# Patient Record
Sex: FEMALE | Race: BLACK OR AFRICAN AMERICAN | NOT HISPANIC OR LATINO | Employment: OTHER | URBAN - METROPOLITAN AREA
[De-identification: names, ages, dates, MRNs, and addresses within clinical notes are randomized per-mention and may not be internally consistent; named-entity substitution may affect disease eponyms.]

---

## 2017-01-09 ENCOUNTER — LAB VISIT (OUTPATIENT)
Dept: LAB | Facility: HOSPITAL | Age: 57
End: 2017-01-09
Attending: INTERNAL MEDICINE
Payer: MEDICARE

## 2017-01-09 DIAGNOSIS — E11.8 TYPE II DIABETES MELLITUS WITH COMPLICATION: ICD-10-CM

## 2017-01-09 LAB
ALBUMIN SERPL BCP-MCNC: 3.5 G/DL
ALP SERPL-CCNC: 82 U/L
ALT SERPL W/O P-5'-P-CCNC: 24 U/L
ANION GAP SERPL CALC-SCNC: 10 MMOL/L
AST SERPL-CCNC: 20 U/L
BILIRUB SERPL-MCNC: 0.3 MG/DL
BUN SERPL-MCNC: 14 MG/DL
CALCIUM SERPL-MCNC: 8.5 MG/DL
CHLORIDE SERPL-SCNC: 106 MMOL/L
CHOLEST/HDLC SERPL: 6.5 {RATIO}
CO2 SERPL-SCNC: 24 MMOL/L
CREAT SERPL-MCNC: 0.9 MG/DL
EST. GFR  (AFRICAN AMERICAN): >60 ML/MIN/1.73 M^2
EST. GFR  (NON AFRICAN AMERICAN): >60 ML/MIN/1.73 M^2
GLUCOSE SERPL-MCNC: 132 MG/DL
HDL/CHOLESTEROL RATIO: 15.4 %
HDLC SERPL-MCNC: 246 MG/DL
HDLC SERPL-MCNC: 38 MG/DL
LDLC SERPL CALC-MCNC: 163.4 MG/DL
NONHDLC SERPL-MCNC: 208 MG/DL
POTASSIUM SERPL-SCNC: 3.8 MMOL/L
PROT SERPL-MCNC: 7.3 G/DL
SODIUM SERPL-SCNC: 140 MMOL/L
TRIGL SERPL-MCNC: 223 MG/DL
TSH SERPL DL<=0.005 MIU/L-ACNC: 3.09 UIU/ML

## 2017-01-09 PROCEDURE — 36415 COLL VENOUS BLD VENIPUNCTURE: CPT | Mod: PO

## 2017-01-09 PROCEDURE — 80053 COMPREHEN METABOLIC PANEL: CPT

## 2017-01-09 PROCEDURE — 84443 ASSAY THYROID STIM HORMONE: CPT

## 2017-01-09 PROCEDURE — 80061 LIPID PANEL: CPT

## 2017-01-09 PROCEDURE — 83036 HEMOGLOBIN GLYCOSYLATED A1C: CPT

## 2017-01-10 LAB
ESTIMATED AVG GLUCOSE: 157 MG/DL
HBA1C MFR BLD HPLC: 7.1 %

## 2017-01-17 ENCOUNTER — OFFICE VISIT (OUTPATIENT)
Dept: INTERNAL MEDICINE | Facility: CLINIC | Age: 57
End: 2017-01-17
Payer: MEDICARE

## 2017-01-17 VITALS
BODY MASS INDEX: 33.27 KG/M2 | TEMPERATURE: 99 F | WEIGHT: 207 LBS | HEIGHT: 66 IN | DIASTOLIC BLOOD PRESSURE: 72 MMHG | OXYGEN SATURATION: 99 % | HEART RATE: 94 BPM | SYSTOLIC BLOOD PRESSURE: 136 MMHG

## 2017-01-17 DIAGNOSIS — I15.2 HYPERTENSION ASSOCIATED WITH DIABETES: ICD-10-CM

## 2017-01-17 DIAGNOSIS — Z86.010 HISTORY OF COLON POLYPS: ICD-10-CM

## 2017-01-17 DIAGNOSIS — E11.59 HYPERTENSION ASSOCIATED WITH DIABETES: ICD-10-CM

## 2017-01-17 DIAGNOSIS — E78.5 HYPERLIPIDEMIA ASSOCIATED WITH TYPE 2 DIABETES MELLITUS: ICD-10-CM

## 2017-01-17 DIAGNOSIS — F32.A DEPRESSION, UNSPECIFIED DEPRESSION TYPE: ICD-10-CM

## 2017-01-17 DIAGNOSIS — E11.69 HYPERLIPIDEMIA ASSOCIATED WITH TYPE 2 DIABETES MELLITUS: ICD-10-CM

## 2017-01-17 DIAGNOSIS — Z12.31 SCREENING MAMMOGRAM, ENCOUNTER FOR: ICD-10-CM

## 2017-01-17 DIAGNOSIS — E11.8 TYPE 2 DIABETES MELLITUS WITH COMPLICATION, WITHOUT LONG-TERM CURRENT USE OF INSULIN: Primary | ICD-10-CM

## 2017-01-17 DIAGNOSIS — I25.10 CORONARY ARTERY DISEASE INVOLVING NATIVE CORONARY ARTERY OF NATIVE HEART WITHOUT ANGINA PECTORIS: ICD-10-CM

## 2017-01-17 PROCEDURE — G0008 ADMIN INFLUENZA VIRUS VAC: HCPCS | Mod: PBBFAC,PO | Performed by: INTERNAL MEDICINE

## 2017-01-17 PROCEDURE — 99214 OFFICE O/P EST MOD 30 MIN: CPT | Mod: 25,S$PBB,, | Performed by: INTERNAL MEDICINE

## 2017-01-17 PROCEDURE — 99999 PR PBB SHADOW E&M-EST. PATIENT-LVL III: CPT | Mod: PBBFAC,,, | Performed by: INTERNAL MEDICINE

## 2017-01-17 PROCEDURE — 99213 OFFICE O/P EST LOW 20 MIN: CPT | Mod: PBBFAC,PO | Performed by: INTERNAL MEDICINE

## 2017-01-17 PROCEDURE — 90732 PPSV23 VACC 2 YRS+ SUBQ/IM: CPT | Mod: PBBFAC,PO | Performed by: INTERNAL MEDICINE

## 2017-01-17 RX ORDER — ROSUVASTATIN CALCIUM 40 MG/1
40 TABLET, COATED ORAL NIGHTLY
Qty: 90 TABLET | Refills: 3 | Status: SHIPPED | OUTPATIENT
Start: 2017-01-17 | End: 2018-01-22

## 2017-01-17 NOTE — MR AVS SNAPSHOT
[facility] Internal Medicine  [address] [facility]  [place] [address]  Phone: [phone]                  Dulce Boogie   2017 10:00 AM   Office Visit    Description:  Female : 1960   Provider:  Zeus Hanson MD   Department:  [facility] - Internal Medicine           Reason for Visit     6 MONTH FOLLOW UP           Diagnoses this Visit        Comments    Type 2 diabetes mellitus with complication, without long-term current use of insulin    -  Primary     Hypertension associated with diabetes         Hyperlipidemia associated with type 2 diabetes mellitus         Coronary artery disease involving native coronary artery of native heart without angina pectoris         History of colon polyps         Depression, unspecified depression type         Screening mammogram, encounter for                To Do List           Future Appointments        Provider Department Dept Phone    7/10/2017 9:10 AM LABORATORY, [facility] Laboratory [phone]    2017 9:40 AM Zeus Hanson MD [facility] Internal Medicine [phone]      Goals (5 Years of Data)     None      Follow-Up and Disposition     Return in about 6 months (around 2017).       These Medications        Disp Refills Start End    rosuvastatin (CRESTOR) 40 MG Tab 90 tablet 3 2017    Take 1 tablet (40 mg total) by mouth every evening. - Oral    Pharmacy: [facility]/pharmacy #[id number] - [place], [place] - [address] AT [facility] Ph #: [phone]         [facility] On Call     [facility] Care Line -  Assistance  Registered nurses in the [facility] On Call Center provide clinical advisement, health education, appointment booking, and other advisory services.  Call for this free service at [phone].             Medications           Message regarding Medications     Verify the changes and/or additions to your medication regime listed below are the same as discussed with your clinician  today.  If any of these changes or additions are incorrect, please notify your healthcare provider.        START taking these NEW medications        Refills    rosuvastatin (CRESTOR) 40 MG Tab 3    Sig: Take 1 tablet (40 mg total) by mouth every evening.    Class: Normal    Route: Oral           Verify that the below list of medications is an accurate representation of the medications you are currently taking.  If none reported, the list may be blank. If incorrect, please contact your healthcare provider. Carry this list with you in case of emergency.           Current Medications     alprazolam (XANAX) 0.5 MG tablet Take 1 tablet (0.5 mg total) by mouth nightly.    amlodipine-olmesartan (MARCELLE) 10-40 mg per tablet Take 1 tablet by mouth once daily.    cyclobenzaprine (FLEXERIL) 10 MG tablet Take 1 tablet (10 mg total) by mouth 3 (three) times daily.    doxazosin (CARDURA) 4 MG tablet Take 1 tablet (4 mg total) by mouth once daily.    fenofibrate micronized (LOFIBRA) 134 MG Cap TAKE ONE CAPSULE BY MOUTH EVERY DAY WITH BREAKFAST.    hydrocodone-acetaminophen 5-325mg (NORCO) 5-325 mg per tablet Take 1 tablet by mouth 3 (three) times daily as needed for Pain.    meloxicam (MOBIC) 7.5 MG tablet Take 1 tablet (7.5 mg total) by mouth 2 (two) times daily.    metformin (GLUCOPHAGE) 500 MG tablet Take 1 tablet (500 mg total) by mouth 2 (two) times daily with meals.    mupirocin (BACTROBAN) 2 % ointment Three times a day. Apply a small amount three times a day     niacin (NIASPAN EXTENDED-RELEASE) 750 MG CR tablet Take 1 tablet (750 mg total) by mouth nightly.    NITROSTAT 0.4 mg SL tablet Place 1 tablet under the tongue.    omega 3-dha-epa-fish oil (FISH OIL) 900-1,400 mg CpDR Take 1 capsule by mouth 2 (two) times daily.    omeprazole (PRILOSEC) 40 MG capsule Take 1 capsule (40 mg total) by mouth once daily.    potassium chloride SA (K-DUR,KLOR-CON) 20 MEQ tablet Take 1 tablet (20 mEq total) by mouth once daily.     "promethazine (PHENERGAN) 25 MG tablet Take 1 tablet (25 mg total) by mouth every 4 (four) hours.    metoprolol tartrate (LOPRESSOR) 25 MG tablet Take 1 tablet (25 mg total) by mouth 2 (two) times daily.    rosuvastatin (CRESTOR) 40 MG Tab Take 1 tablet (40 mg total) by mouth every evening.           Clinical Reference Information           Vital Signs - Last Recorded  Most recent update: 1/17/2017 10:07 AM by Gregory Redman MA    BP Pulse Temp Ht Wt LMP    136/72 94 98.5 °F (36.9 °C) (Tympanic) 5' 6" (1.676 m) 93.9 kg (207 lb 0.2 oz) 06/21/2003    SpO2 BMI             99% 33.41 kg/m2         Blood Pressure          Most Recent Value    BP  136/72      Allergies as of 1/17/2017     No Known Drug Allergies      Immunizations Administered on Date of Encounter - 1/17/2017     Name Date Dose VIS Date Route    Influenza - High Dose 1/17/2017 0.5 mL 8/7/2015 Intramuscular    Pneumococcal Polysaccharide - 23 Valent 1/17/2017 0.5 mL 4/24/2015 Intramuscular      Orders Placed During Today's Visit      Normal Orders This Visit    Case request GI: COLONOSCOPY     Influenza - High Dose (65+) (PF) (IM)     Pneumococcal Polysaccharide Vaccine (23 Valent) (SQ/IM)     Future Labs/Procedures Expected by Expires    CBC auto differential  7/16/2017 (Approximate) 3/18/2018    Comprehensive metabolic panel  7/16/2017 (Approximate) 3/18/2018    Hemoglobin A1c  7/16/2017 (Approximate) 3/18/2018    Lipid panel  7/16/2017 (Approximate) 3/18/2018    Mammo Digital Screening Bilat with CAD  7/16/2017 (Approximate) 3/19/2018    Protein / creatinine ratio, urine  7/16/2017 (Approximate) 7/23/2017    TSH  7/16/2017 (Approximate) 3/18/2018      "

## 2017-01-17 NOTE — PROGRESS NOTES
"HPI:  Patient is a 56-year-old female who comes today for follow-up for diabetes, hypertension, lipids.  At this time, she has no reported problems or complaints.  She denies any hypoglycemic events.  Her blood pressure has been doing extremely well.  She denies any chest pain, shortness of breath.    Current meds have been verified and updated per the EMR  Exam:  Visit Vitals    /72    Pulse 94    Temp 98.5 °F (36.9 °C) (Tympanic)    Ht 5' 6" (1.676 m)    Wt 93.9 kg (207 lb 0.2 oz)    LMP 06/21/2003    SpO2 99%    BMI 33.41 kg/m2     Exam deferred    Lab Results   Component Value Date    WBC 6.95 07/07/2016    HGB 12.1 07/07/2016    HCT 38.0 07/07/2016     07/07/2016    CHOL 246 (H) 01/09/2017    TRIG 223 (H) 01/09/2017    HDL 38 (L) 01/09/2017    ALT 24 01/09/2017    AST 20 01/09/2017     01/09/2017    K 3.8 01/09/2017     01/09/2017    CREATININE 0.9 01/09/2017    BUN 14 01/09/2017    CO2 24 01/09/2017    TSH 3.091 01/09/2017    INR 1.0 12/21/2013    GLUF 106 01/07/2005    HGBA1C 7.1 (H) 01/09/2017       Impression:  Hyperlipidemia, not to goal  Other Multiple medical problems below, stable  Patient Active Problem List   Diagnosis    Depression    Obesity    CAD (coronary artery disease)    Type II diabetes mellitus with complication    Hypertension associated with diabetes    Hyperlipidemia associated with type 2 diabetes mellitus    History of colon polyps       Plan:  Orders Placed This Encounter    Mammo Digital Screening Bilat with CAD    Pneumococcal Polysaccharide Vaccine (23 Valent) (SQ/IM)    Influenza - High Dose (65+) (PF) (IM)    Hemoglobin A1c    Comprehensive metabolic panel    Lipid panel    Protein / creatinine ratio, urine    TSH    CBC auto differential    rosuvastatin (CRESTOR) 40 MG Tab    Case request GI: COLONOSCOPY     She will increase the Crestor to 40 mg a day.  She was given high-dose influenza and Pneumovax today.  She'll be seen again " in 6 months with the above lab work and mammogram.  She is due for her colonoscopy.

## 2017-01-17 NOTE — PROGRESS NOTES
Administered Pneumovax 23 / Highdose Flu per Dr Hanson. See immunization record. Tolerated well/TGD

## 2017-03-02 ENCOUNTER — OFFICE VISIT (OUTPATIENT)
Dept: INTERNAL MEDICINE | Facility: CLINIC | Age: 57
End: 2017-03-02
Payer: MEDICARE

## 2017-03-02 VITALS
WEIGHT: 209.44 LBS | DIASTOLIC BLOOD PRESSURE: 72 MMHG | HEIGHT: 66 IN | HEART RATE: 100 BPM | SYSTOLIC BLOOD PRESSURE: 128 MMHG | BODY MASS INDEX: 33.66 KG/M2 | TEMPERATURE: 100 F | OXYGEN SATURATION: 96 %

## 2017-03-02 DIAGNOSIS — R50.9 FEVER, UNSPECIFIED FEVER CAUSE: ICD-10-CM

## 2017-03-02 DIAGNOSIS — R52 BODY ACHES: ICD-10-CM

## 2017-03-02 DIAGNOSIS — J10.1 INFLUENZA B: ICD-10-CM

## 2017-03-02 LAB
CTP QC/QA: YES
CTP QC/QA: YES
FLUAV AG NPH QL: NEGATIVE
FLUBV AG NPH QL: POSITIVE
S PYO RRNA THROAT QL PROBE: NEGATIVE

## 2017-03-02 PROCEDURE — 87804 INFLUENZA ASSAY W/OPTIC: CPT | Mod: PBBFAC,PO | Performed by: NURSE PRACTITIONER

## 2017-03-02 PROCEDURE — 99999 PR PBB SHADOW E&M-EST. PATIENT-LVL IV: CPT | Mod: PBBFAC,,, | Performed by: NURSE PRACTITIONER

## 2017-03-02 PROCEDURE — 87880 STREP A ASSAY W/OPTIC: CPT | Mod: PBBFAC,PO | Performed by: NURSE PRACTITIONER

## 2017-03-02 PROCEDURE — 87081 CULTURE SCREEN ONLY: CPT

## 2017-03-02 PROCEDURE — 99214 OFFICE O/P EST MOD 30 MIN: CPT | Mod: PBBFAC,PO | Performed by: NURSE PRACTITIONER

## 2017-03-02 PROCEDURE — 99213 OFFICE O/P EST LOW 20 MIN: CPT | Mod: S$PBB,,, | Performed by: NURSE PRACTITIONER

## 2017-03-02 RX ORDER — PROMETHAZINE HYDROCHLORIDE AND DEXTROMETHORPHAN HYDROBROMIDE 6.25; 15 MG/5ML; MG/5ML
5 SYRUP ORAL EVERY 6 HOURS PRN
Qty: 240 ML | Refills: 0 | Status: SHIPPED | OUTPATIENT
Start: 2017-03-02 | End: 2017-03-12

## 2017-03-02 RX ORDER — MUPIROCIN 20 MG/G
OINTMENT TOPICAL 3 TIMES DAILY
Qty: 22 G | Refills: 0 | Status: SHIPPED | OUTPATIENT
Start: 2017-03-02 | End: 2017-09-27 | Stop reason: SDUPTHER

## 2017-03-02 RX ORDER — OSELTAMIVIR PHOSPHATE 75 MG/1
75 CAPSULE ORAL 2 TIMES DAILY
Qty: 10 CAPSULE | Refills: 0 | Status: SHIPPED | OUTPATIENT
Start: 2017-03-02 | End: 2017-03-07

## 2017-03-02 NOTE — PROGRESS NOTES
"Subjective:      Patient ID: Dulce Boogie is a 56 y.o. female.    Chief Complaint: Cough (fever,congestion,body aches)    HPI:  Patient states since yesterday she has had chills, fever, body aches. Her throat has been sore, dry cough.  She has taken tylenol and aspirin at home    Past Medical History:   Diagnosis Date    CAD (coronary artery disease)     Depression     History of colon polyps     Hyperlipidemia associated with type 2 diabetes mellitus     Hypertension associated with diabetes     Obesity     Type II diabetes mellitus with complication        Past Surgical History:   Procedure Laterality Date    CAROTID STENT       SECTION, LOW TRANSVERSE      x 2       Lab Results   Component Value Date    WBC 6.95 2016    HGB 12.1 2016    HCT 38.0 2016     2016    CHOL 246 (H) 2017    TRIG 223 (H) 2017    HDL 38 (L) 2017    ALT 24 2017    AST 20 2017     2017    K 3.8 2017     2017    CREATININE 0.9 2017    BUN 14 2017    CO2 24 2017    TSH 3.091 2017    INR 1.0 2013    GLUF 106 2005    HGBA1C 7.1 (H) 2017       /72  Pulse 100  Temp 99.9 °F (37.7 °C) (Tympanic)   Ht 5' 5.5" (1.664 m)  Wt 95 kg (209 lb 7 oz)  LMP 2003  SpO2 96%  BMI 34.32 kg/m2      Review of Systems   Constitutional: Positive for fever. Negative for appetite change, fatigue and unexpected weight change.        Body aches   HENT: Positive for congestion and sore throat. Negative for ear pain, postnasal drip, rhinorrhea, sinus pressure, sneezing, tinnitus, trouble swallowing and voice change.    Eyes: Negative for pain, discharge, redness, itching and visual disturbance.   Respiratory: Negative for cough, chest tightness, shortness of breath and wheezing.    Cardiovascular: Negative for chest pain, palpitations and leg swelling.   Gastrointestinal: Negative for abdominal distention, " abdominal pain, blood in stool, constipation, diarrhea, nausea and vomiting.        No reflux.   Genitourinary: Negative for difficulty urinating, dyspareunia, flank pain, menstrual problem and pelvic pain.   Musculoskeletal: Negative for arthralgias, back pain, myalgias and neck stiffness.   Skin: Negative for color change and rash.   Neurological: Negative for dizziness and headaches.   Psychiatric/Behavioral: Negative for confusion and sleep disturbance. The patient is not nervous/anxious.       Objective:     Physical Exam   Constitutional: She is oriented to person, place, and time. She appears well-developed and well-nourished. No distress.   HENT:   Head: Normocephalic and atraumatic.   Mouth/Throat: Oropharynx is clear and moist. No oropharyngeal exudate.   Mild red throat   Neck: Neck supple.   Cardiovascular: Normal rate, regular rhythm and normal heart sounds.  Exam reveals no gallop and no friction rub.    No murmur heard.  Pulmonary/Chest: Effort normal and breath sounds normal. No respiratory distress. She has no wheezes. She has no rales.   Musculoskeletal: She exhibits no edema or tenderness.   Lymphadenopathy:     She has no cervical adenopathy.   Neurological: She is alert and oriented to person, place, and time. No cranial nerve deficit.   Skin: Skin is warm and dry. She is not diaphoretic.   Psychiatric: She has a normal mood and affect. Her behavior is normal.   Nursing note and vitals reviewed.    Assessment:      1. Influenza B    2. Fever, unspecified fever cause    3. Body aches      Plan:   Influenza B    Fever, unspecified fever cause  -     POCT Rapid Strep A  -     POCT Influenza A/B  -     Strep A culture, throat    Body aches  -     POCT Rapid Strep A  -     POCT Influenza A/B  -     Strep A culture, throat    Other orders  -     mupirocin (BACTROBAN) 2 % ointment; Apply topically 3 (three) times daily.  Dispense: 22 g; Refill: 0  -     oseltamivir (TAMIFLU) 75 MG capsule; Take 1  capsule (75 mg total) by mouth 2 (two) times daily.  Dispense: 10 capsule; Refill: 0  -     promethazine-dextromethorphan (PROMETHAZINE-DM) 6.25-15 mg/5 mL Syrp; Take 5 mLs by mouth every 6 (six) hours as needed.  Dispense: 240 mL; Refill: 0    Will notify if strep is +.   Symptomatic care discussed, rest and fluids    Current Outpatient Prescriptions:     alprazolam (XANAX) 0.5 MG tablet, Take 1 tablet (0.5 mg total) by mouth nightly., Disp: 30 tablet, Rfl: 5    amlodipine-olmesartan (MARCELLE) 10-40 mg per tablet, Take 1 tablet by mouth once daily., Disp: 30 tablet, Rfl: 11    cyclobenzaprine (FLEXERIL) 10 MG tablet, Take 1 tablet (10 mg total) by mouth 3 (three) times daily., Disp: 50 tablet, Rfl: 3    doxazosin (CARDURA) 4 MG tablet, Take 1 tablet (4 mg total) by mouth once daily., Disp: 30 tablet, Rfl: 11    fenofibrate micronized (LOFIBRA) 134 MG Cap, TAKE ONE CAPSULE BY MOUTH EVERY DAY WITH BREAKFAST., Disp: 30 capsule, Rfl: 11    hydrocodone-acetaminophen 5-325mg (NORCO) 5-325 mg per tablet, Take 1 tablet by mouth 3 (three) times daily as needed for Pain., Disp: 60 tablet, Rfl: 0    meloxicam (MOBIC) 7.5 MG tablet, Take 1 tablet (7.5 mg total) by mouth 2 (two) times daily., Disp: 60 tablet, Rfl: 11    metformin (GLUCOPHAGE) 500 MG tablet, Take 1 tablet (500 mg total) by mouth 2 (two) times daily with meals., Disp: 180 tablet, Rfl: 3    niacin (NIASPAN EXTENDED-RELEASE) 750 MG CR tablet, Take 1 tablet (750 mg total) by mouth nightly., Disp: 30 tablet, Rfl: 11    NITROSTAT 0.4 mg SL tablet, Place 1 tablet under the tongue., Disp: , Rfl: 3    omega 3-dha-epa-fish oil (FISH OIL) 900-1,400 mg CpDR, Take 1 capsule by mouth 2 (two) times daily., Disp: , Rfl:     potassium chloride SA (K-DUR,KLOR-CON) 20 MEQ tablet, Take 1 tablet (20 mEq total) by mouth once daily., Disp: 30 tablet, Rfl: 11    promethazine (PHENERGAN) 25 MG tablet, Take 1 tablet (25 mg total) by mouth every 4 (four) hours., Disp: 30 tablet,  Rfl: 1    rosuvastatin (CRESTOR) 40 MG Tab, Take 1 tablet (40 mg total) by mouth every evening., Disp: 90 tablet, Rfl: 3    mupirocin (BACTROBAN) 2 % ointment, Apply topically 3 (three) times daily., Disp: 22 g, Rfl: 0    omeprazole (PRILOSEC) 40 MG capsule, Take 1 capsule (40 mg total) by mouth once daily., Disp: 30 capsule, Rfl: 11    oseltamivir (TAMIFLU) 75 MG capsule, Take 1 capsule (75 mg total) by mouth 2 (two) times daily., Disp: 10 capsule, Rfl: 0    promethazine-dextromethorphan (PROMETHAZINE-DM) 6.25-15 mg/5 mL Syrp, Take 5 mLs by mouth every 6 (six) hours as needed., Disp: 240 mL, Rfl: 0

## 2017-03-02 NOTE — MR AVS SNAPSHOT
[facility] Internal Medicine  [address] [place] [address]  [place] [address]  Phone: [phone]                  Dulce Boogie   3/2/2017 10:00 AM   Office Visit    Description:  Female : 1960   Provider:  Daniel Wilson NP   Department:  [facility]           Reason for Visit     Cough           Diagnoses this Visit        Comments    Fever, unspecified fever cause    -  Primary     Body aches                To Do List           Future Appointments        Provider Department Dept Phone    7/10/2017 9:10 AM LABORATORY, [facility] - Laboratory [phone]    2017 9:40 AM Zeus Hanson MD [facility] Internal Medicine [phone]      Goals (5 Years of Data)     None       These Medications        Disp Refills Start End    mupirocin (BACTROBAN) 2 % ointment 22 g 0 3/2/2017 3/12/2017    Apply topically 3 (three) times daily. - Topical (Top)    Pharmacy: [facility]/pharmacy #[id number] - [place] - [address] [facility] Ph #: [phone]       oseltamivir (TAMIFLU) 75 MG capsule 10 capsule 0 3/2/2017 3/7/2017    Take 1 capsule (75 mg total) by mouth 2 (two) times daily. - Oral    Pharmacy: [facility]/pharmacy #[id number]  [place] - [address] [facility] Ph #: [phone]       promethazine-dextromethorphan (PROMETHAZINE-DM) 6.25-15 mg/5 mL Syrp 240 mL 0 3/2/2017 3/12/2017    Take 5 mLs by mouth every 6 (six) hours as needed. - Oral    Pharmacy: [facility]/pharmacy #[id number]  [place] - [address] [facility] Ph #: [phone]         [facility] On Call     [facility] On Call Nurse Care Line -  Assistance  Registered nurses in the [facility] On Call Center provide clinical advisement, health education, appointment booking, and other advisory services.  Call for this free service at [phone].             Medications           Message regarding Medications     Verify the changes and/or additions to your medication regime listed  below are the same as discussed with your clinician today.  If any of these changes or additions are incorrect, please notify your healthcare provider.        START taking these NEW medications        Refills    mupirocin (BACTROBAN) 2 % ointment 0    Sig: Apply topically 3 (three) times daily.    Class: Normal    Route: Topical (Top)    oseltamivir (TAMIFLU) 75 MG capsule 0    Sig: Take 1 capsule (75 mg total) by mouth 2 (two) times daily.    Class: Normal    Route: Oral    promethazine-dextromethorphan (PROMETHAZINE-DM) 6.25-15 mg/5 mL Syrp 0    Sig: Take 5 mLs by mouth every 6 (six) hours as needed.    Class: Normal    Route: Oral      STOP taking these medications     metoprolol tartrate (LOPRESSOR) 25 MG tablet Take 1 tablet (25 mg total) by mouth 2 (two) times daily.           Verify that the below list of medications is an accurate representation of the medications you are currently taking.  If none reported, the list may be blank. If incorrect, please contact your healthcare provider. Carry this list with you in case of emergency.           Current Medications     alprazolam (XANAX) 0.5 MG tablet Take 1 tablet (0.5 mg total) by mouth nightly.    amlodipine-olmesartan (MARCELLE) 10-40 mg per tablet Take 1 tablet by mouth once daily.    cyclobenzaprine (FLEXERIL) 10 MG tablet Take 1 tablet (10 mg total) by mouth 3 (three) times daily.    doxazosin (CARDURA) 4 MG tablet Take 1 tablet (4 mg total) by mouth once daily.    fenofibrate micronized (LOFIBRA) 134 MG Cap TAKE ONE CAPSULE BY MOUTH EVERY DAY WITH BREAKFAST.    hydrocodone-acetaminophen 5-325mg (NORCO) 5-325 mg per tablet Take 1 tablet by mouth 3 (three) times daily as needed for Pain.    meloxicam (MOBIC) 7.5 MG tablet Take 1 tablet (7.5 mg total) by mouth 2 (two) times daily.    metformin (GLUCOPHAGE) 500 MG tablet Take 1 tablet (500 mg total) by mouth 2 (two) times daily with meals.    niacin (NIASPAN EXTENDED-RELEASE) 750 MG CR tablet Take 1 tablet (750  mg total) by mouth nightly.    NITROSTAT 0.4 mg SL tablet Place 1 tablet under the tongue.    omega 3-dha-epa-fish oil (FISH OIL) 900-1,400 mg CpDR Take 1 capsule by mouth 2 (two) times daily.    potassium chloride SA (K-DUR,KLOR-CON) 20 MEQ tablet Take 1 tablet (20 mEq total) by mouth once daily.    promethazine (PHENERGAN) 25 MG tablet Take 1 tablet (25 mg total) by mouth every 4 (four) hours.    rosuvastatin (CRESTOR) 40 MG Tab Take 1 tablet (40 mg total) by mouth every evening.    mupirocin (BACTROBAN) 2 % ointment Apply topically 3 (three) times daily.    omeprazole (PRILOSEC) 40 MG capsule Take 1 capsule (40 mg total) by mouth once daily.    oseltamivir (TAMIFLU) 75 MG capsule Take 1 capsule (75 mg total) by mouth 2 (two) times daily.    promethazine-dextromethorphan (PROMETHAZINE-DM) 6.25-15 mg/5 mL Syrp Take 5 mLs by mouth every 6 (six) hours as needed.           Clinical Reference Information           Your Vitals Were     BP                   128/72           Blood Pressure          Most Recent Value    BP  128/72      Allergies as of 3/2/2017     No Known Drug Allergies      Immunizations Administered on Date of Encounter - 3/2/2017     None      Orders Placed During Today's Visit      Normal Orders This Visit    POCT Influenza A/B     POCT Rapid Strep A     Strep A culture, throat          3/2/2017 11:00 AM - Lilibeth Amaral LPN      Component Results     Component Value Flag Ref Range Units Status    Rapid Strep A Screen Negative  Negative  Final     Acceptable Yes    Final         3/2/2017 11:02 AM - Lilibeth Amaral LPN      Component Results     Component Value Flag Ref Range Units Status    Rapid Influenza A Ag Negative  Negative  Final    Rapid Influenza B Ag Positive (A) Negative  Final     Acceptable Yes    Final            Language Assistance Services     ATTENTION: Language assistance services are available, free of charge. Please call [phone].      ATENCIÓN: Trevon fletcher  español, tiene a hernandez disposición servicios gratuitos de asistencia lingüística. Morena al [phone].     JAY JAY Ý: N?u b?n nói Ti?ng Vi?t, có các d?ch v? h? tr? ngôn ng? mi?n phí dành cho b?n. G?i s? [phone].         [facility] complies with applicable Federal civil rights laws and does not discriminate on the basis of race, color, national origin, age, disability, or sex.

## 2017-03-04 LAB — BACTERIA THROAT CULT: NORMAL

## 2017-03-07 RX ORDER — AMLODIPINE AND OLMESARTAN MEDOXOMIL 10; 40 MG/1; MG/1
1 TABLET ORAL DAILY
Qty: 30 TABLET | Refills: 11 | Status: SHIPPED | OUTPATIENT
Start: 2017-03-07 | End: 2017-07-12

## 2017-03-07 RX ORDER — ALPRAZOLAM 0.5 MG/1
0.5 TABLET ORAL NIGHTLY
Qty: 30 TABLET | Refills: 5 | Status: SHIPPED | OUTPATIENT
Start: 2017-03-07 | End: 2017-05-04 | Stop reason: SDUPTHER

## 2017-03-07 RX ORDER — METFORMIN HYDROCHLORIDE 500 MG/1
500 TABLET ORAL 2 TIMES DAILY WITH MEALS
Qty: 180 TABLET | Refills: 3 | Status: SHIPPED | OUTPATIENT
Start: 2017-03-07 | End: 2018-06-06 | Stop reason: SDUPTHER

## 2017-03-07 RX ORDER — OMEPRAZOLE 40 MG/1
40 CAPSULE, DELAYED RELEASE ORAL DAILY
Qty: 30 CAPSULE | Refills: 11 | Status: SHIPPED | OUTPATIENT
Start: 2017-03-07 | End: 2017-07-10 | Stop reason: SDUPTHER

## 2017-03-07 RX ORDER — DOXAZOSIN 4 MG/1
4 TABLET ORAL DAILY
Qty: 30 TABLET | Refills: 11 | Status: SHIPPED | OUTPATIENT
Start: 2017-03-07 | End: 2017-07-10 | Stop reason: SDUPTHER

## 2017-03-07 RX ORDER — FENOFIBRATE 134 MG/1
CAPSULE ORAL
Qty: 30 CAPSULE | Refills: 11 | Status: SHIPPED | OUTPATIENT
Start: 2017-03-07 | End: 2017-07-10 | Stop reason: SDUPTHER

## 2017-03-07 RX ORDER — CYCLOBENZAPRINE HCL 10 MG
10 TABLET ORAL 3 TIMES DAILY
Qty: 50 TABLET | Refills: 3 | Status: SHIPPED | OUTPATIENT
Start: 2017-03-07 | End: 2017-11-01 | Stop reason: SDUPTHER

## 2017-03-07 RX ORDER — POTASSIUM CHLORIDE 20 MEQ/1
20 TABLET, EXTENDED RELEASE ORAL DAILY
Qty: 30 TABLET | Refills: 11 | Status: SHIPPED | OUTPATIENT
Start: 2017-03-07 | End: 2017-07-10 | Stop reason: SDUPTHER

## 2017-03-07 RX ORDER — NIACIN 750 MG/1
750 TABLET, EXTENDED RELEASE ORAL NIGHTLY
Qty: 30 TABLET | Refills: 11 | Status: SHIPPED | OUTPATIENT
Start: 2017-03-07 | End: 2017-05-15

## 2017-03-07 RX ORDER — MELOXICAM 7.5 MG/1
7.5 TABLET ORAL 2 TIMES DAILY
Qty: 60 TABLET | Refills: 11 | Status: SHIPPED | OUTPATIENT
Start: 2017-03-07 | End: 2017-07-10 | Stop reason: SDUPTHER

## 2017-05-04 RX ORDER — HYDROCODONE BITARTRATE AND ACETAMINOPHEN 5; 325 MG/1; MG/1
1 TABLET ORAL 3 TIMES DAILY PRN
Qty: 60 TABLET | Refills: 0 | Status: SHIPPED | OUTPATIENT
Start: 2017-05-04 | End: 2017-09-27 | Stop reason: SDUPTHER

## 2017-05-04 RX ORDER — ALPRAZOLAM 0.5 MG/1
0.5 TABLET ORAL NIGHTLY
Qty: 30 TABLET | Refills: 5 | Status: SHIPPED | OUTPATIENT
Start: 2017-05-04 | End: 2018-01-22

## 2017-05-15 ENCOUNTER — TELEPHONE (OUTPATIENT)
Dept: INTERNAL MEDICINE | Facility: CLINIC | Age: 57
End: 2017-05-15

## 2017-05-15 RX ORDER — NIACIN 750 MG
750 TABLET, EXTENDED RELEASE ORAL NIGHTLY
Qty: 30 TABLET | Refills: 11 | COMMUNITY
Start: 2017-05-15 | End: 2018-04-05 | Stop reason: SDUPTHER

## 2017-05-15 NOTE — TELEPHONE ENCOUNTER
----- Message from Niru Anders sent at 5/15/2017  9:45 AM CDT -----  Contact: Mita/[facility]  Mita has a question about Rx Niacin, Please call her at [phone]      [facility]/pharmacy #[id number] - [place] [facility] [address] [facility]  [address]  [place] [address]  Phone: [phone] Fax: [phone]    Thanks  td

## 2017-05-15 NOTE — TELEPHONE ENCOUNTER
Returned call to THANIA Fan. She advised that the ER Niacin is not covered by pt's insurance. SLOW RELEASED Niacin is preferred. LV 03/02/17. [place] 07/14/17/TWIN

## 2017-07-10 ENCOUNTER — PATIENT MESSAGE (OUTPATIENT)
Dept: INTERNAL MEDICINE | Facility: CLINIC | Age: 57
End: 2017-07-10

## 2017-07-10 ENCOUNTER — LAB VISIT (OUTPATIENT)
Dept: LAB | Facility: HOSPITAL | Age: 57
End: 2017-07-10
Attending: INTERNAL MEDICINE
Payer: MEDICARE

## 2017-07-10 DIAGNOSIS — E11.8 TYPE 2 DIABETES MELLITUS WITH COMPLICATION, WITHOUT LONG-TERM CURRENT USE OF INSULIN: ICD-10-CM

## 2017-07-10 DIAGNOSIS — Z11.59 NEED FOR HEPATITIS C SCREENING TEST: ICD-10-CM

## 2017-07-10 LAB
ALBUMIN SERPL BCP-MCNC: 3.6 G/DL
ALP SERPL-CCNC: 72 U/L
ALT SERPL W/O P-5'-P-CCNC: 27 U/L
ANION GAP SERPL CALC-SCNC: 12 MMOL/L
AST SERPL-CCNC: 22 U/L
BASOPHILS # BLD AUTO: 0.04 K/UL
BASOPHILS NFR BLD: 0.5 %
BILIRUB SERPL-MCNC: 0.3 MG/DL
BUN SERPL-MCNC: 19 MG/DL
CALCIUM SERPL-MCNC: 8.9 MG/DL
CHLORIDE SERPL-SCNC: 109 MMOL/L
CHOLEST/HDLC SERPL: 7 {RATIO}
CO2 SERPL-SCNC: 20 MMOL/L
CREAT SERPL-MCNC: 0.9 MG/DL
DIFFERENTIAL METHOD: ABNORMAL
EOSINOPHIL # BLD AUTO: 0.2 K/UL
EOSINOPHIL NFR BLD: 2.1 %
ERYTHROCYTE [DISTWIDTH] IN BLOOD BY AUTOMATED COUNT: 15.2 %
EST. GFR  (AFRICAN AMERICAN): >60 ML/MIN/1.73 M^2
EST. GFR  (NON AFRICAN AMERICAN): >60 ML/MIN/1.73 M^2
GLUCOSE SERPL-MCNC: 141 MG/DL
HCT VFR BLD AUTO: 37.6 %
HDL/CHOLESTEROL RATIO: 14.2 %
HDLC SERPL-MCNC: 246 MG/DL
HDLC SERPL-MCNC: 35 MG/DL
HGB BLD-MCNC: 12.2 G/DL
LDLC SERPL CALC-MCNC: 172.4 MG/DL
LYMPHOCYTES # BLD AUTO: 3.7 K/UL
LYMPHOCYTES NFR BLD: 48.4 %
MCH RBC QN AUTO: 26.3 PG
MCHC RBC AUTO-ENTMCNC: 32.4 %
MCV RBC AUTO: 81 FL
MONOCYTES # BLD AUTO: 0.6 K/UL
MONOCYTES NFR BLD: 7.4 %
NEUTROPHILS # BLD AUTO: 3.2 K/UL
NEUTROPHILS NFR BLD: 41.6 %
NONHDLC SERPL-MCNC: 211 MG/DL
PLATELET # BLD AUTO: 181 K/UL
PMV BLD AUTO: 13.2 FL
POTASSIUM SERPL-SCNC: 3.3 MMOL/L
PROT SERPL-MCNC: 7.4 G/DL
RBC # BLD AUTO: 4.63 M/UL
SODIUM SERPL-SCNC: 141 MMOL/L
TRIGL SERPL-MCNC: 193 MG/DL
TSH SERPL DL<=0.005 MIU/L-ACNC: 2.08 UIU/ML
WBC # BLD AUTO: 7.69 K/UL

## 2017-07-10 PROCEDURE — 80061 LIPID PANEL: CPT

## 2017-07-10 PROCEDURE — 86803 HEPATITIS C AB TEST: CPT

## 2017-07-10 PROCEDURE — 85025 COMPLETE CBC W/AUTO DIFF WBC: CPT

## 2017-07-10 PROCEDURE — 84443 ASSAY THYROID STIM HORMONE: CPT

## 2017-07-10 PROCEDURE — 80053 COMPREHEN METABOLIC PANEL: CPT

## 2017-07-10 PROCEDURE — 36415 COLL VENOUS BLD VENIPUNCTURE: CPT | Mod: PO

## 2017-07-10 PROCEDURE — 83036 HEMOGLOBIN GLYCOSYLATED A1C: CPT

## 2017-07-10 RX ORDER — POTASSIUM CHLORIDE 20 MEQ/1
20 TABLET, EXTENDED RELEASE ORAL DAILY
Qty: 90 TABLET | Refills: 3 | Status: SHIPPED | OUTPATIENT
Start: 2017-07-10 | End: 2018-05-30

## 2017-07-10 RX ORDER — OMEPRAZOLE 40 MG/1
40 CAPSULE, DELAYED RELEASE ORAL DAILY
Qty: 90 CAPSULE | Refills: 3 | Status: SHIPPED | OUTPATIENT
Start: 2017-07-10 | End: 2018-09-03 | Stop reason: SDUPTHER

## 2017-07-10 RX ORDER — MELOXICAM 7.5 MG/1
7.5 TABLET ORAL 2 TIMES DAILY
Qty: 180 TABLET | Refills: 3 | Status: SHIPPED | OUTPATIENT
Start: 2017-07-10 | End: 2018-01-22

## 2017-07-10 RX ORDER — FENOFIBRATE 134 MG/1
CAPSULE ORAL
Qty: 90 CAPSULE | Refills: 3 | Status: SHIPPED | OUTPATIENT
Start: 2017-07-10 | End: 2018-01-22

## 2017-07-10 RX ORDER — DOXAZOSIN 4 MG/1
4 TABLET ORAL DAILY
Qty: 90 TABLET | Refills: 3 | Status: SHIPPED | OUTPATIENT
Start: 2017-07-10 | End: 2018-09-03 | Stop reason: SDUPTHER

## 2017-07-11 LAB
ESTIMATED AVG GLUCOSE: 160 MG/DL
HBA1C MFR BLD HPLC: 7.2 %
HCV AB SERPL QL IA: NEGATIVE

## 2017-07-12 ENCOUNTER — TELEPHONE (OUTPATIENT)
Dept: INTERNAL MEDICINE | Facility: CLINIC | Age: 57
End: 2017-07-12

## 2017-07-12 RX ORDER — VALSARTAN 320 MG/1
320 TABLET ORAL DAILY
Qty: 90 TABLET | Refills: 3 | Status: SHIPPED | OUTPATIENT
Start: 2017-07-12 | End: 2018-04-05

## 2017-07-12 RX ORDER — AMLODIPINE BESYLATE 10 MG/1
10 TABLET ORAL DAILY
Qty: 90 TABLET | Refills: 3 | Status: SHIPPED | OUTPATIENT
Start: 2017-07-12 | End: 2018-01-22

## 2017-07-12 NOTE — TELEPHONE ENCOUNTER
----- Message from Ivette Madsen sent at 7/12/2017 12:36 PM CDT -----  Contact: [facility]  Caller states states that they have questions regarding a script that was sent over        [facility]/pharmacy #[id number] - [place] - [address] [facility]  [address]  [place] [address]  Phone: [phone] Fax: [phone]

## 2017-07-12 NOTE — TELEPHONE ENCOUNTER
Unable to contact Medicare by phone the call is disconnected Medicare can't hear my response.  Pt is needing a prior authorization on Mauricio.  The pharmacy is recommending pt try Amlodipine,Valsartan,Lorsatan Irbesartan,Candesartan. Last visit 1/17/17 next visit 7/14/17  Please advise

## 2017-07-14 ENCOUNTER — TELEPHONE (OUTPATIENT)
Dept: INTERNAL MEDICINE | Facility: CLINIC | Age: 57
End: 2017-07-14

## 2017-07-14 ENCOUNTER — OFFICE VISIT (OUTPATIENT)
Dept: INTERNAL MEDICINE | Facility: CLINIC | Age: 57
End: 2017-07-14
Payer: MEDICARE

## 2017-07-14 VITALS
SYSTOLIC BLOOD PRESSURE: 150 MMHG | OXYGEN SATURATION: 98 % | HEIGHT: 66 IN | DIASTOLIC BLOOD PRESSURE: 82 MMHG | TEMPERATURE: 97 F | HEART RATE: 79 BPM | WEIGHT: 213.38 LBS | BODY MASS INDEX: 34.29 KG/M2

## 2017-07-14 DIAGNOSIS — I25.10 CORONARY ARTERY DISEASE INVOLVING NATIVE CORONARY ARTERY OF NATIVE HEART WITHOUT ANGINA PECTORIS: ICD-10-CM

## 2017-07-14 DIAGNOSIS — Z12.4 CERVICAL CANCER SCREENING: ICD-10-CM

## 2017-07-14 DIAGNOSIS — Z86.010 HISTORY OF COLON POLYPS: ICD-10-CM

## 2017-07-14 DIAGNOSIS — E11.8 TYPE 2 DIABETES MELLITUS WITH COMPLICATION, WITHOUT LONG-TERM CURRENT USE OF INSULIN: ICD-10-CM

## 2017-07-14 DIAGNOSIS — I15.2 HYPERTENSION ASSOCIATED WITH DIABETES: ICD-10-CM

## 2017-07-14 DIAGNOSIS — E11.59 HYPERTENSION ASSOCIATED WITH DIABETES: ICD-10-CM

## 2017-07-14 DIAGNOSIS — E78.5 HYPERLIPIDEMIA ASSOCIATED WITH TYPE 2 DIABETES MELLITUS: Primary | ICD-10-CM

## 2017-07-14 DIAGNOSIS — E11.69 HYPERLIPIDEMIA ASSOCIATED WITH TYPE 2 DIABETES MELLITUS: Primary | ICD-10-CM

## 2017-07-14 PROCEDURE — 99999 PR PBB SHADOW E&M-EST. PATIENT-LVL IV: CPT | Mod: PBBFAC,,, | Performed by: INTERNAL MEDICINE

## 2017-07-14 PROCEDURE — 88175 CYTOPATH C/V AUTO FLUID REDO: CPT

## 2017-07-14 PROCEDURE — 99214 OFFICE O/P EST MOD 30 MIN: CPT | Mod: S$PBB,,, | Performed by: INTERNAL MEDICINE

## 2017-07-14 PROCEDURE — 4010F ACE/ARB THERAPY RXD/TAKEN: CPT | Mod: ,,, | Performed by: INTERNAL MEDICINE

## 2017-07-14 PROCEDURE — 3045F PR MOST RECENT HEMOGLOBIN A1C LEVEL 7.0-9.0%: CPT | Mod: ,,, | Performed by: INTERNAL MEDICINE

## 2017-07-14 PROCEDURE — 99214 OFFICE O/P EST MOD 30 MIN: CPT | Mod: PBBFAC,PO | Performed by: INTERNAL MEDICINE

## 2017-07-14 RX ORDER — CLOPIDOGREL BISULFATE 75 MG/1
75 TABLET ORAL DAILY
Refills: 6 | Status: ON HOLD | COMMUNITY
Start: 2017-07-05 | End: 2018-05-23 | Stop reason: HOSPADM

## 2017-07-14 NOTE — TELEPHONE ENCOUNTER
Spoke with pt.  She reports that [facility] is her Part D carrier.  Will send prior authorization to [facility].

## 2017-07-14 NOTE — PROGRESS NOTES
"HPI:  Patient is a 56-year-old female who comes in today for follow-up of her hypertension, diabetes, lipids and her annual physical exam.  She states her blood pressure typically is 120 130/70-80 when she's been taking her medications.  Her insurance company denied her Mauricio and so.  She's been out of medications for about 2 weeks.  Patient states her blood sugars at been doing well.  She denies any hypoglycemic events.  She has been having some chest pain.  She saw her cardiologist several months ago and had a heart catheterization done that showed no significant blockages.  There've been no other new problems or complaints  Current MEDS: medcard review, verified and update  Allergies: Per the electronic medical record    Past Medical History:   Diagnosis Date    CAD (coronary artery disease)     Depression     History of colon polyps     Hyperlipidemia associated with type 2 diabetes mellitus     Hypertension associated with diabetes     Obesity     Type II diabetes mellitus with complication        Past Surgical History:   Procedure Laterality Date    CAROTID STENT       SECTION, LOW TRANSVERSE      x 2       SHx: per the electronic medical record    FHx: recorded in the electronic medical record    ROS:    denies any chest pains or shortness of breath. Denies any nausea, vomiting or diarrhea. Denies any fever, chills or sweats. Denies any change in weight, voice, stool, skin or hair. Denies any dysuria, dyspepsia or dysphagia. Denies any change in vision, hearing or headaches. Denies any swollen lymph nodes or loss of memory.    PE:  BP (!) 150/82   Pulse 79   Temp 96.6 °F (35.9 °C) (Tympanic)   Ht 5' 5.5" (1.664 m)   Wt 96.8 kg (213 lb 6.5 oz)   LMP 2003   SpO2 98%   BMI 34.97 kg/m²   Gen: Well-developed, well-nourished, female, in no acute distress, oriented x3  HEENT: neck is supple, no adenopathy, carotids 2+ equal without bruits, thyroid exam normal size without " nodules.  CHEST: clear to auscultation and percussion  CVS: regular rate and rhythm without significant murmur, gallop, or rubs  ABD: soft, benign, no rebound no guarding, no distention. Bowel sounds are normal.     Nontender,  no palpable masses, no organomegaly and no audible bruits.  BREAST: no masses.  No nipple inversion, retraction, or deviation.  EXT: no clubbing, cyanosis, or edema  LYMPH: no cervical, inguinal, or axillary adenopathy  FEET: no loss of sensation.  No ulcers or pressure sores.  Monofilament testing normal  NEURO: gait normal.  Cranial nerves II- XII intact. No nystagmus.  Speech normal.   Gross motor and sensory unremarkable.  PELVIC: External genitalia unremarkable.  Uterus, cervix, adnexa all normal.  Pap smear done    Lab Results   Component Value Date    WBC 7.69 07/10/2017    HGB 12.2 07/10/2017    HCT 37.6 07/10/2017     07/10/2017    CHOL 246 (H) 07/10/2017    TRIG 193 (H) 07/10/2017    HDL 35 (L) 07/10/2017    ALT 27 07/10/2017    AST 22 07/10/2017     07/10/2017    K 3.3 (L) 07/10/2017     07/10/2017    CREATININE 0.9 07/10/2017    BUN 19 07/10/2017    CO2 20 (L) 07/10/2017    TSH 2.075 07/10/2017    INR 1.0 12/21/2013    GLUF 106 01/07/2005    HGBA1C 7.2 (H) 07/10/2017       Impression:  Hypertension, not to goal because she's been out of her medications  Hyperlipidemia, she again is not been faithful with her Crestor  Diabetes, adequately controlled  Other medical problems below, stable  Patient Active Problem List   Diagnosis    Depression    Obesity    CAD (coronary artery disease)    Type II diabetes mellitus with complication    Hypertension associated with diabetes    Hyperlipidemia associated with type 2 diabetes mellitus    History of colon polyps       Plan:   Orders Placed This Encounter    Hemoglobin A1c    Comprehensive metabolic panel    Lipid panel    Protein / creatinine ratio, urine    Liquid-based pap smear, screening    Case request  GI: COLONOSCOPY     Her medications will remain the same.  She'll see me again in 6 months with above lab work.  She's been encouraged to be faithful with her pills, diet, and exercise

## 2017-07-14 NOTE — TELEPHONE ENCOUNTER
Called pt left message to call office with more information for her prior authorization for Mauricio.  Medicare is needing her Part D carrier.

## 2017-08-16 ENCOUNTER — HOSPITAL ENCOUNTER (OUTPATIENT)
Dept: RADIOLOGY | Facility: HOSPITAL | Age: 57
Discharge: HOME | End: 2017-08-16
Attending: INTERNAL MEDICINE
Payer: MEDICARE

## 2017-08-16 VITALS — HEIGHT: 66 IN | WEIGHT: 213 LBS | BODY MASS INDEX: 34.23 KG/M2

## 2017-08-16 DIAGNOSIS — Z12.31 SCREENING MAMMOGRAM, ENCOUNTER FOR: ICD-10-CM

## 2017-08-16 PROCEDURE — 77067 SCR MAMMO BI INCL CAD: CPT | Mod: TC

## 2017-08-16 PROCEDURE — 77067 SCR MAMMO BI INCL CAD: CPT | Mod: 26,,, | Performed by: RADIOLOGY

## 2017-08-18 ENCOUNTER — TELEPHONE (OUTPATIENT)
Dept: INTERNAL MEDICINE | Facility: CLINIC | Age: 57
End: 2017-08-18

## 2017-08-18 NOTE — TELEPHONE ENCOUNTER
----- Message from Sanju Lewis sent at 8/18/2017  8:31 AM CDT -----  Contact: [phone]  Would like to consult with nurse about blood pressure medication script. She states medication is not working pressure is still high. Please call back at [phone]. x. nicolás

## 2017-09-27 RX ORDER — MUPIROCIN 20 MG/G
OINTMENT TOPICAL 3 TIMES DAILY
Qty: 22 G | Refills: 0 | Status: SHIPPED | OUTPATIENT
Start: 2017-09-27 | End: 2019-01-02 | Stop reason: SDUPTHER

## 2017-09-27 RX ORDER — HYDROCODONE BITARTRATE AND ACETAMINOPHEN 5; 325 MG/1; MG/1
1 TABLET ORAL 3 TIMES DAILY PRN
Qty: 60 TABLET | Refills: 0 | Status: SHIPPED | OUTPATIENT
Start: 2017-09-27 | End: 2020-11-23

## 2017-11-02 RX ORDER — CYCLOBENZAPRINE HCL 10 MG
10 TABLET ORAL 3 TIMES DAILY
Qty: 50 TABLET | Refills: 3 | Status: SHIPPED | OUTPATIENT
Start: 2017-11-02 | End: 2018-05-28 | Stop reason: SDUPTHER

## 2017-12-05 RX ORDER — ALPRAZOLAM 0.5 MG/1
0.5 TABLET ORAL NIGHTLY
Qty: 30 TABLET | Refills: 5 | Status: SHIPPED | OUTPATIENT
Start: 2017-12-05 | End: 2018-09-04 | Stop reason: SDUPTHER

## 2018-01-08 ENCOUNTER — PATIENT OUTREACH (OUTPATIENT)
Dept: ADMINISTRATIVE | Facility: HOSPITAL | Age: 58
End: 2018-01-08

## 2018-01-15 ENCOUNTER — LAB VISIT (OUTPATIENT)
Dept: LAB | Facility: HOSPITAL | Age: 58
End: 2018-01-15
Attending: INTERNAL MEDICINE
Payer: MEDICARE

## 2018-01-15 DIAGNOSIS — E11.8 TYPE 2 DIABETES MELLITUS WITH COMPLICATION, WITHOUT LONG-TERM CURRENT USE OF INSULIN: ICD-10-CM

## 2018-01-15 LAB
ALBUMIN SERPL BCP-MCNC: 3.6 G/DL
ALP SERPL-CCNC: 86 U/L
ALT SERPL W/O P-5'-P-CCNC: 23 U/L
ANION GAP SERPL CALC-SCNC: 7 MMOL/L
AST SERPL-CCNC: 18 U/L
BILIRUB SERPL-MCNC: 0.4 MG/DL
BUN SERPL-MCNC: 17 MG/DL
CALCIUM SERPL-MCNC: 9.1 MG/DL
CHLORIDE SERPL-SCNC: 105 MMOL/L
CHOLEST SERPL-MCNC: 227 MG/DL
CHOLEST/HDLC SERPL: 6 {RATIO}
CO2 SERPL-SCNC: 28 MMOL/L
CREAT SERPL-MCNC: 1 MG/DL
EST. GFR  (AFRICAN AMERICAN): >60 ML/MIN/1.73 M^2
EST. GFR  (NON AFRICAN AMERICAN): >60 ML/MIN/1.73 M^2
ESTIMATED AVG GLUCOSE: 157 MG/DL
GLUCOSE SERPL-MCNC: 149 MG/DL
HBA1C MFR BLD HPLC: 7.1 %
HDLC SERPL-MCNC: 38 MG/DL
HDLC SERPL: 16.7 %
LDLC SERPL CALC-MCNC: 154 MG/DL
NONHDLC SERPL-MCNC: 189 MG/DL
POTASSIUM SERPL-SCNC: 3.7 MMOL/L
PROT SERPL-MCNC: 7.5 G/DL
SODIUM SERPL-SCNC: 140 MMOL/L
TRIGL SERPL-MCNC: 175 MG/DL

## 2018-01-15 PROCEDURE — 80053 COMPREHEN METABOLIC PANEL: CPT

## 2018-01-15 PROCEDURE — 83036 HEMOGLOBIN GLYCOSYLATED A1C: CPT

## 2018-01-15 PROCEDURE — 80061 LIPID PANEL: CPT

## 2018-01-15 PROCEDURE — 36415 COLL VENOUS BLD VENIPUNCTURE: CPT | Mod: PO

## 2018-01-22 ENCOUNTER — TELEPHONE (OUTPATIENT)
Dept: INTERNAL MEDICINE | Facility: CLINIC | Age: 58
End: 2018-01-22

## 2018-01-22 ENCOUNTER — OFFICE VISIT (OUTPATIENT)
Dept: INTERNAL MEDICINE | Facility: CLINIC | Age: 58
End: 2018-01-22
Payer: MEDICARE

## 2018-01-22 VITALS
TEMPERATURE: 99 F | HEIGHT: 66 IN | HEART RATE: 69 BPM | WEIGHT: 215.19 LBS | SYSTOLIC BLOOD PRESSURE: 144 MMHG | OXYGEN SATURATION: 99 % | BODY MASS INDEX: 34.58 KG/M2 | DIASTOLIC BLOOD PRESSURE: 88 MMHG

## 2018-01-22 DIAGNOSIS — E11.59 HYPERTENSION ASSOCIATED WITH DIABETES: ICD-10-CM

## 2018-01-22 DIAGNOSIS — G89.29 CHRONIC BILATERAL LOW BACK PAIN WITHOUT SCIATICA: ICD-10-CM

## 2018-01-22 DIAGNOSIS — E11.69 HYPERLIPIDEMIA ASSOCIATED WITH TYPE 2 DIABETES MELLITUS: Primary | ICD-10-CM

## 2018-01-22 DIAGNOSIS — M54.50 CHRONIC BILATERAL LOW BACK PAIN WITHOUT SCIATICA: ICD-10-CM

## 2018-01-22 DIAGNOSIS — E78.5 HYPERLIPIDEMIA ASSOCIATED WITH TYPE 2 DIABETES MELLITUS: Primary | ICD-10-CM

## 2018-01-22 DIAGNOSIS — I15.2 HYPERTENSION ASSOCIATED WITH DIABETES: ICD-10-CM

## 2018-01-22 DIAGNOSIS — E11.8 TYPE 2 DIABETES MELLITUS WITH COMPLICATION, WITHOUT LONG-TERM CURRENT USE OF INSULIN: ICD-10-CM

## 2018-01-22 DIAGNOSIS — R10.31 RIGHT GROIN PAIN: ICD-10-CM

## 2018-01-22 DIAGNOSIS — M54.50 ACUTE RIGHT-SIDED LOW BACK PAIN WITHOUT SCIATICA: ICD-10-CM

## 2018-01-22 LAB
BILIRUB SERPL-MCNC: ABNORMAL MG/DL
BLOOD URINE, POC: ABNORMAL
COLOR, POC UA: YELLOW
GLUCOSE UR QL STRIP: 1000
KETONES UR QL STRIP: ABNORMAL
LEUKOCYTE ESTERASE URINE, POC: ABNORMAL
NITRITE, POC UA: ABNORMAL
PH, POC UA: 5
PROTEIN, POC: ABNORMAL
SPECIFIC GRAVITY, POC UA: 1.02
UROBILINOGEN, POC UA: ABNORMAL

## 2018-01-22 PROCEDURE — 99999 PR PBB SHADOW E&M-EST. PATIENT-LVL IV: CPT | Mod: PBBFAC,,, | Performed by: NURSE PRACTITIONER

## 2018-01-22 PROCEDURE — 81002 URINALYSIS NONAUTO W/O SCOPE: CPT | Mod: PBBFAC,PO | Performed by: NURSE PRACTITIONER

## 2018-01-22 PROCEDURE — 99214 OFFICE O/P EST MOD 30 MIN: CPT | Mod: PBBFAC,PO | Performed by: NURSE PRACTITIONER

## 2018-01-22 PROCEDURE — 99214 OFFICE O/P EST MOD 30 MIN: CPT | Mod: S$PBB,,, | Performed by: NURSE PRACTITIONER

## 2018-01-22 RX ORDER — ROSUVASTATIN CALCIUM 40 MG/1
40 TABLET, COATED ORAL NIGHTLY
Qty: 30 TABLET | Refills: 11 | Status: SHIPPED | OUTPATIENT
Start: 2018-01-22 | End: 2019-03-05 | Stop reason: SDUPTHER

## 2018-01-22 RX ORDER — ROSUVASTATIN CALCIUM 20 MG/1
TABLET, COATED ORAL
Refills: 1 | COMMUNITY
Start: 2017-12-06 | End: 2018-01-22

## 2018-01-22 RX ORDER — AMLODIPINE AND BENAZEPRIL HYDROCHLORIDE 5; 40 MG/1; MG/1
CAPSULE ORAL
Refills: 3 | COMMUNITY
Start: 2017-10-24 | End: 2018-05-21

## 2018-01-22 RX ORDER — FENOFIBRATE 160 MG/1
160 TABLET ORAL DAILY
Qty: 30 TABLET | Refills: 11 | Status: SHIPPED | OUTPATIENT
Start: 2018-01-22 | End: 2018-10-29

## 2018-01-22 RX ORDER — ASPIRIN 325 MG
325 TABLET ORAL DAILY
Refills: 0 | Status: ON HOLD
Start: 2018-01-22 | End: 2018-05-23 | Stop reason: HOSPADM

## 2018-01-22 NOTE — PROGRESS NOTES
Patient, Dulce Boogie (MRN #[id number]), presented with a recorded BMI of 35.26 kg/m^2 and a documented comorbidity(s):  - Diabetes Mellitus Type 2  - Hypertension  - Hyperlipidemia  to which the severe obesity is a contributing factor. This is consistent with the definition of severe obesity (BMI 35.0-35.9) with comorbidity (ICD-10 E66.01, Z68.35). The patient's severe obesity was monitored, evaluated, addressed and/or treated. This addendum to the medical record is made on 01/22/2018.

## 2018-01-22 NOTE — PROGRESS NOTES
"Subjective:      Patient ID: Dulce Boogie is a 57 y.o. female.    Chief Complaint: Follow-up (6 month/DM)    HPI:  Patient is here for a follow up of her labs.  She says her BP at home is better, she has not taken her meds yet today.  Her only complaint is that she is having lower back pain, tends to come around to her right groin area.  Denies any recent falls, trauma, or injury.  No fever of flank pain, no blood in urine.      Past Medical History:   Diagnosis Date    CAD (coronary artery disease)     Depression     History of colon polyps     Hyperlipidemia associated with type 2 diabetes mellitus     Hypertension associated with diabetes     Obesity     Type II diabetes mellitus with complication        Past Surgical History:   Procedure Laterality Date    CAROTID STENT       SECTION, LOW TRANSVERSE      x 2       Lab Results   Component Value Date    WBC 7.69 07/10/2017    HGB 12.2 07/10/2017    HCT 37.6 07/10/2017     07/10/2017    CHOL 227 (H) 01/15/2018    TRIG 175 (H) 01/15/2018    HDL 38 (L) 01/15/2018    ALT 23 01/15/2018    AST 18 01/15/2018     01/15/2018    K 3.7 01/15/2018     01/15/2018    CREATININE 1.0 01/15/2018    BUN 17 01/15/2018    CO2 28 01/15/2018    TSH 2.075 07/10/2017    INR 1.0 2013    GLUF 106 2005    HGBA1C 7.1 (H) 01/15/2018       BP (!) 144/88 (BP Location: Left arm, Patient Position: Sitting, BP Method: Medium (Manual))   Pulse 69   Temp 98.9 °F (37.2 °C) (Tympanic)   Ht 5' 5.5" (1.664 m)   Wt 97.6 kg (215 lb 2.7 oz)   LMP 2003   SpO2 99%   BMI 35.26 kg/m²       Review of Systems   Constitutional: Negative for appetite change, fatigue and unexpected weight change.   HENT: Negative for congestion, ear pain, postnasal drip, rhinorrhea, sinus pressure, sneezing, sore throat, tinnitus, trouble swallowing and voice change.    Eyes: Negative for pain, discharge, redness, itching and visual disturbance.   Respiratory: Negative " for cough, chest tightness, shortness of breath and wheezing.    Cardiovascular: Negative for chest pain, palpitations and leg swelling.   Gastrointestinal: Negative for abdominal distention, abdominal pain, blood in stool, constipation, diarrhea, nausea and vomiting.        No reflux.   Genitourinary: Negative for difficulty urinating, dyspareunia, flank pain, menstrual problem and pelvic pain.   Musculoskeletal: Positive for back pain. Negative for arthralgias, myalgias and neck stiffness.   Skin: Negative for color change and rash.   Neurological: Negative for dizziness and headaches.   Psychiatric/Behavioral: Negative for confusion and sleep disturbance. The patient is not nervous/anxious.       Objective:     Physical Exam   Constitutional: She is oriented to person, place, and time. She appears well-developed and well-nourished. No distress.   Musculoskeletal:   Normal gait  Neg straight leg raises bilaterally  No CV tenderness, POCT urine is normal  No spinal tenderness, is mildly TTP to right lower paraspinal muscle area   Neurological: She is alert and oriented to person, place, and time.   Skin: Skin is warm and dry.   Psychiatric: She has a normal mood and affect. Her behavior is normal.     Assessment:      1. Hyperlipidemia associated with type 2 diabetes mellitus    2. Hypertension associated with diabetes    3. Type 2 diabetes mellitus with complication, without long-term current use of insulin    4. Acute right-sided low back pain without sciatica    5. Right groin pain    6. Chronic bilateral low back pain without sciatica      Plan:   Hyperlipidemia associated with type 2 diabetes mellitus  -     Lipid panel; Future; Expected date: 01/22/2018    Hypertension associated with diabetes  -     Comprehensive metabolic panel; Future; Expected date: 01/22/2018    Type 2 diabetes mellitus with complication, without long-term current use of insulin  -     Hemoglobin A1c; Future; Expected date: 01/22/2018  -      Lipid panel; Future; Expected date: 01/22/2018  -     Comprehensive metabolic panel; Future; Expected date: 01/22/2018    Acute right-sided low back pain without sciatica  -     POCT urine dipstick without microscope    Right groin pain  -     POCT urine dipstick without microscope    Chronic bilateral low back pain without sciatica  -     Ambulatory Referral to Physical/Occupational Therapy    Other orders  -     rosuvastatin (CRESTOR) 40 MG Tab; Take 1 tablet (40 mg total) by mouth every evening.  Dispense: 30 tablet; Refill: 11  -     fenofibrate 160 MG Tab; Take 1 tablet (160 mg total) by mouth once daily.  Dispense: 30 tablet; Refill: 11  -     aspirin 325 MG tablet; Take 1 tablet (325 mg total) by mouth once daily.; Refill: 0    Still needs to schedule her colonoscopy, we discussed diet, exercise, weight loss, change in meds.  Will see PT for her back pain.  Will see dr nathan in 6  Months for labs and a physical      Current Outpatient Prescriptions:     clopidogrel (PLAVIX) 75 mg tablet, Take 75 mg by mouth once daily., Disp: , Rfl: 6    cyclobenzaprine (FLEXERIL) 10 MG tablet, TAKE 1 TABLET (10 MG TOTAL) BY MOUTH 3 (THREE) TIMES DAILY. (Patient taking differently: Take 10 mg by mouth as needed. ), Disp: 50 tablet, Rfl: 3    doxazosin (CARDURA) 4 MG tablet, Take 1 tablet (4 mg total) by mouth once daily., Disp: 90 tablet, Rfl: 3    hydrocodone-acetaminophen 5-325mg (NORCO) 5-325 mg per tablet, Take 1 tablet by mouth 3 (three) times daily as needed for Pain., Disp: 60 tablet, Rfl: 0    metformin (GLUCOPHAGE) 500 MG tablet, Take 1 tablet (500 mg total) by mouth 2 (two) times daily with meals., Disp: 180 tablet, Rfl: 3    niacin (NIASPAN) 750 mg TbSR, Take 1 tablet (750 mg total) by mouth every evening., Disp: 30 tablet, Rfl: 11    NITROSTAT 0.4 mg SL tablet, Place 1 tablet under the tongue., Disp: , Rfl: 3    omega 3-dha-epa-fish oil (FISH OIL) 900-1,400 mg CpDR, Take 1 capsule by mouth 2 (two)  times daily., Disp: , Rfl:     omeprazole (PRILOSEC) 40 MG capsule, Take 1 capsule (40 mg total) by mouth once daily., Disp: 90 capsule, Rfl: 3    potassium chloride SA (K-DUR,KLOR-CON) 20 MEQ tablet, Take 1 tablet (20 mEq total) by mouth once daily., Disp: 90 tablet, Rfl: 3    valsartan (DIOVAN) 320 MG tablet, Take 1 tablet (320 mg total) by mouth once daily., Disp: 90 tablet, Rfl: 3    ALPRAZolam (XANAX) 0.5 MG tablet, TAKE 1 TABLET (0.5 MG TOTAL) BY MOUTH NIGHTLY., Disp: 30 tablet, Rfl: 5    amlodipine-benazepril (LOTREL) 5-40 mg per capsule, TAKE ONE CAPSULE BY MOUTH EVERY DAY AS DIRECTED FOR BLOOD PRESSURE, Disp: , Rfl: 3    aspirin 325 MG tablet, Take 1 tablet (325 mg total) by mouth once daily., Disp: , Rfl: 0    fenofibrate 160 MG Tab, Take 1 tablet (160 mg total) by mouth once daily., Disp: 30 tablet, Rfl: 11    rosuvastatin (CRESTOR) 40 MG Tab, Take 1 tablet (40 mg total) by mouth every evening., Disp: 30 tablet, Rfl: 11

## 2018-01-31 RX ORDER — NITROGLYCERIN 0.4 MG/1
TABLET SUBLINGUAL
Qty: 25 TABLET | Refills: 1 | Status: SHIPPED | OUTPATIENT
Start: 2018-01-31 | End: 2018-09-04 | Stop reason: SDUPTHER

## 2018-03-12 ENCOUNTER — OFFICE VISIT (OUTPATIENT)
Dept: INTERNAL MEDICINE | Facility: CLINIC | Age: 58
End: 2018-03-12
Payer: MEDICARE

## 2018-03-12 VITALS
SYSTOLIC BLOOD PRESSURE: 120 MMHG | DIASTOLIC BLOOD PRESSURE: 82 MMHG | TEMPERATURE: 99 F | WEIGHT: 213.63 LBS | RESPIRATION RATE: 18 BRPM | HEART RATE: 74 BPM | HEIGHT: 65 IN | BODY MASS INDEX: 35.59 KG/M2 | OXYGEN SATURATION: 98 %

## 2018-03-12 DIAGNOSIS — R30.0 DYSURIA: Primary | ICD-10-CM

## 2018-03-12 DIAGNOSIS — R31.9 HEMATURIA, UNSPECIFIED TYPE: ICD-10-CM

## 2018-03-12 LAB
BACTERIA #/AREA URNS AUTO: ABNORMAL /HPF
BILIRUB SERPL-MCNC: ABNORMAL MG/DL
BILIRUB UR QL STRIP: NEGATIVE
BLOOD URINE, POC: ABNORMAL
CLARITY UR REFRACT.AUTO: ABNORMAL
COLOR UR AUTO: YELLOW
COLOR, POC UA: ABNORMAL
GLUCOSE UR QL STRIP: 250
GLUCOSE UR QL STRIP: ABNORMAL
HGB UR QL STRIP: ABNORMAL
HYALINE CASTS UR QL AUTO: 0 /LPF
KETONES UR QL STRIP: ABNORMAL
KETONES UR QL STRIP: NEGATIVE
LEUKOCYTE ESTERASE UR QL STRIP: NEGATIVE
LEUKOCYTE ESTERASE URINE, POC: ABNORMAL
MICROSCOPIC COMMENT: ABNORMAL
NITRITE UR QL STRIP: NEGATIVE
NITRITE, POC UA: ABNORMAL
PH UR STRIP: 6 [PH] (ref 5–8)
PH, POC UA: 5
PROT UR QL STRIP: ABNORMAL
PROTEIN, POC: ABNORMAL
RBC #/AREA URNS AUTO: 46 /HPF (ref 0–4)
SP GR UR STRIP: 1.02 (ref 1–1.03)
SPECIFIC GRAVITY, POC UA: 1.02
SQUAMOUS #/AREA URNS AUTO: 6 /HPF
URN SPEC COLLECT METH UR: ABNORMAL
UROBILINOGEN UR STRIP-ACNC: NEGATIVE EU/DL
UROBILINOGEN, POC UA: ABNORMAL
WBC #/AREA URNS AUTO: 3 /HPF (ref 0–5)

## 2018-03-12 PROCEDURE — 99999 PR PBB SHADOW E&M-EST. PATIENT-LVL V: CPT | Mod: PBBFAC,,, | Performed by: NURSE PRACTITIONER

## 2018-03-12 PROCEDURE — 99213 OFFICE O/P EST LOW 20 MIN: CPT | Mod: S$PBB,,, | Performed by: NURSE PRACTITIONER

## 2018-03-12 PROCEDURE — 87086 URINE CULTURE/COLONY COUNT: CPT

## 2018-03-12 PROCEDURE — 81002 URINALYSIS NONAUTO W/O SCOPE: CPT | Mod: PBBFAC,PO | Performed by: NURSE PRACTITIONER

## 2018-03-12 PROCEDURE — 99215 OFFICE O/P EST HI 40 MIN: CPT | Mod: PBBFAC,PO | Performed by: NURSE PRACTITIONER

## 2018-03-12 PROCEDURE — 81001 URINALYSIS AUTO W/SCOPE: CPT

## 2018-03-12 RX ORDER — PHENAZOPYRIDINE HYDROCHLORIDE 100 MG/1
100 TABLET, FILM COATED ORAL 3 TIMES DAILY PRN
Qty: 9 TABLET | Refills: 0 | Status: SHIPPED | OUTPATIENT
Start: 2018-03-12 | End: 2018-03-22

## 2018-03-12 NOTE — PROGRESS NOTES
"Subjective:      Patient ID: Dulce Boogie is a 57 y.o. female.    Chief Complaint: Urinary Frequency and Dysuria    HPI:  Patient states she has had urinary frequency, urgency, burning when she urinate, says she saw a little blood on tissue this am.   She is seeing PT for low back pain, says it only helps some.  She says the back pain goes into her right groin some.  Denies any blood in her urine, foul odor, or cloudiness.  Says she has been increasing her fluid intake, afshan cranberry juice    Past Medical History:   Diagnosis Date    CAD (coronary artery disease)     Depression     History of colon polyps     Hyperlipidemia associated with type 2 diabetes mellitus     Hypertension associated with diabetes     Obesity     Type II diabetes mellitus with complication        Past Surgical History:   Procedure Laterality Date    CAROTID STENT       SECTION, LOW TRANSVERSE      x 2       Lab Results   Component Value Date    WBC 7.69 07/10/2017    HGB 12.2 07/10/2017    HCT 37.6 07/10/2017     07/10/2017    CHOL 227 (H) 01/15/2018    TRIG 175 (H) 01/15/2018    HDL 38 (L) 01/15/2018    ALT 23 01/15/2018    AST 18 01/15/2018     01/15/2018    K 3.7 01/15/2018     01/15/2018    CREATININE 1.0 01/15/2018    BUN 17 01/15/2018    CO2 28 01/15/2018    TSH 2.075 07/10/2017    INR 1.0 2013    GLUF 106 2005    HGBA1C 7.1 (H) 01/15/2018       /82   Pulse 74   Temp 98.7 °F (37.1 °C)   Resp 18   Ht 5' 5.35" (1.66 m)   Wt 96.9 kg (213 lb 10 oz)   LMP 2003   SpO2 98%   BMI 35.17 kg/m²       Review of Systems   Constitutional: Negative for appetite change, fatigue and unexpected weight change.   HENT: Negative for congestion, ear pain, postnasal drip, rhinorrhea, sinus pressure, sneezing, sore throat, tinnitus, trouble swallowing and voice change.    Eyes: Negative for pain, discharge, redness, itching and visual disturbance.   Respiratory: Negative for cough, chest " tightness, shortness of breath and wheezing.    Cardiovascular: Negative for chest pain, palpitations and leg swelling.   Gastrointestinal: Negative for abdominal distention, abdominal pain, blood in stool, constipation, diarrhea, nausea and vomiting.        No reflux.   Genitourinary: Positive for dysuria, flank pain, frequency, hematuria and urgency. Negative for difficulty urinating, dyspareunia, menstrual problem and pelvic pain.   Musculoskeletal: Negative for arthralgias, back pain, myalgias and neck stiffness.   Skin: Negative for color change and rash.   Neurological: Negative for dizziness and headaches.   Psychiatric/Behavioral: Negative for confusion and sleep disturbance. The patient is not nervous/anxious.       Objective:     Physical Exam   Constitutional: She is oriented to person, place, and time. She appears well-developed and well-nourished. No distress.   Musculoskeletal:   Normal gait   Neurological: She is alert and oriented to person, place, and time.   No cv tenderness   Skin: Skin is warm and dry.   Psychiatric: She has a normal mood and affect. Her behavior is normal.     Assessment:      1. Dysuria    2. Hematuria, unspecified type      Plan:   Dysuria  -     POCT urine dipstick without microscope  -     Urine culture  -     Cancel: Urinalysis; Future; Expected date: 03/12/2018  -     Cancel: Urinalysis    Hematuria, unspecified type  -     CT Renal Stone Study ABD Pelvis WO; Future; Expected date: 03/12/2018    Other orders  -     phenazopyridine (PYRIDIUM) 100 MG tablet; Take 1 tablet (100 mg total) by mouth 3 (three) times daily as needed for Pain.  Dispense: 9 tablet; Refill: 0  -     Urinalysis Microscopic    will have a CT to rule out renal stones.  Monitor for the culture.  Drink water instead of cranberry juice, glucose in urine.        Current Outpatient Prescriptions:     ALPRAZolam (XANAX) 0.5 MG tablet, TAKE 1 TABLET (0.5 MG TOTAL) BY MOUTH NIGHTLY., Disp: 30 tablet, Rfl: 5     amlodipine-benazepril (LOTREL) 5-40 mg per capsule, TAKE ONE CAPSULE BY MOUTH EVERY DAY AS DIRECTED FOR BLOOD PRESSURE, Disp: , Rfl: 3    aspirin 325 MG tablet, Take 1 tablet (325 mg total) by mouth once daily., Disp: , Rfl: 0    clopidogrel (PLAVIX) 75 mg tablet, Take 75 mg by mouth once daily., Disp: , Rfl: 6    cyclobenzaprine (FLEXERIL) 10 MG tablet, TAKE 1 TABLET (10 MG TOTAL) BY MOUTH 3 (THREE) TIMES DAILY. (Patient taking differently: Take 10 mg by mouth as needed. ), Disp: 50 tablet, Rfl: 3    doxazosin (CARDURA) 4 MG tablet, Take 1 tablet (4 mg total) by mouth once daily., Disp: 90 tablet, Rfl: 3    fenofibrate 160 MG Tab, Take 1 tablet (160 mg total) by mouth once daily., Disp: 30 tablet, Rfl: 11    hydrocodone-acetaminophen 5-325mg (NORCO) 5-325 mg per tablet, Take 1 tablet by mouth 3 (three) times daily as needed for Pain., Disp: 60 tablet, Rfl: 0    metformin (GLUCOPHAGE) 500 MG tablet, Take 1 tablet (500 mg total) by mouth 2 (two) times daily with meals., Disp: 180 tablet, Rfl: 3    niacin (NIASPAN) 750 mg TbSR, Take 1 tablet (750 mg total) by mouth every evening., Disp: 30 tablet, Rfl: 11    nitroGLYCERIN (NITROSTAT) 0.4 MG SL tablet, PLACE 1 TABLET UNDER TONGUE AT FIRST SIGN OF HEART PAIN, AND REPEAT EVERY 5 MINUTES IF PAIN PERSISTS, Disp: 25 tablet, Rfl: 1    omega 3-dha-epa-fish oil (FISH OIL) 900-1,400 mg CpDR, Take 1 capsule by mouth 2 (two) times daily., Disp: , Rfl:     omeprazole (PRILOSEC) 40 MG capsule, Take 1 capsule (40 mg total) by mouth once daily., Disp: 90 capsule, Rfl: 3    potassium chloride SA (K-DUR,KLOR-CON) 20 MEQ tablet, Take 1 tablet (20 mEq total) by mouth once daily., Disp: 90 tablet, Rfl: 3    rosuvastatin (CRESTOR) 40 MG Tab, Take 1 tablet (40 mg total) by mouth every evening., Disp: 30 tablet, Rfl: 11    valsartan (DIOVAN) 320 MG tablet, Take 1 tablet (320 mg total) by mouth once daily., Disp: 90 tablet, Rfl: 3    phenazopyridine (PYRIDIUM) 100 MG tablet,  Take 1 tablet (100 mg total) by mouth 3 (three) times daily as needed for Pain., Disp: 9 tablet, Rfl: 0

## 2018-03-13 ENCOUNTER — HOSPITAL ENCOUNTER (OUTPATIENT)
Dept: RADIOLOGY | Facility: HOSPITAL | Age: 58
Discharge: HOME | End: 2018-03-13
Attending: NURSE PRACTITIONER
Payer: MEDICARE

## 2018-03-13 DIAGNOSIS — R31.9 HEMATURIA, UNSPECIFIED TYPE: ICD-10-CM

## 2018-03-13 LAB — BACTERIA UR CULT: NORMAL

## 2018-03-13 PROCEDURE — 74176 CT ABD & PELVIS W/O CONTRAST: CPT | Mod: TC

## 2018-03-14 ENCOUNTER — TELEPHONE (OUTPATIENT)
Dept: INTERNAL MEDICINE | Facility: CLINIC | Age: 58
End: 2018-03-14

## 2018-03-14 DIAGNOSIS — R31.9 HEMATURIA, UNSPECIFIED TYPE: Primary | ICD-10-CM

## 2018-03-14 NOTE — TELEPHONE ENCOUNTER
Please let her know her CT scan only shows the beginning of a fatty liver, need to work on weight loss,  Showed mild constipation, can use a stool softener and mirilax daily, increase water intake.  Showed no renal stones.  The urine culture was neg for infection.  Will send her to urology for assessment, had blood in urine with no infection.  Please schedule.   thanks

## 2018-03-14 NOTE — TELEPHONE ENCOUNTER
Results given to patient she verbalized understanding. Appointment for urology scheduled. Will send them a message to get patient in sooner than 05/08/18.

## 2018-03-15 RX ORDER — NIACIN 750 MG/1
750 TABLET, EXTENDED RELEASE ORAL NIGHTLY
Qty: 30 TABLET | Refills: 11 | Status: SHIPPED | OUTPATIENT
Start: 2018-03-15 | End: 2020-11-23 | Stop reason: SDUPTHER

## 2018-03-15 RX ORDER — AMLODIPINE AND OLMESARTAN MEDOXOMIL 10; 40 MG/1; MG/1
1 TABLET ORAL DAILY
Qty: 30 TABLET | Refills: 11 | Status: SHIPPED | OUTPATIENT
Start: 2018-03-15 | End: 2018-04-24

## 2018-03-21 ENCOUNTER — HOSPITAL ENCOUNTER (EMERGENCY)
Facility: HOSPITAL | Age: 58
Discharge: HOME | End: 2018-03-21
Attending: EMERGENCY MEDICINE
Payer: MEDICARE

## 2018-03-21 VITALS
HEIGHT: 65 IN | OXYGEN SATURATION: 99 % | HEART RATE: 72 BPM | WEIGHT: 213.88 LBS | BODY MASS INDEX: 35.63 KG/M2 | SYSTOLIC BLOOD PRESSURE: 149 MMHG | TEMPERATURE: 100 F | DIASTOLIC BLOOD PRESSURE: 72 MMHG | RESPIRATION RATE: 18 BRPM

## 2018-03-21 DIAGNOSIS — N12 PYELONEPHRITIS: Primary | ICD-10-CM

## 2018-03-21 LAB
ALBUMIN SERPL BCP-MCNC: 4.3 G/DL
ALP SERPL-CCNC: 97 U/L
ALT SERPL W/O P-5'-P-CCNC: 20 U/L
ANION GAP SERPL CALC-SCNC: 12 MMOL/L
AST SERPL-CCNC: 18 U/L
BACTERIA #/AREA URNS HPF: ABNORMAL /HPF
BASOPHILS # BLD AUTO: 0.03 K/UL
BASOPHILS NFR BLD: 0.2 %
BILIRUB SERPL-MCNC: 0.4 MG/DL
BILIRUB UR QL STRIP: NEGATIVE
BUN SERPL-MCNC: 16 MG/DL
CALCIUM SERPL-MCNC: 9.8 MG/DL
CHLORIDE SERPL-SCNC: 103 MMOL/L
CLARITY UR: ABNORMAL
CO2 SERPL-SCNC: 26 MMOL/L
COLOR UR: YELLOW
CREAT SERPL-MCNC: 1 MG/DL
DIFFERENTIAL METHOD: ABNORMAL
EOSINOPHIL # BLD AUTO: 0.2 K/UL
EOSINOPHIL NFR BLD: 1 %
ERYTHROCYTE [DISTWIDTH] IN BLOOD BY AUTOMATED COUNT: 15.1 %
EST. GFR  (AFRICAN AMERICAN): >60 ML/MIN/1.73 M^2
EST. GFR  (NON AFRICAN AMERICAN): >60 ML/MIN/1.73 M^2
GLUCOSE SERPL-MCNC: 161 MG/DL
GLUCOSE UR QL STRIP: ABNORMAL
HCT VFR BLD AUTO: 43.5 %
HGB BLD-MCNC: 14.3 G/DL
HGB UR QL STRIP: ABNORMAL
HYALINE CASTS #/AREA URNS LPF: 0 /LPF
KETONES UR QL STRIP: NEGATIVE
LACTATE SERPL-SCNC: 1.3 MMOL/L
LEUKOCYTE ESTERASE UR QL STRIP: NEGATIVE
LYMPHOCYTES # BLD AUTO: 4.1 K/UL
LYMPHOCYTES NFR BLD: 27.3 %
MCH RBC QN AUTO: 27 PG
MCHC RBC AUTO-ENTMCNC: 32.9 G/DL
MCV RBC AUTO: 82 FL
MICROSCOPIC COMMENT: ABNORMAL
MONOCYTES # BLD AUTO: 1 K/UL
MONOCYTES NFR BLD: 6.9 %
NEUTROPHILS # BLD AUTO: 9.7 K/UL
NEUTROPHILS NFR BLD: 64.8 %
NITRITE UR QL STRIP: NEGATIVE
PH UR STRIP: 8 [PH] (ref 5–8)
PLATELET # BLD AUTO: 202 K/UL
PMV BLD AUTO: ABNORMAL FL
POTASSIUM SERPL-SCNC: 3.8 MMOL/L
PROT SERPL-MCNC: 8.7 G/DL
PROT UR QL STRIP: ABNORMAL
RBC # BLD AUTO: 5.29 M/UL
RBC #/AREA URNS HPF: 30 /HPF (ref 0–4)
SODIUM SERPL-SCNC: 141 MMOL/L
SP GR UR STRIP: 1.02 (ref 1–1.03)
SQUAMOUS #/AREA URNS HPF: 1 /HPF
URN SPEC COLLECT METH UR: ABNORMAL
UROBILINOGEN UR STRIP-ACNC: NEGATIVE EU/DL
WBC # BLD AUTO: 15.03 K/UL
WBC #/AREA URNS HPF: 10 /HPF (ref 0–5)
WBC CLUMPS URNS QL MICRO: ABNORMAL

## 2018-03-21 PROCEDURE — 96374 THER/PROPH/DIAG INJ IV PUSH: CPT

## 2018-03-21 PROCEDURE — 25000003 PHARM REV CODE 250: Performed by: EMERGENCY MEDICINE

## 2018-03-21 PROCEDURE — 63600175 PHARM REV CODE 636 W HCPCS: Performed by: EMERGENCY MEDICINE

## 2018-03-21 PROCEDURE — 80053 COMPREHEN METABOLIC PANEL: CPT

## 2018-03-21 PROCEDURE — 36415 COLL VENOUS BLD VENIPUNCTURE: CPT

## 2018-03-21 PROCEDURE — 96361 HYDRATE IV INFUSION ADD-ON: CPT

## 2018-03-21 PROCEDURE — 81000 URINALYSIS NONAUTO W/SCOPE: CPT

## 2018-03-21 PROCEDURE — 99284 EMERGENCY DEPT VISIT MOD MDM: CPT | Mod: 25

## 2018-03-21 PROCEDURE — 96375 TX/PRO/DX INJ NEW DRUG ADDON: CPT

## 2018-03-21 PROCEDURE — 87040 BLOOD CULTURE FOR BACTERIA: CPT | Mod: 59

## 2018-03-21 PROCEDURE — 83605 ASSAY OF LACTIC ACID: CPT

## 2018-03-21 PROCEDURE — 85025 COMPLETE CBC W/AUTO DIFF WBC: CPT

## 2018-03-21 RX ORDER — TRAMADOL HYDROCHLORIDE 50 MG/1
50 TABLET ORAL EVERY 6 HOURS PRN
Qty: 12 TABLET | Refills: 0 | Status: ON HOLD | OUTPATIENT
Start: 2018-03-21 | End: 2018-05-23 | Stop reason: HOSPADM

## 2018-03-21 RX ORDER — ONDANSETRON 4 MG/1
4 TABLET, FILM COATED ORAL EVERY 8 HOURS PRN
Qty: 12 TABLET | Refills: 0 | Status: SHIPPED | OUTPATIENT
Start: 2018-03-21 | End: 2023-10-16

## 2018-03-21 RX ORDER — ACETAMINOPHEN 10 MG/ML
1000 INJECTION, SOLUTION INTRAVENOUS ONCE
Status: COMPLETED | OUTPATIENT
Start: 2018-03-21 | End: 2018-03-21

## 2018-03-21 RX ORDER — ONDANSETRON 2 MG/ML
4 INJECTION INTRAMUSCULAR; INTRAVENOUS
Status: COMPLETED | OUTPATIENT
Start: 2018-03-21 | End: 2018-03-21

## 2018-03-21 RX ORDER — CEFUROXIME AXETIL 500 MG/1
500 TABLET ORAL 2 TIMES DAILY
Qty: 14 TABLET | Refills: 0 | Status: SHIPPED | OUTPATIENT
Start: 2018-03-21 | End: 2018-03-28

## 2018-03-21 RX ADMIN — ONDANSETRON 4 MG: 2 INJECTION, SOLUTION INTRAMUSCULAR; INTRAVENOUS at 02:03

## 2018-03-21 RX ADMIN — ACETAMINOPHEN 1000 MG: 10 INJECTION, SOLUTION INTRAVENOUS at 04:03

## 2018-03-21 RX ADMIN — SODIUM CHLORIDE 1000 ML: 0.9 INJECTION, SOLUTION INTRAVENOUS at 03:03

## 2018-03-21 NOTE — ED PROVIDER NOTES
SCRIBE #1 NOTE: I, Ira Hurt, am scribing for, and in the presence of, Trevon Palomares MD. I have scribed the entire note.      History      Chief Complaint   Patient presents with    Abdominal Pain     right sided pain with dysuria. pain radiates to flank.       Review of patient's allergies indicates:   Allergen Reactions    No known drug allergies         HPI   HPI    3/21/2018, 1:50 AM   History obtained from the patient      History of Present Illness: Dulce Boogie is a 57 y.o. female patient who presents to the Emergency Department for abd pain which onset gradually 1 week ago. Symptoms are constant and moderate in severity. No mitigating or exacerbating factors reported. Associated sxs include n/v, dysuria, and frequency. Pt reports being nauseated x1 week but began vomiting tonight. Patient denies any fever, chills, diarrhea, constipation, hematochezia, hematuria, vaginal bleeding/discharge, and all other sxs at this time. Pt was evaluated by her PCP last week. Pt's UA showed hematuria and her CT renal stone was negative. No further complaints or concerns at this time.       Arrival mode: Personal vehicle     PCP: Zeus Hanson MD       Past Medical History:  Past Medical History:   Diagnosis Date    CAD (coronary artery disease)     Depression     History of colon polyps     Hyperlipidemia associated with type 2 diabetes mellitus     Hypertension associated with diabetes     Obesity     Type II diabetes mellitus with complication        Past Surgical History:  Past Surgical History:   Procedure Laterality Date    CAROTID STENT       SECTION, LOW TRANSVERSE      x 2         Family History:  Family History   Problem Relation Age of Onset    Hypertension Mother     Diabetes Mother     Hyperlipidemia Mother     Hypertension Father     Diabetes Father     Hyperlipidemia Father     Colon cancer Father     Breast cancer Neg Hx     Ovarian cancer Neg Hx     Uterine cancer Neg Hx         Social History:  Social History     Social History Main Topics    Smoking status: Never Smoker    Smokeless tobacco: Never Used    Alcohol use No    Drug use: No    Sexual activity: Yes     Partners: Male     Birth control/ protection: None, Post-menopausal       ROS   Review of Systems   Constitutional: Negative for chills and fever.   HENT: Negative for sore throat.    Respiratory: Negative for shortness of breath.    Cardiovascular: Negative for chest pain.   Gastrointestinal: Positive for abdominal pain, nausea and vomiting. Negative for blood in stool, constipation and diarrhea.   Genitourinary: Positive for dysuria and frequency. Negative for hematuria.   Musculoskeletal: Negative for back pain.   Skin: Negative for rash.   Neurological: Negative for weakness.   Hematological: Does not bruise/bleed easily.   All other systems reviewed and are negative.      Physical Exam      Initial Vitals [03/21/18 0134]   BP Pulse Resp Temp SpO2   (!) 180/95 77 18 99.8 °F (37.7 °C) 97 %      MAP       123.33          Physical Exam  Nursing Notes and Vital Signs Reviewed.  Constitutional: Patient is in no acute distress. Well-developed and well-nourished.  Head: Atraumatic. Normocephalic.  Eyes: PERRL. EOM intact. Conjunctivae are not pale. No scleral icterus.  ENT: Mucous membranes are moist. Oropharynx is clear and symmetric.    Neck: Supple. Full ROM. No lymphadenopathy.  Cardiovascular: Regular rate. Regular rhythm. No murmurs, rubs, or gallops. Distal pulses are 2+ and symmetric.  Pulmonary/Chest: No respiratory distress. Clear to auscultation bilaterally. No wheezing or rales.  Abdominal: Soft and non-distended.  There is suprapubic tenderness.  No rebound, guarding, or rigidity. Good bowel sounds.  Genitourinary: R CVA tenderness  Musculoskeletal: Moves all extremities. No obvious deformities. No edema. No calf tenderness.  Skin: Warm and dry.  Neurological:  Alert, awake, and appropriate.  Normal speech.   "No acute focal neurological deficits are appreciated.  Psychiatric: Normal affect. Good eye contact. Appropriate in content.    ED Course    Procedures  ED Vital Signs:  Vitals:    03/21/18 0134 03/21/18 0145 03/21/18 0450   BP: (!) 180/95  (!) 149/72   Pulse: 77  72   Resp: 18  18   Temp: 99.8 °F (37.7 °C)     TempSrc: Oral     SpO2: 97%  99%   Weight:  97 kg (213 lb 13.5 oz)    Height: 5' 5" (1.651 m)         Abnormal Lab Results:  Labs Reviewed   CBC W/ AUTO DIFFERENTIAL - Abnormal; Notable for the following:        Result Value    WBC 15.03 (*)     RDW 15.1 (*)     Gran # (ANC) 9.7 (*)     All other components within normal limits   COMPREHENSIVE METABOLIC PANEL - Abnormal; Notable for the following:     Glucose 161 (*)     Total Protein 8.7 (*)     All other components within normal limits   URINALYSIS - Abnormal; Notable for the following:     Appearance, UA Hazy (*)     Protein, UA 2+ (*)     Glucose, UA 1+ (*)     Occult Blood UA 3+ (*)     All other components within normal limits   URINALYSIS MICROSCOPIC - Abnormal; Notable for the following:     RBC, UA 30 (*)     WBC, UA 10 (*)     WBC Clumps, UA Occasional (*)     All other components within normal limits   CULTURE, BLOOD   CULTURE, BLOOD   LACTIC ACID, PLASMA        All Lab Results:  Results for orders placed or performed during the hospital encounter of 03/21/18   CBC auto differential   Result Value Ref Range    WBC 15.03 (H) 3.90 - 12.70 K/uL    RBC 5.29 4.00 - 5.40 M/uL    Hemoglobin 14.3 12.0 - 16.0 g/dL    Hematocrit 43.5 37.0 - 48.5 %    MCV 82 82 - 98 fL    MCH 27.0 27.0 - 31.0 pg    MCHC 32.9 32.0 - 36.0 g/dL    RDW 15.1 (H) 11.5 - 14.5 %    Platelets 202 150 - 350 K/uL    MPV SEE COMMENT 9.2 - 12.9 fL    Gran # (ANC) 9.7 (H) 1.8 - 7.7 K/uL    Lymph # 4.1 1.0 - 4.8 K/uL    Mono # 1.0 0.3 - 1.0 K/uL    Eos # 0.2 0.0 - 0.5 K/uL    Baso # 0.03 0.00 - 0.20 K/uL    Gran% 64.8 38.0 - 73.0 %    Lymph% 27.3 18.0 - 48.0 %    Mono% 6.9 4.0 - 15.0 %    " Eosinophil% 1.0 0.0 - 8.0 %    Basophil% 0.2 0.0 - 1.9 %    Differential Method Automated    Comprehensive metabolic panel   Result Value Ref Range    Sodium 141 136 - 145 mmol/L    Potassium 3.8 3.5 - 5.1 mmol/L    Chloride 103 95 - 110 mmol/L    CO2 26 23 - 29 mmol/L    Glucose 161 (H) 70 - 110 mg/dL    BUN, Bld 16 6 - 20 mg/dL    Creatinine 1.0 0.5 - 1.4 mg/dL    Calcium 9.8 8.7 - 10.5 mg/dL    Total Protein 8.7 (H) 6.0 - 8.4 g/dL    Albumin 4.3 3.5 - 5.2 g/dL    Total Bilirubin 0.4 0.1 - 1.0 mg/dL    Alkaline Phosphatase 97 55 - 135 U/L    AST 18 10 - 40 U/L    ALT 20 10 - 44 U/L    Anion Gap 12 8 - 16 mmol/L    eGFR if African American >60 >60 mL/min/1.73 m^2    eGFR if non African American >60 >60 mL/min/1.73 m^2   Urinalysis   Result Value Ref Range    Specimen UA Urine, Clean Catch     Color, UA Yellow Yellow, Straw, Idania    Appearance, UA Hazy (A) Clear    pH, UA 8.0 5.0 - 8.0    Specific Gravity, UA 1.025 1.005 - 1.030    Protein, UA 2+ (A) Negative    Glucose, UA 1+ (A) Negative    Ketones, UA Negative Negative    Bilirubin (UA) Negative Negative    Occult Blood UA 3+ (A) Negative    Nitrite, UA Negative Negative    Urobilinogen, UA Negative <2.0 EU/dL    Leukocytes, UA Negative Negative   Lactic acid, plasma   Result Value Ref Range    Lactate (Lactic Acid) 1.3 0.5 - 2.2 mmol/L   Urinalysis Microscopic   Result Value Ref Range    RBC, UA 30 (H) 0 - 4 /hpf    WBC, UA 10 (H) 0 - 5 /hpf    WBC Clumps, UA Occasional (A) None-Rare    Bacteria, UA Occasional None-Occ /hpf    Squam Epithel, UA 1 /hpf    Hyaline Casts, UA 0 0-1/lpf /lpf    Microscopic Comment SEE COMMENT               The Emergency Provider reviewed the vital signs and test results, which are outlined above.    ED Discussion     4:14 AM: Reassessed pt at this time.  Pt states her condition has improved at this time. Discussed with pt all pertinent ED information and results. Discussed pt dx and plan of tx. Gave pt all f/u and return to the ED  instructions. All questions and concerns were addressed at this time. Pt expresses understanding of information and instructions, and is comfortable with plan to discharge. Pt is stable for discharge.    I discussed with patient and/or family/caretaker that evaluation in the ED does not suggest any emergent or life threatening medical conditions requiring immediate intervention beyond what was provided in the ED, and I believe patient is safe for discharge.  Regardless, an unremarkable evaluation in the ED does not preclude the development or presence of a serious of life threatening condition. As such, patient was instructed to return immediately for any worsening or change in current symptoms.      ED Medication(s):  Medications   sodium chloride 0.9% bolus 1,000 mL (0 mLs Intravenous Stopped 3/21/18 0430)   ondansetron injection 4 mg (4 mg Intravenous Given 3/21/18 0230)   acetaminophen (10 mg/mL) injection 1,000 mg (0 mg Intravenous Stopped 3/21/18 0430)       Discharge Medication List as of 3/21/2018  4:10 AM      START taking these medications    Details   cefUROXime (CEFTIN) 500 MG tablet Take 1 tablet (500 mg total) by mouth 2 (two) times daily., Starting Wed 3/21/2018, Until Wed 3/28/2018, Print      ondansetron (ZOFRAN) 4 MG tablet Take 1 tablet (4 mg total) by mouth every 8 (eight) hours as needed for Nausea., Starting Wed 3/21/2018, Print      traMADol (ULTRAM) 50 mg tablet Take 1 tablet (50 mg total) by mouth every 6 (six) hours as needed for Pain., Starting Wed 3/21/2018, Print             Follow-up Information     Zeus Hanson MD In 2 days.    Specialty:  Internal Medicine  Contact information:  [address]  [address]  [facility] [address] [phone]             [facility] - .    Specialty:  Emergency Medicine  Why:  As needed, If symptoms worsen  Contact information:  [address]  [facility] [address] [phone]                   Medical Decision  Making    Medical Decision Making:   Clinical Tests:   Lab Tests: Ordered and Reviewed           Scribe Attestation:   Scribe #1: I performed the above scribed service and the documentation accurately describes the services I performed. I attest to the accuracy of the note.    Attending:   Physician Attestation Statement for Scribe #1: I, Trevon Palomares MD, personally performed the services described in this documentation, as scribed by Ira Hurt, in my presence, and it is both accurate and complete.          Clinical Impression       ICD-10-CM ICD-9-CM   1. Pyelonephritis N12 590.80       Disposition:   Disposition: Discharged  Condition: Stable         Trevon Palomares MD  03/21/18 0544

## 2018-03-26 LAB
BACTERIA BLD CULT: NORMAL
BACTERIA BLD CULT: NORMAL

## 2018-04-05 ENCOUNTER — OFFICE VISIT (OUTPATIENT)
Dept: OBSTETRICS AND GYNECOLOGY | Facility: CLINIC | Age: 58
End: 2018-04-05
Payer: MEDICARE

## 2018-04-05 VITALS
SYSTOLIC BLOOD PRESSURE: 138 MMHG | BODY MASS INDEX: 35.26 KG/M2 | WEIGHT: 211.63 LBS | HEIGHT: 65 IN | DIASTOLIC BLOOD PRESSURE: 82 MMHG

## 2018-04-05 DIAGNOSIS — R10.2 PELVIC PAIN IN FEMALE: Primary | ICD-10-CM

## 2018-04-05 PROCEDURE — 99214 OFFICE O/P EST MOD 30 MIN: CPT | Mod: PBBFAC | Performed by: NURSE PRACTITIONER

## 2018-04-05 PROCEDURE — 99213 OFFICE O/P EST LOW 20 MIN: CPT | Mod: S$PBB,ICN,, | Performed by: NURSE PRACTITIONER

## 2018-04-05 PROCEDURE — 99999 PR PBB SHADOW E&M-EST. PATIENT-LVL IV: CPT | Mod: PBBFAC,,, | Performed by: NURSE PRACTITIONER

## 2018-04-05 PROCEDURE — 87086 URINE CULTURE/COLONY COUNT: CPT

## 2018-04-05 PROCEDURE — 87491 CHLMYD TRACH DNA AMP PROBE: CPT

## 2018-04-05 NOTE — PROGRESS NOTES
"CC: Pelvic pain     Dulce Boogie is a 57 y.o. female  presents for c/o pelvic pain. Patient was seen in ED and renal stones were ruled out. Urine in clinic indicated leucocytes and glucose. No N/V/D or constipation. No fever. Patient reports moderate pelvic pain for 4 weeks. No AUB.     Past Medical History:   Diagnosis Date    CAD (coronary artery disease)     Depression     History of colon polyps     Hyperlipidemia associated with type 2 diabetes mellitus     Hypertension associated with diabetes     Obesity     Type II diabetes mellitus with complication      Past Surgical History:   Procedure Laterality Date    CAROTID STENT       SECTION, LOW TRANSVERSE      x 2     Social History     Social History    Marital status:      Spouse name: N/A    Number of children: N/A    Years of education: N/A     Occupational History    Not on file.     Social History Main Topics    Smoking status: Never Smoker    Smokeless tobacco: Never Used    Alcohol use No    Drug use: No    Sexual activity: Yes     Partners: Male     Birth control/ protection: None, Post-menopausal     Other Topics Concern    Not on file     Social History Narrative    No narrative on file     Family History   Problem Relation Age of Onset    Hypertension Mother     Hyperlipidemia Mother     Hypertension Father     Diabetes Father     Hyperlipidemia Father     Colon cancer Father     Hypertension Brother     Breast cancer Neg Hx     Ovarian cancer Neg Hx     Uterine cancer Neg Hx      OB History      Para Term  AB Living    2 2 2     2    SAB TAB Ectopic Multiple Live Births                       /82   Ht 5' 5" (1.651 m)   Wt 96 kg (211 lb 10.3 oz)   LMP 2003   BMI 35.22 kg/m²       ROS:  CHEST: Denies chest pain or shortness of breath.   CARDIOVASCULAR: Denies palpitations or left sided chest pain.   ABDOMEN: HPI  URINARY: No frequency, dysuria, hematuria, or burning on " urination.  REPRODUCTIVE: See HPI.       PHYSICAL EXAM:  APPEARANCE: Obese AA female, in no acute distress.  CHEST: Good respiratory effect  ABDOMEN: Soft.  No tenderness or masses.    PELVIC: Normal external genitalia without lesions.  Vagina moist and well rugated without lesions or discharge.  Cervix pink, without lesions, discharge or tenderness.   Bimanual exam shows uterus to be mildly enlarged, regular, mobile and nontender.  Adnexa without masses or tenderness.      1. Pelvic pain in female  US Pelvis Comp with Transvag NON-OB (xpd    Urine culture     PLAN:  Exam indicates uterus is mildly enlarged.  GC cx  Pelvic ultrasound   Patient was counseled today on A.C.S. Pap guidelines and recommendations for yearly pelvic exams, mammograms and monthly self breast exams; to see her PCP for other health maintenance.

## 2018-04-06 ENCOUNTER — PATIENT MESSAGE (OUTPATIENT)
Dept: OBSTETRICS AND GYNECOLOGY | Facility: CLINIC | Age: 58
End: 2018-04-06

## 2018-04-06 ENCOUNTER — TELEPHONE (OUTPATIENT)
Dept: OBSTETRICS AND GYNECOLOGY | Facility: CLINIC | Age: 58
End: 2018-04-06

## 2018-04-06 ENCOUNTER — HOSPITAL ENCOUNTER (OUTPATIENT)
Dept: RADIOLOGY | Facility: HOSPITAL | Age: 58
Discharge: HOME | End: 2018-04-06
Attending: NURSE PRACTITIONER
Payer: MEDICARE

## 2018-04-06 DIAGNOSIS — R10.2 PELVIC PAIN IN FEMALE: ICD-10-CM

## 2018-04-06 LAB
BACTERIA UR CULT: NO GROWTH
C TRACH DNA SPEC QL NAA+PROBE: NOT DETECTED
N GONORRHOEA DNA SPEC QL NAA+PROBE: NOT DETECTED

## 2018-04-06 PROCEDURE — 76830 TRANSVAGINAL US NON-OB: CPT | Mod: 26,,, | Performed by: RADIOLOGY

## 2018-04-06 PROCEDURE — 76856 US EXAM PELVIC COMPLETE: CPT | Mod: 26,,, | Performed by: RADIOLOGY

## 2018-04-06 PROCEDURE — 76830 TRANSVAGINAL US NON-OB: CPT | Mod: TC,PO

## 2018-04-06 NOTE — TELEPHONE ENCOUNTER
Attempted to contact pt regarding pelvic u/s results. Left message with male for pt to return call to clinic.

## 2018-04-06 NOTE — PROGRESS NOTES
Please call patient and inform her that ultrasound indicated fibroids. She is experiencing pelvic pain, please have her see Dr. Rivera.

## 2018-04-06 NOTE — TELEPHONE ENCOUNTER
----- Message from Isabelle Bender NP sent at 4/6/2018  3:46 PM CDT -----  Please call patient and inform her that ultrasound indicated fibroids. She is experiencing pelvic pain, please have her see Dr. Rivera.

## 2018-04-09 ENCOUNTER — TELEPHONE (OUTPATIENT)
Dept: INTERNAL MEDICINE | Facility: CLINIC | Age: 58
End: 2018-04-09

## 2018-04-09 ENCOUNTER — TELEPHONE (OUTPATIENT)
Dept: OBSTETRICS AND GYNECOLOGY | Facility: CLINIC | Age: 58
End: 2018-04-09

## 2018-04-09 NOTE — TELEPHONE ENCOUNTER
----- Message from Isabelle Bender NP sent at 4/7/2018  9:25 AM CDT -----  Urine culture is normal.

## 2018-04-09 NOTE — TELEPHONE ENCOUNTER
----- Message from Lisa Hernandez sent at 4/9/2018  1:56 PM CDT -----  Pt is returning a call to nurse.      Please call pt back at [phone]

## 2018-04-09 NOTE — TELEPHONE ENCOUNTER
Spoke with pt notified of urine culture results, advised to keep appt with Dr. Rivera. Patient verbalized understanding

## 2018-04-12 ENCOUNTER — OFFICE VISIT (OUTPATIENT)
Dept: OBSTETRICS AND GYNECOLOGY | Facility: CLINIC | Age: 58
End: 2018-04-12
Payer: MEDICARE

## 2018-04-12 VITALS
DIASTOLIC BLOOD PRESSURE: 78 MMHG | HEIGHT: 65 IN | SYSTOLIC BLOOD PRESSURE: 146 MMHG | BODY MASS INDEX: 35.71 KG/M2 | WEIGHT: 214.31 LBS

## 2018-04-12 DIAGNOSIS — R10.2 SUPRAPUBIC PAIN: Primary | ICD-10-CM

## 2018-04-12 DIAGNOSIS — D25.9 UTERINE LEIOMYOMA, UNSPECIFIED LOCATION: ICD-10-CM

## 2018-04-12 PROCEDURE — 99999 PR PBB SHADOW E&M-EST. PATIENT-LVL II: CPT | Mod: PBBFAC,,, | Performed by: OBSTETRICS & GYNECOLOGY

## 2018-04-12 PROCEDURE — 99212 OFFICE O/P EST SF 10 MIN: CPT | Mod: S$PBB,,, | Performed by: OBSTETRICS & GYNECOLOGY

## 2018-04-12 PROCEDURE — 99212 OFFICE O/P EST SF 10 MIN: CPT | Mod: PBBFAC | Performed by: OBSTETRICS & GYNECOLOGY

## 2018-04-12 NOTE — PROGRESS NOTES
Subjective:       Patient ID: Dulce Boogie is a 57 y.o. female.    Chief Complaint:  follow up on U/S (Fibroids 18)      History of Present Illness  HPI  Abdominal Pain  Patient presents for evaluation of abdominal pain. The pain is described as cramping, and is 0/10 in intensity currently. Pain is located in the suprapubic area without radiation. Onset was unclear occurring 1 month ago. Symptoms have been unchanged since. Pains are intermittent occurring every several days.  Aggravating factors: urination. Alleviating factors: none. Associated symptoms: dysuria. The patient denies anorexia, constipation, diarrhea, fever, hematochezia, hematuria, nausea and vomiting. Risk factors for pelvic/abdominal pain include prior pelvic surgery and uterine fibroids.  Pt is sexually active.  Last period occurred over 10 yrs ago.  Last pap NILM in 2017.  Pt has been evaluated in ER for same last month and was treated for a presumed UTI.      GYN & OB History  Patient's last menstrual period was 2003.   Date of Last Pap: 2017    OB History    Para Term  AB Living   2 2 2     2   SAB TAB Ectopic Multiple Live Births                  # Outcome Date GA Lbr Ryan/2nd Weight Sex Delivery Anes PTL Lv   2 Term      CS-LTranv      1 Term      CS-LTranv             Review of Systems  Review of Systems   Constitutional: Negative for activity change, appetite change, fatigue, fever and unexpected weight change.   Respiratory: Negative for shortness of breath.    Cardiovascular: Negative for chest pain.   Gastrointestinal: Positive for abdominal pain. Negative for bloating, blood in stool, constipation, diarrhea, nausea and vomiting.   Genitourinary: Positive for dysuria and pelvic pain. Negative for dyspareunia, flank pain, frequency, genital sores, hematuria, urgency, vaginal bleeding, vaginal discharge, vaginal pain, urinary incontinence, postmenopausal bleeding and vaginal odor.   Musculoskeletal: Negative  for back pain.   Neurological: Negative for syncope and headaches.           Objective:    Physical Exam:   Constitutional: She is oriented to person, place, and time. She appears well-developed and well-nourished. No distress.                           Neurological: She is alert and oriented to person, place, and time.     Psychiatric: She has a normal mood and affect. Her behavior is normal. Thought content normal.          US pelvis: The uterus measures 83 x 44 x 34 mm.  Evaluation of the endometrial stripe is somewhat technically limited.  It appears to measure on the order of 2.8 mm.  Right ovary is not well visualized but measures on the order of 15 x 13 x 12 mm.  Left ovary measures 24 x 15 x 12 mm.  The ovaries demonstrate flow.  No adnexal lesions.  Small nabothian cyst.  Hypoechoic masses are seen within the posterior upper to mid myometrium most compatible with fibroids.  The larger of these measures 19 x 18 x 13 mm.  A 2nd fibroid is seen more heterogeneously in appearance and measures 15 x 13 by 10 mm.  No free fluid is seen.     Assessment:        1. Suprapubic pain    2. Uterine leiomyoma, unspecified location             Plan:      Suprapubic pain  -     Clinical presentation is non-specific and exam/test findings to date have been unremarkable.  The two small fibroids noted on recent ultrasound are not a likely cause of her presenting symptoms (see below for details).  Symptoms appear to have a urologic association and pt has been treated for suspected UTI's recently (although Urine culture last week was negative), thus would advise evaluation with Urology.  Pt reports she already has an appointment with Urology.     Uterine leiomyoma, unspecified location  -     Pt was counseled on fibroids, including common signs and symptoms.  The two small posterior fibroids noted on ultrasound are not a likely cause of reported symptoms. However, treatment options were reviewed with pt, including medical vs  fibroid embolization vs myomectomy vs hysterectomy.  Pt has several co-morbidities and is not a good surgical candidate at this time.  Recommend expectant management and follow up with Urology as detailed above.  Pt voiced understanding and agrees.      Follow-up if symptoms worsen or fail to improve.

## 2018-04-17 ENCOUNTER — LAB VISIT (OUTPATIENT)
Dept: LAB | Facility: HOSPITAL | Age: 58
End: 2018-04-17
Attending: NURSE PRACTITIONER
Payer: MEDICARE

## 2018-04-17 DIAGNOSIS — E78.5 HYPERLIPIDEMIA ASSOCIATED WITH TYPE 2 DIABETES MELLITUS: ICD-10-CM

## 2018-04-17 DIAGNOSIS — E11.69 HYPERLIPIDEMIA ASSOCIATED WITH TYPE 2 DIABETES MELLITUS: ICD-10-CM

## 2018-04-17 DIAGNOSIS — I15.2 HYPERTENSION ASSOCIATED WITH DIABETES: ICD-10-CM

## 2018-04-17 DIAGNOSIS — E11.59 HYPERTENSION ASSOCIATED WITH DIABETES: ICD-10-CM

## 2018-04-17 DIAGNOSIS — E11.8 TYPE 2 DIABETES MELLITUS WITH COMPLICATION, WITHOUT LONG-TERM CURRENT USE OF INSULIN: ICD-10-CM

## 2018-04-17 LAB
ALBUMIN SERPL BCP-MCNC: 3.8 G/DL
ALP SERPL-CCNC: 80 U/L
ALT SERPL W/O P-5'-P-CCNC: 25 U/L
ANION GAP SERPL CALC-SCNC: 8 MMOL/L
AST SERPL-CCNC: 23 U/L
BILIRUB SERPL-MCNC: 0.4 MG/DL
BUN SERPL-MCNC: 16 MG/DL
CALCIUM SERPL-MCNC: 9.5 MG/DL
CHLORIDE SERPL-SCNC: 108 MMOL/L
CHOLEST SERPL-MCNC: 251 MG/DL
CHOLEST/HDLC SERPL: 6 {RATIO}
CO2 SERPL-SCNC: 26 MMOL/L
CREAT SERPL-MCNC: 0.8 MG/DL
EST. GFR  (AFRICAN AMERICAN): >60 ML/MIN/1.73 M^2
EST. GFR  (NON AFRICAN AMERICAN): >60 ML/MIN/1.73 M^2
ESTIMATED AVG GLUCOSE: 157 MG/DL
GLUCOSE SERPL-MCNC: 137 MG/DL
HBA1C MFR BLD HPLC: 7.1 %
HDLC SERPL-MCNC: 42 MG/DL
HDLC SERPL: 16.7 %
LDLC SERPL CALC-MCNC: 178.4 MG/DL
NONHDLC SERPL-MCNC: 209 MG/DL
POTASSIUM SERPL-SCNC: 3.6 MMOL/L
PROT SERPL-MCNC: 7.6 G/DL
SODIUM SERPL-SCNC: 142 MMOL/L
TRIGL SERPL-MCNC: 153 MG/DL

## 2018-04-17 PROCEDURE — 80053 COMPREHEN METABOLIC PANEL: CPT

## 2018-04-17 PROCEDURE — 83036 HEMOGLOBIN GLYCOSYLATED A1C: CPT

## 2018-04-17 PROCEDURE — 80061 LIPID PANEL: CPT

## 2018-04-17 PROCEDURE — 36415 COLL VENOUS BLD VENIPUNCTURE: CPT | Mod: PO

## 2018-04-19 ENCOUNTER — PATIENT OUTREACH (OUTPATIENT)
Dept: ADMINISTRATIVE | Facility: HOSPITAL | Age: 58
End: 2018-04-19

## 2018-04-24 ENCOUNTER — OFFICE VISIT (OUTPATIENT)
Dept: INTERNAL MEDICINE | Facility: CLINIC | Age: 58
End: 2018-04-24
Payer: MEDICARE

## 2018-04-24 VITALS
TEMPERATURE: 99 F | HEART RATE: 72 BPM | HEIGHT: 65 IN | SYSTOLIC BLOOD PRESSURE: 130 MMHG | RESPIRATION RATE: 16 BRPM | OXYGEN SATURATION: 98 % | DIASTOLIC BLOOD PRESSURE: 80 MMHG

## 2018-04-24 DIAGNOSIS — Z86.010 HISTORY OF COLON POLYPS: ICD-10-CM

## 2018-04-24 DIAGNOSIS — I15.2 HYPERTENSION ASSOCIATED WITH DIABETES: ICD-10-CM

## 2018-04-24 DIAGNOSIS — E11.59 HYPERTENSION ASSOCIATED WITH DIABETES: ICD-10-CM

## 2018-04-24 DIAGNOSIS — I25.10 CORONARY ARTERY DISEASE INVOLVING NATIVE CORONARY ARTERY OF NATIVE HEART WITHOUT ANGINA PECTORIS: ICD-10-CM

## 2018-04-24 DIAGNOSIS — E11.69 HYPERLIPIDEMIA ASSOCIATED WITH TYPE 2 DIABETES MELLITUS: ICD-10-CM

## 2018-04-24 DIAGNOSIS — E78.5 HYPERLIPIDEMIA ASSOCIATED WITH TYPE 2 DIABETES MELLITUS: ICD-10-CM

## 2018-04-24 DIAGNOSIS — E11.8 TYPE 2 DIABETES MELLITUS WITH COMPLICATION, WITHOUT LONG-TERM CURRENT USE OF INSULIN: Primary | ICD-10-CM

## 2018-04-24 PROCEDURE — 99214 OFFICE O/P EST MOD 30 MIN: CPT | Mod: S$PBB,,, | Performed by: INTERNAL MEDICINE

## 2018-04-24 PROCEDURE — 99999 PR PBB SHADOW E&M-EST. PATIENT-LVL V: CPT | Mod: PBBFAC,,, | Performed by: INTERNAL MEDICINE

## 2018-04-24 PROCEDURE — 99215 OFFICE O/P EST HI 40 MIN: CPT | Mod: PBBFAC,PO | Performed by: INTERNAL MEDICINE

## 2018-04-24 RX ORDER — GLIMEPIRIDE 4 MG/1
4 TABLET ORAL
Qty: 90 TABLET | Refills: 3 | Status: SHIPPED | OUTPATIENT
Start: 2018-04-24 | End: 2019-08-13 | Stop reason: SDUPTHER

## 2018-04-24 NOTE — PROGRESS NOTES
"HPI:  Patient is a 57-year-old female who comes in today for follow-up of her diabetes, hypertension, lipids.  She denies any new problems or complaints.  Her blood sugars at been doing well.  She denies any hypoglycemic events.  She denies any chest pains or shortness of breath.    Current meds have been verified and updated per the EMR  Exam:/80   Pulse 72   Temp 98.9 °F (37.2 °C)   Resp 16   Ht 5' 5" (1.651 m)   LMP 06/21/2003   SpO2 98%    chest clear  Cardiovascular regular rate and rhythm without murmur, gallop or rub  Extremities without edema  Lab Results   Component Value Date    WBC 15.03 (H) 03/21/2018    HGB 14.3 03/21/2018    HCT 43.5 03/21/2018     03/21/2018    CHOL 251 (H) 04/17/2018    TRIG 153 (H) 04/17/2018    HDL 42 04/17/2018    ALT 25 04/17/2018    AST 23 04/17/2018     04/17/2018    K 3.6 04/17/2018     04/17/2018    CREATININE 0.8 04/17/2018    BUN 16 04/17/2018    CO2 26 04/17/2018    TSH 2.075 07/10/2017    INR 1.0 12/21/2013    GLUF 106 01/07/2005    HGBA1C 7.1 (H) 04/17/2018       Impression:  Hyperlipidemia, not to goal.  She states she's taken her Crestor daily.  She admits she doesn't take the niacin consistently.  Diabetes, stable  Hypertension, stable  History of colon polyps, she is past due for repeat colonoscopy  Patient Active Problem List   Diagnosis    Depression    CAD (coronary artery disease)    Type II diabetes mellitus with complication    Hypertension associated with diabetes    Hyperlipidemia associated with type 2 diabetes mellitus    History of colon polyps       Plan:  Orders Placed This Encounter    Lipid panel    Hemoglobin A1c    Basic metabolic panel    Protein / creatinine ratio, urine    TSH    CBC auto differential    glimepiride (AMARYL) 4 MG tablet    Case request GI: COLONOSCOPY     Patient will start on Amaryl 4 mg a day.  Patient will see me again in 3 months with above lab work.  She's due for colonoscopy " screening.  I've asked her to discuss with her cardiologist about starting on Repath

## 2018-04-27 ENCOUNTER — DOCUMENTATION ONLY (OUTPATIENT)
Dept: ENDOSCOPY | Facility: HOSPITAL | Age: 58
End: 2018-04-27

## 2018-05-21 ENCOUNTER — HOSPITAL ENCOUNTER (INPATIENT)
Facility: HOSPITAL | Age: 58
LOS: 2 days | Discharge: HOME | DRG: 246 | End: 2018-05-23
Attending: EMERGENCY MEDICINE | Admitting: INTERNAL MEDICINE
Payer: MEDICARE

## 2018-05-21 DIAGNOSIS — I21.4 NSTEMI (NON-ST ELEVATED MYOCARDIAL INFARCTION): Primary | ICD-10-CM

## 2018-05-21 DIAGNOSIS — R07.9 CHEST PAIN: ICD-10-CM

## 2018-05-21 DIAGNOSIS — F32.9 MAJOR DEPRESSIVE DISORDER, SINGLE EPISODE: ICD-10-CM

## 2018-05-21 LAB
ALBUMIN SERPL BCP-MCNC: 3.5 G/DL
ALP SERPL-CCNC: 76 U/L
ALT SERPL W/O P-5'-P-CCNC: 21 U/L
ANION GAP SERPL CALC-SCNC: 6 MMOL/L
ANION GAP SERPL CALC-SCNC: 8 MMOL/L
APTT BLDCRRT: 26.1 SEC
APTT BLDCRRT: 47.8 SEC
APTT BLDCRRT: 78.4 SEC
AST SERPL-CCNC: 18 U/L
BASOPHILS # BLD AUTO: 0.03 K/UL
BASOPHILS NFR BLD: 0.3 %
BILIRUB SERPL-MCNC: 0.2 MG/DL
BILIRUB UR QL STRIP: NEGATIVE
BNP SERPL-MCNC: 27 PG/ML
BUN SERPL-MCNC: 13 MG/DL
BUN SERPL-MCNC: 15 MG/DL
CALCIUM SERPL-MCNC: 9 MG/DL
CALCIUM SERPL-MCNC: 9.1 MG/DL
CHLORIDE SERPL-SCNC: 109 MMOL/L
CHLORIDE SERPL-SCNC: 109 MMOL/L
CHOLEST SERPL-MCNC: 187 MG/DL
CHOLEST/HDLC SERPL: 5.5 {RATIO}
CK MB SERPL-MCNC: 1 NG/ML
CK MB SERPL-RTO: 0.5 %
CK SERPL-CCNC: 184 U/L
CLARITY UR: CLEAR
CO2 SERPL-SCNC: 25 MMOL/L
CO2 SERPL-SCNC: 25 MMOL/L
COLOR UR: YELLOW
CREAT SERPL-MCNC: 0.9 MG/DL
CREAT SERPL-MCNC: 1 MG/DL
DIASTOLIC DYSFUNCTION: NO
DIFFERENTIAL METHOD: ABNORMAL
EOSINOPHIL # BLD AUTO: 0.2 K/UL
EOSINOPHIL NFR BLD: 2.3 %
ERYTHROCYTE [DISTWIDTH] IN BLOOD BY AUTOMATED COUNT: 15.5 %
EST. GFR  (AFRICAN AMERICAN): >60 ML/MIN/1.73 M^2
EST. GFR  (AFRICAN AMERICAN): >60 ML/MIN/1.73 M^2
EST. GFR  (NON AFRICAN AMERICAN): >60 ML/MIN/1.73 M^2
EST. GFR  (NON AFRICAN AMERICAN): >60 ML/MIN/1.73 M^2
ESTIMATED PA SYSTOLIC PRESSURE: 24.53
GLUCOSE SERPL-MCNC: 144 MG/DL
GLUCOSE SERPL-MCNC: 157 MG/DL
GLUCOSE UR QL STRIP: ABNORMAL
HCT VFR BLD AUTO: 37.2 %
HDLC SERPL-MCNC: 34 MG/DL
HDLC SERPL: 18.2 %
HGB BLD-MCNC: 12.2 G/DL
HGB UR QL STRIP: NEGATIVE
INR PPP: 1
KETONES UR QL STRIP: NEGATIVE
LDLC SERPL CALC-MCNC: 133.4 MG/DL
LEUKOCYTE ESTERASE UR QL STRIP: NEGATIVE
LYMPHOCYTES # BLD AUTO: 5.3 K/UL
LYMPHOCYTES NFR BLD: 52.2 %
MAGNESIUM SERPL-MCNC: 2 MG/DL
MCH RBC QN AUTO: 26.9 PG
MCHC RBC AUTO-ENTMCNC: 32.8 G/DL
MCV RBC AUTO: 82 FL
MONOCYTES # BLD AUTO: 0.6 K/UL
MONOCYTES NFR BLD: 5.9 %
NEUTROPHILS # BLD AUTO: 4 K/UL
NEUTROPHILS NFR BLD: 39.4 %
NITRITE UR QL STRIP: NEGATIVE
NONHDLC SERPL-MCNC: 153 MG/DL
PH UR STRIP: 6 [PH] (ref 5–8)
PLATELET # BLD AUTO: 193 K/UL
PMV BLD AUTO: 12 FL
POCT GLUCOSE: 118 MG/DL (ref 70–110)
POTASSIUM SERPL-SCNC: 3.5 MMOL/L
POTASSIUM SERPL-SCNC: 4 MMOL/L
PROT SERPL-MCNC: 7.3 G/DL
PROT UR QL STRIP: NEGATIVE
PROTHROMBIN TIME: 10.2 SEC
RBC # BLD AUTO: 4.54 M/UL
RETIRED EF AND QEF - SEE NOTES: 60 (ref 55–65)
SODIUM SERPL-SCNC: 140 MMOL/L
SODIUM SERPL-SCNC: 142 MMOL/L
SP GR UR STRIP: >=1.03 (ref 1–1.03)
TRIGL SERPL-MCNC: 98 MG/DL
TROPONIN I SERPL DL<=0.01 NG/ML-MCNC: 0.14 NG/ML
TROPONIN I SERPL DL<=0.01 NG/ML-MCNC: 0.15 NG/ML
TROPONIN I SERPL DL<=0.01 NG/ML-MCNC: 0.18 NG/ML
TROPONIN I SERPL DL<=0.01 NG/ML-MCNC: 0.2 NG/ML
TSH SERPL DL<=0.005 MIU/L-ACNC: 1.67 UIU/ML
URN SPEC COLLECT METH UR: ABNORMAL
UROBILINOGEN UR STRIP-ACNC: NEGATIVE EU/DL
WBC # BLD AUTO: 10.09 K/UL

## 2018-05-21 PROCEDURE — 63600175 PHARM REV CODE 636 W HCPCS: Performed by: NURSE PRACTITIONER

## 2018-05-21 PROCEDURE — 25000003 PHARM REV CODE 250: Performed by: NURSE PRACTITIONER

## 2018-05-21 PROCEDURE — 93306 TTE W/DOPPLER COMPLETE: CPT | Mod: 26,,, | Performed by: INTERNAL MEDICINE

## 2018-05-21 PROCEDURE — 82962 GLUCOSE BLOOD TEST: CPT

## 2018-05-21 PROCEDURE — 80061 LIPID PANEL: CPT

## 2018-05-21 PROCEDURE — 93306 TTE W/DOPPLER COMPLETE: CPT

## 2018-05-21 PROCEDURE — 93010 ELECTROCARDIOGRAM REPORT: CPT | Mod: ,,, | Performed by: INTERNAL MEDICINE

## 2018-05-21 PROCEDURE — 96375 TX/PRO/DX INJ NEW DRUG ADDON: CPT

## 2018-05-21 PROCEDURE — 80053 COMPREHEN METABOLIC PANEL: CPT

## 2018-05-21 PROCEDURE — 81003 URINALYSIS AUTO W/O SCOPE: CPT

## 2018-05-21 PROCEDURE — 84484 ASSAY OF TROPONIN QUANT: CPT | Mod: 91

## 2018-05-21 PROCEDURE — 80048 BASIC METABOLIC PNL TOTAL CA: CPT

## 2018-05-21 PROCEDURE — 83735 ASSAY OF MAGNESIUM: CPT

## 2018-05-21 PROCEDURE — 63700000 PHARM REV CODE 250 ALT 637 W/O HCPCS: Performed by: NURSE PRACTITIONER

## 2018-05-21 PROCEDURE — 84484 ASSAY OF TROPONIN QUANT: CPT

## 2018-05-21 PROCEDURE — 99285 EMERGENCY DEPT VISIT HI MDM: CPT

## 2018-05-21 PROCEDURE — 85730 THROMBOPLASTIN TIME PARTIAL: CPT

## 2018-05-21 PROCEDURE — 36415 COLL VENOUS BLD VENIPUNCTURE: CPT

## 2018-05-21 PROCEDURE — 83880 ASSAY OF NATRIURETIC PEPTIDE: CPT

## 2018-05-21 PROCEDURE — 21400001 HC TELEMETRY ROOM

## 2018-05-21 PROCEDURE — 85610 PROTHROMBIN TIME: CPT

## 2018-05-21 PROCEDURE — 85730 THROMBOPLASTIN TIME PARTIAL: CPT | Mod: 91

## 2018-05-21 PROCEDURE — 82550 ASSAY OF CK (CPK): CPT

## 2018-05-21 PROCEDURE — 25000003 PHARM REV CODE 250: Performed by: EMERGENCY MEDICINE

## 2018-05-21 PROCEDURE — 84443 ASSAY THYROID STIM HORMONE: CPT

## 2018-05-21 PROCEDURE — 82553 CREATINE MB FRACTION: CPT

## 2018-05-21 PROCEDURE — 96374 THER/PROPH/DIAG INJ IV PUSH: CPT

## 2018-05-21 PROCEDURE — 93010 ELECTROCARDIOGRAM REPORT: CPT | Mod: 76,,, | Performed by: INTERNAL MEDICINE

## 2018-05-21 PROCEDURE — 63600175 PHARM REV CODE 636 W HCPCS: Performed by: EMERGENCY MEDICINE

## 2018-05-21 PROCEDURE — 99223 1ST HOSP IP/OBS HIGH 75: CPT | Mod: ,,, | Performed by: INTERNAL MEDICINE

## 2018-05-21 PROCEDURE — 85025 COMPLETE CBC W/AUTO DIFF WBC: CPT

## 2018-05-21 RX ORDER — PANTOPRAZOLE SODIUM 40 MG/1
40 TABLET, DELAYED RELEASE ORAL DAILY
Status: DISCONTINUED | OUTPATIENT
Start: 2018-05-21 | End: 2018-05-23 | Stop reason: HOSPADM

## 2018-05-21 RX ORDER — AMLODIPINE AND OLMESARTAN MEDOXOMIL 10; 40 MG/1; MG/1
TABLET ORAL
COMMUNITY
End: 2018-05-30 | Stop reason: ALTCHOICE

## 2018-05-21 RX ORDER — GLUCAGON 1 MG
1 KIT INJECTION
Status: DISCONTINUED | OUTPATIENT
Start: 2018-05-21 | End: 2018-05-23 | Stop reason: HOSPADM

## 2018-05-21 RX ORDER — INSULIN ASPART 100 [IU]/ML
0-5 INJECTION, SOLUTION INTRAVENOUS; SUBCUTANEOUS EVERY 6 HOURS PRN
Status: DISCONTINUED | OUTPATIENT
Start: 2018-05-21 | End: 2018-05-23 | Stop reason: HOSPADM

## 2018-05-21 RX ORDER — ASPIRIN 325 MG
325 TABLET ORAL DAILY
Status: DISCONTINUED | OUTPATIENT
Start: 2018-05-21 | End: 2018-05-22 | Stop reason: SDUPTHER

## 2018-05-21 RX ORDER — HEPARIN SODIUM,PORCINE/D5W 25000/250
12 INTRAVENOUS SOLUTION INTRAVENOUS CONTINUOUS
Status: DISCONTINUED | OUTPATIENT
Start: 2018-05-21 | End: 2018-05-22

## 2018-05-21 RX ORDER — ONDANSETRON 2 MG/ML
4 INJECTION INTRAMUSCULAR; INTRAVENOUS
Status: COMPLETED | OUTPATIENT
Start: 2018-05-21 | End: 2018-05-21

## 2018-05-21 RX ORDER — MORPHINE SULFATE 4 MG/ML
4 INJECTION, SOLUTION INTRAMUSCULAR; INTRAVENOUS
Status: COMPLETED | OUTPATIENT
Start: 2018-05-21 | End: 2018-05-21

## 2018-05-21 RX ORDER — OLMESARTAN MEDOXOMIL 40 MG/1
40 TABLET ORAL DAILY
Status: DISCONTINUED | OUTPATIENT
Start: 2018-05-21 | End: 2018-05-23 | Stop reason: HOSPADM

## 2018-05-21 RX ORDER — HEPARIN SODIUM,PORCINE/D5W 25000/250
16 INTRAVENOUS SOLUTION INTRAVENOUS CONTINUOUS
Status: DISCONTINUED | OUTPATIENT
Start: 2018-05-21 | End: 2018-05-21

## 2018-05-21 RX ORDER — ROSUVASTATIN CALCIUM 10 MG/1
40 TABLET, COATED ORAL NIGHTLY
Status: DISCONTINUED | OUTPATIENT
Start: 2018-05-21 | End: 2018-05-23 | Stop reason: HOSPADM

## 2018-05-21 RX ORDER — ONDANSETRON 2 MG/ML
4 INJECTION INTRAMUSCULAR; INTRAVENOUS EVERY 6 HOURS PRN
Status: DISCONTINUED | OUTPATIENT
Start: 2018-05-21 | End: 2018-05-23 | Stop reason: HOSPADM

## 2018-05-21 RX ORDER — ISOSORBIDE MONONITRATE 20 MG/1
20 TABLET ORAL DAILY
COMMUNITY
End: 2019-08-13 | Stop reason: SDUPTHER

## 2018-05-21 RX ORDER — CLOPIDOGREL BISULFATE 75 MG/1
75 TABLET ORAL DAILY
Status: DISCONTINUED | OUTPATIENT
Start: 2018-05-21 | End: 2018-05-22 | Stop reason: SDUPTHER

## 2018-05-21 RX ORDER — DOXAZOSIN 2 MG/1
4 TABLET ORAL DAILY
Status: DISCONTINUED | OUTPATIENT
Start: 2018-05-21 | End: 2018-05-23 | Stop reason: HOSPADM

## 2018-05-21 RX ORDER — FENOFIBRATE 160 MG/1
160 TABLET ORAL DAILY
Status: DISCONTINUED | OUTPATIENT
Start: 2018-05-21 | End: 2018-05-23 | Stop reason: HOSPADM

## 2018-05-21 RX ORDER — NITROGLYCERIN 0.4 MG/1
0.4 TABLET SUBLINGUAL EVERY 5 MIN PRN
Status: DISCONTINUED | OUTPATIENT
Start: 2018-05-21 | End: 2018-05-23 | Stop reason: HOSPADM

## 2018-05-21 RX ORDER — AMLODIPINE BESYLATE 5 MG/1
5 TABLET ORAL DAILY
Status: DISCONTINUED | OUTPATIENT
Start: 2018-05-21 | End: 2018-05-23 | Stop reason: HOSPADM

## 2018-05-21 RX ORDER — LANOLIN ALCOHOL/MO/W.PET/CERES
500 CREAM (GRAM) TOPICAL NIGHTLY
Status: DISCONTINUED | OUTPATIENT
Start: 2018-05-21 | End: 2018-05-21

## 2018-05-21 RX ORDER — NIACIN 500 MG/1
500 TABLET, EXTENDED RELEASE ORAL NIGHTLY
Status: DISCONTINUED | OUTPATIENT
Start: 2018-05-21 | End: 2018-05-21

## 2018-05-21 RX ORDER — METOPROLOL TARTRATE 25 MG/1
25 TABLET, FILM COATED ORAL 2 TIMES DAILY
Status: DISCONTINUED | OUTPATIENT
Start: 2018-05-21 | End: 2018-05-23 | Stop reason: HOSPADM

## 2018-05-21 RX ORDER — ISOSORBIDE MONONITRATE 20 MG/1
20 TABLET ORAL 2 TIMES DAILY WITH MEALS
Status: DISCONTINUED | OUTPATIENT
Start: 2018-05-21 | End: 2018-05-23 | Stop reason: HOSPADM

## 2018-05-21 RX ORDER — ACETAMINOPHEN 325 MG/1
650 TABLET ORAL EVERY 6 HOURS PRN
Status: DISCONTINUED | OUTPATIENT
Start: 2018-05-21 | End: 2018-05-23 | Stop reason: HOSPADM

## 2018-05-21 RX ADMIN — HEPARIN SODIUM AND DEXTROSE 12 UNITS/KG/HR: 10000; 5 INJECTION INTRAVENOUS at 04:05

## 2018-05-21 RX ADMIN — MORPHINE SULFATE 4 MG: 4 INJECTION INTRAVENOUS at 03:05

## 2018-05-21 RX ADMIN — NITROGLYCERIN 0.4 MG: 0.4 TABLET SUBLINGUAL at 10:05

## 2018-05-21 RX ADMIN — AMLODIPINE BESYLATE 5 MG: 5 TABLET ORAL at 10:05

## 2018-05-21 RX ADMIN — FENOFIBRATE 160 MG: 160 TABLET ORAL at 10:05

## 2018-05-21 RX ADMIN — ONDANSETRON 4 MG: 2 INJECTION INTRAMUSCULAR; INTRAVENOUS at 03:05

## 2018-05-21 RX ADMIN — CLOPIDOGREL BISULFATE 75 MG: 75 TABLET ORAL at 10:05

## 2018-05-21 RX ADMIN — ROSUVASTATIN CALCIUM 10 MG: 10 TABLET, FILM COATED ORAL at 09:05

## 2018-05-21 RX ADMIN — OLMESARTAN MEDOXOMIL 40 MG: 40 TABLET, FILM COATED ORAL at 11:05

## 2018-05-21 RX ADMIN — DOXAZOSIN MESYLATE 4 MG: 2 TABLET ORAL at 10:05

## 2018-05-21 RX ADMIN — ASPIRIN 325 MG ORAL TABLET 325 MG: 325 PILL ORAL at 10:05

## 2018-05-21 RX ADMIN — METOPROLOL TARTRATE 25 MG: 25 TABLET ORAL at 05:05

## 2018-05-21 RX ADMIN — NITROGLYCERIN 1 INCH: 20 OINTMENT TOPICAL at 03:05

## 2018-05-21 RX ADMIN — HEPARIN SODIUM AND DEXTROSE 10 UNITS/KG/HR: 10000; 5 INJECTION INTRAVENOUS at 07:05

## 2018-05-21 RX ADMIN — PANTOPRAZOLE SODIUM 40 MG: 40 TABLET, DELAYED RELEASE ORAL at 10:05

## 2018-05-21 RX ADMIN — ISOSORBIDE MONONITRATE 20 MG: 20 TABLET ORAL at 11:05

## 2018-05-21 RX ADMIN — METOPROLOL TARTRATE 25 MG: 25 TABLET ORAL at 09:05

## 2018-05-21 NOTE — CONSULTS
[facility]  Cardiology  Consult Note    Patient Name: Dulce Boogie  MRN: [id number]  Admission Date: 5/21/2018  Hospital Length of Stay: 0 days  Code Status: Full Code   Attending Provider: Uzma Alvarez MD   Consulting Provider: Clark Villarreal NP  Primary Care Physician: Zeus Hanson MD  Principal Problem:NSTEMI (non-ST elevated myocardial infarction)    Patient information was obtained from patient, EMS personnel, past medical records and ER records.     Inpatient consult to Cardiology  Consult performed by: CLARK VILLARREAL  Consult ordered by: RODRÍGUEZ TRUONG        Subjective:     Chief Complaint:  Chest pain      HPI:   Mrs. Boogie is a 57 year old female with a PMHx of CAD with remote PCI in 2010, HTN, HLD, DM II who presents to [facility] ED with complaints of substernal chest pain which radiated to left shoulder while she was laying flat. She reports that the chest pain is similar to when she had cardiac stents placed in 2010. She reports that the chest pain has escalated over the past two weeks from exertional chest pain with walking to rest pain while lying in bed. Last night her pain started while she was lying down at rest. She denies any nausea, vomiting, diaphoresis, or shortness of breath. She did attempt to relieve her chest pain with 1 SL NTG but denies relief of chest pain. Her last LHC was 5/2017 which revealed patent stent with no blockages. She is seen by Dr. Valle ( Cardiology) and was started on Imdur 20 mg PO BID last week which has not relieved her chest pain. Workup in the ED revealed Troponin 0.202>0.177 and patient was placed on Heparin gtt. Cardiology was consulted to assist with management. Patient seen and examined this AM, chest pain free at this time.     Past Medical History:   Diagnosis Date    CAD (coronary artery disease)     Depression     History of colon polyps     Hyperlipidemia associated with type 2 diabetes mellitus     Hypertension associated  with diabetes     Obesity     Type II diabetes mellitus with complication        Past Surgical History:   Procedure Laterality Date    CAROTID STENT       SECTION, LOW TRANSVERSE      x 2       Review of patient's allergies indicates:   Allergen Reactions    No known drug allergies        No current facility-administered medications on file prior to encounter.      Current Outpatient Prescriptions on File Prior to Encounter   Medication Sig    ALPRAZolam (XANAX) 0.5 MG tablet TAKE 1 TABLET (0.5 MG TOTAL) BY MOUTH NIGHTLY.    aspirin 325 MG tablet Take 1 tablet (325 mg total) by mouth once daily.    clopidogrel (PLAVIX) 75 mg tablet Take 75 mg by mouth once daily.    cyclobenzaprine (FLEXERIL) 10 MG tablet TAKE 1 TABLET (10 MG TOTAL) BY MOUTH 3 (THREE) TIMES DAILY. (Patient taking differently: Take 10 mg by mouth as needed. )    doxazosin (CARDURA) 4 MG tablet Take 1 tablet (4 mg total) by mouth once daily.    fenofibrate 160 MG Tab Take 1 tablet (160 mg total) by mouth once daily.    glimepiride (AMARYL) 4 MG tablet Take 1 tablet (4 mg total) by mouth before breakfast.    hydrocodone-acetaminophen 5-325mg (NORCO) 5-325 mg per tablet Take 1 tablet by mouth 3 (three) times daily as needed for Pain.    metformin (GLUCOPHAGE) 500 MG tablet Take 1 tablet (500 mg total) by mouth 2 (two) times daily with meals.    niacin (NIASPAN) 750 MG CR tablet TAKE 1 TABLET (750 MG TOTAL) BY MOUTH NIGHTLY.    nitroGLYCERIN (NITROSTAT) 0.4 MG SL tablet PLACE 1 TABLET UNDER TONGUE AT FIRST SIGN OF HEART PAIN, AND REPEAT EVERY 5 MINUTES IF PAIN PERSISTS    omeprazole (PRILOSEC) 40 MG capsule Take 1 capsule (40 mg total) by mouth once daily.    ondansetron (ZOFRAN) 4 MG tablet Take 1 tablet (4 mg total) by mouth every 8 (eight) hours as needed for Nausea.    potassium chloride SA (K-DUR,KLOR-CON) 20 MEQ tablet Take 1 tablet (20 mEq total) by mouth once daily.    rosuvastatin (CRESTOR) 40 MG Tab Take 1 tablet (40  mg total) by mouth every evening.    traMADol (ULTRAM) 50 mg tablet Take 1 tablet (50 mg total) by mouth every 6 (six) hours as needed for Pain.    [DISCONTINUED] amlodipine-benazepril (LOTREL) 5-40 mg per capsule TAKE ONE CAPSULE BY MOUTH EVERY DAY AS DIRECTED FOR BLOOD PRESSURE     Family History     Problem Relation (Age of Onset)    Colon cancer Father    Diabetes Father    Hyperlipidemia Mother, Father    Hypertension Mother, Father, Brother        Social History Main Topics    Smoking status: Never Smoker    Smokeless tobacco: Never Used    Alcohol use No    Drug use: No    Sexual activity: Yes     Partners: Male     Birth control/ protection: None, Post-menopausal     Review of Systems   Constitution: Negative for weakness.   HENT: Negative for hearing loss and hoarse voice.    Eyes: Negative for visual disturbance.   Cardiovascular: Negative for chest pain, claudication, dyspnea on exertion, irregular heartbeat, leg swelling, near-syncope, orthopnea, palpitations, paroxysmal nocturnal dyspnea and syncope.   Respiratory: Negative for cough, hemoptysis, shortness of breath, sleep disturbances due to breathing, snoring and wheezing.    Endocrine: Negative for cold intolerance.   Hematologic/Lymphatic: Does not bruise/bleed easily.   Skin: Negative for color change, dry skin and nail changes.   Musculoskeletal: Positive for back pain. Negative for joint pain, myalgias and neck pain.   Gastrointestinal: Negative for bloating, abdominal pain, constipation, nausea and vomiting.   Genitourinary: Negative for dysuria, flank pain, hematuria and hesitancy.   Neurological: Negative for headaches, light-headedness, loss of balance, numbness and paresthesias.   Psychiatric/Behavioral: Negative for altered mental status.   Allergic/Immunologic: Negative for environmental allergies.     Objective:     Vital Signs (Most Recent):  Temp: 98.3 °F (36.8 °C) (05/21/18 0200)  Pulse: 77 (05/21/18 0431)  Resp: 14 (05/21/18  0431)  BP: (!) 115/59 (05/21/18 0431)  SpO2: 96 % (05/21/18 0431) Vital Signs (24h Range):  Temp:  [98.3 °F (36.8 °C)] 98.3 °F (36.8 °C)  Pulse:  [74-77] 77  Resp:  [13-20] 14  SpO2:  [96 %-100 %] 96 %  BP: (115-164)/(59-83) 115/59     Weight: 94.3 kg (208 lb)  Body mass index is 34.61 kg/m².    SpO2: 96 %  O2 Device (Oxygen Therapy): room air    No intake or output data in the 24 hours ending 05/21/18 0835    Lines/Drains/Airways     Peripheral Intravenous Line                 Peripheral IV - Single Lumen 05/21/18 0221 Right Antecubital less than 1 day         Peripheral IV - Single Lumen 05/21/18 0555 Left Antecubital less than 1 day                Physical Exam   Constitutional: She is oriented to person, place, and time. She appears well-developed and well-nourished.   HENT:   Head: Normocephalic and atraumatic.   Eyes: Conjunctivae are normal. Pupils are equal, round, and reactive to light.   Neck: Full passive range of motion without pain. Neck supple. No JVD present.   Cardiovascular: Normal rate, regular rhythm, S1 normal, S2 normal and intact distal pulses.   Occasional extrasystoles are present. PMI is not displaced.    No murmur heard.  Pulses:       Radial pulses are 2+ on the right side, and 2+ on the left side.        Dorsalis pedis pulses are 1+ on the right side, and 1+ on the left side.   Pulmonary/Chest: Effort normal. No respiratory distress. She has decreased breath sounds in the right lower field and the left lower field. She has no wheezes.   Abdominal: Soft. Bowel sounds are normal.   obese   Genitourinary:   Genitourinary Comments: deferred   Musculoskeletal: Normal range of motion. She exhibits no edema.        Right ankle: She exhibits no swelling.        Left ankle: She exhibits no swelling.   Neurological: She is alert and oriented to person, place, and time.   Skin: Skin is warm and dry. No cyanosis. Nails show no clubbing.   Psychiatric: She has a normal mood and affect. Her speech is  normal and behavior is normal. Judgment and thought content normal. Cognition and memory are normal.   Nursing note and vitals reviewed.      Significant Labs:   BMP:   Recent Labs  Lab 05/21/18 0221 05/21/18  0551   * 144*    140   K 3.5 4.0    109   CO2 25 25   BUN 13 15   CREATININE 1.0 0.9   CALCIUM 9.1 9.0   MG 2.0  --    , CMP   Recent Labs  Lab 05/21/18 0221 05/21/18  0551    140   K 3.5 4.0    109   CO2 25 25   * 144*   BUN 13 15   CREATININE 1.0 0.9   CALCIUM 9.1 9.0   PROT 7.3  --    ALBUMIN 3.5  --    BILITOT 0.2  --    ALKPHOS 76  --    AST 18  --    ALT 21  --    ANIONGAP 8 6*   ESTGFRAFRICA >60 >60   EGFRNONAA >60 >60   , CBC   Recent Labs  Lab 05/21/18 0221   WBC 10.09   HGB 12.2   HCT 37.2      , Lipid Panel No results for input(s): CHOL, HDL, LDLCALC, TRIG, CHOLHDL in the last 48 hours., Troponin   Recent Labs  Lab 05/21/18 0221 05/21/18  0551   TROPONINI 0.202* 0.177*    and All pertinent lab results from the last 24 hours have been reviewed.    Significant Imaging: EKG: Reviewed and X-Ray: CXR: X-Ray Chest 1 View (CXR): No results found for this visit on 05/21/18. and X-Ray Chest PA and Lateral (CXR): No results found for this visit on 05/21/18.    Assessment and Plan:   Patient seen and examined in ED room, lying in bed. NO chest pain at present time. IV heparin gtt infusing. Request old medical records. Continue to trend troponins. 2D echo pending.   * NSTEMI (non-ST elevated myocardial infarction)    -Continue to trend troponin  -Troponin 0.202>0.177  -IV heparin gtt  -Chest pain free currently  -2D Echo pending  -Request old medical records from [facility]          Hyperlipidemia    -Continue Statin, Niacin, Tricor        Essential hypertension    -Continue home meds  -Add Amlodipine 5 mg PO Daily        CAD with far remote PCI of unknown vessel    -Continue ASA, Statin, Plavix, Imdur, BB, ARB  -IV heparin gtt  -Request old medical records from [facility]    -Chest pain free at present.            VTE Risk Mitigation         Ordered     heparin 25,000 units in dextrose 5% 250 mL (100 units/mL) infusion; FEMALE  Continuous      05/21/18 0418          Thank you for your consult. I will follow-up with patient. Please contact us if you have any additional questions.    Theresa Cox NP  Cardiology   [facility]

## 2018-05-21 NOTE — ED NOTES
The patient is resting quietly, eyes closed, arouses easily to stimuli. Airway is open and patent, respirations are spontaneous, normal respiratory effort and rate noted, skin warm and dry, appearance: in no acute distress and resting comfortably.

## 2018-05-21 NOTE — ASSESSMENT & PLAN NOTE
- Last LHC in 5/2017 showed patent stent with no other significant disease, per patient report.  - Followed by Dr. Valle ( Cardiology).  - Plan as above.

## 2018-05-21 NOTE — ASSESSMENT & PLAN NOTE
-Continue to trend troponin  -Troponin 0.202>0.177  -IV heparin gtt  -Chest pain free currently  -2D Echo pending  -Request old medical records from [facility]

## 2018-05-21 NOTE — SUBJECTIVE & OBJECTIVE
Past Medical History:   Diagnosis Date    CAD (coronary artery disease)     Depression     History of colon polyps     Hyperlipidemia associated with type 2 diabetes mellitus     Hypertension associated with diabetes     Obesity     Type II diabetes mellitus with complication        Past Surgical History:   Procedure Laterality Date    CAROTID STENT       SECTION, LOW TRANSVERSE      x 2       Review of patient's allergies indicates:   Allergen Reactions    No known drug allergies        No current facility-administered medications on file prior to encounter.      Current Outpatient Prescriptions on File Prior to Encounter   Medication Sig    ALPRAZolam (XANAX) 0.5 MG tablet TAKE 1 TABLET (0.5 MG TOTAL) BY MOUTH NIGHTLY.    aspirin 325 MG tablet Take 1 tablet (325 mg total) by mouth once daily.    clopidogrel (PLAVIX) 75 mg tablet Take 75 mg by mouth once daily.    cyclobenzaprine (FLEXERIL) 10 MG tablet TAKE 1 TABLET (10 MG TOTAL) BY MOUTH 3 (THREE) TIMES DAILY. (Patient taking differently: Take 10 mg by mouth as needed. )    doxazosin (CARDURA) 4 MG tablet Take 1 tablet (4 mg total) by mouth once daily.    fenofibrate 160 MG Tab Take 1 tablet (160 mg total) by mouth once daily.    glimepiride (AMARYL) 4 MG tablet Take 1 tablet (4 mg total) by mouth before breakfast.    hydrocodone-acetaminophen 5-325mg (NORCO) 5-325 mg per tablet Take 1 tablet by mouth 3 (three) times daily as needed for Pain.    metformin (GLUCOPHAGE) 500 MG tablet Take 1 tablet (500 mg total) by mouth 2 (two) times daily with meals.    niacin (NIASPAN) 750 MG CR tablet TAKE 1 TABLET (750 MG TOTAL) BY MOUTH NIGHTLY.    nitroGLYCERIN (NITROSTAT) 0.4 MG SL tablet PLACE 1 TABLET UNDER TONGUE AT FIRST SIGN OF HEART PAIN, AND REPEAT EVERY 5 MINUTES IF PAIN PERSISTS    omeprazole (PRILOSEC) 40 MG capsule Take 1 capsule (40 mg total) by mouth once daily.    ondansetron (ZOFRAN) 4 MG tablet Take 1 tablet (4 mg total) by  mouth every 8 (eight) hours as needed for Nausea.    potassium chloride SA (K-DUR,KLOR-CON) 20 MEQ tablet Take 1 tablet (20 mEq total) by mouth once daily.    rosuvastatin (CRESTOR) 40 MG Tab Take 1 tablet (40 mg total) by mouth every evening.    traMADol (ULTRAM) 50 mg tablet Take 1 tablet (50 mg total) by mouth every 6 (six) hours as needed for Pain.    [DISCONTINUED] amlodipine-benazepril (LOTREL) 5-40 mg per capsule TAKE ONE CAPSULE BY MOUTH EVERY DAY AS DIRECTED FOR BLOOD PRESSURE     Family History     Problem Relation (Age of Onset)    Colon cancer Father    Diabetes Father    Hyperlipidemia Mother, Father    Hypertension Mother, Father, Brother        Social History Main Topics    Smoking status: Never Smoker    Smokeless tobacco: Never Used    Alcohol use No    Drug use: No    Sexual activity: Yes     Partners: Male     Birth control/ protection: None, Post-menopausal     Review of Systems   Constitution: Negative for weakness.   HENT: Negative for hearing loss and hoarse voice.    Eyes: Negative for visual disturbance.   Cardiovascular: Negative for chest pain, claudication, dyspnea on exertion, irregular heartbeat, leg swelling, near-syncope, orthopnea, palpitations, paroxysmal nocturnal dyspnea and syncope.   Respiratory: Negative for cough, hemoptysis, shortness of breath, sleep disturbances due to breathing, snoring and wheezing.    Endocrine: Negative for cold intolerance.   Hematologic/Lymphatic: Does not bruise/bleed easily.   Skin: Negative for color change, dry skin and nail changes.   Musculoskeletal: Positive for back pain. Negative for joint pain, myalgias and neck pain.   Gastrointestinal: Negative for bloating, abdominal pain, constipation, nausea and vomiting.   Genitourinary: Negative for dysuria, flank pain, hematuria and hesitancy.   Neurological: Negative for headaches, light-headedness, loss of balance, numbness and paresthesias.   Psychiatric/Behavioral: Negative for altered  mental status.   Allergic/Immunologic: Negative for environmental allergies.     Objective:     Vital Signs (Most Recent):  Temp: 98.3 °F (36.8 °C) (05/21/18 0200)  Pulse: 77 (05/21/18 0431)  Resp: 14 (05/21/18 0431)  BP: (!) 115/59 (05/21/18 0431)  SpO2: 96 % (05/21/18 0431) Vital Signs (24h Range):  Temp:  [98.3 °F (36.8 °C)] 98.3 °F (36.8 °C)  Pulse:  [74-77] 77  Resp:  [13-20] 14  SpO2:  [96 %-100 %] 96 %  BP: (115-164)/(59-83) 115/59     Weight: 94.3 kg (208 lb)  Body mass index is 34.61 kg/m².    SpO2: 96 %  O2 Device (Oxygen Therapy): room air    No intake or output data in the 24 hours ending 05/21/18 0835    Lines/Drains/Airways     Peripheral Intravenous Line                 Peripheral IV - Single Lumen 05/21/18 0221 Right Antecubital less than 1 day         Peripheral IV - Single Lumen 05/21/18 0555 Left Antecubital less than 1 day                Physical Exam   Constitutional: She is oriented to person, place, and time. She appears well-developed and well-nourished.   HENT:   Head: Normocephalic and atraumatic.   Eyes: Conjunctivae are normal. Pupils are equal, round, and reactive to light.   Neck: Full passive range of motion without pain. Neck supple. No JVD present.   Cardiovascular: Normal rate, regular rhythm, S1 normal, S2 normal and intact distal pulses.   Occasional extrasystoles are present. PMI is not displaced.    No murmur heard.  Pulses:       Radial pulses are 2+ on the right side, and 2+ on the left side.        Dorsalis pedis pulses are 1+ on the right side, and 1+ on the left side.   Pulmonary/Chest: Effort normal. No respiratory distress. She has decreased breath sounds in the right lower field and the left lower field. She has no wheezes.   Abdominal: Soft. Bowel sounds are normal.   obese   Genitourinary:   Genitourinary Comments: deferred   Musculoskeletal: Normal range of motion. She exhibits no edema.        Right ankle: She exhibits no swelling.        Left ankle: She exhibits no  swelling.   Neurological: She is alert and oriented to person, place, and time.   Skin: Skin is warm and dry. No cyanosis. Nails show no clubbing.   Psychiatric: She has a normal mood and affect. Her speech is normal and behavior is normal. Judgment and thought content normal. Cognition and memory are normal.   Nursing note and vitals reviewed.      Significant Labs:   BMP:   Recent Labs  Lab 05/21/18 0221 05/21/18  0551   * 144*    140   K 3.5 4.0    109   CO2 25 25   BUN 13 15   CREATININE 1.0 0.9   CALCIUM 9.1 9.0   MG 2.0  --    , CMP   Recent Labs  Lab 05/21/18 0221 05/21/18  0551    140   K 3.5 4.0    109   CO2 25 25   * 144*   BUN 13 15   CREATININE 1.0 0.9   CALCIUM 9.1 9.0   PROT 7.3  --    ALBUMIN 3.5  --    BILITOT 0.2  --    ALKPHOS 76  --    AST 18  --    ALT 21  --    ANIONGAP 8 6*   ESTGFRAFRICA >60 >60   EGFRNONAA >60 >60   , CBC   Recent Labs  Lab 05/21/18 0221   WBC 10.09   HGB 12.2   HCT 37.2      , Lipid Panel No results for input(s): CHOL, HDL, LDLCALC, TRIG, CHOLHDL in the last 48 hours., Troponin   Recent Labs  Lab 05/21/18 0221 05/21/18  0551   TROPONINI 0.202* 0.177*    and All pertinent lab results from the last 24 hours have been reviewed.    Significant Imaging: EKG: Reviewed and X-Ray: CXR: X-Ray Chest 1 View (CXR): No results found for this visit on 05/21/18. and X-Ray Chest PA and Lateral (CXR): No results found for this visit on 05/21/18.

## 2018-05-21 NOTE — HPI
"Ms. Boogie is a 58yo female with a PMHx of CAD with remote PCI ~10 years ago, HTN, HLD, and DM II, who presented to the ED with c/o substernal CP that has waxed and waned over the past 1 week.  Pain is "squeezing" in nature, rated 4-5/10, radiates into left arm, and nonexertional.  Associated SOB and nausea.  No aggravating factors; Relieved by SL NTG.  Denies any palpitations, COOPER, orthopnea, PND, edema, cough, ABD pain, V/D, dysuria, lightheadedness/dizziness, syncope, diaphoresis, or fever.  She reports having multiple cardiac caths in the past due to similar symptoms, all of which have been benign.  Per patient, last LHC 5/2017 showed patent stent with no other significant disease.  Denies any h/o CHF.  She is followed by Dr. Valle ( Cardiology).  She went to [facility] ED 5/15 for above symptoms, and work-up resulted troponin 0.19 > 0.17.  Cardiologist on call for  Cardiology was contacted, and recommended patient be seen in office for further evaluation.  Repeat troponin drawn at f/u on 5/20 which showed flat distribution at 0.17.  She was started on Imdur 20mg BID.  Unfortunately, her symptoms have persisted despite Imdur.  Work-up in ED resulted troponin 0.2, EKG unrevealing.  Case discussed with Cardiology in ED, and IV heparin drip initiated.  Hospital Medicine was called for admission.  Currently, patient appears comfortable in NAD.  Denies any CP or SOB currently.   "

## 2018-05-21 NOTE — SUBJECTIVE & OBJECTIVE
Past Medical History:   Diagnosis Date    CAD (coronary artery disease)     Depression     History of colon polyps     Hyperlipidemia associated with type 2 diabetes mellitus     Hypertension associated with diabetes     Obesity     Type II diabetes mellitus with complication        Past Surgical History:   Procedure Laterality Date    CAROTID STENT       SECTION, LOW TRANSVERSE      x 2       Review of patient's allergies indicates:   Allergen Reactions    No known drug allergies        No current facility-administered medications on file prior to encounter.      Current Outpatient Prescriptions on File Prior to Encounter   Medication Sig    ALPRAZolam (XANAX) 0.5 MG tablet TAKE 1 TABLET (0.5 MG TOTAL) BY MOUTH NIGHTLY.    amlodipine-benazepril (LOTREL) 5-40 mg per capsule TAKE ONE CAPSULE BY MOUTH EVERY DAY AS DIRECTED FOR BLOOD PRESSURE    aspirin 325 MG tablet Take 1 tablet (325 mg total) by mouth once daily.    clopidogrel (PLAVIX) 75 mg tablet Take 75 mg by mouth once daily.    cyclobenzaprine (FLEXERIL) 10 MG tablet TAKE 1 TABLET (10 MG TOTAL) BY MOUTH 3 (THREE) TIMES DAILY. (Patient taking differently: Take 10 mg by mouth as needed. )    doxazosin (CARDURA) 4 MG tablet Take 1 tablet (4 mg total) by mouth once daily.    fenofibrate 160 MG Tab Take 1 tablet (160 mg total) by mouth once daily.    glimepiride (AMARYL) 4 MG tablet Take 1 tablet (4 mg total) by mouth before breakfast.    hydrocodone-acetaminophen 5-325mg (NORCO) 5-325 mg per tablet Take 1 tablet by mouth 3 (three) times daily as needed for Pain.    metformin (GLUCOPHAGE) 500 MG tablet Take 1 tablet (500 mg total) by mouth 2 (two) times daily with meals.    niacin (NIASPAN) 750 MG CR tablet TAKE 1 TABLET (750 MG TOTAL) BY MOUTH NIGHTLY.    nitroGLYCERIN (NITROSTAT) 0.4 MG SL tablet PLACE 1 TABLET UNDER TONGUE AT FIRST SIGN OF HEART PAIN, AND REPEAT EVERY 5 MINUTES IF PAIN PERSISTS    omeprazole (PRILOSEC) 40 MG  capsule Take 1 capsule (40 mg total) by mouth once daily.    ondansetron (ZOFRAN) 4 MG tablet Take 1 tablet (4 mg total) by mouth every 8 (eight) hours as needed for Nausea.    potassium chloride SA (K-DUR,KLOR-CON) 20 MEQ tablet Take 1 tablet (20 mEq total) by mouth once daily.    rosuvastatin (CRESTOR) 40 MG Tab Take 1 tablet (40 mg total) by mouth every evening.    traMADol (ULTRAM) 50 mg tablet Take 1 tablet (50 mg total) by mouth every 6 (six) hours as needed for Pain.     Family History     Problem Relation (Age of Onset)    Colon cancer Father    Diabetes Father    Hyperlipidemia Mother, Father    Hypertension Mother, Father, Brother        Social History Main Topics    Smoking status: Never Smoker    Smokeless tobacco: Never Used    Alcohol use No    Drug use: No    Sexual activity: Yes     Partners: Male     Birth control/ protection: None, Post-menopausal     Review of Systems   Constitutional: Negative for activity change, chills, diaphoresis, fatigue, fever and unexpected weight change.   HENT: Negative for congestion, sore throat and trouble swallowing.    Eyes: Negative for photophobia and visual disturbance.   Respiratory: Positive for shortness of breath. Negative for apnea, cough, chest tightness and wheezing.    Cardiovascular: Positive for chest pain. Negative for palpitations and leg swelling.   Gastrointestinal: Positive for nausea. Negative for abdominal distention, abdominal pain, blood in stool, constipation, diarrhea and vomiting.   Endocrine: Negative for polydipsia, polyphagia and polyuria.   Genitourinary: Negative for difficulty urinating, dysuria, frequency, hematuria and urgency.   Musculoskeletal: Negative for arthralgias, back pain, gait problem, joint swelling and myalgias.   Skin: Negative for pallor, rash and wound.   Neurological: Negative for dizziness, seizures, syncope, speech difficulty, weakness, light-headedness, numbness and headaches.   Psychiatric/Behavioral:  Negative for confusion. The patient is not nervous/anxious.    All other systems reviewed and are negative.    Objective:     Vital Signs (Most Recent):  Temp: 98.3 °F (36.8 °C) (05/21/18 0200)  Pulse: 74 (05/21/18 0316)  Resp: 13 (05/21/18 0316)  BP: (!) 164/75 (05/21/18 0316)  SpO2: 100 % (05/21/18 0316) Vital Signs (24h Range):  Temp:  [98.3 °F (36.8 °C)] 98.3 °F (36.8 °C)  Pulse:  [74-77] 74  Resp:  [13-20] 13  SpO2:  [98 %-100 %] 100 %  BP: (149-164)/(75-83) 164/75     Weight: 94.3 kg (208 lb)  Body mass index is 34.61 kg/m².    Physical Exam   Constitutional: She is oriented to person, place, and time. She appears well-developed and well-nourished. No distress.   HENT:   Head: Normocephalic and atraumatic.   Eyes: Conjunctivae are normal.   PERRL; EOM intact.   Neck: Normal range of motion. Neck supple. No JVD present.   Cardiovascular: Normal rate, regular rhythm, S1 normal, S2 normal and intact distal pulses.   No extrasystoles are present. Exam reveals no gallop and no friction rub.    No murmur heard.  Pulses:       Radial pulses are 2+ on the right side, and 2+ on the left side.        Dorsalis pedis pulses are 2+ on the right side, and 2+ on the left side.        Posterior tibial pulses are 2+ on the right side, and 2+ on the left side.   Pulmonary/Chest: Effort normal and breath sounds normal. No accessory muscle usage. No tachypnea. No respiratory distress. She has no wheezes. She has no rhonchi. She has no rales.   Abdominal: Soft. Bowel sounds are normal. She exhibits no distension. There is no tenderness. There is no rebound, no guarding and no CVA tenderness.   Musculoskeletal: Normal range of motion. She exhibits no edema, tenderness or deformity.   Neurological: She is alert and oriented to person, place, and time. No cranial nerve deficit or sensory deficit.   Skin: Skin is warm, dry and intact. Capillary refill takes less than 2 seconds. No rash noted. She is not diaphoretic. No cyanosis or  erythema.   Psychiatric: She has a normal mood and affect. Her speech is normal and behavior is normal. Cognition and memory are normal.   Nursing note and vitals reviewed.          Significant Labs:   Results for orders placed or performed during the hospital encounter of 05/21/18   CBC auto differential   Result Value Ref Range    WBC 10.09 3.90 - 12.70 K/uL    RBC 4.54 4.00 - 5.40 M/uL    Hemoglobin 12.2 12.0 - 16.0 g/dL    Hematocrit 37.2 37.0 - 48.5 %    MCV 82 82 - 98 fL    MCH 26.9 (L) 27.0 - 31.0 pg    MCHC 32.8 32.0 - 36.0 g/dL    RDW 15.5 (H) 11.5 - 14.5 %    Platelets 193 150 - 350 K/uL    MPV 12.0 9.2 - 12.9 fL    Gran # (ANC) 4.0 1.8 - 7.7 K/uL    Lymph # 5.3 (H) 1.0 - 4.8 K/uL    Mono # 0.6 0.3 - 1.0 K/uL    Eos # 0.2 0.0 - 0.5 K/uL    Baso # 0.03 0.00 - 0.20 K/uL    Gran% 39.4 38.0 - 73.0 %    Lymph% 52.2 (H) 18.0 - 48.0 %    Mono% 5.9 4.0 - 15.0 %    Eosinophil% 2.3 0.0 - 8.0 %    Basophil% 0.3 0.0 - 1.9 %    Differential Method Automated    Comprehensive metabolic panel   Result Value Ref Range    Sodium 142 136 - 145 mmol/L    Potassium 3.5 3.5 - 5.1 mmol/L    Chloride 109 95 - 110 mmol/L    CO2 25 23 - 29 mmol/L    Glucose 157 (H) 70 - 110 mg/dL    BUN, Bld 13 6 - 20 mg/dL    Creatinine 1.0 0.5 - 1.4 mg/dL    Calcium 9.1 8.7 - 10.5 mg/dL    Total Protein 7.3 6.0 - 8.4 g/dL    Albumin 3.5 3.5 - 5.2 g/dL    Total Bilirubin 0.2 0.1 - 1.0 mg/dL    Alkaline Phosphatase 76 55 - 135 U/L    AST 18 10 - 40 U/L    ALT 21 10 - 44 U/L    Anion Gap 8 8 - 16 mmol/L    eGFR if African American >60 >60 mL/min/1.73 m^2    eGFR if non African American >60 >60 mL/min/1.73 m^2   APTT   Result Value Ref Range    aPTT 26.1 21.0 - 32.0 sec   Protime-INR   Result Value Ref Range    Prothrombin Time 10.2 9.0 - 12.5 sec    INR 1.0 0.8 - 1.2   Troponin I   Result Value Ref Range    Troponin I 0.202 (H) 0.000 - 0.026 ng/mL      All pertinent labs within the past 24 hours have been reviewed.    Significant Imaging:   Imaging  Results          X-Ray Chest AP Portable (In process)                I have reviewed all pertinent imaging results/findings within the past 24 hours.     EKG: (personally reviewed)  Normal sinus rhythm, nonspecific ST-T wave abnormality.  No acute ischemic ST-T changes from previous tracings, including OLOL on 5/15/18.

## 2018-05-21 NOTE — HPI
Mrs. Boogie is a 57 year old female with a PMHx of CAD with remote PCI in 2010, HTN, HLD, DM II who presents to [facility] ED with complaints of substernal chest pain which radiated to left shoulder while she was laying flat. She reports that the chest pain is similar to when she had cardiac stents placed in 2010. She reports that the chest pain has escalated over the past two weeks from exertional chest pain with walking to rest pain while lying in bed. Last night her pain started while she was lying down at rest. She denies any nausea, vomiting, diaphoresis, or shortness of breath. She did attempt to relieve her chest pain with 1 SL NTG but denies relief of chest pain. Her last LHC was 5/2017 which revealed patent stent with no blockages. She is seen by Dr. Valle ( Cardiology) and was started on Imdur 20 mg PO BID last week which has not relieved her chest pain. Workup in the ED revealed Troponin 0.202>0.177 and patient was placed on Heparin gtt. Cardiology was consulted to assist with management. Patient seen and examined this AM, chest pain free at this time.

## 2018-05-21 NOTE — PLAN OF CARE
Problem: Patient Care Overview  Goal: Individualization & Mutuality  Outcome: Ongoing (interventions implemented as appropriate)  Patient awake and alert, in NAD. Patient had uneventful shift. VS stable. Patient denies pain or SOB. Patient given nitro for chest pain on arrival. Chest pain resolved. Patient on telemetry,. Patient ambulates without assistance. Fall precautions in place. Bed locked and in lowest position. Yellow fall risk band and non-skid socks on patient. Patient free from fall/injury. Plan of care reviewed with patient. All questions answered. Will continue to monitor.

## 2018-05-21 NOTE — HOSPITAL COURSE
Patient admitted for NSTEMI. Cardiology consulted. Her last LHC was 5/2017 which revealed patent stent with no blockages. Troponin 0.202>0.177>0.139. Patient placed on Heparin gtt. 2D echo shows EF 60-65%. Will continue ASA, statin, plavix, imdur, BB, and ARB. Patient underwent LHC on 5/22/18 and noted to have LAD 50% mid long lesion, no intervention needed. Also found to have RCA 95% prox treated with solange x2 and PDA distal 80% treated with SOLANGE x 2. Had brief atypical chest pain last night. Labs and vitals stable. Denies any left radial access complaints. Symptoms improved. Case discussed with Gregory Sher NP, ok to discharge from cardiology standpoint. Case discussed with Dr. Rowan. Home meds reconciled. New prescription given. Patient instructed to hold metformin for 48 hours post angiogram. Patient seen and examined on the date of discharge and found suitable for discharge.

## 2018-05-21 NOTE — H&P
"[facility]  History & Physical    Patient Name: Dulce Boogie  MRN: [id number]  Admission Date: 5/21/2018  Attending Physician: Ej Perez MD  Primary Care Provider: Zeus Hanson MD         Patient information was obtained from patient, past medical records and ER records.     Subjective:     Principal Problem:NSTEMI (non-ST elevated myocardial infarction)    Chief Complaint:   Chief Complaint   Patient presents with    Chest Pain     midsternal chest pain radiating to L side of chest and into L arm; dizziness and shortness of breath        HPI: Ms. Boogie is a 58yo female  with a PMHx of CAD with remote PCI ~10 years ago, HTN, HLD, and DM II, who presented to the ED with c/o substernal CP that has waxed and waned over the past 1 week.  Pain is "squeezing" in nature, rated 4-5/10, radiates into left arm, and nonexertional.  Associated SOB and nausea.  No aggravating factors; Relieved by SL NTG.  Denies any palpitations, COOPER, orthopnea, PND, edema, cough, ABD pain, V/D, dysuria, lightheadedness/dizziness, syncope, diaphoresis, or fever.  She reports having multiple cardiac caths in the past due to similar symptoms, all of which have been benign.  Per patient, last [facility] 5/2017 showed patent stent with no other significant disease.  Denies any h/o CHF.  She is followed by Dr. Valle ( Cardiology).  She went to [facility] ED 5/15 for above symptoms, and work-up resulted troponin 0.19 > 0.17.  Cardiologist on call for  Cardiology was contacted, and recommended patient be seen in office for further evaluation.  Repeat troponin drawn at f/u on 5/20 which showed flat distribution at 0.17.  She was started on Imdur 20mg BID.  Unfortunately, her symptoms have persisted despite Imdur.  Work-up in ED resulted troponin 0.2, EKG unrevealing.  Case discussed with Cardiology in ED, and IV heparin drip initiated.  Hospital Medicine was called for admission.  Currently, patient appears comfortable " in NAD.  Denies any CP or SOB currently.       Past Medical History:   Diagnosis Date    CAD (coronary artery disease)     Depression     History of colon polyps     Hyperlipidemia associated with type 2 diabetes mellitus     Hypertension associated with diabetes     Obesity     Type II diabetes mellitus with complication        Past Surgical History:   Procedure Laterality Date    CAROTID STENT       SECTION, LOW TRANSVERSE      x 2       Review of patient's allergies indicates:   Allergen Reactions    No known drug allergies        No current facility-administered medications on file prior to encounter.      Current Outpatient Prescriptions on File Prior to Encounter   Medication Sig    ALPRAZolam (XANAX) 0.5 MG tablet TAKE 1 TABLET (0.5 MG TOTAL) BY MOUTH NIGHTLY.    amlodipine-benazepril (LOTREL) 5-40 mg per capsule TAKE ONE CAPSULE BY MOUTH EVERY DAY AS DIRECTED FOR BLOOD PRESSURE    aspirin 325 MG tablet Take 1 tablet (325 mg total) by mouth once daily.    clopidogrel (PLAVIX) 75 mg tablet Take 75 mg by mouth once daily.    cyclobenzaprine (FLEXERIL) 10 MG tablet TAKE 1 TABLET (10 MG TOTAL) BY MOUTH 3 (THREE) TIMES DAILY. (Patient taking differently: Take 10 mg by mouth as needed. )    doxazosin (CARDURA) 4 MG tablet Take 1 tablet (4 mg total) by mouth once daily.    fenofibrate 160 MG Tab Take 1 tablet (160 mg total) by mouth once daily.    glimepiride (AMARYL) 4 MG tablet Take 1 tablet (4 mg total) by mouth before breakfast.    hydrocodone-acetaminophen 5-325mg (NORCO) 5-325 mg per tablet Take 1 tablet by mouth 3 (three) times daily as needed for Pain.    metformin (GLUCOPHAGE) 500 MG tablet Take 1 tablet (500 mg total) by mouth 2 (two) times daily with meals.    niacin (NIASPAN) 750 MG CR tablet TAKE 1 TABLET (750 MG TOTAL) BY MOUTH NIGHTLY.    nitroGLYCERIN (NITROSTAT) 0.4 MG SL tablet PLACE 1 TABLET UNDER TONGUE AT FIRST SIGN OF HEART PAIN, AND REPEAT EVERY 5 MINUTES IF PAIN  PERSISTS    omeprazole (PRILOSEC) 40 MG capsule Take 1 capsule (40 mg total) by mouth once daily.    ondansetron (ZOFRAN) 4 MG tablet Take 1 tablet (4 mg total) by mouth every 8 (eight) hours as needed for Nausea.    potassium chloride SA (K-DUR,KLOR-CON) 20 MEQ tablet Take 1 tablet (20 mEq total) by mouth once daily.    rosuvastatin (CRESTOR) 40 MG Tab Take 1 tablet (40 mg total) by mouth every evening.    traMADol (ULTRAM) 50 mg tablet Take 1 tablet (50 mg total) by mouth every 6 (six) hours as needed for Pain.     Family History     Problem Relation (Age of Onset)    Colon cancer Father    Diabetes Father    Hyperlipidemia Mother, Father    Hypertension Mother, Father, Brother        Social History Main Topics    Smoking status: Never Smoker    Smokeless tobacco: Never Used    Alcohol use No    Drug use: No    Sexual activity: Yes     Partners: Male     Birth control/ protection: None, Post-menopausal     Review of Systems   Constitutional: Negative for activity change, chills, diaphoresis, fatigue, fever and unexpected weight change.   HENT: Negative for congestion, sore throat and trouble swallowing.    Eyes: Negative for photophobia and visual disturbance.   Respiratory: Positive for shortness of breath. Negative for apnea, cough, chest tightness and wheezing.    Cardiovascular: Positive for chest pain. Negative for palpitations and leg swelling.   Gastrointestinal: Positive for nausea. Negative for abdominal distention, abdominal pain, blood in stool, constipation, diarrhea and vomiting.   Endocrine: Negative for polydipsia, polyphagia and polyuria.   Genitourinary: Negative for difficulty urinating, dysuria, frequency, hematuria and urgency.   Musculoskeletal: Negative for arthralgias, back pain, gait problem, joint swelling and myalgias.   Skin: Negative for pallor, rash and wound.   Neurological: Negative for dizziness, seizures, syncope, speech difficulty, weakness, light-headedness, numbness  and headaches.   Psychiatric/Behavioral: Negative for confusion. The patient is not nervous/anxious.    All other systems reviewed and are negative.    Objective:     Vital Signs (Most Recent):  Temp: 98.3 °F (36.8 °C) (05/21/18 0200)  Pulse: 74 (05/21/18 0316)  Resp: 13 (05/21/18 0316)  BP: (!) 164/75 (05/21/18 0316)  SpO2: 100 % (05/21/18 0316) Vital Signs (24h Range):  Temp:  [98.3 °F (36.8 °C)] 98.3 °F (36.8 °C)  Pulse:  [74-77] 74  Resp:  [13-20] 13  SpO2:  [98 %-100 %] 100 %  BP: (149-164)/(75-83) 164/75     Weight: 94.3 kg (208 lb)  Body mass index is 34.61 kg/m².    Physical Exam   Constitutional: She is oriented to person, place, and time. She appears well-developed and well-nourished. No distress.   HENT:   Head: Normocephalic and atraumatic.   Eyes: Conjunctivae are normal.   PERRL; EOM intact.   Neck: Normal range of motion. Neck supple. No JVD present.   Cardiovascular: Normal rate, regular rhythm, S1 normal, S2 normal and intact distal pulses.   No extrasystoles are present. Exam reveals no gallop and no friction rub.    No murmur heard.  Pulses:       Radial pulses are 2+ on the right side, and 2+ on the left side.        Dorsalis pedis pulses are 2+ on the right side, and 2+ on the left side.        Posterior tibial pulses are 2+ on the right side, and 2+ on the left side.   Pulmonary/Chest: Effort normal and breath sounds normal. No accessory muscle usage. No tachypnea. No respiratory distress. She has no wheezes. She has no rhonchi. She has no rales.   Abdominal: Soft. Bowel sounds are normal. She exhibits no distension. There is no tenderness. There is no rebound, no guarding and no CVA tenderness.   Musculoskeletal: Normal range of motion. She exhibits no edema, tenderness or deformity.   Neurological: She is alert and oriented to person, place, and time. No cranial nerve deficit or sensory deficit.   Skin: Skin is warm, dry and intact. Capillary refill takes less than 2 seconds. No rash noted.  She is not diaphoretic. No cyanosis or erythema.   Psychiatric: She has a normal mood and affect. Her speech is normal and behavior is normal. Cognition and memory are normal.   Nursing note and vitals reviewed.          Significant Labs:   Results for orders placed or performed during the hospital encounter of 05/21/18   CBC auto differential   Result Value Ref Range    WBC 10.09 3.90 - 12.70 K/uL    RBC 4.54 4.00 - 5.40 M/uL    Hemoglobin 12.2 12.0 - 16.0 g/dL    Hematocrit 37.2 37.0 - 48.5 %    MCV 82 82 - 98 fL    MCH 26.9 (L) 27.0 - 31.0 pg    MCHC 32.8 32.0 - 36.0 g/dL    RDW 15.5 (H) 11.5 - 14.5 %    Platelets 193 150 - 350 K/uL    MPV 12.0 9.2 - 12.9 fL    Gran # (ANC) 4.0 1.8 - 7.7 K/uL    Lymph # 5.3 (H) 1.0 - 4.8 K/uL    Mono # 0.6 0.3 - 1.0 K/uL    Eos # 0.2 0.0 - 0.5 K/uL    Baso # 0.03 0.00 - 0.20 K/uL    Gran% 39.4 38.0 - 73.0 %    Lymph% 52.2 (H) 18.0 - 48.0 %    Mono% 5.9 4.0 - 15.0 %    Eosinophil% 2.3 0.0 - 8.0 %    Basophil% 0.3 0.0 - 1.9 %    Differential Method Automated    Comprehensive metabolic panel   Result Value Ref Range    Sodium 142 136 - 145 mmol/L    Potassium 3.5 3.5 - 5.1 mmol/L    Chloride 109 95 - 110 mmol/L    CO2 25 23 - 29 mmol/L    Glucose 157 (H) 70 - 110 mg/dL    BUN, Bld 13 6 - 20 mg/dL    Creatinine 1.0 0.5 - 1.4 mg/dL    Calcium 9.1 8.7 - 10.5 mg/dL    Total Protein 7.3 6.0 - 8.4 g/dL    Albumin 3.5 3.5 - 5.2 g/dL    Total Bilirubin 0.2 0.1 - 1.0 mg/dL    Alkaline Phosphatase 76 55 - 135 U/L    AST 18 10 - 40 U/L    ALT 21 10 - 44 U/L    Anion Gap 8 8 - 16 mmol/L    eGFR if African American >60 >60 mL/min/1.73 m^2    eGFR if non African American >60 >60 mL/min/1.73 m^2   APTT   Result Value Ref Range    aPTT 26.1 21.0 - 32.0 sec   Protime-INR   Result Value Ref Range    Prothrombin Time 10.2 9.0 - 12.5 sec    INR 1.0 0.8 - 1.2   Troponin I   Result Value Ref Range    Troponin I 0.202 (H) 0.000 - 0.026 ng/mL      All pertinent labs within the past 24 hours have been  reviewed.    Significant Imaging:   Imaging Results          X-Ray Chest AP Portable (In process)                I have reviewed all pertinent imaging results/findings within the past 24 hours.     EKG: (personally reviewed)  Normal sinus rhythm, nonspecific ST-T wave abnormality.  No acute ischemic ST-T changes from previous tracings, including OLOL on 5/15/18.        Assessment/Plan:     * NSTEMI (non-ST elevated myocardial infarction)    - Troponin since 5/15 with flat distribution (0.19 > 0.17 > 0.17 > 0.2).  - Currently CP free.  JAYDE score of 5.  - Troponin 0.2 in ED, EKG unrevealing.  Follow serial results.  - Case discussed with Cardiology in ED, IV heparin drip initiated per nomogram.  - Continue ASA, Plavix, Imdur, and high-intensity statin.  - Will add BB.  - Check TTE in AM, FLP.  - Cardiology to eval patient in AM.        CAD with far remote PCI of unknown vessel    - Last [facility] in 5/2017 showed patent stent with no other significant disease, per patient report.  - Followed by Dr. Valle ( Cardiology).  - Plan as above.           Hyperlipidemia    - Continue high-intensity statin.  - Check FLP.        Essential hypertension    - BP stable.  - Continue ARB, BB, and long-acting nitrate.  - Will hold amlodipine to prevent hypotension with added BB.  Monitor BP trends and resume as needed.        Type 2 diabetes mellitus    - Hold Amaryl and metformin, will resume at DC.  - Accuchecks with SSI.  - Check HbA1c.            VTE Risk Mitigation         Ordered     heparin 25,000 units in dextrose 5% 250 mL (100 units/mL) infusion; FEMALE  Continuous      05/21/18 4005             TONYA Cadena  Department of Hospital Medicine   [facility]

## 2018-05-21 NOTE — ED PROVIDER NOTES
SCRIBE #1 NOTE: I, Rodrigo Dawn, am scribing for, and in the presence of, Ky Conner DO. I have scribed the entire note.      History      Chief Complaint   Patient presents with    Chest Pain     midsternal chest pain radiating to L side of chest and into L arm; dizziness and shortness of breath       Review of patient's allergies indicates:   Allergen Reactions    No known drug allergies         HPI   HPI    2018, 2:05 AM   History obtained from the patient      History of Present Illness: Dulce Boogie is a 57 y.o. female patient who presents to the Emergency Department for an evaluation for midsternal chest pain which onset suddenly today. Pt reports the pain comes and goes. Pt reports she took her aspirin today. Pt had a stent placed x10 years ago. Symptoms are intermittent and moderate in severity. Exacerbated by nothing and relieved by nitro. Associated sxs include left arm pain radiating from chest, dizziness, and SOB. Patient denies any fever, chills, calf pain, leg swelling, palpitations, recent travel, N/V/D, dysuria, hematuria, cough, congestion, and all other sxs at this time. No further complaints or concerns at this time.         Arrival mode: Personal vehicle     PCP: Zeus Hanson MD       Past Medical History:  Past Medical History:   Diagnosis Date    CAD (coronary artery disease)     Depression     History of colon polyps     Hyperlipidemia associated with type 2 diabetes mellitus     Hypertension associated with diabetes     Obesity     Type II diabetes mellitus with complication        Past Surgical History:  Past Surgical History:   Procedure Laterality Date    CAROTID STENT       SECTION, LOW TRANSVERSE      x 2         Family History:  Family History   Problem Relation Age of Onset    Hypertension Mother     Hyperlipidemia Mother     Hypertension Father     Diabetes Father     Hyperlipidemia Father     Colon cancer Father     Hypertension Brother      Breast cancer Neg Hx     Ovarian cancer Neg Hx     Uterine cancer Neg Hx        Social History:  Social History     Social History Main Topics    Smoking status: Never Smoker    Smokeless tobacco: Never Used    Alcohol use No    Drug use: No    Sexual activity: Yes     Partners: Male     Birth control/ protection: None, Post-menopausal       ROS   Review of Systems   Constitutional: Negative for chills, diaphoresis and fever.        (-) Recent travel   HENT: Negative for sore throat.    Respiratory: Positive for shortness of breath. Negative for cough, chest tightness and wheezing.    Cardiovascular: Positive for chest pain. Negative for palpitations and leg swelling.   Gastrointestinal: Negative for abdominal pain, nausea and vomiting.   Genitourinary: Negative for dysuria, frequency, hematuria and urgency.   Musculoskeletal: Positive for myalgias (Left arm pain radiating from chest). Negative for back pain and neck pain.        (-) Calf tenderness   Skin: Negative for rash.   Neurological: Positive for dizziness. Negative for weakness, light-headedness and headaches.   Hematological: Does not bruise/bleed easily.     Physical Exam      Initial Vitals [05/21/18 0200]   BP Pulse Resp Temp SpO2   (!) 149/83 77 20 98.3 °F (36.8 °C) 98 %      MAP       105          Physical Exam  Nursing Notes and Vital Signs Reviewed.  Constitutional: Patient is in no acute distress. Well-developed and well-nourished.  Head: Atraumatic. Normocephalic.  Eyes: PERRL. EOM intact. Conjunctivae are not pale. No scleral icterus.  ENT: Mucous membranes are moist. Oropharynx is clear and symmetric.    Neck: Supple. Full ROM. No lymphadenopathy.  Cardiovascular: Regular rate. Regular rhythm. No murmurs, rubs, or gallops. Distal pulses are 2+ and symmetric.  Pulmonary/Chest: No respiratory distress. Clear to auscultation bilaterally. No wheezing or rales.  Abdominal: Soft and non-distended.  There is no tenderness.  Musculoskeletal:  "Moves all extremities. No obvious deformities.   Skin: Warm and dry.  Neurological:  Alert, awake, and appropriate.  Normal speech.  No acute focal neurological deficits are appreciated.  Psychiatric: Normal affect. Good eye contact. Appropriate in content.    ED Course    Procedures  ED Vital Signs:  Vitals:    05/21/18 0200 05/21/18 0316 05/21/18 0331   BP: (!) 149/83 (!) 164/75    Pulse: 77 74    Resp: 20 13    Temp: 98.3 °F (36.8 °C)     TempSrc: Oral     SpO2: 98% 100%    Weight:   94.3 kg (208 lb)   Height: 5' 5" (1.651 m)         Abnormal Lab Results:  Labs Reviewed   CBC W/ AUTO DIFFERENTIAL - Abnormal; Notable for the following:        Result Value    MCH 26.9 (*)     RDW 15.5 (*)     Lymph # 5.3 (*)     Lymph% 52.2 (*)     All other components within normal limits   COMPREHENSIVE METABOLIC PANEL - Abnormal; Notable for the following:     Glucose 157 (*)     All other components within normal limits   TROPONIN I - Abnormal; Notable for the following:     Troponin I 0.202 (*)     All other components within normal limits   APTT   PROTIME-INR   ANTI-XA HEPARIN MONITORING   APTT   APTT   PROTIME-INR   PLATELET COUNT   CBC W/ AUTO DIFFERENTIAL        All Lab Results:  Results for orders placed or performed during the hospital encounter of 05/21/18   CBC auto differential   Result Value Ref Range    WBC 10.09 3.90 - 12.70 K/uL    RBC 4.54 4.00 - 5.40 M/uL    Hemoglobin 12.2 12.0 - 16.0 g/dL    Hematocrit 37.2 37.0 - 48.5 %    MCV 82 82 - 98 fL    MCH 26.9 (L) 27.0 - 31.0 pg    MCHC 32.8 32.0 - 36.0 g/dL    RDW 15.5 (H) 11.5 - 14.5 %    Platelets 193 150 - 350 K/uL    MPV 12.0 9.2 - 12.9 fL    Gran # (ANC) 4.0 1.8 - 7.7 K/uL    Lymph # 5.3 (H) 1.0 - 4.8 K/uL    Mono # 0.6 0.3 - 1.0 K/uL    Eos # 0.2 0.0 - 0.5 K/uL    Baso # 0.03 0.00 - 0.20 K/uL    Gran% 39.4 38.0 - 73.0 %    Lymph% 52.2 (H) 18.0 - 48.0 %    Mono% 5.9 4.0 - 15.0 %    Eosinophil% 2.3 0.0 - 8.0 %    Basophil% 0.3 0.0 - 1.9 %    Differential Method " Automated    Comprehensive metabolic panel   Result Value Ref Range    Sodium 142 136 - 145 mmol/L    Potassium 3.5 3.5 - 5.1 mmol/L    Chloride 109 95 - 110 mmol/L    CO2 25 23 - 29 mmol/L    Glucose 157 (H) 70 - 110 mg/dL    BUN, Bld 13 6 - 20 mg/dL    Creatinine 1.0 0.5 - 1.4 mg/dL    Calcium 9.1 8.7 - 10.5 mg/dL    Total Protein 7.3 6.0 - 8.4 g/dL    Albumin 3.5 3.5 - 5.2 g/dL    Total Bilirubin 0.2 0.1 - 1.0 mg/dL    Alkaline Phosphatase 76 55 - 135 U/L    AST 18 10 - 40 U/L    ALT 21 10 - 44 U/L    Anion Gap 8 8 - 16 mmol/L    eGFR if African American >60 >60 mL/min/1.73 m^2    eGFR if non African American >60 >60 mL/min/1.73 m^2   APTT   Result Value Ref Range    aPTT 26.1 21.0 - 32.0 sec   Protime-INR   Result Value Ref Range    Prothrombin Time 10.2 9.0 - 12.5 sec    INR 1.0 0.8 - 1.2   Troponin I   Result Value Ref Range    Troponin I 0.202 (H) 0.000 - 0.026 ng/mL         Imaging Results:    Interpreted by virtual imaging.  Study: X-ray chest ap portable  Findings: Per virtual imaging, No definite infiltrate.                  The EKG was ordered, reviewed, and independently interpreted by the ED provider.  Interpretation time: 2:13  Rate: 74 BPM  Rhythm: normal sinus rhythm  Interpretation: Nonspecific ST&T wave abnormalities. No STEMI.        The Emergency Provider reviewed the vital signs and test results, which are outlined above.    ED Discussion     3:29 AM: Dr. Conner discussed the pt's case with Dr. Taylor (Cardiology) who recommends starting pt on a heparin drip and admit to hospital medicine.    3:35 AM: Discussed case with Dr. Perez (Hospital Medicine). Dr. Perez agrees with current care and management of pt and accepts admission.   Admitting Service: Hospital medicine   Admitting Physician: Dr. Perez  Admit to: Tele    3:39 AM: Re-evaluated pt. I have discussed test results, shared treatment plan, and the need for admission with patient and family at bedside. Patient is resting comfortably.  Pt and family express understanding at this time and agree with all information. All questions answered. Pt and family have no further questions or concerns at this time. Pt is ready for admit.      ED Medication(s):  Medications   heparin 25,000 units in dextrose 5% 250 mL (100 units/mL) bolus from bag; INITIAL BOLUS DOSE (not administered)   heparin 25,000 units in dextrose 5% 250 mL (100 units/mL) infusion; FEMALE (not administered)   morphine injection 4 mg (4 mg Intravenous Given 5/21/18 0321)   ondansetron injection 4 mg (4 mg Intravenous Given 5/21/18 0322)   nitroGLYCERIN 2% TD oint ointment 1 inch (1 inch Topical (Top) Given 5/21/18 0322)       New Prescriptions    No medications on file             Medical Decision Making    Medical Decision Making:   Clinical Tests:   Lab Tests: Ordered and Reviewed  Radiological Study: Ordered and Reviewed  Medical Tests: Ordered and Reviewed           Scribe Attestation:   Scribe #1: I performed the above scribed service and the documentation accurately describes the services I performed. I attest to the accuracy of the note.    Attending:   Physician Attestation Statement for Scribe #1: I, Ky Conner DO, personally performed the services described in this documentation, as scribed by Rodrigo Dawn, in my presence, and it is both accurate and complete.          Clinical Impression       ICD-10-CM ICD-9-CM   1. NSTEMI (non-ST elevated myocardial infarction) I21.4 410.70   2. Chest pain R07.9 786.50       Disposition:   Disposition: Admitted  Condition: Fair         Ky Conner DO  05/21/18 3959

## 2018-05-21 NOTE — ASSESSMENT & PLAN NOTE
- Troponin since 5/15 with flat distribution (0.19 > 0.17 > 0.17 > 0.2).  - Currently CP free.  JAYDE score of 5.  - Troponin 0.2 in ED, EKG unrevealing.  Follow serial results.  - Case discussed with Cardiology in ED, IV heparin drip initiated per nomogram.  - Continue ASA, Plavix, Imdur, and high-intensity statin.  - Will add BB.  - Cardiology consult   -Plan for [facility] in am

## 2018-05-21 NOTE — ASSESSMENT & PLAN NOTE
- Troponin since 5/15 with flat distribution (0.19 > 0.17 > 0.17 > 0.2).  - Currently CP free.  JAYDE score of 5.  - Troponin 0.2 in ED, EKG unrevealing.  Follow serial results.  - Case discussed with Cardiology in ED, IV heparin drip initiated per nomogram.  - Continue ASA, Plavix, Imdur, and high-intensity statin.  - Will add BB.  - Check TTE in AM, FLP.  - Cardiology to eval patient in AM.

## 2018-05-21 NOTE — SUBJECTIVE & OBJECTIVE
Interval History: Patient denies any chest pain. Plan for [facility] in AM. Cardiology following.    Review of Systems   Constitutional: Negative for activity change, chills, diaphoresis, fatigue, fever and unexpected weight change.   HENT: Negative for congestion, sore throat and trouble swallowing.    Eyes: Negative for photophobia and visual disturbance.   Respiratory: Positive for shortness of breath. Negative for apnea, cough, chest tightness and wheezing.    Cardiovascular: Positive for chest pain. Negative for palpitations and leg swelling.   Gastrointestinal: Positive for nausea. Negative for abdominal distention, abdominal pain, blood in stool, constipation, diarrhea and vomiting.   Endocrine: Negative for polydipsia, polyphagia and polyuria.   Genitourinary: Negative for difficulty urinating, dysuria, frequency, hematuria and urgency.   Musculoskeletal: Negative for arthralgias, back pain, gait problem, joint swelling and myalgias.   Skin: Negative for pallor, rash and wound.   Neurological: Negative for dizziness, seizures, syncope, speech difficulty, weakness, light-headedness, numbness and headaches.   Psychiatric/Behavioral: Negative for confusion. The patient is not nervous/anxious.    All other systems reviewed and are negative.    Objective:     Vital Signs (Most Recent):  Temp: 98 °F (36.7 °C) (05/21/18 1558)  Pulse: 63 (05/21/18 1558)  Resp: 18 (05/21/18 1558)  BP: (!) 103/58 (05/21/18 1558)  SpO2: (!) 93 % (05/21/18 1558) Vital Signs (24h Range):  Temp:  [98 °F (36.7 °C)-98.3 °F (36.8 °C)] 98 °F (36.7 °C)  Pulse:  [55-77] 63  Resp:  [12-20] 18  SpO2:  [93 %-100 %] 93 %  BP: (103-176)/(55-83) 103/58     Weight: 94.3 kg (208 lb)  Body mass index is 34.61 kg/m².  No intake or output data in the 24 hours ending 05/21/18 1641   Physical Exam   Constitutional: She is oriented to person, place, and time. She appears well-developed and well-nourished. No distress.   HENT:   Head: Normocephalic and atraumatic.    Eyes: Conjunctivae are normal.   PERRL; EOM intact.   Neck: Normal range of motion. Neck supple. No JVD present.   Cardiovascular: Normal rate, regular rhythm, S1 normal, S2 normal and intact distal pulses.   No extrasystoles are present. Exam reveals no gallop and no friction rub.    No murmur heard.  Pulses:       Radial pulses are 2+ on the right side, and 2+ on the left side.        Dorsalis pedis pulses are 2+ on the right side, and 2+ on the left side.        Posterior tibial pulses are 2+ on the right side, and 2+ on the left side.   Pulmonary/Chest: Effort normal and breath sounds normal. No accessory muscle usage. No tachypnea. No respiratory distress. She has no wheezes. She has no rhonchi. She has no rales.   Abdominal: Soft. Bowel sounds are normal. She exhibits no distension. There is no tenderness. There is no rebound, no guarding and no CVA tenderness.   Musculoskeletal: Normal range of motion. She exhibits no edema, tenderness or deformity.   Neurological: She is alert and oriented to person, place, and time. No cranial nerve deficit or sensory deficit.   Skin: Skin is warm, dry and intact. Capillary refill takes less than 2 seconds. No rash noted. She is not diaphoretic. No cyanosis or erythema.   Psychiatric: She has a normal mood and affect. Her speech is normal and behavior is normal. Cognition and memory are normal.   Nursing note and vitals reviewed.      Significant Labs:   BMP:   Recent Labs  Lab 05/21/18 0221 05/21/18 0551   * 144*    140   K 3.5 4.0    109   CO2 25 25   BUN 13 15   CREATININE 1.0 0.9   CALCIUM 9.1 9.0   MG 2.0  --      CBC:   Recent Labs  Lab 05/21/18 0221   WBC 10.09   HGB 12.2   HCT 37.2        CMP:   Recent Labs  Lab 05/21/18 0221 05/21/18 0551    140   K 3.5 4.0    109   CO2 25 25   * 144*   BUN 13 15   CREATININE 1.0 0.9   CALCIUM 9.1 9.0   PROT 7.3  --    ALBUMIN 3.5  --    BILITOT 0.2  --    ALKPHOS 76  --    AST 18   --    ALT 21  --    ANIONGAP 8 6*   EGFRNONAA >60 >60     Cardiac Markers:   Recent Labs  Lab 05/21/18  0221   BNP 27     Troponin:   Recent Labs  Lab 05/21/18  0221 05/21/18  0551 05/21/18  1541   TROPONINI 0.202* 0.177* 0.139*     All pertinent labs within the past 24 hours have been reviewed.    Significant Imaging:   Imaging Results          X-Ray Chest AP Portable (Final result)  Result time 05/21/18 08:08:15    Final result by Kenneth Barrera MD (05/21/18 08:08:15)                 Impression:      Negative single view chest x-ray.      Electronically signed by: Kenneth Barrera MD  Date:    05/21/2018  Time:    08:08             Narrative:    EXAMINATION:  XR CHEST AP PORTABLE    CLINICAL HISTORY:  Acute chest pain,, chest pain;    COMPARISON:  12/21/2013.    FINDINGS:  Heart size is normal. The lung fields are clear. No acute cardiopulmonary infiltrate.

## 2018-05-21 NOTE — ASSESSMENT & PLAN NOTE
-Continue ASA, Statin, Plavix, Imdur, BB, ARB  -IV heparin gtt  -Request old medical records from [facility]   -Chest pain free at present.

## 2018-05-21 NOTE — ASSESSMENT & PLAN NOTE
- BP stable.  - Continue ARB, BB, and long-acting nitrate.  - Will hold amlodipine to prevent hypotension with added BB.  Monitor BP trends and resume as needed.

## 2018-05-22 PROBLEM — R07.9 CHEST PAIN: Status: ACTIVE | Noted: 2018-05-22

## 2018-05-22 LAB
ANION GAP SERPL CALC-SCNC: 10 MMOL/L
APTT BLDCRRT: 45.7 SEC
BASOPHILS # BLD AUTO: 0.03 K/UL
BASOPHILS NFR BLD: 0.3 %
BUN SERPL-MCNC: 25 MG/DL
CALCIUM SERPL-MCNC: 8.9 MG/DL
CHLORIDE SERPL-SCNC: 107 MMOL/L
CO2 SERPL-SCNC: 22 MMOL/L
CREAT SERPL-MCNC: 1 MG/DL
DIFFERENTIAL METHOD: ABNORMAL
EOSINOPHIL # BLD AUTO: 0.2 K/UL
EOSINOPHIL NFR BLD: 2.2 %
ERYTHROCYTE [DISTWIDTH] IN BLOOD BY AUTOMATED COUNT: 15.6 %
EST. GFR  (AFRICAN AMERICAN): >60 ML/MIN/1.73 M^2
EST. GFR  (NON AFRICAN AMERICAN): >60 ML/MIN/1.73 M^2
ESTIMATED AVG GLUCOSE: 157 MG/DL
GLUCOSE SERPL-MCNC: 139 MG/DL
HBA1C MFR BLD HPLC: 7.1 %
HCT VFR BLD AUTO: 36.5 %
HGB BLD-MCNC: 11.7 G/DL
LYMPHOCYTES # BLD AUTO: 5.1 K/UL
LYMPHOCYTES NFR BLD: 53.4 %
MCH RBC QN AUTO: 26.6 PG
MCHC RBC AUTO-ENTMCNC: 32.1 G/DL
MCV RBC AUTO: 83 FL
MONOCYTES # BLD AUTO: 0.5 K/UL
MONOCYTES NFR BLD: 4.8 %
NEUTROPHILS # BLD AUTO: 3.7 K/UL
NEUTROPHILS NFR BLD: 39.4 %
PLATELET # BLD AUTO: 169 K/UL
PMV BLD AUTO: 12.4 FL
POC ACTIVATED CLOTTING TIME K: 213 SEC (ref 74–137)
POC ACTIVATED CLOTTING TIME K: 219 SEC (ref 74–137)
POC ACTIVATED CLOTTING TIME K: 279 SEC (ref 74–137)
POCT GLUCOSE: 102 MG/DL (ref 70–110)
POCT GLUCOSE: 126 MG/DL (ref 70–110)
POCT GLUCOSE: 130 MG/DL (ref 70–110)
POTASSIUM SERPL-SCNC: 3.9 MMOL/L
RBC # BLD AUTO: 4.4 M/UL
SAMPLE: ABNORMAL
SODIUM SERPL-SCNC: 139 MMOL/L
TROPONIN I SERPL DL<=0.01 NG/ML-MCNC: 0.14 NG/ML
WBC # BLD AUTO: 9.49 K/UL

## 2018-05-22 PROCEDURE — 93010 ELECTROCARDIOGRAM REPORT: CPT | Mod: ,,, | Performed by: INTERNAL MEDICINE

## 2018-05-22 PROCEDURE — 25000003 PHARM REV CODE 250: Performed by: INTERNAL MEDICINE

## 2018-05-22 PROCEDURE — 027237Z DILATION OF CORONARY ARTERY, THREE ARTERIES WITH FOUR OR MORE DRUG-ELUTING INTRALUMINAL DEVICES, PERCUTANEOUS APPROACH: ICD-10-PCS | Performed by: INTERNAL MEDICINE

## 2018-05-22 PROCEDURE — 85730 THROMBOPLASTIN TIME PARTIAL: CPT

## 2018-05-22 PROCEDURE — B2151ZZ FLUOROSCOPY OF LEFT HEART USING LOW OSMOLAR CONTRAST: ICD-10-PCS | Performed by: INTERNAL MEDICINE

## 2018-05-22 PROCEDURE — 92929 CATH LAB PROCEDURE: CPT | Mod: RC,,, | Performed by: INTERNAL MEDICINE

## 2018-05-22 PROCEDURE — 4A023N7 MEASUREMENT OF CARDIAC SAMPLING AND PRESSURE, LEFT HEART, PERCUTANEOUS APPROACH: ICD-10-PCS | Performed by: INTERNAL MEDICINE

## 2018-05-22 PROCEDURE — 25000003 PHARM REV CODE 250

## 2018-05-22 PROCEDURE — 63700000 PHARM REV CODE 250 ALT 637 W/O HCPCS: Performed by: NURSE PRACTITIONER

## 2018-05-22 PROCEDURE — 83036 HEMOGLOBIN GLYCOSYLATED A1C: CPT

## 2018-05-22 PROCEDURE — 36415 COLL VENOUS BLD VENIPUNCTURE: CPT

## 2018-05-22 PROCEDURE — 25000003 PHARM REV CODE 250: Performed by: NURSE PRACTITIONER

## 2018-05-22 PROCEDURE — 63600175 PHARM REV CODE 636 W HCPCS

## 2018-05-22 PROCEDURE — 4A033BC MEASUREMENT OF ARTERIAL PRESSURE, CORONARY, PERCUTANEOUS APPROACH: ICD-10-PCS | Performed by: INTERNAL MEDICINE

## 2018-05-22 PROCEDURE — 92928 PRQ TCAT PLMT NTRAC ST 1 LES: CPT | Mod: RC,,, | Performed by: INTERNAL MEDICINE

## 2018-05-22 PROCEDURE — 85025 COMPLETE CBC W/AUTO DIFF WBC: CPT

## 2018-05-22 PROCEDURE — C1769 GUIDE WIRE: HCPCS

## 2018-05-22 PROCEDURE — 93571 IV DOP VEL&/PRESS C FLO 1ST: CPT | Mod: 26,,, | Performed by: INTERNAL MEDICINE

## 2018-05-22 PROCEDURE — 99152 MOD SED SAME PHYS/QHP 5/>YRS: CPT | Mod: ,,, | Performed by: INTERNAL MEDICINE

## 2018-05-22 PROCEDURE — B2111ZZ FLUOROSCOPY OF MULTIPLE CORONARY ARTERIES USING LOW OSMOLAR CONTRAST: ICD-10-PCS | Performed by: INTERNAL MEDICINE

## 2018-05-22 PROCEDURE — 93458 L HRT ARTERY/VENTRICLE ANGIO: CPT | Mod: 26,59,, | Performed by: INTERNAL MEDICINE

## 2018-05-22 PROCEDURE — 80048 BASIC METABOLIC PNL TOTAL CA: CPT

## 2018-05-22 PROCEDURE — 21400001 HC TELEMETRY ROOM

## 2018-05-22 DEVICE — IMPLANTABLE DEVICE: Type: IMPLANTABLE DEVICE | Site: HEART | Status: FUNCTIONAL

## 2018-05-22 RX ORDER — SODIUM CHLORIDE 9 MG/ML
INJECTION, SOLUTION INTRAVENOUS CONTINUOUS
Status: ACTIVE | OUTPATIENT
Start: 2018-05-22 | End: 2018-05-23

## 2018-05-22 RX ORDER — SODIUM CHLORIDE 9 MG/ML
INJECTION, SOLUTION INTRAVENOUS CONTINUOUS
Status: DISCONTINUED | OUTPATIENT
Start: 2018-05-22 | End: 2018-05-22

## 2018-05-22 RX ORDER — HYDROCODONE BITARTRATE AND ACETAMINOPHEN 5; 325 MG/1; MG/1
1 TABLET ORAL EVERY 6 HOURS PRN
Status: DISCONTINUED | OUTPATIENT
Start: 2018-05-22 | End: 2018-05-23 | Stop reason: HOSPADM

## 2018-05-22 RX ORDER — ASPIRIN 81 MG/1
81 TABLET ORAL DAILY
Status: DISCONTINUED | OUTPATIENT
Start: 2018-05-23 | End: 2018-05-23 | Stop reason: HOSPADM

## 2018-05-22 RX ADMIN — METOPROLOL TARTRATE 25 MG: 25 TABLET ORAL at 10:05

## 2018-05-22 RX ADMIN — DOXAZOSIN MESYLATE 4 MG: 2 TABLET ORAL at 10:05

## 2018-05-22 RX ADMIN — ROSUVASTATIN CALCIUM 40 MG: 10 TABLET, FILM COATED ORAL at 08:05

## 2018-05-22 RX ADMIN — PANTOPRAZOLE SODIUM 40 MG: 40 TABLET, DELAYED RELEASE ORAL at 10:05

## 2018-05-22 RX ADMIN — ACETAMINOPHEN 650 MG: 325 TABLET, FILM COATED ORAL at 10:05

## 2018-05-22 RX ADMIN — METOPROLOL TARTRATE 25 MG: 25 TABLET ORAL at 08:05

## 2018-05-22 RX ADMIN — FENOFIBRATE 160 MG: 160 TABLET ORAL at 10:05

## 2018-05-22 RX ADMIN — HYDROCODONE BITARTRATE AND ACETAMINOPHEN 1 TABLET: 5; 325 TABLET ORAL at 07:05

## 2018-05-22 RX ADMIN — ISOSORBIDE MONONITRATE 20 MG: 20 TABLET ORAL at 05:05

## 2018-05-22 RX ADMIN — SODIUM CHLORIDE: 0.9 INJECTION, SOLUTION INTRAVENOUS at 05:05

## 2018-05-22 RX ADMIN — OLMESARTAN MEDOXOMIL 40 MG: 40 TABLET, FILM COATED ORAL at 10:05

## 2018-05-22 RX ADMIN — SODIUM CHLORIDE: 0.9 INJECTION, SOLUTION INTRAVENOUS at 07:05

## 2018-05-22 RX ADMIN — SODIUM CHLORIDE: 0.9 INJECTION, SOLUTION INTRAVENOUS at 11:05

## 2018-05-22 RX ADMIN — CLOPIDOGREL BISULFATE 75 MG: 75 TABLET ORAL at 10:05

## 2018-05-22 RX ADMIN — ACETAMINOPHEN 650 MG: 325 TABLET, FILM COATED ORAL at 05:05

## 2018-05-22 RX ADMIN — AMLODIPINE BESYLATE 5 MG: 5 TABLET ORAL at 10:05

## 2018-05-22 RX ADMIN — ASPIRIN 325 MG ORAL TABLET 325 MG: 325 PILL ORAL at 10:05

## 2018-05-22 NOTE — NURSING TRANSFER
Nursing Transfer Note      5/22/2018     Transfer ROOM 212    Transfer via BED    Transfer with PORT MONITOR    Transported by KALEB COLLINS RN    Medicines sent:NONE    Chart send with patient: YES    Notified:FAMILY AT BEDSIDE     Patient reassessed at: 5/22/18  1630    BEDSIDE REPORT TO JESUS    Upon arrival to floor:TRANSFERRED TO ROOM. TRANSFERRED TO BED.  TELEMETRY MONITER ON.  IV FLUIDS ON PUMP AT PRESCRIBED RATE.  PROCEDURAL ACCESS SITE WITHOUT BLEEDING OR HEMATOMA.  DRESSING DRY AND INTACT.  CARE HANDED OFF TO.JESUS.........

## 2018-05-22 NOTE — PROGRESS NOTES
Pt up to shower after covering ivs and groins shaved bilateral as prep for heart cath. Pt used hibicleanse soap. Tolerated.

## 2018-05-22 NOTE — PROGRESS NOTES
"[facility] Hospital Medicine  Progress Note    Patient Name: Dulce Boogie  MRN: [id number]  Patient Class: IP- Inpatient   Admission Date: 5/21/2018  Length of Stay: 1 days  Attending Physician: Uzma Alvarez MD  Primary Care Provider: Zeus Hanson MD        Subjective:     Principal Problem:NSTEMI (non-ST elevated myocardial infarction)    HPI:  Ms. Boogie is a 56yo female  with a PMHx of CAD with remote PCI ~10 years ago, HTN, HLD, and DM II, who presented to the ED with c/o substernal CP that has waxed and waned over the past 1 week.  Pain is "squeezing" in nature, rated 4-5/10, radiates into left arm, and nonexertional.  Associated SOB and nausea.  No aggravating factors; Relieved by SL NTG.  Denies any palpitations, COOPER, orthopnea, PND, edema, cough, ABD pain, V/D, dysuria, lightheadedness/dizziness, syncope, diaphoresis, or fever.  She reports having multiple cardiac caths in the past due to similar symptoms, all of which have been benign.  Per patient, last LHC 5/2017 showed patent stent with no other significant disease.  Denies any h/o CHF.  She is followed by Dr. Valle ( Cardiology).  She went to [facility] ED 5/15 for above symptoms, and work-up resulted troponin 0.19 > 0.17.  Cardiologist on call for  Cardiology was contacted, and recommended patient be seen in office for further evaluation.  Repeat troponin drawn at f/u on 5/20 which showed flat distribution at 0.17.  She was started on Imdur 20mg BID.  Unfortunately, her symptoms have persisted despite Imdur.  Work-up in ED resulted troponin 0.2, EKG unrevealing.  Case discussed with Cardiology in ED, and IV heparin drip initiated.  Hospital Medicine was called for admission.  Currently, patient appears comfortable in NAD.  Denies any CP or SOB currently.     Hospital Course:  Patient admitted for NSTEMI. Cardiology consulted. Her last LHC was 5/2017 which revealed patent stent with no blockages. Troponin 0.202>0.177>0.139. " Patient placed on Heparin gtt. 2D echo shows EF 60-65%. Will continue ASA, statin, plavix, imdur, BB, and ARB. [facility] planned for today.     Interval History: No acute events overnight. [facility] planned for today.     Review of Systems   Constitutional: Negative for activity change, chills, diaphoresis, fatigue, fever and unexpected weight change.   HENT: Negative for congestion, sore throat and trouble swallowing.    Eyes: Negative for photophobia and visual disturbance.   Respiratory: Negative for apnea, cough, chest tightness, shortness of breath and wheezing.    Cardiovascular: Positive for chest pain. Negative for palpitations and leg swelling.   Gastrointestinal: Negative for abdominal distention, abdominal pain, blood in stool, constipation, diarrhea, nausea and vomiting.   Endocrine: Negative for polydipsia, polyphagia and polyuria.   Genitourinary: Negative for difficulty urinating, dysuria, frequency, hematuria and urgency.   Musculoskeletal: Negative for arthralgias, back pain, gait problem, joint swelling and myalgias.   Skin: Negative for pallor, rash and wound.   Neurological: Negative for dizziness, seizures, syncope, speech difficulty, weakness, light-headedness, numbness and headaches.   Psychiatric/Behavioral: Negative for confusion. The patient is not nervous/anxious.    All other systems reviewed and are negative.    Objective:     Vital Signs (Most Recent):  Temp: 98.2 °F (36.8 °C) (05/22/18 1145)  Pulse: (!) 59 (05/22/18 1145)  Resp: 17 (05/22/18 1145)  BP: 128/64 (05/22/18 1145)  SpO2: 97 % (05/22/18 1145) Vital Signs (24h Range):  Temp:  [20 °F (-6.7 °C)-98.4 °F (36.9 °C)] 98.2 °F (36.8 °C)  Pulse:  [59-74] 59  Resp:  [17-20] 17  SpO2:  [93 %-99 %] 97 %  BP: (103-137)/(58-69) 128/64     Weight: 98 kg (216 lb 0.8 oz)  Body mass index is 35.95 kg/m².    Intake/Output Summary (Last 24 hours) at 05/22/18 1325  Last data filed at 05/22/18 0500   Gross per 24 hour   Intake           855.83 ml   Output                 0 ml   Net           855.83 ml      Physical Exam   Constitutional: She is oriented to person, place, and time. She appears well-developed and well-nourished. No distress.   HENT:   Head: Normocephalic and atraumatic.   Eyes: Conjunctivae are normal.   PERRL; EOM intact.   Neck: Normal range of motion. Neck supple. No JVD present.   Cardiovascular: Normal rate, regular rhythm, S1 normal, S2 normal and intact distal pulses.   No extrasystoles are present. Exam reveals no gallop and no friction rub.    No murmur heard.  Pulses:       Radial pulses are 2+ on the right side, and 2+ on the left side.        Dorsalis pedis pulses are 2+ on the right side, and 2+ on the left side.        Posterior tibial pulses are 2+ on the right side, and 2+ on the left side.   Pulmonary/Chest: Effort normal and breath sounds normal. No accessory muscle usage. No tachypnea. No respiratory distress. She has no wheezes. She has no rhonchi. She has no rales.   Abdominal: Soft. Bowel sounds are normal. She exhibits no distension. There is no tenderness. There is no rebound, no guarding and no CVA tenderness.   Musculoskeletal: Normal range of motion. She exhibits no edema, tenderness or deformity.   Neurological: She is alert and oriented to person, place, and time. No cranial nerve deficit or sensory deficit.   Skin: Skin is warm, dry and intact. Capillary refill takes less than 2 seconds. No rash noted. She is not diaphoretic. No cyanosis or erythema.   Psychiatric: She has a normal mood and affect. Her speech is normal and behavior is normal. Cognition and memory are normal.   Nursing note and vitals reviewed.      Significant Labs:   BMP:   Recent Labs  Lab 05/21/18  0221  05/22/18  0557   *  < > 139*     < > 139   K 3.5  < > 3.9     < > 107   CO2 25  < > 22*   BUN 13  < > 25*   CREATININE 1.0  < > 1.0   CALCIUM 9.1  < > 8.9   MG 2.0  --   --    < > = values in this interval not displayed.  CBC:   Recent  Labs  Lab 05/21/18  0221 05/22/18  0557   WBC 10.09 9.49   HGB 12.2 11.7*   HCT 37.2 36.5*    169     CMP:   Recent Labs  Lab 05/21/18  0221 05/21/18  0551 05/22/18  0557    140 139   K 3.5 4.0 3.9    109 107   CO2 25 25 22*   * 144* 139*   BUN 13 15 25*   CREATININE 1.0 0.9 1.0   CALCIUM 9.1 9.0 8.9   PROT 7.3  --   --    ALBUMIN 3.5  --   --    BILITOT 0.2  --   --    ALKPHOS 76  --   --    AST 18  --   --    ALT 21  --   --    ANIONGAP 8 6* 10   EGFRNONAA >60 >60 >60     Cardiac Markers:   Recent Labs  Lab 05/21/18  0221   BNP 27     Troponin:   Recent Labs  Lab 05/21/18  1541 05/21/18  1945 05/21/18  2352   TROPONINI 0.139* 0.151* 0.142*     All pertinent labs within the past 24 hours have been reviewed.    Significant Imaging:   Imaging Results          X-Ray Chest AP Portable (Final result)  Result time 05/21/18 08:08:15    Final result by Kenneth Barrera MD (05/21/18 08:08:15)                 Impression:      Negative single view chest x-ray.      Electronically signed by: Kenneth Barrera MD  Date:    05/21/2018  Time:    08:08             Narrative:    EXAMINATION:  XR CHEST AP PORTABLE    CLINICAL HISTORY:  Acute chest pain,, chest pain;    COMPARISON:  12/21/2013.    FINDINGS:  Heart size is normal. The lung fields are clear. No acute cardiopulmonary infiltrate.                              Assessment/Plan:      * NSTEMI (non-ST elevated myocardial infarction)    - Troponin since 5/15 with flat distribution (0.19 > 0.17 > 0.17 > 0.2).  - Currently CP free.  JAYDE score of 5.  - Troponin 0.2 in ED, EKG unrevealing.  Follow serial results.  - Case discussed with Cardiology in ED, IV heparin drip initiated per nomogram.  - Continue ASA, Plavix, Imdur, and high-intensity statin.  - Will add BB.  - Cardiology consult   - [facility] planned for today          Hyperlipidemia    - Resume home meds           Essential hypertension    - BP stable.  - Continue ARB, BB, and long-acting  nitrate.  - Will hold amlodipine to prevent hypotension with added BB.  Monitor BP trends and resume as needed.        Type 2 diabetes mellitus    - Hold Amaryl and metformin, will resume at DC.  - Accuchecks with SSI.          CAD with far remote PCI of unknown vessel    - Last [facility] in 5/2017 showed patent stent with no other significant disease, per patient report.  - Followed by Dr. Valle ([facility]).  - Plan as above.          VTE Risk Mitigation         Ordered     heparin 25,000 units in dextrose 5% 250 mL (100 units/mL) infusion; FEMALE  Continuous      05/21/18 0432              Lynda Lewis NP  Department of Hospital Medicine   [facility]

## 2018-05-22 NOTE — H&P (VIEW-ONLY)
[facility]  Cardiology  Consult Note    Patient Name: Dulce Boogie  MRN: [id number]  Admission Date: 5/21/2018  Hospital Length of Stay: 0 days  Code Status: Full Code   Attending Provider: Uzma Alvarez MD   Consulting Provider: Clark Villarreal NP  Primary Care Physician: Zeus Hanson MD  Principal Problem:NSTEMI (non-ST elevated myocardial infarction)    Patient information was obtained from patient, EMS personnel, past medical records and ER records.     Inpatient consult to Cardiology  Consult performed by: CLARK VILLARREAL  Consult ordered by: RODRÍGUEZ TRUONG        Subjective:     Chief Complaint:  Chest pain      HPI:   Mrs. Boogie is a 57 year old female with a PMHx of CAD with remote PCI in 2010, HTN, HLD, DM II who presents to [facility] ED with complaints of substernal chest pain which radiated to left shoulder while she was laying flat. She reports that the chest pain is similar to when she had cardiac stents placed in 2010. She reports that the chest pain has escalated over the past two weeks from exertional chest pain with walking to rest pain while lying in bed. Last night her pain started while she was lying down at rest. She denies any nausea, vomiting, diaphoresis, or shortness of breath. She did attempt to relieve her chest pain with 1 SL NTG but denies relief of chest pain. Her last LHC was 5/2017 which revealed patent stent with no blockages. She is seen by Dr. Valle ( Cardiology) and was started on Imdur 20 mg PO BID last week which has not relieved her chest pain. Workup in the ED revealed Troponin 0.202>0.177 and patient was placed on Heparin gtt. Cardiology was consulted to assist with management. Patient seen and examined this AM, chest pain free at this time.     Past Medical History:   Diagnosis Date    CAD (coronary artery disease)     Depression     History of colon polyps     Hyperlipidemia associated with type 2 diabetes mellitus     Hypertension associated  with diabetes     Obesity     Type II diabetes mellitus with complication        Past Surgical History:   Procedure Laterality Date    CAROTID STENT       SECTION, LOW TRANSVERSE      x 2       Review of patient's allergies indicates:   Allergen Reactions    No known drug allergies        No current facility-administered medications on file prior to encounter.      Current Outpatient Prescriptions on File Prior to Encounter   Medication Sig    ALPRAZolam (XANAX) 0.5 MG tablet TAKE 1 TABLET (0.5 MG TOTAL) BY MOUTH NIGHTLY.    aspirin 325 MG tablet Take 1 tablet (325 mg total) by mouth once daily.    clopidogrel (PLAVIX) 75 mg tablet Take 75 mg by mouth once daily.    cyclobenzaprine (FLEXERIL) 10 MG tablet TAKE 1 TABLET (10 MG TOTAL) BY MOUTH 3 (THREE) TIMES DAILY. (Patient taking differently: Take 10 mg by mouth as needed. )    doxazosin (CARDURA) 4 MG tablet Take 1 tablet (4 mg total) by mouth once daily.    fenofibrate 160 MG Tab Take 1 tablet (160 mg total) by mouth once daily.    glimepiride (AMARYL) 4 MG tablet Take 1 tablet (4 mg total) by mouth before breakfast.    hydrocodone-acetaminophen 5-325mg (NORCO) 5-325 mg per tablet Take 1 tablet by mouth 3 (three) times daily as needed for Pain.    metformin (GLUCOPHAGE) 500 MG tablet Take 1 tablet (500 mg total) by mouth 2 (two) times daily with meals.    niacin (NIASPAN) 750 MG CR tablet TAKE 1 TABLET (750 MG TOTAL) BY MOUTH NIGHTLY.    nitroGLYCERIN (NITROSTAT) 0.4 MG SL tablet PLACE 1 TABLET UNDER TONGUE AT FIRST SIGN OF HEART PAIN, AND REPEAT EVERY 5 MINUTES IF PAIN PERSISTS    omeprazole (PRILOSEC) 40 MG capsule Take 1 capsule (40 mg total) by mouth once daily.    ondansetron (ZOFRAN) 4 MG tablet Take 1 tablet (4 mg total) by mouth every 8 (eight) hours as needed for Nausea.    potassium chloride SA (K-DUR,KLOR-CON) 20 MEQ tablet Take 1 tablet (20 mEq total) by mouth once daily.    rosuvastatin (CRESTOR) 40 MG Tab Take 1 tablet (40  mg total) by mouth every evening.    traMADol (ULTRAM) 50 mg tablet Take 1 tablet (50 mg total) by mouth every 6 (six) hours as needed for Pain.    [DISCONTINUED] amlodipine-benazepril (LOTREL) 5-40 mg per capsule TAKE ONE CAPSULE BY MOUTH EVERY DAY AS DIRECTED FOR BLOOD PRESSURE     Family History     Problem Relation (Age of Onset)    Colon cancer Father    Diabetes Father    Hyperlipidemia Mother, Father    Hypertension Mother, Father, Brother        Social History Main Topics    Smoking status: Never Smoker    Smokeless tobacco: Never Used    Alcohol use No    Drug use: No    Sexual activity: Yes     Partners: Male     Birth control/ protection: None, Post-menopausal     Review of Systems   Constitution: Negative for weakness.   HENT: Negative for hearing loss and hoarse voice.    Eyes: Negative for visual disturbance.   Cardiovascular: Negative for chest pain, claudication, dyspnea on exertion, irregular heartbeat, leg swelling, near-syncope, orthopnea, palpitations, paroxysmal nocturnal dyspnea and syncope.   Respiratory: Negative for cough, hemoptysis, shortness of breath, sleep disturbances due to breathing, snoring and wheezing.    Endocrine: Negative for cold intolerance.   Hematologic/Lymphatic: Does not bruise/bleed easily.   Skin: Negative for color change, dry skin and nail changes.   Musculoskeletal: Positive for back pain. Negative for joint pain, myalgias and neck pain.   Gastrointestinal: Negative for bloating, abdominal pain, constipation, nausea and vomiting.   Genitourinary: Negative for dysuria, flank pain, hematuria and hesitancy.   Neurological: Negative for headaches, light-headedness, loss of balance, numbness and paresthesias.   Psychiatric/Behavioral: Negative for altered mental status.   Allergic/Immunologic: Negative for environmental allergies.     Objective:     Vital Signs (Most Recent):  Temp: 98.3 °F (36.8 °C) (05/21/18 0200)  Pulse: 77 (05/21/18 0431)  Resp: 14 (05/21/18  0431)  BP: (!) 115/59 (05/21/18 0431)  SpO2: 96 % (05/21/18 0431) Vital Signs (24h Range):  Temp:  [98.3 °F (36.8 °C)] 98.3 °F (36.8 °C)  Pulse:  [74-77] 77  Resp:  [13-20] 14  SpO2:  [96 %-100 %] 96 %  BP: (115-164)/(59-83) 115/59     Weight: 94.3 kg (208 lb)  Body mass index is 34.61 kg/m².    SpO2: 96 %  O2 Device (Oxygen Therapy): room air    No intake or output data in the 24 hours ending 05/21/18 0835    Lines/Drains/Airways     Peripheral Intravenous Line                 Peripheral IV - Single Lumen 05/21/18 0221 Right Antecubital less than 1 day         Peripheral IV - Single Lumen 05/21/18 0555 Left Antecubital less than 1 day                Physical Exam   Constitutional: She is oriented to person, place, and time. She appears well-developed and well-nourished.   HENT:   Head: Normocephalic and atraumatic.   Eyes: Conjunctivae are normal. Pupils are equal, round, and reactive to light.   Neck: Full passive range of motion without pain. Neck supple. No JVD present.   Cardiovascular: Normal rate, regular rhythm, S1 normal, S2 normal and intact distal pulses.   Occasional extrasystoles are present. PMI is not displaced.    No murmur heard.  Pulses:       Radial pulses are 2+ on the right side, and 2+ on the left side.        Dorsalis pedis pulses are 1+ on the right side, and 1+ on the left side.   Pulmonary/Chest: Effort normal. No respiratory distress. She has decreased breath sounds in the right lower field and the left lower field. She has no wheezes.   Abdominal: Soft. Bowel sounds are normal.   obese   Genitourinary:   Genitourinary Comments: deferred   Musculoskeletal: Normal range of motion. She exhibits no edema.        Right ankle: She exhibits no swelling.        Left ankle: She exhibits no swelling.   Neurological: She is alert and oriented to person, place, and time.   Skin: Skin is warm and dry. No cyanosis. Nails show no clubbing.   Psychiatric: She has a normal mood and affect. Her speech is  normal and behavior is normal. Judgment and thought content normal. Cognition and memory are normal.   Nursing note and vitals reviewed.      Significant Labs:   BMP:   Recent Labs  Lab 05/21/18 0221 05/21/18  0551   * 144*    140   K 3.5 4.0    109   CO2 25 25   BUN 13 15   CREATININE 1.0 0.9   CALCIUM 9.1 9.0   MG 2.0  --    , CMP   Recent Labs  Lab 05/21/18 0221 05/21/18  0551    140   K 3.5 4.0    109   CO2 25 25   * 144*   BUN 13 15   CREATININE 1.0 0.9   CALCIUM 9.1 9.0   PROT 7.3  --    ALBUMIN 3.5  --    BILITOT 0.2  --    ALKPHOS 76  --    AST 18  --    ALT 21  --    ANIONGAP 8 6*   ESTGFRAFRICA >60 >60   EGFRNONAA >60 >60   , CBC   Recent Labs  Lab 05/21/18 0221   WBC 10.09   HGB 12.2   HCT 37.2      , Lipid Panel No results for input(s): CHOL, HDL, LDLCALC, TRIG, CHOLHDL in the last 48 hours., Troponin   Recent Labs  Lab 05/21/18 0221 05/21/18  0551   TROPONINI 0.202* 0.177*    and All pertinent lab results from the last 24 hours have been reviewed.    Significant Imaging: EKG: Reviewed and X-Ray: CXR: X-Ray Chest 1 View (CXR): No results found for this visit on 05/21/18. and X-Ray Chest PA and Lateral (CXR): No results found for this visit on 05/21/18.    Assessment and Plan:   Patient seen and examined in ED room, lying in bed. NO chest pain at present time. IV heparin gtt infusing. Request old medical records. Continue to trend troponins. 2D echo pending.   * NSTEMI (non-ST elevated myocardial infarction)    -Continue to trend troponin  -Troponin 0.202>0.177  -IV heparin gtt  -Chest pain free currently  -2D Echo pending  -Request old medical records from [facility]          Hyperlipidemia    -Continue Statin, Niacin, Tricor        Essential hypertension    -Continue home meds  -Add Amlodipine 5 mg PO Daily        CAD with far remote PCI of unknown vessel    -Continue ASA, Statin, Plavix, Imdur, BB, ARB  -IV heparin gtt  -Request old medical records from [facility]    -Chest pain free at present.            VTE Risk Mitigation         Ordered     heparin 25,000 units in dextrose 5% 250 mL (100 units/mL) infusion; FEMALE  Continuous      05/21/18 8244          Thank you for your consult. I will follow-up with patient. Please contact us if you have any additional questions.    Theresa Cox NP  Cardiology   [facility]

## 2018-05-22 NOTE — PLAN OF CARE
Met with patient. Patient identified no discharge needs at this time.   Provided Discharge Planning Begins upon Admission, Discharge Planning Packet including Advance Directive Admission, [facility] Delivery information and contact information for . Explained role of case management in the transition of care.      [id number] [place] - [address] AT [facility]  [address]  [place] [address]  Phone: [phone] Fax: [phone]    [id number] [place] - [address] [facility]  [address]  [place] [address]  Phone: [phone] Fax: [phone]    [facility] [address] [address] [employer] - [place] - [address]  [address]  [place] [address]  Phone: [phone] Fax: [phone]    Zeus Hanson MD  Payor: MEDICARE / Plan: MEDICARE PART A & B / Product Type: [facility] /       05/22/18 0851   Discharge Assessment   Assessment Type Discharge Planning Assessment   Confirmed/corrected address and phone number on facesheet? Yes   Assessment information obtained from? Patient;Medical Record   Expected Length of Stay (days) (TBD)   Prior to hospitilization cognitive status: Alert/Oriented   Prior to hospitalization functional status: Independent   Current cognitive status: Alert/Oriented   Current Functional Status: Independent   Facility Arrived From: (home)   Lives With spouse   Is patient able to care for self after discharge? Unable to determine at this time (comments)   Who are your caregiver(s) and their phone number(s)? (David Boogie, [phone])   Patient's perception of discharge disposition home or selfcare   Readmission Within The Last 30 Days no previous admission in last 30 days   Patient currently being followed by outpatient case management? No   Equipment Currently Used at Home none   Do you have any problems affording any of your prescribed medications? No    Is the patient taking medications as prescribed? yes   Does the patient have transportation home? Yes   Transportation Available car;family or friend will provide   Discharge Plan A Home   Discharge Plan B Home with family   Patient/Family In Agreement With Plan yes

## 2018-05-22 NOTE — PROGRESS NOTES
Pt had some oozing from lt wrist puncture site. Pressure to site and new dressing applied. More pressure applied again. Good strong pulses . Cap refill <3 secs. Will monitor. Attempted to call cvru.no answer. Arm with immobilizer

## 2018-05-22 NOTE — ASSESSMENT & PLAN NOTE
- Troponin since 5/15 with flat distribution (0.19 > 0.17 > 0.17 > 0.2).  - Currently CP free.  JAYDE score of 5.  - Troponin 0.2 in ED, EKG unrevealing.  Follow serial results.  - Case discussed with Cardiology in ED, IV heparin drip initiated per nomogram.  - Continue ASA, Plavix, Imdur, and high-intensity statin.  - Will add BB.  - Cardiology consult   - C planned for today

## 2018-05-22 NOTE — PROGRESS NOTES
Pt back to floor. Alert and oriented x 3. Cooperative. Heart cath thru lt wrist. Wnl. Dressing d/c/i. Pulses good. Denies n/t. Pt states area is sore. Will monitor.

## 2018-05-22 NOTE — INTERVAL H&P NOTE
The patient has been examined and the H&P has been reviewed:    I concur with the findings and no changes have occurred since H&P was written.  Has rest chest pain yesterday evening  suggestive of angina will proceed with angio  Anesthesia/Surgery risks, benefits and alternative options discussed and understood by patient/family.  I have explained the risks, benefits , and alternatives of the procedure in detail.the patient voices understanding and all questions have been answered.the patient agrees to proceed as planned.        Active Hospital Problems    Diagnosis  POA    *NSTEMI (non-ST elevated myocardial infarction) [I21.4]  Yes    Hyperlipidemia [E78.5]  Yes     Chronic    Essential hypertension [I10]  Yes     Chronic    Type 2 diabetes mellitus [E11.9]  Yes     Chronic    CAD with far remote PCI of unknown vessel [I25.10]  Yes     Chronic      Resolved Hospital Problems    Diagnosis Date Resolved POA   No resolved problems to display.

## 2018-05-22 NOTE — OP NOTE
INPATIENT Operative Note         SUMMARY     Surgery Date: 5/22/2018     Surgeon(s) and Role:     * Aimee Cooper MD - Primary    ASSISTANT:none    Pre-op Diagnosis:  NSTEMI (non-ST elevated myocardial infarction) [I21.4]      Post-op Diagnosis:  nstemi    Procedure(s) (LRB):  HEART CATH-LEFT (Left)  Stent rca pda and ffr lad   COMPLICATION:none    Anesthesia: RN IV Sedation    Findings/Key Components:  Lad 50% mid long lesion ffr 0.81   lcx non obs cad  rca 90% prox treated with solange 3.0x9 and 3.0x22  pda distal 80% treated with solange 2.25x26 and 2.5x9.    Estimated Blood Loss: < 50 ML.         SPECIMEN: NONE    Devices/Prostetics:resolute stents x4    PLAN:  Routine pots stent care

## 2018-05-22 NOTE — NURSING
Patient briefly assessed for diabetes educational needs following chart review  She currently has a headache so limited to interaction  She has a home glucose monitor and checks daily with averages 120-130's  She is consistent with taking her diabetes med's  No further action at this time

## 2018-05-22 NOTE — PLAN OF CARE
Problem: Patient Care Overview  Goal: Plan of Care Review  Outcome: Ongoing (interventions implemented as appropriate)  Dx cp  poc instructed on npo status for heart cath. Instructed on dbc 10 x q1h wa, instructed on turning q2h. Instructed on prep for heart cath and heart cath itself. Instructed on medication. No falls. Instructed on pain  Scale and management. Instructed to call with any needs and concerns.

## 2018-05-23 ENCOUNTER — TELEPHONE (OUTPATIENT)
Dept: CARDIOLOGY | Facility: CLINIC | Age: 58
End: 2018-05-23

## 2018-05-23 VITALS
HEART RATE: 61 BPM | SYSTOLIC BLOOD PRESSURE: 122 MMHG | HEIGHT: 65 IN | BODY MASS INDEX: 37.47 KG/M2 | DIASTOLIC BLOOD PRESSURE: 56 MMHG | OXYGEN SATURATION: 98 % | TEMPERATURE: 99 F | RESPIRATION RATE: 17 BRPM | WEIGHT: 224.88 LBS

## 2018-05-23 LAB
ANION GAP SERPL CALC-SCNC: 5 MMOL/L
BASOPHILS # BLD AUTO: 0.02 K/UL
BASOPHILS # BLD AUTO: 0.02 K/UL
BASOPHILS NFR BLD: 0.2 %
BASOPHILS NFR BLD: 0.2 %
BUN SERPL-MCNC: 13 MG/DL
CALCIUM SERPL-MCNC: 8.6 MG/DL
CHLORIDE SERPL-SCNC: 109 MMOL/L
CO2 SERPL-SCNC: 24 MMOL/L
CREAT SERPL-MCNC: 0.8 MG/DL
DIFFERENTIAL METHOD: ABNORMAL
DIFFERENTIAL METHOD: ABNORMAL
EOSINOPHIL # BLD AUTO: 0.2 K/UL
EOSINOPHIL # BLD AUTO: 0.2 K/UL
EOSINOPHIL NFR BLD: 2.8 %
EOSINOPHIL NFR BLD: 2.8 %
ERYTHROCYTE [DISTWIDTH] IN BLOOD BY AUTOMATED COUNT: 15.3 %
ERYTHROCYTE [DISTWIDTH] IN BLOOD BY AUTOMATED COUNT: 15.3 %
EST. GFR  (AFRICAN AMERICAN): >60 ML/MIN/1.73 M^2
EST. GFR  (NON AFRICAN AMERICAN): >60 ML/MIN/1.73 M^2
GLUCOSE SERPL-MCNC: 126 MG/DL
HCT VFR BLD AUTO: 35.1 %
HCT VFR BLD AUTO: 35.1 %
HGB BLD-MCNC: 11.4 G/DL
HGB BLD-MCNC: 11.4 G/DL
LYMPHOCYTES # BLD AUTO: 2.9 K/UL
LYMPHOCYTES # BLD AUTO: 2.9 K/UL
LYMPHOCYTES NFR BLD: 35.6 %
LYMPHOCYTES NFR BLD: 35.6 %
MCH RBC QN AUTO: 26.5 PG
MCH RBC QN AUTO: 26.5 PG
MCHC RBC AUTO-ENTMCNC: 32.5 G/DL
MCHC RBC AUTO-ENTMCNC: 32.5 G/DL
MCV RBC AUTO: 81 FL
MCV RBC AUTO: 81 FL
MONOCYTES # BLD AUTO: 0.7 K/UL
MONOCYTES # BLD AUTO: 0.7 K/UL
MONOCYTES NFR BLD: 8.4 %
MONOCYTES NFR BLD: 8.4 %
NEUTROPHILS # BLD AUTO: 4.4 K/UL
NEUTROPHILS # BLD AUTO: 4.4 K/UL
NEUTROPHILS NFR BLD: 53.1 %
NEUTROPHILS NFR BLD: 53.1 %
PLATELET # BLD AUTO: 176 K/UL
PLATELET # BLD AUTO: 176 K/UL
PMV BLD AUTO: ABNORMAL FL
PMV BLD AUTO: ABNORMAL FL
POCT GLUCOSE: 125 MG/DL (ref 70–110)
POCT GLUCOSE: 134 MG/DL (ref 70–110)
POTASSIUM SERPL-SCNC: 3.6 MMOL/L
RBC # BLD AUTO: 4.31 M/UL
RBC # BLD AUTO: 4.31 M/UL
SODIUM SERPL-SCNC: 138 MMOL/L
WBC # BLD AUTO: 8.24 K/UL
WBC # BLD AUTO: 8.24 K/UL

## 2018-05-23 PROCEDURE — 25000003 PHARM REV CODE 250: Performed by: NURSE PRACTITIONER

## 2018-05-23 PROCEDURE — 63700000 PHARM REV CODE 250 ALT 637 W/O HCPCS: Performed by: NURSE PRACTITIONER

## 2018-05-23 PROCEDURE — 85025 COMPLETE CBC W/AUTO DIFF WBC: CPT

## 2018-05-23 PROCEDURE — 25000003 PHARM REV CODE 250: Performed by: INTERNAL MEDICINE

## 2018-05-23 PROCEDURE — 99232 SBSQ HOSP IP/OBS MODERATE 35: CPT | Mod: ,,, | Performed by: INTERNAL MEDICINE

## 2018-05-23 PROCEDURE — 80048 BASIC METABOLIC PNL TOTAL CA: CPT

## 2018-05-23 RX ORDER — ASPIRIN 81 MG/1
81 TABLET ORAL DAILY
Refills: 0 | COMMUNITY
Start: 2018-05-24 | End: 2024-02-22

## 2018-05-23 RX ORDER — CETIRIZINE HYDROCHLORIDE 10 MG/1
10 TABLET ORAL DAILY
Status: DISCONTINUED | OUTPATIENT
Start: 2018-05-23 | End: 2018-05-23 | Stop reason: HOSPADM

## 2018-05-23 RX ORDER — METOPROLOL TARTRATE 25 MG/1
25 TABLET, FILM COATED ORAL 2 TIMES DAILY
Qty: 60 TABLET | Refills: 0 | Status: SHIPPED | OUTPATIENT
Start: 2018-05-23 | End: 2018-06-22

## 2018-05-23 RX ADMIN — AMLODIPINE BESYLATE 5 MG: 5 TABLET ORAL at 08:05

## 2018-05-23 RX ADMIN — HYDROCODONE BITARTRATE AND ACETAMINOPHEN 1 TABLET: 5; 325 TABLET ORAL at 05:05

## 2018-05-23 RX ADMIN — DOXAZOSIN MESYLATE 4 MG: 2 TABLET ORAL at 08:05

## 2018-05-23 RX ADMIN — ISOSORBIDE MONONITRATE 20 MG: 20 TABLET ORAL at 08:05

## 2018-05-23 RX ADMIN — CETIRIZINE HYDROCHLORIDE 10 MG: 10 TABLET, FILM COATED ORAL at 05:05

## 2018-05-23 RX ADMIN — PANTOPRAZOLE SODIUM 40 MG: 40 TABLET, DELAYED RELEASE ORAL at 08:05

## 2018-05-23 RX ADMIN — ASPIRIN 81 MG: 81 TABLET, COATED ORAL at 08:05

## 2018-05-23 RX ADMIN — TICAGRELOR 90 MG: 90 TABLET ORAL at 08:05

## 2018-05-23 RX ADMIN — METOPROLOL TARTRATE 25 MG: 25 TABLET ORAL at 08:05

## 2018-05-23 RX ADMIN — OLMESARTAN MEDOXOMIL 40 MG: 40 TABLET, FILM COATED ORAL at 08:05

## 2018-05-23 RX ADMIN — FENOFIBRATE 160 MG: 160 TABLET ORAL at 08:05

## 2018-05-23 NOTE — HOSPITAL COURSE
5/23/18- Patient is post angiogram on 5/22/18 for NSTEMI. Noted to have LAD 50% mid long lesion ffr 0.81   lcx non obs cad. Also found to have RCA 90% prox treated with solange x2 and PDA distal 80% treated with SOLANGE x 2. Had brief atypical chest pain last night. Labs and vitals stable. metformin on hold post angiogram. Denies any left radial access complaints.

## 2018-05-23 NOTE — PROGRESS NOTES
[facility]  Cardiology  Progress Note    Patient Name: Dulce Boogie  MRN: [id number]  Admission Date: 5/21/2018  Hospital Length of Stay: 2 days  Code Status: Full Code   Attending Physician: Abdoul Rowan MD   Primary Care Physician: Zeus Hanson MD  Expected Discharge Date:   Principal Problem:NSTEMI (non-ST elevated myocardial infarction)    Subjective:   Brief HPI:  Mrs. Boogie is a 57 year old female with a PMHx of CAD with remote PCI in 2010, HTN, HLD, DM II who presents to [facility] ED with complaints of substernal chest pain which radiated to left shoulder while she was laying flat. She reports that the chest pain is similar to when she had cardiac stents placed in 2010. She reports that the chest pain has escalated over the past two weeks from exertional chest pain with walking to rest pain while lying in bed. Last night her pain started while she was lying down at rest. She denies any nausea, vomiting, diaphoresis, or shortness of breath. She did attempt to relieve her chest pain with 1 SL NTG but denies relief of chest pain. Her last LHC was 5/2017 which revealed patent stent with no blockages. She is seen by Dr. Valle ( Cardiology) and was started on Imdur 20 mg PO BID last week which has not relieved her chest pain. Workup in the ED revealed Troponin 0.202>0.177 and patient was placed on Heparin gtt. Cardiology was consulted to assist with management. Patient seen and examined this AM, chest pain free at this time.     Hospital Course:   5/23/18- Patient is post angiogram on 5/22/18 for NSTEMI. Noted to have LAD 50% mid long lesion ffr 0.81   lcx non obs cad. Also found to have RCA 90% prox treated with solange x2 and PDA distal 80% treated with SOLANGE x 2. Had brief atypical chest pain last night. Labs and vitals stable. metformin on hold post angiogram. Denies any left radial access complaints.        Review of Systems   Constitution: Negative for diaphoresis, weakness, malaise/fatigue,  weight gain and weight loss.   HENT: Negative for congestion and nosebleeds.    Cardiovascular: Negative for chest pain, claudication, cyanosis, dyspnea on exertion, irregular heartbeat, leg swelling, near-syncope, orthopnea, palpitations, paroxysmal nocturnal dyspnea and syncope.   Respiratory: Negative for cough, hemoptysis, shortness of breath, sleep disturbances due to breathing, snoring, sputum production and wheezing.    Hematologic/Lymphatic: Negative for bleeding problem. Does not bruise/bleed easily.   Skin: Negative for rash.   Musculoskeletal: Negative for arthritis, back pain, falls, joint pain, muscle cramps and muscle weakness.   Gastrointestinal: Negative for abdominal pain, constipation, diarrhea, heartburn, hematemesis, hematochezia, melena and nausea.   Genitourinary: Negative for dysuria, hematuria and nocturia.   Neurological: Negative for excessive daytime sleepiness, dizziness, headaches, light-headedness, loss of balance, numbness and vertigo.     Objective:     Vital Signs (Most Recent):  Temp: 98.6 °F (37 °C) (05/23/18 1135)  Pulse: 61 (05/23/18 1135)  Resp: 17 (05/23/18 1135)  BP: (!) 122/56 (05/23/18 1135)  SpO2: 98 % (05/23/18 1135) Vital Signs (24h Range):  Temp:  [96.3 °F (35.7 °C)-98.8 °F (37.1 °C)] 98.6 °F (37 °C)  Pulse:  [58-82] 61  Resp:  [11-18] 17  SpO2:  [96 %-99 %] 98 %  BP: (122-188)/(56-94) 122/56     Weight: 102 kg (224 lb 13.9 oz)  Body mass index is 37.42 kg/m².     SpO2: 98 %  O2 Device (Oxygen Therapy): room air      Intake/Output Summary (Last 24 hours) at 05/23/18 1317  Last data filed at 05/23/18 1000   Gross per 24 hour   Intake          2211.67 ml   Output             1650 ml   Net           561.67 ml       Lines/Drains/Airways     Peripheral Intravenous Line                 Peripheral IV - Single Lumen 05/21/18 0221 Right Antecubital 2 days         Peripheral IV - Single Lumen 05/21/18 0555 Left Antecubital 2 days                Physical Exam   Constitutional: She  is oriented to person, place, and time. She appears well-developed and well-nourished.   HENT:   Head: Normocephalic and atraumatic.   Eyes: Conjunctivae are normal. Pupils are equal, round, and reactive to light.   Neck: Full passive range of motion without pain. Neck supple. No JVD present.   Cardiovascular: Normal rate, regular rhythm, S1 normal, S2 normal and intact distal pulses.  PMI is not displaced.    No murmur heard.  Pulses:       Radial pulses are 2+ on the right side, and 2+ on the left side.        Dorsalis pedis pulses are 1+ on the right side, and 1+ on the left side.   Pulmonary/Chest: Effort normal. No respiratory distress. She has decreased breath sounds in the right lower field and the left lower field. She has no wheezes.   Abdominal: Soft. Bowel sounds are normal.   obese   Musculoskeletal: Normal range of motion. She exhibits no edema.        Right ankle: She exhibits no swelling.        Left ankle: She exhibits no swelling.   Neurological: She is alert and oriented to person, place, and time.   Skin: Skin is warm and dry. No cyanosis. Nails show no clubbing.   Psychiatric: She has a normal mood and affect. Her speech is normal and behavior is normal. Judgment and thought content normal. Cognition and memory are normal.   Nursing note and vitals reviewed.      Significant Labs:   All pertinent lab results from the last 24 hours have been reviewed. and   Recent Lab Results       05/23/18  1044 05/23/18  0518 05/23/18  0505 05/22/18  1731 05/22/18  1424      Anion Gap  5(L)        Baso #  0.02          0.02        Basophil%  0.2          0.2        BUN, Bld  13        Calcium  8.6(L)        Chloride  109        CO2  24        Creatinine  0.8        Differential Method  Automated          Automated        eGFR if   >60        eGFR if non   >60  Comment:  Calculation used to obtain the estimated glomerular filtration  rate (eGFR) is the CKD-EPI equation.            Eos #  0.2          0.2        Eosinophil%  2.8          2.8        Glucose  126(H)        Gran # (ANC)  4.4          4.4        Gran%  53.1          53.1        Hematocrit  35.1(L)          35.1(L)        Hemoglobin  11.4(L)          11.4(L)        Lymph #  2.9          2.9        Lymph%  35.6          35.6        MCH  26.5(L)          26.5(L)        MCHC  32.5          32.5        MCV  81(L)          81(L)        Mono #  0.7          0.7        Mono%  8.4          8.4        MPV  SEE COMMENT  Comment:  Result not available.          SEE COMMENT  Comment:  Result not available.        Platelets  176          176        POC ACTIVATED CLOTTING TIME K     213(H)     POCT Glucose 134(H)  125(H) 102      Potassium  3.6        RBC  4.31          4.31        RDW  15.3(H)          15.3(H)        Sample     unknown     Sodium  138        WBC  8.24          8.24                    05/22/18  1343 05/22/18  1319      Anion Gap       Baso #       Basophil%       BUN, Bld       Calcium       Chloride       CO2       Creatinine       Differential Method       eGFR if        eGFR if non        Eos #       Eosinophil%       Glucose       Gran # (ANC)       Gran%       Hematocrit       Hemoglobin       Lymph #       Lymph%       MCH       MCHC       MCV       Mono #       Mono%       MPV       Platelets       POC ACTIVATED CLOTTING TIME K 279(H) 219(H)     POCT Glucose       Potassium       RBC       RDW       Sample unknown unknown     Sodium       WBC             Significant Imaging: Echocardiogram:   2D echo with color flow doppler:   Results for orders placed or performed during the hospital encounter of 05/21/18   2D echo with color flow doppler   Result Value Ref Range    EF 60 55 - 65    Diastolic Dysfunction No     Est. PA Systolic Pressure 24.53      CATH LAB    Diagnostic:          Patient has a right dominant coronary artery.        - Left Main Coronary Artery:             The LM is normal.  There is JAYDE 3 flow.     - Left Anterior Descending Artery:             The mid LAD has a 50% stenosis. There is JAYDE 3 flow. FFR was 0.81. IFR was 0.95.                     Lesion Details:   This is a Type C lesion.  The length is 30mm, eccentric shape, moderate proximal tortuosity, segment angulation of 45-90 degrees, irregular contour, none to mild calcification.     - Left Circumflex Artery:             The LCX has luminal irregularities. There is JAYDE 3 flow. there is a patent stent noted w/o any stenosis.     - Right Coronary Artery:             The proximal RCA has a 95% stenosis. There is JAYDE 3 flow.                     Lesion Details:   The length is 10-20 mm, eccentric shape, mild proximal tortuosity, segment angulation of 45-90 degrees, irregular contour, mild to moderate calcification.             The distal RCA has a 80% stenosis. There is JAYDE 3 flow.                     Lesion Details:   This is a Type C lesion.  The length is 20mm, eccentric shape, severe proximal tortuosity, segment angulation of 45-90 degrees, irregular contour, mild to moderate calcification.     - Posterior Descending Artery:             The proximal PDA has a 80% stenosis. There is JAYDE 3 flow.                     Lesion Details:   The length is 10mm, eccentric shape, moderate proximal tortuosity, segment angulation of 45-90 degrees, smooth contour.      Intervention          Mid LAD:              The following items were used: Volcano Primewire Verrata.       Proximal RCA:              The following items were used: Blln Trek 2.5 X 15, Stent Resolute Rx 3.00x22 (WENDY) and Blln Quantum 3.5 X 12.       Distal RCA:              The lesion was successfully intervened. Post-stenosis of 0% and post-JAYDE 3 flow. The vessel was accessed natively.  The following items were used: Blln [place] 2.25 X 20, Stent Resolute Rx 2.25x26 (WENDY) and Stent Resolute Rx 2.50x8 (WENDY).       Proximal PDA:              The lesion was successfully  intervened. Post-stenosis of 0% and post-AJYDE 3 flow. The vessel was accessed natively.  The following items were used: Blln [place] 2.25 X 20 and Stent Resolute Rx 2.25x26 (SOLANGE).    Assessment and Plan:       * NSTEMI (non-ST elevated myocardial infarction)    Patient is post angiogram. Noted to have LAD 50% mid long lesion ffr 0.81   lcx non obs cad. Also found to have RCA 90% prox treated with solange x2 and PDA distal 80% treated with SOLANGE x 2  -2D Echo showed normal LVEF 60%  -Has been examined and is clear for discharge from Cardiac standpoint.   --Continue ASA, Statin, Brilinta Imdur, BB, Mauricio  -She has been instructed to hold her metformin for 48 hours post angiogram  -Has also been counseled on post angiogram restrictions and verbalized an understanding   -She will follow up with KRISTIE Jenkins next week. Clinic will call to arrange appt.   -Patient would like to follow up with [facility] after discharge. Will have patient sign release of medical records from Dr. Valle's office when she follows up In clinic next week .          Hyperlipidemia    -Continue Statin, Niacin, Tricor        Essential hypertension    -Continue home meds  -will make further changes as an OP         CAD with far remote PCI of unknown vessel    See plan for NSTEMI             VTE Risk Mitigation     None        Chart reviewed. Patient examined by Dr. Martínez and agrees with plan that has been outlined.     TONYA Domingo  Cardiology  [facility]

## 2018-05-23 NOTE — ASSESSMENT & PLAN NOTE
Patient is post angiogram. Noted to have LAD 50% mid long lesion ffr 0.81   lcx non obs cad. Also found to have RCA 90% prox treated with solange x2 and PDA distal 80% treated with SOLANGE x 2  -2D Echo showed normal LVEF 60%  -Has been examined and is clear for discharge from Cardiac standpoint.   --Continue ASA, Statin, Brilinta Imdur, BB, Mauricio  -She has been instructed to hold her metformin for 48 hours post angiogram  -Has also been counseled on post angiogram restrictions and verbalized an understanding   -She will follow up with KRISTIE Jenkins next week. Clinic will call to arrange appt.   -Patient would like to follow up with [facility] after discharge. Will have patient sign release of medical records from Dr. Valle's office when she follows up In clinic next week .

## 2018-05-23 NOTE — PROGRESS NOTES
Dr. jaquez notfiied of pt pain to lt wrist. Notified of oozing which stopped. And swelling. No new orders other than pain meds.

## 2018-05-23 NOTE — PLAN OF CARE
Problem: Patient Care Overview  Goal: Plan of Care Review  POC discussed w/patient, verbalized understanding. NSR on monitor. VSS. Voids per self. Patient turns independently in bed. Fall precautions in place, bed alarm on. Discussed hourly rounding with patient and advised to call at any time between rounding, as needed. Patient denies needs at this time.

## 2018-05-23 NOTE — PLAN OF CARE
Problem: Patient Care Overview  Goal: Individualization & Mutuality  Outcome: Outcome(s) achieved Date Met: 05/23/18  Patient awake and alert, in NAD. Patient had uneventful shift. VS stable. Patient denies pain or SOB. Patient on telemetry, NSR. Patient ambulates without assistance. Fall precautions in place. Bed locked and in lowest position. Yellow fall risk band and non-skid socks on patient. Patient free from fall/injury. Plan of care reviewed with patient. All questions answered. Will continue to monitor. Patient will be discharged home today.

## 2018-05-23 NOTE — DISCHARGE SUMMARY
"[facility] Medicine  Discharge Summary      Patient Name: Dulce Boogie  MRN: [id number]  Admission Date: 5/21/2018  Hospital Length of Stay: 2 days  Discharge Date and Time:  05/23/2018 2:03 PM  Attending Physician: Abdoul Rowan MD   Discharging Provider: Lynda Lewis NP  Primary Care Provider: Zeus Hanson MD      HPI:   Ms. Boogie is a 56yo female  with a PMHx of CAD with remote PCI ~10 years ago, HTN, HLD, and DM II, who presented to the ED with c/o substernal CP that has waxed and waned over the past 1 week.  Pain is "squeezing" in nature, rated 4-5/10, radiates into left arm, and nonexertional.  Associated SOB and nausea.  No aggravating factors; Relieved by SL NTG.  Denies any palpitations, COOPER, orthopnea, PND, edema, cough, ABD pain, V/D, dysuria, lightheadedness/dizziness, syncope, diaphoresis, or fever.  She reports having multiple cardiac caths in the past due to similar symptoms, all of which have been benign.  Per patient, last LHC 5/2017 showed patent stent with no other significant disease.  Denies any h/o CHF.  She is followed by Dr. Valle ( Cardiology).  She went to [facility] ED 5/15 for above symptoms, and work-up resulted troponin 0.19 > 0.17.  Cardiologist on call for  Cardiology was contacted, and recommended patient be seen in office for further evaluation.  Repeat troponin drawn at f/u on 5/20 which showed flat distribution at 0.17.  She was started on Imdur 20mg BID.  Unfortunately, her symptoms have persisted despite Imdur.  Work-up in ED resulted troponin 0.2, EKG unrevealing.  Case discussed with Cardiology in ED, and IV heparin drip initiated.  Hospital Medicine was called for admission.  Currently, patient appears comfortable in NAD.  Denies any CP or SOB currently.     Procedure(s) (LRB):  HEART CATH-LEFT (Left)      Hospital Course:   Patient admitted for NSTEMI. Cardiology consulted. Her last LHC was 5/2017 which revealed patent stent with no " blockages. Troponin 0.202>0.177>0.139. Patient placed on Heparin gtt. 2D echo shows EF 60-65%. Will continue ASA, statin, plavix, imdur, BB, and ARB. Patient underwent LHC on 5/22/18 and noted to have LAD 50% mid long lesion, no intervention needed. Also found to have RCA 95% prox treated with solange x2 and PDA distal 80% treated with SOLANGE x 2. Had brief atypical chest pain last night. Labs and vitals stable. Denies any left radial access complaints. Symptoms improved. Case discussed with Gregory Sher NP, ok to discharge from cardiology standpoint. Case discussed with Dr. Rowan. Home meds reconciled. New prescription given. Patient instructed to hold metformin for 48 hours post angiogram. Patient seen and examined on the date of discharge and found suitable for discharge.      Consults:   Consults         Status Ordering Provider     Inpatient consult to Cardiology  Once     Provider:  Saleem Martínez MD    Completed JOLEEN SINCLAIR          No new Assessment & Plan notes have been filed under this hospital service since the last note was generated.  Service: Hospital Medicine    Final Active Diagnoses:    Diagnosis Date Noted POA    PRINCIPAL PROBLEM:  NSTEMI (non-ST elevated myocardial infarction) [I21.4] 05/21/2018 Yes    Chest pain [R07.9] 05/22/2018 Yes    Hyperlipidemia [E78.5]  Yes     Chronic    Essential hypertension [I10]  Yes     Chronic    Type 2 diabetes mellitus [E11.9]  Yes     Chronic    CAD with far remote PCI of unknown vessel [I25.10]  Yes     Chronic      Problems Resolved During this Admission:    Diagnosis Date Noted Date Resolved POA       Discharged Condition: stable    Disposition: Home or Self Care    Follow Up:  Follow-up Information     Zeus Hanson MD In 3 days.    Specialty:  Internal Medicine  Why:  hospital follow-up   Contact information:  [address]   [address]  [facility] [address]  [phone]             TONYA Domingo.    Specialty:  Cardiology  Why:  hospital  follow-up, cardiology office will call to setup appointment   Contact information:  [address]  [place] [id number] [phone]                 Patient Instructions:     Diet diabetic     Diet Cardiac     Activity as tolerated     Notify your health care provider if you experience any of the following:  severe uncontrolled pain     Notify your health care provider if you experience any of the following:  difficulty breathing or increased cough     Notify your health care provider if you experience any of the following:  persistent dizziness, light-headedness, or visual disturbances     Notify your health care provider if you experience any of the following:  increased confusion or weakness         Significant Diagnostic Studies: Labs:   BMP:   Recent Labs  Lab 05/22/18  0557 05/23/18 0518   * 126*    138   K 3.9 3.6    109   CO2 22* 24   BUN 25* 13   CREATININE 1.0 0.8   CALCIUM 8.9 8.6*   , CMP   Recent Labs  Lab 05/22/18 0557 05/23/18 0518    138   K 3.9 3.6    109   CO2 22* 24   * 126*   BUN 25* 13   CREATININE 1.0 0.8   CALCIUM 8.9 8.6*   ANIONGAP 10 5*   ESTGFRAFRICA >60 >60   EGFRNONAA >60 >60   , CBC   Recent Labs  Lab 05/22/18  0557 05/23/18 0518   WBC 9.49 8.24  8.24   HGB 11.7* 11.4*  11.4*   HCT 36.5* 35.1*  35.1*    176  176    and All labs within the past 24 hours have been reviewed    Pending Diagnostic Studies:     Procedure Component Value Units Date/Time    IR Heart Cath Images [[id number]] Updated:  05/22/18 1432    Order Status:  Sent Lab Status:  In process          Medications:  Reconciled Home Medications:      Medication List      START taking these medications    aspirin 81 MG EC tablet  Commonly known as:  ECOTRIN  Take 1 tablet (81 mg total) by mouth once daily.  Start taking on:  5/24/2018  Replaces:  aspirin 325 MG tablet     metoprolol tartrate 25 MG tablet  Commonly known as:  LOPRESSOR  Take 1 tablet (25 mg total) by mouth 2  (two) times daily.     ticagrelor 90 mg tablet  Commonly known as:  BRILINTA  Take 1 tablet (90 mg total) by mouth 2 (two) times daily.        CHANGE how you take these medications    cyclobenzaprine 10 MG tablet  Commonly known as:  FLEXERIL  TAKE 1 TABLET (10 MG TOTAL) BY MOUTH 3 (THREE) TIMES DAILY.  What changed:  · when to take this  · reasons to take this        CONTINUE taking these medications    ALPRAZolam 0.5 MG tablet  Commonly known as:  XANAX  TAKE 1 TABLET (0.5 MG TOTAL) BY MOUTH NIGHTLY.     amlodipine-olmesartan 10-40 mg per tablet  Commonly known as:  MARCELLE  Take by mouth.     doxazosin 4 MG tablet  Commonly known as:  CARDURA  Take 1 tablet (4 mg total) by mouth once daily.     fenofibrate 160 MG Tab  Take 1 tablet (160 mg total) by mouth once daily.     glimepiride 4 MG tablet  Commonly known as:  AMARYL  Take 1 tablet (4 mg total) by mouth before breakfast.     HYDROcodone-acetaminophen 5-325 mg per tablet  Commonly known as:  NORCO  Take 1 tablet by mouth 3 (three) times daily as needed for Pain.     isosorbide mononitrate 20 MG Tab  Commonly known as:  ISMO,MONOKET  Take 20 mg by mouth.     metFORMIN 500 MG tablet  Commonly known as:  GLUCOPHAGE  Take 1 tablet (500 mg total) by mouth 2 (two) times daily with meals.     niacin 750 MG CR tablet  Commonly known as:  NIASPAN  TAKE 1 TABLET (750 MG TOTAL) BY MOUTH NIGHTLY.     nitroGLYCERIN 0.4 MG SL tablet  Commonly known as:  NITROSTAT  PLACE 1 TABLET UNDER TONGUE AT FIRST SIGN OF HEART PAIN, AND REPEAT EVERY 5 MINUTES IF PAIN PERSISTS     omeprazole 40 MG capsule  Commonly known as:  PRILOSEC  Take 1 capsule (40 mg total) by mouth once daily.     ondansetron 4 MG tablet  Commonly known as:  ZOFRAN  Take 1 tablet (4 mg total) by mouth every 8 (eight) hours as needed for Nausea.     potassium chloride SA 20 MEQ tablet  Commonly known as:  K-DUR,KLOR-CON  Take 1 tablet (20 mEq total) by mouth once daily.     rosuvastatin 40 MG Tab  Commonly known as:   CRESTOR  Take 1 tablet (40 mg total) by mouth every evening.        STOP taking these medications    amlodipine-benazepril 5-40 mg per capsule  Commonly known as:  LOTREL     aspirin 325 MG tablet  Replaced by:  aspirin 81 MG EC tablet     clopidogrel 75 mg tablet  Commonly known as:  PLAVIX     traMADol 50 mg tablet  Commonly known as:  ULTRAM            Indwelling Lines/Drains at time of discharge:   Lines/Drains/Airways          No matching active lines, drains, or airways          Time spent on the discharge of patient: 40 minutes  Patient was seen and examined on the date of discharge and determined to be suitable for discharge.         Lynda Lewis NP  Department of Hospital Medicine  [facility]

## 2018-05-23 NOTE — PLAN OF CARE
05/23/18 1015   Medicare Message   Important Message from Medicare regarding Discharge Appeal Rights Given to patient/caregiver;Explained to patient/caregiver;Signed/date by patient/caregiver   Date IMM was signed 05/23/18   Time IMM was signed 1015

## 2018-05-23 NOTE — SUBJECTIVE & OBJECTIVE
Review of Systems   Constitution: Negative for diaphoresis, weakness, malaise/fatigue, weight gain and weight loss.   HENT: Negative for congestion and nosebleeds.    Cardiovascular: Negative for chest pain, claudication, cyanosis, dyspnea on exertion, irregular heartbeat, leg swelling, near-syncope, orthopnea, palpitations, paroxysmal nocturnal dyspnea and syncope.   Respiratory: Negative for cough, hemoptysis, shortness of breath, sleep disturbances due to breathing, snoring, sputum production and wheezing.    Hematologic/Lymphatic: Negative for bleeding problem. Does not bruise/bleed easily.   Skin: Negative for rash.   Musculoskeletal: Negative for arthritis, back pain, falls, joint pain, muscle cramps and muscle weakness.   Gastrointestinal: Negative for abdominal pain, constipation, diarrhea, heartburn, hematemesis, hematochezia, melena and nausea.   Genitourinary: Negative for dysuria, hematuria and nocturia.   Neurological: Negative for excessive daytime sleepiness, dizziness, headaches, light-headedness, loss of balance, numbness and vertigo.     Objective:     Vital Signs (Most Recent):  Temp: 98.6 °F (37 °C) (05/23/18 1135)  Pulse: 61 (05/23/18 1135)  Resp: 17 (05/23/18 1135)  BP: (!) 122/56 (05/23/18 1135)  SpO2: 98 % (05/23/18 1135) Vital Signs (24h Range):  Temp:  [96.3 °F (35.7 °C)-98.8 °F (37.1 °C)] 98.6 °F (37 °C)  Pulse:  [58-82] 61  Resp:  [11-18] 17  SpO2:  [96 %-99 %] 98 %  BP: (122-188)/(56-94) 122/56     Weight: 102 kg (224 lb 13.9 oz)  Body mass index is 37.42 kg/m².     SpO2: 98 %  O2 Device (Oxygen Therapy): room air      Intake/Output Summary (Last 24 hours) at 05/23/18 1317  Last data filed at 05/23/18 1000   Gross per 24 hour   Intake          2211.67 ml   Output             1650 ml   Net           561.67 ml       Lines/Drains/Airways     Peripheral Intravenous Line                 Peripheral IV - Single Lumen 05/21/18 0221 Right Antecubital 2 days         Peripheral IV - Single  Lumen 05/21/18 0555 Left Antecubital 2 days                Physical Exam   Constitutional: She is oriented to person, place, and time. She appears well-developed and well-nourished.   HENT:   Head: Normocephalic and atraumatic.   Eyes: Conjunctivae are normal. Pupils are equal, round, and reactive to light.   Neck: Full passive range of motion without pain. Neck supple. No JVD present.   Cardiovascular: Normal rate, regular rhythm, S1 normal, S2 normal and intact distal pulses.  PMI is not displaced.    No murmur heard.  Pulses:       Radial pulses are 2+ on the right side, and 2+ on the left side.        Dorsalis pedis pulses are 1+ on the right side, and 1+ on the left side.   Pulmonary/Chest: Effort normal. No respiratory distress. She has decreased breath sounds in the right lower field and the left lower field. She has no wheezes.   Abdominal: Soft. Bowel sounds are normal.   obese   Musculoskeletal: Normal range of motion. She exhibits no edema.        Right ankle: She exhibits no swelling.        Left ankle: She exhibits no swelling.   Neurological: She is alert and oriented to person, place, and time.   Skin: Skin is warm and dry. No cyanosis. Nails show no clubbing.   Psychiatric: She has a normal mood and affect. Her speech is normal and behavior is normal. Judgment and thought content normal. Cognition and memory are normal.   Nursing note and vitals reviewed.      Significant Labs:   All pertinent lab results from the last 24 hours have been reviewed. and   Recent Lab Results       05/23/18  1044 05/23/18  0518 05/23/18  0505 05/22/18  1731 05/22/18  1424      Anion Gap  5(L)        Baso #  0.02          0.02        Basophil%  0.2          0.2        BUN, Bld  13        Calcium  8.6(L)        Chloride  109        CO2  24        Creatinine  0.8        Differential Method  Automated          Automated        eGFR if   >60        eGFR if non   >60  Comment:  Calculation used  to obtain the estimated glomerular filtration  rate (eGFR) is the CKD-EPI equation.           Eos #  0.2          0.2        Eosinophil%  2.8          2.8        Glucose  126(H)        Gran # (ANC)  4.4          4.4        Gran%  53.1          53.1        Hematocrit  35.1(L)          35.1(L)        Hemoglobin  11.4(L)          11.4(L)        Lymph #  2.9          2.9        Lymph%  35.6          35.6        MCH  26.5(L)          26.5(L)        MCHC  32.5          32.5        MCV  81(L)          81(L)        Mono #  0.7          0.7        Mono%  8.4          8.4        MPV  SEE COMMENT  Comment:  Result not available.          SEE COMMENT  Comment:  Result not available.        Platelets  176          176        POC ACTIVATED CLOTTING TIME K     213(H)     POCT Glucose 134(H)  125(H) 102      Potassium  3.6        RBC  4.31          4.31        RDW  15.3(H)          15.3(H)        Sample     unknown     Sodium  138        WBC  8.24          8.24                    05/22/18  1343 05/22/18  1319      Anion Gap       Baso #       Basophil%       BUN, Bld       Calcium       Chloride       CO2       Creatinine       Differential Method       eGFR if        eGFR if non        Eos #       Eosinophil%       Glucose       Gran # (ANC)       Gran%       Hematocrit       Hemoglobin       Lymph #       Lymph%       MCH       MCHC       MCV       Mono #       Mono%       MPV       Platelets       POC ACTIVATED CLOTTING TIME K 279(H) 219(H)     POCT Glucose       Potassium       RBC       RDW       Sample unknown unknown     Sodium       WBC             Significant Imaging: Echocardiogram:   2D echo with color flow doppler:   Results for orders placed or performed during the hospital encounter of 05/21/18   2D echo with color flow doppler   Result Value Ref Range    EF 60 55 - 65    Diastolic Dysfunction No     Est. PA Systolic Pressure 24.53      CATH LAB    Diagnostic:          Patient has a  right dominant coronary artery.        - Left Main Coronary Artery:             The LM is normal. There is JAYDE 3 flow.     - Left Anterior Descending Artery:             The mid LAD has a 50% stenosis. There is JAYDE 3 flow. FFR was 0.81. IFR was 0.95.                     Lesion Details:   This is a Type C lesion.  The length is 30mm, eccentric shape, moderate proximal tortuosity, segment angulation of 45-90 degrees, irregular contour, none to mild calcification.     - Left Circumflex Artery:             The LCX has luminal irregularities. There is JAYDE 3 flow. there is a patent stent noted w/o any stenosis.     - Right Coronary Artery:             The proximal RCA has a 95% stenosis. There is JAYDE 3 flow.                     Lesion Details:   The length is 10-20 mm, eccentric shape, mild proximal tortuosity, segment angulation of 45-90 degrees, irregular contour, mild to moderate calcification.             The distal RCA has a 80% stenosis. There is JAYDE 3 flow.                     Lesion Details:   This is a Type C lesion.  The length is 20mm, eccentric shape, severe proximal tortuosity, segment angulation of 45-90 degrees, irregular contour, mild to moderate calcification.     - Posterior Descending Artery:             The proximal PDA has a 80% stenosis. There is JAYDE 3 flow.                     Lesion Details:   The length is 10mm, eccentric shape, moderate proximal tortuosity, segment angulation of 45-90 degrees, smooth contour.      Intervention          Mid LAD:              The following items were used: Volcano Primewire Verrata.       Proximal RCA:              The following items were used: Blln Trek 2.5 X 15, Stent Resolute Rx 3.00x22 (WENDY) and Blln Quantum 3.5 X 12.       Distal RCA:              The lesion was successfully intervened. Post-stenosis of 0% and post-JAYDE 3 flow. The vessel was accessed natively.  The following items were used: Blln [place] 2.25 X 20, Stent Resolute Rx 2.25x26 (WENDY) and  Stent Resolute Rx 2.50x8 (WENDY).       Proximal PDA:              The lesion was successfully intervened. Post-stenosis of 0% and post-JAYDE 3 flow. The vessel was accessed natively.  The following items were used: Blln [place] 2.25 X 20 and Stent Resolute Rx 2.25x26 (WENDY).

## 2018-05-23 NOTE — TELEPHONE ENCOUNTER
I have scheduled pt f/u next week with Gregory and requested records from Dr. Tyrese Valle office fax number [phone] fax confirmation received.

## 2018-05-24 ENCOUNTER — NURSE TRIAGE (OUTPATIENT)
Dept: ADMINISTRATIVE | Facility: CLINIC | Age: 58
End: 2018-05-24

## 2018-05-24 ENCOUNTER — TELEPHONE (OUTPATIENT)
Dept: CARDIOLOGY | Facility: CLINIC | Age: 58
End: 2018-05-24

## 2018-05-24 ENCOUNTER — PATIENT OUTREACH (OUTPATIENT)
Dept: ADMINISTRATIVE | Facility: CLINIC | Age: 58
End: 2018-05-24

## 2018-05-24 LAB
CORONARY STENOSIS: ABNORMAL
CORONARY STENT: YES

## 2018-05-24 NOTE — PHYSICIAN QUERY
PT Name: Dulce Boogie  MR #: [id number]     Physician Query Form - Documentation Clarification      CDS/: Joya Guerrero RN, CDI              Contact information:[phone]  Deleted by Jason/ wrong account.    This form is a permanent document in the medical record.     Query Date: May 24, 2018    By submitting this query, we are merely seeking further clarification of documentation. Please utilize your independent clinical judgment when addressing the question(s) below.    The Medical record reflects the following:    Supporting Clinical Findings Location in Medical Record     Upon presentation to the ED she was found to be in fluid overload and had a BP of 237/102.   Pt was initally treated with IV injections to lower her BP but ultimately needed a cardine drip to lower her pressure.  Pt was admitted to the ICU with a diagnosis hypertensive crisis and Acute on chronic diastolic heart failure.         H&P 4/18     BP = 200/90--> 237/102--> 202/91-->181/80--> 156/68       Flow sheet        Vital signs                                                                            Doctor, Please specify diagnosis or diagnoses associated with above clinical findings.    Please specify the diagnosis of Hypertensive Crisis.    Provider Use Only      [  ]  Hypertensive emergency    [  ]  Hypertensive urgency    [  ]  Other_______________                                                                                                             [  ] Clinically undetermined

## 2018-05-24 NOTE — TELEPHONE ENCOUNTER
----- Message from Jason Douglas sent at 5/24/2018  2:30 PM CDT -----  Contact: Dulce Cardona at [phone]

## 2018-05-24 NOTE — PATIENT INSTRUCTIONS
Discharge Instructions for Cardiac Catheterization  Cardiac catheterization is a procedure to look for blocked areas in the blood vessels that send blood to the heart. A thin, flexible tube (catheter) is put in a blood vessel in your groin or arm. The healthcare provider injects contrast fluid into your blood, which then flows to your heart. X-rays pictures are taken of your heart. Your provider will review the results with you. Be sure to ask any questions you have before you leave. This sheet will help you take care of yourself at home.  Home care  · Only do light and easy activities for the next 2 to 3 days. Ask for help with chores and errands while you recover. Have someone drive you to your appointments.  · Don't lift anything heavy for a while. Your healthcare team will tell you when it's safe to lift again.  · Ask your healthcare team when you can expect to return to work. Unless your job involves lifting, you may be able to return to your normal activities within a couple of days.  · Take your medicines as directed. Don't skip doses.  · Drink 6 to 8 glasses of water a day. This is to help flush the contrast dye out of your body. Call your healthcare team if your urine has any change in color.  · Take your temperature each day for 7 days. If you feel cold and clammy or start sweating, take your temperature right away and call your healthcare team.  · Check your incisions every day for signs of infection. These include redness, swelling, and drainage. It is normal to have a small bruise or bump where the catheter was inserted. A bruise that is getting larger is not normal and should be reported to your healthcare team. If you see blood forming in the incision, call your healthcare team. Go to the emergency department if you have uncontrolled bleeding from the artery site. This is especially true if you take medicines that make it difficult for your blood to clot. Examples are aspirin, clopidogrel, and  warfarin.  · Eat a healthy diet. Make sure it is low in fat, salt, and cholesterol. Ask your healthcare team for diet information.  · Stop smoking. Enroll in a stop-smoking program or ask your healthcare team for help. Stop-smoking programs can be life saving.  · Exercise as your healthcare team tells you to. Your healthcare team may recommend you start a cardiac rehabilitation program. Cardiac rehab is an exercise program in which trained healthcare staff watch your progress and stress on your heart while you exercise. Ask your team how to enroll.  · Don't swim or take baths until your healthcare team says its OK. You can shower the day after the procedure. Keep the site clean and dry. This keeps the incision from getting wet and infected until the skin and artery can heal.  Follow-up care  · Make a follow-up appointment as advised by our staff. It's common to have a follow-up appointment 2 to 4 weeks after an angioplasty or coronary stent procedure.  · Make a yearly appointment, too. This is to make sure you are still doing well and not having any new symptoms.  · Don't wait for a follow-up appointment if your medicines aren't working or you are having heart-related symptoms.  When to seek medical care  Call your healthcare provider right away if you have any of the following:  · Chest pain  · Constant or increasing pain or numbness in your leg  · Fever of 100.4°F (38.0°C) or higher, or as directed by your healthcare provider  · Symptoms of infection. These include redness, swelling, drainage, or warmth at the incision site.  · Shortness of breath  · A leg that feels cold or appears blue  · Bleeding, bruising, or a lot of swelling where the catheter was inserted  · Blood in your urine  · Black or tarry stools  · Any unusual bleeding   Date Last Reviewed: 10/1/2016  © 7775-0350 [employer]. [address] [place] [address], [place] [address]. All rights reserved. This information is not intended as a  substitute for professional medical care. Always follow your healthcare professional's instructions.

## 2018-05-24 NOTE — TELEPHONE ENCOUNTER
Returned pt call states on left side chest/breast states she is experiencing a lot of burning and achy pain. Pt states pain scale pain is an 8 right now.    Pt states she walked around the house states she is breathing harder than normal. Pt denies any other symptoms.     Bp-137/86 P- 79

## 2018-05-24 NOTE — TELEPHONE ENCOUNTER
Reason for Disposition   Intermittent chest pain and pain has been increasing in severity or frequency    Protocols used:  CHEST PAIN-A-OH    Spoke with pt for a TCC / post discharge follow up call.  Patient indicated that she is have a dull intermittent pain that is getting in frequency in her chest and she is feeling weaker today.  Per protocol, I instructed her to go to the ER, but she declined going to the ER right now.  Stated she would wait.

## 2018-05-25 NOTE — PLAN OF CARE
05/25/18 0914   Final Note   Assessment Type Final Discharge Note   Discharge Disposition Home   Right Care Referral Info   Post Acute Recommendation No Care

## 2018-05-25 NOTE — TELEPHONE ENCOUNTER
If patient still symptomatic, she needs to be evaluated in the ED since she just had stents placed

## 2018-05-28 RX ORDER — CYCLOBENZAPRINE HCL 10 MG
10 TABLET ORAL 3 TIMES DAILY
Qty: 50 TABLET | Refills: 3 | Status: SHIPPED | OUTPATIENT
Start: 2018-05-28 | End: 2018-12-10 | Stop reason: SDUPTHER

## 2018-05-30 ENCOUNTER — OFFICE VISIT (OUTPATIENT)
Dept: CARDIOLOGY | Facility: CLINIC | Age: 58
End: 2018-05-30
Payer: MEDICARE

## 2018-05-30 VITALS
WEIGHT: 209.19 LBS | BODY MASS INDEX: 34.85 KG/M2 | HEIGHT: 65 IN | DIASTOLIC BLOOD PRESSURE: 84 MMHG | SYSTOLIC BLOOD PRESSURE: 136 MMHG | HEART RATE: 66 BPM

## 2018-05-30 DIAGNOSIS — I10 ESSENTIAL HYPERTENSION: Chronic | ICD-10-CM

## 2018-05-30 DIAGNOSIS — E11.9 TYPE 2 DIABETES MELLITUS WITHOUT COMPLICATION, WITHOUT LONG-TERM CURRENT USE OF INSULIN: ICD-10-CM

## 2018-05-30 DIAGNOSIS — I21.4 NSTEMI (NON-ST ELEVATED MYOCARDIAL INFARCTION): ICD-10-CM

## 2018-05-30 DIAGNOSIS — E78.5 HYPERLIPIDEMIA, UNSPECIFIED HYPERLIPIDEMIA TYPE: Chronic | ICD-10-CM

## 2018-05-30 DIAGNOSIS — I25.10 CORONARY ARTERY DISEASE INVOLVING NATIVE CORONARY ARTERY OF NATIVE HEART WITHOUT ANGINA PECTORIS: Primary | Chronic | ICD-10-CM

## 2018-05-30 PROCEDURE — 99214 OFFICE O/P EST MOD 30 MIN: CPT | Mod: S$PBB,,, | Performed by: NURSE PRACTITIONER

## 2018-05-30 PROCEDURE — 99999 PR PBB SHADOW E&M-EST. PATIENT-LVL IV: CPT | Mod: PBBFAC,,, | Performed by: NURSE PRACTITIONER

## 2018-05-30 PROCEDURE — 99214 OFFICE O/P EST MOD 30 MIN: CPT | Mod: PBBFAC | Performed by: NURSE PRACTITIONER

## 2018-05-30 RX ORDER — MELOXICAM 7.5 MG/1
7.5 TABLET ORAL 2 TIMES DAILY
Refills: 3 | COMMUNITY
Start: 2018-05-06 | End: 2018-05-30

## 2018-05-30 RX ORDER — AMLODIPINE AND BENAZEPRIL HYDROCHLORIDE 5; 40 MG/1; MG/1
1 CAPSULE ORAL DAILY
Qty: 90 CAPSULE | Refills: 3 | Status: SHIPPED | OUTPATIENT
Start: 2018-05-30 | End: 2018-05-30 | Stop reason: SDUPTHER

## 2018-05-30 RX ORDER — AMLODIPINE AND BENAZEPRIL HYDROCHLORIDE 5; 40 MG/1; MG/1
1 CAPSULE ORAL DAILY
Qty: 90 CAPSULE | Refills: 3 | Status: SHIPPED | OUTPATIENT
Start: 2018-05-30 | End: 2018-10-29

## 2018-05-30 NOTE — PROGRESS NOTES
Subjective:   Patient ID:  Dulce Boogie is a 57 y.o. female who presents for follow up of Coronary Artery Disease      HPI      Patient presents to clinic for hospital follow up. She was [place] [facility] [place] with NSTEMI. PMHx of CAD with remote PCI in , HTN, HLD, DM II who presents to [facility] ED with complaints of substernal chest pain which radiated to left shoulder while she was laying flat. She reports that the chest pain is similar to when she had cardiac stents placed in . Her last LHC was in 2017 which revealed patent stent with no blockages. Patient previously followed by Dr. Valle ( Cardiology) and was started on Imdur 20 mg PO BID one week prior to admission. Workup in the ED revealed Troponin 0.202>0.177 and patient was placed on Heparin gtt. Underwent LHC on 18 and noted to have LAD 50% mid long lesion ffr 0.81. Also noted LCX non obs CAD and patent stent. Also found to have RCA 90% prox treated with solange x2 and PDA distal 80% treated with SOLANGE x 2.   Has no abnormal bleeding on dual antiplatelets. Had a URI recently and having atypical chest wall pain. Has been limiting her sodium intake. Denies any left radial access complaints. Has no chest pain, sob, edema, orthopnea, PND, dizziness, near syncope.     Past Medical History:   Diagnosis Date    CAD (coronary artery disease)     Depression     History of colon polyps     Hyperlipidemia associated with type 2 diabetes mellitus     Hypertension associated with diabetes     Obesity     Type II diabetes mellitus with complication        Past Surgical History:   Procedure Laterality Date    CAROTID STENT       SECTION, LOW TRANSVERSE      x 2    LEFT HEART CATHETERIZATION Left 2018    Procedure: HEART CATH-LEFT;  Surgeon: Aimee Cooper MD;  Location: [facility] CATH LAB;  Service: Cardiology;  Laterality: Left;       Social History   Substance Use Topics    Smoking status: Never Smoker    Smokeless tobacco: Never Used     Alcohol use No       Family History   Problem Relation Age of Onset    Hypertension Mother     Hyperlipidemia Mother     Hypertension Father     Diabetes Father     Hyperlipidemia Father     Colon cancer Father     Hypertension Brother     Breast cancer Neg Hx     Ovarian cancer Neg Hx     Uterine cancer Neg Hx        Current Outpatient Prescriptions   Medication Sig    ALPRAZolam (XANAX) 0.5 MG tablet TAKE 1 TABLET (0.5 MG TOTAL) BY MOUTH NIGHTLY.    amlodipine-olmesartan (MARCELLE) 10-40 mg per tablet Take by mouth.    aspirin (ECOTRIN) 81 MG EC tablet Take 1 tablet (81 mg total) by mouth once daily.    cyclobenzaprine (FLEXERIL) 10 MG tablet TAKE 1 TABLET (10 MG TOTAL) BY MOUTH 3 (THREE) TIMES DAILY.    doxazosin (CARDURA) 4 MG tablet Take 1 tablet (4 mg total) by mouth once daily.    fenofibrate 160 MG Tab Take 1 tablet (160 mg total) by mouth once daily.    glimepiride (AMARYL) 4 MG tablet Take 1 tablet (4 mg total) by mouth before breakfast.    hydrocodone-acetaminophen 5-325mg (NORCO) 5-325 mg per tablet Take 1 tablet by mouth 3 (three) times daily as needed for Pain.    isosorbide mononitrate (ISMO,MONOKET) 20 MG Tab Take 20 mg by mouth.    metformin (GLUCOPHAGE) 500 MG tablet Take 1 tablet (500 mg total) by mouth 2 (two) times daily with meals.    metoprolol tartrate (LOPRESSOR) 25 MG tablet Take 1 tablet (25 mg total) by mouth 2 (two) times daily.    niacin (NIASPAN) 750 MG CR tablet TAKE 1 TABLET (750 MG TOTAL) BY MOUTH NIGHTLY.    nitroGLYCERIN (NITROSTAT) 0.4 MG SL tablet PLACE 1 TABLET UNDER TONGUE AT FIRST SIGN OF HEART PAIN, AND REPEAT EVERY 5 MINUTES IF PAIN PERSISTS    omeprazole (PRILOSEC) 40 MG capsule Take 1 capsule (40 mg total) by mouth once daily.    ondansetron (ZOFRAN) 4 MG tablet Take 1 tablet (4 mg total) by mouth every 8 (eight) hours as needed for Nausea.    potassium chloride SA (K-DUR,KLOR-CON) 20 MEQ tablet Take 1 tablet (20 mEq total) by mouth once daily.     rosuvastatin (CRESTOR) 40 MG Tab Take 1 tablet (40 mg total) by mouth every evening.    ticagrelor (BRILINTA) 90 mg tablet Take 1 tablet (90 mg total) by mouth 2 (two) times daily.    meloxicam (MOBIC) 7.5 MG tablet Take 7.5 mg by mouth 2 (two) times daily.     No current facility-administered medications for this visit.      Current Outpatient Prescriptions on File Prior to Visit   Medication Sig    ALPRAZolam (XANAX) 0.5 MG tablet TAKE 1 TABLET (0.5 MG TOTAL) BY MOUTH NIGHTLY.    amlodipine-olmesartan (MARCELLE) 10-40 mg per tablet Take by mouth.    aspirin (ECOTRIN) 81 MG EC tablet Take 1 tablet (81 mg total) by mouth once daily.    cyclobenzaprine (FLEXERIL) 10 MG tablet TAKE 1 TABLET (10 MG TOTAL) BY MOUTH 3 (THREE) TIMES DAILY.    doxazosin (CARDURA) 4 MG tablet Take 1 tablet (4 mg total) by mouth once daily.    fenofibrate 160 MG Tab Take 1 tablet (160 mg total) by mouth once daily.    glimepiride (AMARYL) 4 MG tablet Take 1 tablet (4 mg total) by mouth before breakfast.    hydrocodone-acetaminophen 5-325mg (NORCO) 5-325 mg per tablet Take 1 tablet by mouth 3 (three) times daily as needed for Pain.    isosorbide mononitrate (ISMO,MONOKET) 20 MG Tab Take 20 mg by mouth.    metformin (GLUCOPHAGE) 500 MG tablet Take 1 tablet (500 mg total) by mouth 2 (two) times daily with meals.    metoprolol tartrate (LOPRESSOR) 25 MG tablet Take 1 tablet (25 mg total) by mouth 2 (two) times daily.    niacin (NIASPAN) 750 MG CR tablet TAKE 1 TABLET (750 MG TOTAL) BY MOUTH NIGHTLY.    nitroGLYCERIN (NITROSTAT) 0.4 MG SL tablet PLACE 1 TABLET UNDER TONGUE AT FIRST SIGN OF HEART PAIN, AND REPEAT EVERY 5 MINUTES IF PAIN PERSISTS    omeprazole (PRILOSEC) 40 MG capsule Take 1 capsule (40 mg total) by mouth once daily.    ondansetron (ZOFRAN) 4 MG tablet Take 1 tablet (4 mg total) by mouth every 8 (eight) hours as needed for Nausea.    potassium chloride SA (K-DUR,KLOR-CON) 20 MEQ tablet Take 1 tablet (20 mEq total)  by mouth once daily.    rosuvastatin (CRESTOR) 40 MG Tab Take 1 tablet (40 mg total) by mouth every evening.    ticagrelor (BRILINTA) 90 mg tablet Take 1 tablet (90 mg total) by mouth 2 (two) times daily.     No current facility-administered medications on file prior to visit.        Review of Systems   Constitution: Negative for diaphoresis, weakness, malaise/fatigue, weight gain and weight loss.   HENT: Negative for congestion and nosebleeds.    Cardiovascular: Negative for chest pain, claudication, cyanosis, dyspnea on exertion, irregular heartbeat, leg swelling, near-syncope, orthopnea, palpitations, paroxysmal nocturnal dyspnea and syncope.   Respiratory: Negative for cough, hemoptysis, shortness of breath, sleep disturbances due to breathing, snoring, sputum production and wheezing.    Hematologic/Lymphatic: Negative for bleeding problem. Does not bruise/bleed easily.   Skin: Negative for rash.   Musculoskeletal: Negative for arthritis, back pain, falls, joint pain, muscle cramps and muscle weakness.   Gastrointestinal: Negative for abdominal pain, constipation, diarrhea, heartburn, hematemesis, hematochezia, melena and nausea.   Genitourinary: Negative for dysuria, hematuria and nocturia.   Neurological: Negative for excessive daytime sleepiness, dizziness, headaches, light-headedness, loss of balance, numbness and vertigo.       Objective:   Physical Exam   Constitutional: She is oriented to person, place, and time. She appears well-developed and well-nourished.   Neck: Neck supple. No JVD present.   Cardiovascular: Normal rate, regular rhythm, normal heart sounds and normal pulses.  Exam reveals no friction rub.    No murmur heard.  Pulmonary/Chest: Effort normal and breath sounds normal. No respiratory distress. She has no wheezes. She has no rales.   Abdominal: Soft. Bowel sounds are normal. She exhibits no distension.   Musculoskeletal: She exhibits no edema or tenderness.   Neurological: She is alert  "and oriented to person, place, and time.   Skin: Skin is warm and dry. No rash noted.   Left radial access site without redness, tenderness, swelling, or drainage. There is no active external bleeding or hematoma.   Psychiatric: She has a normal mood and affect. Her behavior is normal.   Nursing note and vitals reviewed.    Vitals:    05/30/18 0924 05/30/18 0930   BP: 126/88 136/84   BP Location: Left arm Right arm   Patient Position: Sitting Sitting   Pulse: 66    Weight: 94.9 kg (209 lb 3.5 oz)    Height: 5' 5" (1.651 m)      Lab Results   Component Value Date    CHOL 187 05/21/2018    CHOL 251 (H) 04/17/2018    CHOL 227 (H) 01/15/2018     Lab Results   Component Value Date    HDL 34 (L) 05/21/2018    HDL 42 04/17/2018    HDL 38 (L) 01/15/2018     Lab Results   Component Value Date    LDLCALC 133.4 05/21/2018    LDLCALC 178.4 (H) 04/17/2018    LDLCALC 154.0 01/15/2018     Lab Results   Component Value Date    TRIG 98 05/21/2018    TRIG 153 (H) 04/17/2018    TRIG 175 (H) 01/15/2018     Lab Results   Component Value Date    CHOLHDL 18.2 (L) 05/21/2018    CHOLHDL 16.7 (L) 04/17/2018    CHOLHDL 16.7 (L) 01/15/2018       Chemistry        Component Value Date/Time     05/23/2018 0518    K 3.6 05/23/2018 0518     05/23/2018 0518    CO2 24 05/23/2018 0518    BUN 13 05/23/2018 0518    CREATININE 0.8 05/23/2018 0518     (H) 05/23/2018 0518        Component Value Date/Time    CALCIUM 8.6 (L) 05/23/2018 0518    ALKPHOS 76 05/21/2018 0221    AST 18 05/21/2018 0221    ALT 21 05/21/2018 0221    BILITOT 0.2 05/21/2018 0221    ESTGFRAFRICA >60 05/23/2018 0518    EGFRNONAA >60 05/23/2018 0518          Lab Results   Component Value Date    TSH 1.665 05/21/2018     Lab Results   Component Value Date    INR 1.0 05/21/2018    INR 1.0 12/21/2013    INR 1.0 07/10/2011     Lab Results   Component Value Date    WBC 8.24 05/23/2018    WBC 8.24 05/23/2018    HGB 11.4 (L) 05/23/2018    HGB 11.4 (L) 05/23/2018    HCT 35.1 " (L) 05/23/2018    HCT 35.1 (L) 05/23/2018    MCV 81 (L) 05/23/2018    MCV 81 (L) 05/23/2018     05/23/2018     05/23/2018     BMP  Sodium   Date Value Ref Range Status   05/23/2018 138 136 - 145 mmol/L Final     Potassium   Date Value Ref Range Status   05/23/2018 3.6 3.5 - 5.1 mmol/L Final     Chloride   Date Value Ref Range Status   05/23/2018 109 95 - 110 mmol/L Final     CO2   Date Value Ref Range Status   05/23/2018 24 23 - 29 mmol/L Final     BUN, Bld   Date Value Ref Range Status   05/23/2018 13 6 - 20 mg/dL Final     Creatinine   Date Value Ref Range Status   05/23/2018 0.8 0.5 - 1.4 mg/dL Final     Calcium   Date Value Ref Range Status   05/23/2018 8.6 (L) 8.7 - 10.5 mg/dL Final     Anion Gap   Date Value Ref Range Status   05/23/2018 5 (L) 8 - 16 mmol/L Final     eGFR if    Date Value Ref Range Status   05/23/2018 >60 >60 mL/min/1.73 m^2 Final     eGFR if non    Date Value Ref Range Status   05/23/2018 >60 >60 mL/min/1.73 m^2 Final     Comment:     Calculation used to obtain the estimated glomerular filtration  rate (eGFR) is the CKD-EPI equation.        CrCl cannot be calculated (Patient's most recent lab result is older than the maximum 7 days allowed.).    Assessment:     1. Coronary artery disease involving native coronary artery of native heart without angina pectoris    2. NSTEMI (non-ST elevated myocardial infarction)    3. Hyperlipidemia, unspecified hyperlipidemia type    4. Essential hypertension      Has no angina or equivalent.  BP well controlled on current regimen  No CNS complaints to suggest TIA or CVA  No abnormal bleeding on DAPT  Good med and diet compliance     Plan:   Continue current medical management for now   Diabetes control   Heart healthy diet  Exercise routine  RTC in 6 months with lipids, CMP and A1C

## 2018-06-06 RX ORDER — METFORMIN HYDROCHLORIDE 500 MG/1
TABLET ORAL
Qty: 180 TABLET | Refills: 3 | Status: SHIPPED | OUTPATIENT
Start: 2018-06-06 | End: 2019-06-08 | Stop reason: SDUPTHER

## 2018-07-19 ENCOUNTER — PATIENT OUTREACH (OUTPATIENT)
Dept: ADMINISTRATIVE | Facility: HOSPITAL | Age: 58
End: 2018-07-19

## 2018-07-26 ENCOUNTER — LAB VISIT (OUTPATIENT)
Dept: LAB | Facility: HOSPITAL | Age: 58
End: 2018-07-26
Attending: INTERNAL MEDICINE
Payer: MEDICARE

## 2018-07-26 DIAGNOSIS — E11.8 TYPE 2 DIABETES MELLITUS WITH COMPLICATION, WITHOUT LONG-TERM CURRENT USE OF INSULIN: ICD-10-CM

## 2018-07-26 LAB
ANION GAP SERPL CALC-SCNC: 8 MMOL/L
BASOPHILS # BLD AUTO: 0.06 K/UL
BASOPHILS NFR BLD: 0.6 %
BUN SERPL-MCNC: 13 MG/DL
CALCIUM SERPL-MCNC: 9.6 MG/DL
CHLORIDE SERPL-SCNC: 109 MMOL/L
CHOLEST SERPL-MCNC: 137 MG/DL
CHOLEST/HDLC SERPL: 3.2 {RATIO}
CO2 SERPL-SCNC: 23 MMOL/L
CREAT SERPL-MCNC: 0.9 MG/DL
DIFFERENTIAL METHOD: ABNORMAL
EOSINOPHIL # BLD AUTO: 0.3 K/UL
EOSINOPHIL NFR BLD: 2.6 %
ERYTHROCYTE [DISTWIDTH] IN BLOOD BY AUTOMATED COUNT: 15.8 %
EST. GFR  (AFRICAN AMERICAN): >60 ML/MIN/1.73 M^2
EST. GFR  (NON AFRICAN AMERICAN): >60 ML/MIN/1.73 M^2
ESTIMATED AVG GLUCOSE: 157 MG/DL
GLUCOSE SERPL-MCNC: 153 MG/DL
HBA1C MFR BLD HPLC: 7.1 %
HCT VFR BLD AUTO: 38.2 %
HDLC SERPL-MCNC: 43 MG/DL
HDLC SERPL: 31.4 %
HGB BLD-MCNC: 11.8 G/DL
IMM GRANULOCYTES # BLD AUTO: 0.03 K/UL
IMM GRANULOCYTES NFR BLD AUTO: 0.3 %
LDLC SERPL CALC-MCNC: 66.6 MG/DL
LYMPHOCYTES # BLD AUTO: 3.6 K/UL
LYMPHOCYTES NFR BLD: 37.7 %
MCH RBC QN AUTO: 26.5 PG
MCHC RBC AUTO-ENTMCNC: 30.9 G/DL
MCV RBC AUTO: 86 FL
MONOCYTES # BLD AUTO: 0.6 K/UL
MONOCYTES NFR BLD: 6.4 %
NEUTROPHILS # BLD AUTO: 4.9 K/UL
NEUTROPHILS NFR BLD: 52.4 %
NONHDLC SERPL-MCNC: 94 MG/DL
NRBC BLD-RTO: 0 /100 WBC
PLATELET # BLD AUTO: 199 K/UL
PMV BLD AUTO: 13.3 FL
POTASSIUM SERPL-SCNC: 3.7 MMOL/L
RBC # BLD AUTO: 4.46 M/UL
SODIUM SERPL-SCNC: 140 MMOL/L
TRIGL SERPL-MCNC: 137 MG/DL
TSH SERPL DL<=0.005 MIU/L-ACNC: 1.93 UIU/ML
WBC # BLD AUTO: 9.46 K/UL

## 2018-07-26 PROCEDURE — 84443 ASSAY THYROID STIM HORMONE: CPT

## 2018-07-26 PROCEDURE — 83036 HEMOGLOBIN GLYCOSYLATED A1C: CPT

## 2018-07-26 PROCEDURE — 80048 BASIC METABOLIC PNL TOTAL CA: CPT

## 2018-07-26 PROCEDURE — 36415 COLL VENOUS BLD VENIPUNCTURE: CPT

## 2018-07-26 PROCEDURE — 85025 COMPLETE CBC W/AUTO DIFF WBC: CPT

## 2018-07-26 PROCEDURE — 80061 LIPID PANEL: CPT

## 2018-08-01 ENCOUNTER — TELEPHONE (OUTPATIENT)
Dept: CARDIOLOGY | Facility: CLINIC | Age: 58
End: 2018-08-01

## 2018-08-01 NOTE — TELEPHONE ENCOUNTER
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

## 2018-08-02 ENCOUNTER — OFFICE VISIT (OUTPATIENT)
Dept: INTERNAL MEDICINE | Facility: CLINIC | Age: 58
End: 2018-08-02
Payer: MEDICARE

## 2018-08-02 ENCOUNTER — TELEPHONE (OUTPATIENT)
Dept: CARDIOLOGY | Facility: CLINIC | Age: 58
End: 2018-08-02

## 2018-08-02 VITALS
TEMPERATURE: 98 F | HEIGHT: 67 IN | RESPIRATION RATE: 16 BRPM | WEIGHT: 210.75 LBS | HEART RATE: 72 BPM | SYSTOLIC BLOOD PRESSURE: 130 MMHG | BODY MASS INDEX: 33.08 KG/M2 | DIASTOLIC BLOOD PRESSURE: 80 MMHG | OXYGEN SATURATION: 97 %

## 2018-08-02 DIAGNOSIS — E78.5 HYPERLIPIDEMIA ASSOCIATED WITH TYPE 2 DIABETES MELLITUS: ICD-10-CM

## 2018-08-02 DIAGNOSIS — E11.59 HYPERTENSION ASSOCIATED WITH DIABETES: ICD-10-CM

## 2018-08-02 DIAGNOSIS — I21.4 NSTEMI (NON-ST ELEVATED MYOCARDIAL INFARCTION): Primary | ICD-10-CM

## 2018-08-02 DIAGNOSIS — I25.10 CORONARY ARTERY DISEASE INVOLVING NATIVE CORONARY ARTERY OF NATIVE HEART WITHOUT ANGINA PECTORIS: Chronic | ICD-10-CM

## 2018-08-02 DIAGNOSIS — Z12.11 COLON CANCER SCREENING: ICD-10-CM

## 2018-08-02 DIAGNOSIS — I15.2 HYPERTENSION ASSOCIATED WITH DIABETES: ICD-10-CM

## 2018-08-02 DIAGNOSIS — Z86.010 HISTORY OF COLON POLYPS: ICD-10-CM

## 2018-08-02 DIAGNOSIS — Z12.39 SCREENING BREAST EXAMINATION: ICD-10-CM

## 2018-08-02 DIAGNOSIS — E11.69 HYPERLIPIDEMIA ASSOCIATED WITH TYPE 2 DIABETES MELLITUS: ICD-10-CM

## 2018-08-02 DIAGNOSIS — E11.8 TYPE 2 DIABETES MELLITUS WITH COMPLICATION, WITHOUT LONG-TERM CURRENT USE OF INSULIN: ICD-10-CM

## 2018-08-02 DIAGNOSIS — F32.A DEPRESSION, UNSPECIFIED DEPRESSION TYPE: ICD-10-CM

## 2018-08-02 PROCEDURE — 99215 OFFICE O/P EST HI 40 MIN: CPT | Mod: PBBFAC,PO | Performed by: INTERNAL MEDICINE

## 2018-08-02 PROCEDURE — 99999 PR PBB SHADOW E&M-EST. PATIENT-LVL V: CPT | Mod: PBBFAC,,, | Performed by: INTERNAL MEDICINE

## 2018-08-02 PROCEDURE — 99214 OFFICE O/P EST MOD 30 MIN: CPT | Mod: S$PBB,,, | Performed by: INTERNAL MEDICINE

## 2018-08-02 RX ORDER — FENOFIBRATE 134 MG/1
CAPSULE ORAL
Refills: 3 | COMMUNITY
Start: 2018-06-06 | End: 2019-08-13 | Stop reason: SDUPTHER

## 2018-08-02 RX ORDER — ROSUVASTATIN CALCIUM 20 MG/1
TABLET, COATED ORAL
Refills: 3 | COMMUNITY
Start: 2018-06-07 | End: 2018-10-29 | Stop reason: SDUPTHER

## 2018-08-02 RX ORDER — METOPROLOL TARTRATE 25 MG/1
25 TABLET, FILM COATED ORAL 2 TIMES DAILY
Refills: 10 | COMMUNITY
Start: 2018-06-28 | End: 2019-08-13 | Stop reason: SDUPTHER

## 2018-08-02 NOTE — TELEPHONE ENCOUNTER
The patient has been notified of this information and all questions answered.  She says if she continues to have problems with her legs she will call us back to make an appt.

## 2018-08-02 NOTE — PROGRESS NOTES
"HPI:  Patient is a 57-year-old female comes today for follow-up of her diabetes, hypertension, lipids, coronary disease, and for her annual physical exam.  She denies any chest pains or shortness of breath.  She has had no hypoglycemic events.  Blood sugars have been doing fairly well.  Her blood pressures been well controlled at home.  He has no other complaints.    Current MEDS: medcard review, verified and update  Allergies: Per the electronic medical record    Past Medical History:   Diagnosis Date    CAD (coronary artery disease)     Depression     History of colon polyps     Hyperlipidemia associated with type 2 diabetes mellitus     Hypertension associated with diabetes     Obesity     Type II diabetes mellitus with complication        Past Surgical History:   Procedure Laterality Date    CAROTID STENT       SECTION, LOW TRANSVERSE      x 2    LEFT HEART CATHETERIZATION Left 2018    Procedure: HEART CATH-LEFT;  Surgeon: Aimee Cooper MD;  Location: [facility] CATH LAB;  Service: Cardiology;  Laterality: Left;       SHx: per the electronic medical record    FHx: recorded in the electronic medical record    ROS:    denies any chest pains or shortness of breath. Denies any nausea, vomiting or diarrhea. Denies any fever, chills or sweats. Denies any change in weight, voice, stool, skin or hair. Denies any dysuria, dyspepsia or dysphagia. Denies any change in vision, hearing or headaches. Denies any swollen lymph nodes or loss of memory.    PE:  /80   Pulse 72   Temp 98.3 °F (36.8 °C)   Resp 16   Ht 5' 7" (1.702 m)   Wt 95.6 kg (210 lb 12.2 oz)   LMP 2003   SpO2 97%   BMI 33.01 kg/m²   Gen: Well-developed, well-nourished, female, in no acute distress, oriented x3  HEENT: neck is supple, no adenopathy, carotids 2+ equal without bruits, thyroid exam normal size without nodules.  CHEST: clear to auscultation and percussion  CVS: regular rate and rhythm without significant " murmur, gallop, or rubs  ABD: soft, benign, no rebound no guarding, no distention. Bowel sounds are normal.     Nontender,  no palpable masses, no organomegaly and no audible bruits.  BREAST: no masses.  No nipple inversion, retraction, or deviation.  EXT: no clubbing, cyanosis, or edema  LYMPH: no cervical, inguinal, or axillary adenopathy  FEET: no loss of sensation.  No ulcers or pressure sores.  Monofilament testing normal.  NEURO: gait normal.  Cranial nerves II- XII intact. No nystagmus.  Speech normal.   Gross motor and sensory unremarkable.  PELVIC: deferred    Lab Results   Component Value Date    WBC 9.46 07/26/2018    HGB 11.8 (L) 07/26/2018    HCT 38.2 07/26/2018     07/26/2018    CHOL 137 07/26/2018    TRIG 137 07/26/2018    HDL 43 07/26/2018    ALT 21 05/21/2018    AST 18 05/21/2018     07/26/2018    K 3.7 07/26/2018     07/26/2018    CREATININE 0.9 07/26/2018    BUN 13 07/26/2018    CO2 23 07/26/2018    TSH 1.925 07/26/2018    INR 1.0 05/21/2018    GLUF 106 01/07/2005    HGBA1C 7.1 (H) 07/26/2018       Impression:   Multiple medical problems below, stable  Patient Active Problem List   Diagnosis    Depression    CAD with far remote PCI of unknown vessel    History of colon polyps    NSTEMI (non-ST elevated myocardial infarction)    Type II diabetes mellitus with complication    Hypertension associated with diabetes    Hyperlipidemia associated with type 2 diabetes mellitus       Plan:   Orders Placed This Encounter    Mammo Digital Screening Bilat with CAD    Comprehensive metabolic panel    Hemoglobin A1c    Lipid panel    Protein / creatinine ratio, urine    TSH    CBC auto differential    Case request GI: COLONOSCOPY     Patient is due for colonoscopy screening and mammogram.  She will be seen again in 6 months with the above lab work.  Her medications remain the same.  She has been encouraged to exercise daily and to try to avoid carbohydrates.  She needs to try  to lose some weight.

## 2018-08-02 NOTE — TELEPHONE ENCOUNTER
----- Message from Jennifer Kothari sent at 8/2/2018  3:33 PM CDT -----  Contact: Pt  Pt called and stated she was returning a call to the nurse. She can be reached at [phone].    Thanks,  TF

## 2018-08-09 RX ORDER — POTASSIUM CHLORIDE 20 MEQ/1
TABLET, EXTENDED RELEASE ORAL
Qty: 90 TABLET | Refills: 3 | Status: SHIPPED | OUTPATIENT
Start: 2018-08-09 | End: 2019-04-01

## 2018-08-09 RX ORDER — AMLODIPINE BESYLATE 10 MG/1
TABLET ORAL
Qty: 90 TABLET | Refills: 3 | Status: SHIPPED | OUTPATIENT
Start: 2018-08-09 | End: 2019-07-17 | Stop reason: SDUPTHER

## 2018-08-09 RX ORDER — MELOXICAM 7.5 MG/1
TABLET ORAL
Qty: 180 TABLET | Refills: 3 | Status: SHIPPED | OUTPATIENT
Start: 2018-08-09 | End: 2018-10-29 | Stop reason: SDUPTHER

## 2018-08-14 ENCOUNTER — DOCUMENTATION ONLY (OUTPATIENT)
Dept: ENDOSCOPY | Facility: HOSPITAL | Age: 58
End: 2018-08-14

## 2018-09-03 RX ORDER — DOXAZOSIN 4 MG/1
TABLET ORAL
Qty: 90 TABLET | Refills: 3 | Status: SHIPPED | OUTPATIENT
Start: 2018-09-03 | End: 2018-11-26 | Stop reason: SDUPTHER

## 2018-09-03 RX ORDER — OMEPRAZOLE 40 MG/1
CAPSULE, DELAYED RELEASE ORAL
Qty: 90 CAPSULE | Refills: 3 | Status: SHIPPED | OUTPATIENT
Start: 2018-09-03 | End: 2018-11-26 | Stop reason: SDUPTHER

## 2018-09-03 RX ORDER — FENOFIBRATE 134 MG/1
CAPSULE ORAL
Qty: 90 CAPSULE | Refills: 3 | Status: SHIPPED | OUTPATIENT
Start: 2018-09-03 | End: 2018-10-29

## 2018-09-05 RX ORDER — NITROGLYCERIN 0.4 MG/1
TABLET SUBLINGUAL
Qty: 25 TABLET | Refills: 1 | Status: SHIPPED | OUTPATIENT
Start: 2018-09-05 | End: 2020-01-09

## 2018-09-05 RX ORDER — ALPRAZOLAM 0.5 MG/1
TABLET ORAL
Qty: 30 TABLET | Refills: 5 | Status: SHIPPED | OUTPATIENT
Start: 2018-09-05 | End: 2019-03-05 | Stop reason: SDUPTHER

## 2018-09-28 ENCOUNTER — DOCUMENTATION ONLY (OUTPATIENT)
Dept: ENDOSCOPY | Facility: HOSPITAL | Age: 58
End: 2018-09-28

## 2018-09-28 NOTE — PROGRESS NOTES
Mailed pt a letter requesting call to the office to schedule endoscopy procedure order entered on 8/2/18.

## 2018-10-15 ENCOUNTER — PATIENT MESSAGE (OUTPATIENT)
Dept: ADMINISTRATIVE | Facility: OTHER | Age: 58
End: 2018-10-15

## 2018-10-16 DIAGNOSIS — E11.9 TYPE 2 DIABETES MELLITUS: ICD-10-CM

## 2018-10-24 ENCOUNTER — PES CALL (OUTPATIENT)
Dept: ADMINISTRATIVE | Facility: CLINIC | Age: 58
End: 2018-10-24

## 2018-10-29 ENCOUNTER — OFFICE VISIT (OUTPATIENT)
Dept: INTERNAL MEDICINE | Facility: CLINIC | Age: 58
End: 2018-10-29
Payer: MEDICARE

## 2018-10-29 VITALS
BODY MASS INDEX: 34.68 KG/M2 | HEART RATE: 87 BPM | HEIGHT: 65 IN | WEIGHT: 208.13 LBS | TEMPERATURE: 99 F | RESPIRATION RATE: 18 BRPM | DIASTOLIC BLOOD PRESSURE: 84 MMHG | SYSTOLIC BLOOD PRESSURE: 122 MMHG | OXYGEN SATURATION: 99 %

## 2018-10-29 DIAGNOSIS — E78.5 HYPERLIPIDEMIA ASSOCIATED WITH TYPE 2 DIABETES MELLITUS: ICD-10-CM

## 2018-10-29 DIAGNOSIS — I21.4 NSTEMI (NON-ST ELEVATED MYOCARDIAL INFARCTION): ICD-10-CM

## 2018-10-29 DIAGNOSIS — E11.69 HYPERLIPIDEMIA ASSOCIATED WITH TYPE 2 DIABETES MELLITUS: ICD-10-CM

## 2018-10-29 DIAGNOSIS — E11.59 HYPERTENSION ASSOCIATED WITH DIABETES: ICD-10-CM

## 2018-10-29 DIAGNOSIS — I77.819 AORTIC ECTASIA: ICD-10-CM

## 2018-10-29 DIAGNOSIS — I15.2 HYPERTENSION ASSOCIATED WITH DIABETES: ICD-10-CM

## 2018-10-29 DIAGNOSIS — I25.10 CORONARY ARTERY DISEASE INVOLVING NATIVE CORONARY ARTERY OF NATIVE HEART WITHOUT ANGINA PECTORIS: Chronic | ICD-10-CM

## 2018-10-29 DIAGNOSIS — F32.5 MAJOR DEPRESSION IN REMISSION: ICD-10-CM

## 2018-10-29 DIAGNOSIS — Z00.00 ENCOUNTER FOR PREVENTIVE HEALTH EXAMINATION: Primary | ICD-10-CM

## 2018-10-29 DIAGNOSIS — E66.09 CLASS 1 OBESITY DUE TO EXCESS CALORIES WITH SERIOUS COMORBIDITY AND BODY MASS INDEX (BMI) OF 34.0 TO 34.9 IN ADULT: ICD-10-CM

## 2018-10-29 PROBLEM — E66.811 CLASS 1 OBESITY DUE TO EXCESS CALORIES WITH SERIOUS COMORBIDITY IN ADULT: Status: ACTIVE | Noted: 2018-10-29

## 2018-10-29 PROCEDURE — 99215 OFFICE O/P EST HI 40 MIN: CPT | Mod: PBBFAC,PO | Performed by: NURSE PRACTITIONER

## 2018-10-29 PROCEDURE — G0439 PPPS, SUBSEQ VISIT: HCPCS | Mod: S$GLB,,, | Performed by: NURSE PRACTITIONER

## 2018-10-29 PROCEDURE — 90662 IIV NO PRSV INCREASED AG IM: CPT | Mod: PBBFAC,PO

## 2018-10-29 PROCEDURE — 99999 PR PBB SHADOW E&M-EST. PATIENT-LVL V: CPT | Mod: PBBFAC,,, | Performed by: NURSE PRACTITIONER

## 2018-10-29 RX ORDER — MELOXICAM 7.5 MG/1
7.5 TABLET ORAL 2 TIMES DAILY
Qty: 60 TABLET | Refills: 0 | Status: SHIPPED | OUTPATIENT
Start: 2018-10-29 | End: 2019-04-01

## 2018-10-29 NOTE — PROGRESS NOTES
"Dulce Boogie presented for a  Medicare AWV and comprehensive Health Risk Assessment today. The following components were reviewed and updated:    · Medical history  · Family History  · Social history  · Allergies and Current Medications  · Health Risk Assessment  · Health Maintenance  · Care Team     ** See Completed Assessments for Annual Wellness Visit within the encounter summary.**       The following assessments were completed:  · Living Situation  · CAGE  · Depression Screening  · Timed Get Up and Go  · Whisper Test  · Cognitive Function Screening  · Nutrition Screening  · ADL Screening  · PAQ Screening    Vitals:    10/29/18 0909   BP: 122/84   Pulse: 87   Resp: 18   Temp: 98.7 °F (37.1 °C)   SpO2: 99%   Weight: 94.4 kg (208 lb 1.8 oz)   Height: 5' 4.96" (1.65 m)     Body mass index is 34.67 kg/m².  Physical Exam   Constitutional: She is oriented to person, place, and time. She appears well-developed and well-nourished. No distress.   HENT:   Head: Normocephalic and atraumatic.   Mouth/Throat: Oropharynx is clear and moist. No oropharyngeal exudate.   Eyes: Conjunctivae are normal.   Neck: Normal range of motion. Neck supple. No JVD present. No tracheal deviation present. No thyromegaly present.   No carotid bruits   Cardiovascular: Normal rate, regular rhythm, normal heart sounds and intact distal pulses. Exam reveals no gallop and no friction rub.   No murmur heard.  Pulmonary/Chest: Effort normal and breath sounds normal. No respiratory distress. She has no wheezes. She has no rales.   Abdominal: Soft. Bowel sounds are normal. She exhibits no distension and no mass. There is no tenderness. There is no guarding.   No abd bruits   Musculoskeletal: Normal range of motion. She exhibits no edema or tenderness.   Lymphadenopathy:     She has no cervical adenopathy.   Neurological: She is alert and oriented to person, place, and time. No cranial nerve deficit.   Bilateral monofilament testing done on feet, normal " with no lesions or breakdowns noted   Skin: Skin is warm and dry. She is not diaphoretic.   Psychiatric: She has a normal mood and affect. Her behavior is normal.   Nursing note and vitals reviewed.        Diagnoses and health risks identified today and associated recommendations/orders:    1. Encounter for preventive health examination  Screenings performed, as noted above.  Personal preventative testing needs reviewed.     2. Hypertension associated with diabetes  Monitored/treated on meds, continue the same tx, stable    3. Hyperlipidemia associated with type 2 diabetes mellitus  Monitored/treated on meds, continue the same tx, stable    4. Coronary artery disease involving native coronary artery of native heart without angina pectoris  Monitored/treated on meds, continue the same tx, stable    5. NSTEMI (non-ST elevated myocardial infarction)  Monitored/treated on meds, continue the same tx, stable    6. Major depression in remission  Monitored/treated on meds, continue the same tx, stable    7. Class 1 obesity due to excess calories with serious comorbidity and body mass index (BMI) of 34.0 to 34.9 in adult  Monitored/treated on meds, continue the same tx, stable    8. Aortic Ectasia  Monitored/treated on meds, continue the same tx, stable      Provided Dulce with a 5-10 year written screening schedule and personal prevention plan. Recommendations were developed using the USPSTF age appropriate recommendations. Education, counseling, and referrals were provided as needed. After Visit Summary printed and given to patient which includes a list of additional screenings\tests needed.    No Follow-up on file.    Daniel Wilson NP

## 2018-10-29 NOTE — PATIENT INSTRUCTIONS
Counseling and Referral of Other Preventative  (Italic type indicates deductible and co-insurance are waived)    Patient Name: Dulce Boogie  Today's Date: 10/29/2018    Health Maintenance       Date Due Completion Date    Colonoscopy 04/28/2017 4/28/2014    Foot Exam 07/14/2018 7/14/2017 (Done)    Override on 7/14/2017: Done    Override on 7/15/2016: Done    Influenza Vaccine 08/01/2018 1/17/2017    Mammogram 08/16/2018 8/16/2017    Eye Exam 12/12/2018 12/12/2017    Hemoglobin A1c 01/26/2019 7/26/2018    Lipid Panel 07/26/2019 7/26/2018    Urine Microalbumin 07/26/2019 7/26/2018    High Dose Statin 10/29/2019 10/29/2018    Pap Smear 07/14/2020 7/14/2017    TETANUS VACCINE 12/07/2021 12/7/2011    Pneumococcal PPSV23 (Medium Risk) (2) 08/06/2024 1/17/2017        Orders Placed This Encounter   Procedures    Influenza - High Dose (65+) (PF) (IM)     The following information is provided to all patients.  This information is to help you find resources for any of the problems found today that may be affecting your health:                Living healthy guide: www.[facility].[place].gov      Understanding Diabetes: www.diabetes.org      Eating healthy: www.cdc.gov/healthyweight      CDC home safety checklist: www.cdc.gov/steadi/patient.html      Agency on Aging: www.goea.[place].[facility]      Alcoholics anonymous (AA): www.aa.org      Physical Activity: www.deo.nih.gov/dg6ifcc      Tobacco use: www.quitwithusla.org

## 2018-11-19 ENCOUNTER — LAB VISIT (OUTPATIENT)
Dept: LAB | Facility: HOSPITAL | Age: 58
End: 2018-11-19
Attending: NURSE PRACTITIONER
Payer: MEDICARE

## 2018-11-19 DIAGNOSIS — I21.4 NSTEMI (NON-ST ELEVATED MYOCARDIAL INFARCTION): ICD-10-CM

## 2018-11-19 DIAGNOSIS — E11.9 TYPE 2 DIABETES MELLITUS WITHOUT COMPLICATION, WITHOUT LONG-TERM CURRENT USE OF INSULIN: ICD-10-CM

## 2018-11-19 DIAGNOSIS — I25.10 CORONARY ARTERY DISEASE INVOLVING NATIVE CORONARY ARTERY OF NATIVE HEART WITHOUT ANGINA PECTORIS: Chronic | ICD-10-CM

## 2018-11-19 DIAGNOSIS — I10 ESSENTIAL HYPERTENSION: Chronic | ICD-10-CM

## 2018-11-19 LAB
ALBUMIN SERPL BCP-MCNC: 3.6 G/DL
ALP SERPL-CCNC: 67 U/L
ALT SERPL W/O P-5'-P-CCNC: 16 U/L
ANION GAP SERPL CALC-SCNC: 8 MMOL/L
AST SERPL-CCNC: 19 U/L
BILIRUB SERPL-MCNC: 0.3 MG/DL
BUN SERPL-MCNC: 15 MG/DL
CALCIUM SERPL-MCNC: 8.8 MG/DL
CHLORIDE SERPL-SCNC: 109 MMOL/L
CO2 SERPL-SCNC: 24 MMOL/L
CREAT SERPL-MCNC: 1 MG/DL
EST. GFR  (AFRICAN AMERICAN): >60 ML/MIN/1.73 M^2
EST. GFR  (NON AFRICAN AMERICAN): >60 ML/MIN/1.73 M^2
ESTIMATED AVG GLUCOSE: 151 MG/DL
GLUCOSE SERPL-MCNC: 202 MG/DL
HBA1C MFR BLD HPLC: 6.9 %
POTASSIUM SERPL-SCNC: 3.5 MMOL/L
PROT SERPL-MCNC: 7.2 G/DL
SODIUM SERPL-SCNC: 141 MMOL/L

## 2018-11-19 PROCEDURE — 80053 COMPREHEN METABOLIC PANEL: CPT

## 2018-11-19 PROCEDURE — 80061 LIPID PANEL: CPT

## 2018-11-19 PROCEDURE — 36415 COLL VENOUS BLD VENIPUNCTURE: CPT

## 2018-11-19 PROCEDURE — 83036 HEMOGLOBIN GLYCOSYLATED A1C: CPT

## 2018-11-27 ENCOUNTER — LAB VISIT (OUTPATIENT)
Dept: LAB | Facility: HOSPITAL | Age: 58
End: 2018-11-27
Attending: NURSE PRACTITIONER
Payer: MEDICARE

## 2018-11-27 ENCOUNTER — OFFICE VISIT (OUTPATIENT)
Dept: CARDIOLOGY | Facility: CLINIC | Age: 58
End: 2018-11-27
Payer: MEDICARE

## 2018-11-27 VITALS
BODY MASS INDEX: 35.23 KG/M2 | DIASTOLIC BLOOD PRESSURE: 90 MMHG | HEIGHT: 65 IN | SYSTOLIC BLOOD PRESSURE: 162 MMHG | WEIGHT: 211.44 LBS | HEART RATE: 82 BPM

## 2018-11-27 DIAGNOSIS — E78.5 HYPERLIPIDEMIA ASSOCIATED WITH TYPE 2 DIABETES MELLITUS: ICD-10-CM

## 2018-11-27 DIAGNOSIS — I15.2 HYPERTENSION ASSOCIATED WITH DIABETES: ICD-10-CM

## 2018-11-27 DIAGNOSIS — I25.10 CORONARY ARTERY DISEASE INVOLVING NATIVE CORONARY ARTERY OF NATIVE HEART WITHOUT ANGINA PECTORIS: Primary | Chronic | ICD-10-CM

## 2018-11-27 DIAGNOSIS — E11.69 HYPERLIPIDEMIA ASSOCIATED WITH TYPE 2 DIABETES MELLITUS: ICD-10-CM

## 2018-11-27 DIAGNOSIS — E11.8 TYPE 2 DIABETES MELLITUS WITH COMPLICATION, WITHOUT LONG-TERM CURRENT USE OF INSULIN: ICD-10-CM

## 2018-11-27 DIAGNOSIS — E11.59 HYPERTENSION ASSOCIATED WITH DIABETES: ICD-10-CM

## 2018-11-27 LAB
CHOLEST SERPL-MCNC: 171 MG/DL
CHOLEST/HDLC SERPL: 4.8 {RATIO}
HDLC SERPL-MCNC: 36 MG/DL
HDLC SERPL: 21.1 %
LDLC SERPL CALC-MCNC: 93.8 MG/DL
NONHDLC SERPL-MCNC: 135 MG/DL
TRIGL SERPL-MCNC: 206 MG/DL

## 2018-11-27 PROCEDURE — 80061 LIPID PANEL: CPT

## 2018-11-27 PROCEDURE — 36415 COLL VENOUS BLD VENIPUNCTURE: CPT

## 2018-11-27 PROCEDURE — 99999 PR PBB SHADOW E&M-EST. PATIENT-LVL IV: CPT | Mod: PBBFAC,,, | Performed by: NURSE PRACTITIONER

## 2018-11-27 PROCEDURE — 99214 OFFICE O/P EST MOD 30 MIN: CPT | Mod: S$PBB,,, | Performed by: NURSE PRACTITIONER

## 2018-11-27 PROCEDURE — 99214 OFFICE O/P EST MOD 30 MIN: CPT | Mod: PBBFAC | Performed by: NURSE PRACTITIONER

## 2018-11-27 RX ORDER — OMEPRAZOLE 40 MG/1
40 CAPSULE, DELAYED RELEASE ORAL DAILY
Qty: 90 CAPSULE | Refills: 3 | Status: SHIPPED | OUTPATIENT
Start: 2018-11-27 | End: 2018-11-27 | Stop reason: SDUPTHER

## 2018-11-27 RX ORDER — OMEPRAZOLE 40 MG/1
40 CAPSULE, DELAYED RELEASE ORAL DAILY
Qty: 90 CAPSULE | Refills: 3 | Status: SHIPPED | OUTPATIENT
Start: 2018-11-27 | End: 2019-08-13 | Stop reason: SDUPTHER

## 2018-11-27 RX ORDER — DOXAZOSIN 4 MG/1
4 TABLET ORAL DAILY
Qty: 90 TABLET | Refills: 3 | Status: SHIPPED | OUTPATIENT
Start: 2018-11-27 | End: 2019-08-13 | Stop reason: SDUPTHER

## 2018-11-27 RX ORDER — DOXAZOSIN 4 MG/1
4 TABLET ORAL DAILY
Qty: 90 TABLET | Refills: 3 | Status: SHIPPED | OUTPATIENT
Start: 2018-11-27 | End: 2018-11-27 | Stop reason: SDUPTHER

## 2018-11-27 NOTE — PROGRESS NOTES
Subjective:   Patient ID:  Dulce Boogie is a 58 y.o. female who presents for follow up of Coronary Artery Disease      HPI  Ms. Boogie presents today with no complaints. Has PMH of CAD with remote PCI in , HTN, HLD, DM II. Presented to [facility] ED on 18 with chest pain. Noted to have elevated troponin. Underwent LHC on 18 and noted to have LAD 50% mid long lesion ffr 0.81. Also noted LCX non obs CAD and patent stent. Also found to have RCA 90% prox treated with solange x2 and PDA distal 80% treated with SOLANGE x 2.     Patient previously followed by Dr. Valle ( Cardiology). Has no chest pain, edema, orthopnea, PND, dizziness, near syncope. Has occasional SOB.   Has no abnormal bleeding on dual antiplatelets. Has been limiting her sodium intake  Diabetes well controlled on current regimen. CMP stable. Lipids still pending. BP up today in clinic, but hasn't had medication today. Has not had to take any SL nitro. Not exercising.     Past Medical History:   Diagnosis Date    CAD (coronary artery disease)     Depression     History of colon polyps     Hyperlipidemia associated with type 2 diabetes mellitus     Hypertension associated with diabetes     NSTEMI (non-ST elevated myocardial infarction) 2018    Obesity     Type II diabetes mellitus with complication        Past Surgical History:   Procedure Laterality Date    CAROTID STENT       SECTION, LOW TRANSVERSE      x 2    COLONOSCOPY N/A 2014    Performed by Aury Horn MD at [facility] [place] ENDO    HEART CATH-LEFT Left 2018    Performed by Aimee Cooper MD at [facility] [place] CATH LAB    LEFT HEART CATHETERIZATION Left 2018    Procedure: HEART CATH-LEFT;  Surgeon: Aimee Cooper MD;  Location: [facility] [place] CATH LAB;  Service: Cardiology;  Laterality: Left;       Social History     Tobacco Use    Smoking status: Never Smoker    Smokeless tobacco: Never Used   Substance Use Topics    Alcohol use: No    Drug use: No       Family History   Problem  Relation Age of Onset    Hypertension Mother     Hyperlipidemia Mother     Hypertension Father     Diabetes Father     Hyperlipidemia Father     Colon cancer Father     Hypertension Brother     Breast cancer Neg Hx     Ovarian cancer Neg Hx     Uterine cancer Neg Hx        Current Outpatient Medications   Medication Sig    ALPRAZolam (XANAX) 0.5 MG tablet TAKE 1 TABLET BY MOUTH NIGHTLY    amLODIPine (NORVASC) 10 MG tablet TAKE 1 TABLET BY MOUTH DAILY    aspirin (ECOTRIN) 81 MG EC tablet Take 1 tablet (81 mg total) by mouth once daily.    cyclobenzaprine (FLEXERIL) 10 MG tablet TAKE 1 TABLET (10 MG TOTAL) BY MOUTH 3 (THREE) TIMES DAILY.    doxazosin (CARDURA) 4 MG tablet Take 1 tablet (4 mg total) by mouth once daily.    fenofibrate micronized (LOFIBRA) 134 MG Cap TAKE ONE CAPSULE BY MOUTH EVERY DAY WITH BREAKFAST.    glimepiride (AMARYL) 4 MG tablet Take 1 tablet (4 mg total) by mouth before breakfast.    hydrocodone-acetaminophen 5-325mg (NORCO) 5-325 mg per tablet Take 1 tablet by mouth 3 (three) times daily as needed for Pain.    isosorbide mononitrate (ISMO,MONOKET) 20 MG Tab Take 20 mg by mouth.    meloxicam (MOBIC) 7.5 MG tablet Take 1 tablet (7.5 mg total) by mouth 2 (two) times daily.    metFORMIN (GLUCOPHAGE) 500 MG tablet TAKE 1 TABLET BY MOUTH 2 TIMES DAILY WITH MEALS.    metoprolol tartrate (LOPRESSOR) 25 MG tablet Take 25 mg by mouth 2 (two) times daily.    niacin (NIASPAN) 750 MG CR tablet TAKE 1 TABLET (750 MG TOTAL) BY MOUTH NIGHTLY.    omeprazole (PRILOSEC) 40 MG capsule Take 1 capsule (40 mg total) by mouth once daily.    rosuvastatin (CRESTOR) 40 MG Tab Take 1 tablet (40 mg total) by mouth every evening.    ticagrelor (BRILINTA) 90 mg tablet Take 1 tablet (90 mg total) by mouth 2 (two) times daily.    nitroGLYCERIN (NITROSTAT) 0.4 MG SL tablet PLACE 1 TABLET UNDER TONGUE AT FIRST SIGN OF HEART PAIN, AND REPEAT EVERY 5 MINUTES IF PAIN PERSISTS    ondansetron (ZOFRAN) 4  MG tablet Take 1 tablet (4 mg total) by mouth every 8 (eight) hours as needed for Nausea.    potassium chloride SA (K-DUR,KLOR-CON) 20 MEQ tablet TAKE 1 TABLET BY MOUTH DAILY     No current facility-administered medications for this visit.      Current Outpatient Medications on File Prior to Visit   Medication Sig    ALPRAZolam (XANAX) 0.5 MG tablet TAKE 1 TABLET BY MOUTH NIGHTLY    amLODIPine (NORVASC) 10 MG tablet TAKE 1 TABLET BY MOUTH DAILY    aspirin (ECOTRIN) 81 MG EC tablet Take 1 tablet (81 mg total) by mouth once daily.    cyclobenzaprine (FLEXERIL) 10 MG tablet TAKE 1 TABLET (10 MG TOTAL) BY MOUTH 3 (THREE) TIMES DAILY.    doxazosin (CARDURA) 4 MG tablet Take 1 tablet (4 mg total) by mouth once daily.    fenofibrate micronized (LOFIBRA) 134 MG Cap TAKE ONE CAPSULE BY MOUTH EVERY DAY WITH BREAKFAST.    glimepiride (AMARYL) 4 MG tablet Take 1 tablet (4 mg total) by mouth before breakfast.    hydrocodone-acetaminophen 5-325mg (NORCO) 5-325 mg per tablet Take 1 tablet by mouth 3 (three) times daily as needed for Pain.    isosorbide mononitrate (ISMO,MONOKET) 20 MG Tab Take 20 mg by mouth.    meloxicam (MOBIC) 7.5 MG tablet Take 1 tablet (7.5 mg total) by mouth 2 (two) times daily.    metFORMIN (GLUCOPHAGE) 500 MG tablet TAKE 1 TABLET BY MOUTH 2 TIMES DAILY WITH MEALS.    metoprolol tartrate (LOPRESSOR) 25 MG tablet Take 25 mg by mouth 2 (two) times daily.    niacin (NIASPAN) 750 MG CR tablet TAKE 1 TABLET (750 MG TOTAL) BY MOUTH NIGHTLY.    omeprazole (PRILOSEC) 40 MG capsule Take 1 capsule (40 mg total) by mouth once daily.    rosuvastatin (CRESTOR) 40 MG Tab Take 1 tablet (40 mg total) by mouth every evening.    ticagrelor (BRILINTA) 90 mg tablet Take 1 tablet (90 mg total) by mouth 2 (two) times daily.    nitroGLYCERIN (NITROSTAT) 0.4 MG SL tablet PLACE 1 TABLET UNDER TONGUE AT FIRST SIGN OF HEART PAIN, AND REPEAT EVERY 5 MINUTES IF PAIN PERSISTS    ondansetron (ZOFRAN) 4 MG tablet Take 1  tablet (4 mg total) by mouth every 8 (eight) hours as needed for Nausea.    potassium chloride SA (K-DUR,KLOR-CON) 20 MEQ tablet TAKE 1 TABLET BY MOUTH DAILY     No current facility-administered medications on file prior to visit.        Review of Systems   Constitution: Negative for diaphoresis, weakness, malaise/fatigue, weight gain and weight loss.   HENT: Negative for congestion and nosebleeds.    Cardiovascular: Negative for chest pain, claudication, cyanosis, dyspnea on exertion, irregular heartbeat, leg swelling, near-syncope, orthopnea, palpitations, paroxysmal nocturnal dyspnea and syncope.   Respiratory: Negative for cough, hemoptysis, shortness of breath, sleep disturbances due to breathing, snoring, sputum production and wheezing.    Hematologic/Lymphatic: Negative for bleeding problem. Does not bruise/bleed easily.   Skin: Negative for rash.   Musculoskeletal: Negative for arthritis, back pain, falls, joint pain, muscle cramps and muscle weakness.   Gastrointestinal: Negative for abdominal pain, constipation, diarrhea, heartburn, hematemesis, hematochezia, melena and nausea.   Genitourinary: Negative for dysuria, hematuria and nocturia.   Neurological: Negative for excessive daytime sleepiness, dizziness, headaches, light-headedness, loss of balance, numbness and vertigo.       Objective:   Physical Exam   Constitutional: She is oriented to person, place, and time. She appears well-developed and well-nourished.   Neck: Neck supple. No JVD present.   Cardiovascular: Normal rate, regular rhythm, normal heart sounds and normal pulses. Exam reveals no friction rub.   No murmur heard.  Pulmonary/Chest: Effort normal and breath sounds normal. No respiratory distress. She has no wheezes. She has no rales.   Abdominal: Soft. Bowel sounds are normal. She exhibits no distension.   Musculoskeletal: She exhibits no edema or tenderness.   Neurological: She is alert and oriented to person, place, and time.   Skin:  "Skin is warm and dry. No rash noted.   Psychiatric: She has a normal mood and affect. Her behavior is normal.   Nursing note and vitals reviewed.    Vitals:    11/27/18 1105 11/27/18 1108   BP: (!) 148/88 (!) 162/90   BP Location: Right arm Left arm   Patient Position: Sitting Sitting   Pulse: 82    Weight: 95.9 kg (211 lb 6.7 oz)    Height: 5' 4.96" (1.65 m)      Lab Results   Component Value Date    CHOL 137 07/26/2018    CHOL 187 05/21/2018    CHOL 251 (H) 04/17/2018     Lab Results   Component Value Date    HDL 43 07/26/2018    HDL 34 (L) 05/21/2018    HDL 42 04/17/2018     Lab Results   Component Value Date    LDLCALC 66.6 07/26/2018    LDLCALC 133.4 05/21/2018    LDLCALC 178.4 (H) 04/17/2018     Lab Results   Component Value Date    TRIG 137 07/26/2018    TRIG 98 05/21/2018    TRIG 153 (H) 04/17/2018     Lab Results   Component Value Date    CHOLHDL 31.4 07/26/2018    CHOLHDL 18.2 (L) 05/21/2018    CHOLHDL 16.7 (L) 04/17/2018       Chemistry        Component Value Date/Time     11/19/2018 0950    K 3.5 11/19/2018 0950     11/19/2018 0950    CO2 24 11/19/2018 0950    BUN 15 11/19/2018 0950    CREATININE 1.0 11/19/2018 0950     (H) 11/19/2018 0950        Component Value Date/Time    CALCIUM 8.8 11/19/2018 0950    ALKPHOS 67 11/19/2018 0950    AST 19 11/19/2018 0950    ALT 16 11/19/2018 0950    BILITOT 0.3 11/19/2018 0950    ESTGFRAFRICA >60 11/19/2018 0950    EGFRNONAA >60 11/19/2018 0950          Lab Results   Component Value Date    TSH 1.925 07/26/2018     Lab Results   Component Value Date    INR 1.0 05/21/2018    INR 1.0 12/21/2013    INR 1.0 07/10/2011     Lab Results   Component Value Date    WBC 9.46 07/26/2018    HGB 11.8 (L) 07/26/2018    HCT 38.2 07/26/2018    MCV 86 07/26/2018     07/26/2018     BMP  Sodium   Date Value Ref Range Status   11/19/2018 141 136 - 145 mmol/L Final     Potassium   Date Value Ref Range Status   11/19/2018 3.5 3.5 - 5.1 mmol/L Final     Chloride "   Date Value Ref Range Status   11/19/2018 109 95 - 110 mmol/L Final     CO2   Date Value Ref Range Status   11/19/2018 24 23 - 29 mmol/L Final     BUN, Bld   Date Value Ref Range Status   11/19/2018 15 6 - 20 mg/dL Final     Creatinine   Date Value Ref Range Status   11/19/2018 1.0 0.5 - 1.4 mg/dL Final     Calcium   Date Value Ref Range Status   11/19/2018 8.8 8.7 - 10.5 mg/dL Final     Anion Gap   Date Value Ref Range Status   11/19/2018 8 8 - 16 mmol/L Final     eGFR if    Date Value Ref Range Status   11/19/2018 >60 >60 mL/min/1.73 m^2 Final     eGFR if non    Date Value Ref Range Status   11/19/2018 >60 >60 mL/min/1.73 m^2 Final     Comment:     Calculation used to obtain the estimated glomerular filtration  rate (eGFR) is the CKD-EPI equation.        CrCl cannot be calculated (Patient's most recent lab result is older than the maximum 7 days allowed.).    Assessment:     1. Coronary artery disease involving native coronary artery of native heart without angina pectoris    2. Hyperlipidemia associated with type 2 diabetes mellitus    3. Hypertension associated with diabetes    4. Type 2 diabetes mellitus with complication, without long-term current use of insulin      Has no angina or equivalent.  BP well controlled on current regimen  No CNS complaints to suggest TIA or CVA  No abnormal bleeding on DAPT  Good med and diet compliance   Lipids pending   Diabetes well controlled  Has not had to take any SL nitro   Plan:   Lipid panel today   Continue current medical management for now   Diabetes control   Heart healthy diet  Exercise routine  RTC in 6 months with lipids, CMP and A1C

## 2018-12-10 RX ORDER — CYCLOBENZAPRINE HCL 10 MG
10 TABLET ORAL 3 TIMES DAILY
Qty: 50 TABLET | Refills: 3 | Status: SHIPPED | OUTPATIENT
Start: 2018-12-10 | End: 2019-04-12 | Stop reason: SDUPTHER

## 2019-01-02 RX ORDER — MUPIROCIN 20 MG/G
OINTMENT TOPICAL 3 TIMES DAILY
Qty: 22 G | Refills: 0 | Status: SHIPPED | OUTPATIENT
Start: 2019-01-02 | End: 2019-01-12

## 2019-01-15 DIAGNOSIS — E11.9 TYPE 2 DIABETES MELLITUS: ICD-10-CM

## 2019-02-04 ENCOUNTER — HOSPITAL ENCOUNTER (OUTPATIENT)
Dept: RADIOLOGY | Facility: HOSPITAL | Age: 59
Discharge: HOME | End: 2019-02-04
Attending: INTERNAL MEDICINE
Payer: MEDICARE

## 2019-02-04 DIAGNOSIS — Z12.39 SCREENING BREAST EXAMINATION: ICD-10-CM

## 2019-02-04 PROCEDURE — 77063 MAMMO DIGITAL SCREENING BILAT WITH TOMOSYNTHESIS_CAD: ICD-10-PCS | Mod: 26,,, | Performed by: RADIOLOGY

## 2019-02-04 PROCEDURE — 77063 BREAST TOMOSYNTHESIS BI: CPT | Mod: 26,,, | Performed by: RADIOLOGY

## 2019-02-04 PROCEDURE — 77067 MAMMO DIGITAL SCREENING BILAT WITH TOMOSYNTHESIS_CAD: ICD-10-PCS | Mod: 26,,, | Performed by: RADIOLOGY

## 2019-02-04 PROCEDURE — 77067 SCR MAMMO BI INCL CAD: CPT | Mod: 26,,, | Performed by: RADIOLOGY

## 2019-02-04 PROCEDURE — 77067 SCR MAMMO BI INCL CAD: CPT | Mod: TC

## 2019-02-11 ENCOUNTER — OFFICE VISIT (OUTPATIENT)
Dept: INTERNAL MEDICINE | Facility: CLINIC | Age: 59
End: 2019-02-11
Payer: MEDICARE

## 2019-02-11 VITALS
RESPIRATION RATE: 16 BRPM | BODY MASS INDEX: 36.35 KG/M2 | TEMPERATURE: 99 F | OXYGEN SATURATION: 95 % | HEART RATE: 75 BPM | DIASTOLIC BLOOD PRESSURE: 82 MMHG | SYSTOLIC BLOOD PRESSURE: 124 MMHG | WEIGHT: 212.94 LBS | HEIGHT: 64 IN

## 2019-02-11 DIAGNOSIS — E78.5 HYPERLIPIDEMIA ASSOCIATED WITH TYPE 2 DIABETES MELLITUS: ICD-10-CM

## 2019-02-11 DIAGNOSIS — E11.8 TYPE 2 DIABETES MELLITUS WITH COMPLICATION, WITHOUT LONG-TERM CURRENT USE OF INSULIN: Primary | ICD-10-CM

## 2019-02-11 DIAGNOSIS — E11.69 HYPERLIPIDEMIA ASSOCIATED WITH TYPE 2 DIABETES MELLITUS: ICD-10-CM

## 2019-02-11 DIAGNOSIS — Z86.010 HISTORY OF COLON POLYPS: ICD-10-CM

## 2019-02-11 DIAGNOSIS — F32.5 MAJOR DEPRESSION IN REMISSION: ICD-10-CM

## 2019-02-11 DIAGNOSIS — E11.59 HYPERTENSION ASSOCIATED WITH DIABETES: ICD-10-CM

## 2019-02-11 DIAGNOSIS — I77.819 AORTIC ECTASIA: ICD-10-CM

## 2019-02-11 DIAGNOSIS — I15.2 HYPERTENSION ASSOCIATED WITH DIABETES: ICD-10-CM

## 2019-02-11 DIAGNOSIS — I25.10 CORONARY ARTERY DISEASE INVOLVING NATIVE CORONARY ARTERY OF NATIVE HEART WITHOUT ANGINA PECTORIS: Chronic | ICD-10-CM

## 2019-02-11 PROCEDURE — 99999 PR PBB SHADOW E&M-EST. PATIENT-LVL III: ICD-10-PCS | Mod: PBBFAC,,, | Performed by: INTERNAL MEDICINE

## 2019-02-11 PROCEDURE — 99214 OFFICE O/P EST MOD 30 MIN: CPT | Mod: S$PBB,,, | Performed by: INTERNAL MEDICINE

## 2019-02-11 PROCEDURE — 99213 OFFICE O/P EST LOW 20 MIN: CPT | Mod: PBBFAC,PO | Performed by: INTERNAL MEDICINE

## 2019-02-11 PROCEDURE — 99214 PR OFFICE/OUTPT VISIT, EST, LEVL IV, 30-39 MIN: ICD-10-PCS | Mod: S$PBB,,, | Performed by: INTERNAL MEDICINE

## 2019-02-11 PROCEDURE — 99999 PR PBB SHADOW E&M-EST. PATIENT-LVL III: CPT | Mod: PBBFAC,,, | Performed by: INTERNAL MEDICINE

## 2019-02-11 NOTE — PROGRESS NOTES
"HPI:  Patient is a 58-year-old female who comes today for follow-up of her diabetes, hypertension, and lipids.  Patient's blood pressures been doing well.  Her sugars have been excellent.  She denies any hypoglycemic events.  Her only complaint is some arthritic type pain in her left shoulder.  Her recent mammogram showed a questionable lesion in the left breast.    Current meds have been verified and updated per the EMR  Exam:/82   Pulse 75   Temp 99 °F (37.2 °C)   Resp 16   Ht 5' 4" (1.626 m)   Wt 96.6 kg (212 lb 15.4 oz)   LMP 06/21/2003   SpO2 95%   BMI 36.56 kg/m²   Carotids 2+ equal without bruit  Chest clear  Cardiovascular regular rate and rhythm without murmur gallop or rub  Breast exam shows no masses no nipple inversion retraction or deviation  Left shoulder reveals his osteoarthritic type changes.  Lab Results   Component Value Date    WBC 9.87 02/04/2019    HGB 12.1 02/04/2019    HCT 38.7 02/04/2019     02/04/2019    CHOL 192 02/04/2019    TRIG 169 (H) 02/04/2019    HDL 38 (L) 02/04/2019    ALT 19 02/04/2019    AST 21 02/04/2019     02/04/2019    K 3.6 02/04/2019     02/04/2019    CREATININE 0.9 02/04/2019    BUN 22 (H) 02/04/2019    CO2 24 02/04/2019    TSH 2.667 02/04/2019    INR 1.0 05/21/2018    GLUF 106 01/07/2005    HGBA1C 7.0 (H) 02/04/2019       Impression:  Osteoarthritis of the shoulder.  Abnormal mammogram  Hyperlipidemia, not to goal, patient noncompliant with medications  Hypertension, diabetes all stable.  Patient Active Problem List   Diagnosis    CAD with far remote PCI of unknown vessel    History of colon polyps    NSTEMI (non-ST elevated myocardial infarction)    Type II diabetes mellitus with complication    Hypertension associated with diabetes    Hyperlipidemia associated with type 2 diabetes mellitus    Major depression in remission    Aortic ectasia       Plan:  Orders Placed This Encounter    Hemoglobin A1c    Comprehensive metabolic " panel    Lipid panel    Protein / creatinine ratio, urine    TSH    Case request GI: COLONOSCOPY     Patient needs to start taking her Crestor and Niaspan faithfully.  Patient will be seen again in 6 months.  She will have the above lab work done.  She is due for her colonoscopy.

## 2019-02-12 ENCOUNTER — HOSPITAL ENCOUNTER (OUTPATIENT)
Dept: RADIOLOGY | Facility: HOSPITAL | Age: 59
Discharge: HOME | End: 2019-02-12
Attending: INTERNAL MEDICINE
Payer: MEDICARE

## 2019-02-12 DIAGNOSIS — R92.8 ABNORMAL MAMMOGRAM: ICD-10-CM

## 2019-02-12 PROCEDURE — 76642 ULTRASOUND BREAST LIMITED: CPT | Mod: 26,LT,, | Performed by: RADIOLOGY

## 2019-02-12 PROCEDURE — 76642 US BREAST LEFT LIMITED: ICD-10-PCS | Mod: 26,LT,, | Performed by: RADIOLOGY

## 2019-02-12 PROCEDURE — 77065 DX MAMMO INCL CAD UNI: CPT | Mod: 26,LT,, | Performed by: RADIOLOGY

## 2019-02-12 PROCEDURE — 77061 BREAST TOMOSYNTHESIS UNI: CPT | Mod: 26,LT,, | Performed by: RADIOLOGY

## 2019-02-12 PROCEDURE — 77065 DX MAMMO INCL CAD UNI: CPT | Mod: TC,LT

## 2019-02-12 PROCEDURE — 77061 BREAST TOMOSYNTHESIS UNI: CPT | Mod: TC,LT

## 2019-02-12 PROCEDURE — 77061 MAMMO DIGITAL DIAGNOSTIC LEFT WITH TOMOSYNTHESIS_CAD: ICD-10-PCS | Mod: 26,LT,, | Performed by: RADIOLOGY

## 2019-02-12 PROCEDURE — 76642 ULTRASOUND BREAST LIMITED: CPT | Mod: TC,LT

## 2019-02-12 PROCEDURE — 77065 MAMMO DIGITAL DIAGNOSTIC LEFT WITH TOMOSYNTHESIS_CAD: ICD-10-PCS | Mod: 26,LT,, | Performed by: RADIOLOGY

## 2019-02-13 ENCOUNTER — TELEPHONE (OUTPATIENT)
Dept: ENDOSCOPY | Facility: HOSPITAL | Age: 59
End: 2019-02-13

## 2019-02-13 RX ORDER — SODIUM, POTASSIUM,MAG SULFATES 17.5-3.13G
SOLUTION, RECONSTITUTED, ORAL ORAL
Qty: 354 ML | Refills: 0 | Status: SHIPPED | OUTPATIENT
Start: 2019-02-13 | End: 2019-06-06

## 2019-02-13 NOTE — TELEPHONE ENCOUNTER
Endoscopy Scheduling Questionnaire:    Call Type:Incoming call via [employer]    1. Have you been admitted overnight to the hospital in the past 3 months? no  2. Do you get CP and SOB while walking up a flight of stairs? no  3. Have you had a stent placed in the past 12 months? yes  4. Have you had a stroke or heart attack in the past 6 months? no  5. Have you had any chest pain in the past 3 months? no      If so, have you been evaluated by your PCP or Cardiologist? no  6. Do you take weight loss medications? no  7. Have you been diagnosed with Diverticulitis within the past 3 months? no  8. Are you having any GI symptoms that you feel need to be evaluated prior to your procedure? no  9. Are you on dialysis? no  10. Are you diabetic? yes  11. Do you have any other health issues that you feel might limit your ability to safely have the procedure and/or sedation? no  12. Is the patient over 81 yo? no        If so, has the patient been seen by their PCP or GI in the last 3 months? N/A       -I have reviewed the last colonoscopy for recommendations regarding surveillance and bowel prep  Yes  -I have reviewed the patient's medications and allergies. She is on high risk medications and will require cardiac clearance. A clearance request will be sent to Dr. Cooper for Jennifer within 30 days of procedure  -I have verified the pharmacy information. The prep being used is Suprep. The patient's prep instructions were sent via mail..    Date Endoscopy Scheduled: Date: 6/3/19  Or  Date Gastro office visit Scheduled: NA

## 2019-02-20 ENCOUNTER — TELEPHONE (OUTPATIENT)
Dept: ENDOSCOPY | Facility: HOSPITAL | Age: 59
End: 2019-02-20

## 2019-03-05 RX ORDER — ROSUVASTATIN CALCIUM 40 MG/1
40 TABLET, COATED ORAL NIGHTLY
Qty: 30 TABLET | Refills: 11 | Status: SHIPPED | OUTPATIENT
Start: 2019-03-05 | End: 2019-08-13 | Stop reason: SDUPTHER

## 2019-03-05 RX ORDER — ALPRAZOLAM 0.5 MG/1
TABLET ORAL
Qty: 90 TABLET | Refills: 1 | Status: SHIPPED | OUTPATIENT
Start: 2019-03-05 | End: 2019-09-06 | Stop reason: SDUPTHER

## 2019-04-01 ENCOUNTER — OFFICE VISIT (OUTPATIENT)
Dept: INTERNAL MEDICINE | Facility: CLINIC | Age: 59
End: 2019-04-01
Payer: MEDICARE

## 2019-04-01 ENCOUNTER — HOSPITAL ENCOUNTER (OUTPATIENT)
Dept: RADIOLOGY | Facility: HOSPITAL | Age: 59
Discharge: HOME | End: 2019-04-01
Attending: NURSE PRACTITIONER
Payer: MEDICARE

## 2019-04-01 VITALS
RESPIRATION RATE: 16 BRPM | HEIGHT: 64 IN | WEIGHT: 215.19 LBS | HEART RATE: 69 BPM | DIASTOLIC BLOOD PRESSURE: 86 MMHG | BODY MASS INDEX: 36.74 KG/M2 | TEMPERATURE: 98 F | SYSTOLIC BLOOD PRESSURE: 122 MMHG | OXYGEN SATURATION: 98 %

## 2019-04-01 DIAGNOSIS — G89.29 CHRONIC LEFT SHOULDER PAIN: Primary | ICD-10-CM

## 2019-04-01 DIAGNOSIS — M25.512 CHRONIC LEFT SHOULDER PAIN: Primary | ICD-10-CM

## 2019-04-01 DIAGNOSIS — M25.512 CHRONIC LEFT SHOULDER PAIN: ICD-10-CM

## 2019-04-01 DIAGNOSIS — G89.29 CHRONIC LEFT SHOULDER PAIN: ICD-10-CM

## 2019-04-01 PROCEDURE — 73030 XR SHOULDER COMPLETE 2 OR MORE VIEWS LEFT: ICD-10-PCS | Mod: 26,LT,, | Performed by: RADIOLOGY

## 2019-04-01 PROCEDURE — 99999 PR PBB SHADOW E&M-EST. PATIENT-LVL III: CPT | Mod: PBBFAC,,, | Performed by: NURSE PRACTITIONER

## 2019-04-01 PROCEDURE — 73030 X-RAY EXAM OF SHOULDER: CPT | Mod: 26,LT,, | Performed by: RADIOLOGY

## 2019-04-01 PROCEDURE — 99214 OFFICE O/P EST MOD 30 MIN: CPT | Mod: 25,S$PBB,, | Performed by: NURSE PRACTITIONER

## 2019-04-01 PROCEDURE — 20610 DRAIN/INJ JOINT/BURSA W/O US: CPT | Mod: PBBFAC,PO | Performed by: NURSE PRACTITIONER

## 2019-04-01 PROCEDURE — 73030 X-RAY EXAM OF SHOULDER: CPT | Mod: TC,PO,LT

## 2019-04-01 PROCEDURE — 20610 LARGE JOINT ASPIRATION/INJECTION: L GLENOHUMERAL: ICD-10-PCS | Mod: S$PBB,LT,, | Performed by: NURSE PRACTITIONER

## 2019-04-01 PROCEDURE — 99999 PR PBB SHADOW E&M-EST. PATIENT-LVL III: ICD-10-PCS | Mod: PBBFAC,,, | Performed by: NURSE PRACTITIONER

## 2019-04-01 PROCEDURE — 99213 OFFICE O/P EST LOW 20 MIN: CPT | Mod: PBBFAC,25,PO | Performed by: NURSE PRACTITIONER

## 2019-04-01 PROCEDURE — 20610 DRAIN/INJ JOINT/BURSA W/O US: CPT | Mod: S$PBB,LT,, | Performed by: NURSE PRACTITIONER

## 2019-04-01 PROCEDURE — 99214 PR OFFICE/OUTPT VISIT, EST, LEVL IV, 30-39 MIN: ICD-10-PCS | Mod: 25,S$PBB,, | Performed by: NURSE PRACTITIONER

## 2019-04-01 RX ORDER — DICLOFENAC SODIUM 10 MG/G
2 GEL TOPICAL 3 TIMES DAILY PRN
Qty: 100 G | Refills: 0 | Status: SHIPPED | OUTPATIENT
Start: 2019-04-01 | End: 2023-04-19

## 2019-04-01 RX ORDER — METHYLPREDNISOLONE ACETATE 80 MG/ML
80 INJECTION, SUSPENSION INTRA-ARTICULAR; INTRALESIONAL; INTRAMUSCULAR; SOFT TISSUE
Status: DISCONTINUED | OUTPATIENT
Start: 2019-04-01 | End: 2019-04-01 | Stop reason: HOSPADM

## 2019-04-01 RX ADMIN — METHYLPREDNISOLONE ACETATE 80 MG: 80 INJECTION, SUSPENSION INTRALESIONAL; INTRAMUSCULAR; INTRASYNOVIAL; SOFT TISSUE at 09:04

## 2019-04-01 NOTE — PROCEDURES
Large Joint Aspiration/Injection: L glenohumeral  Date/Time: 4/1/2019 9:00 AM  Performed by: Daniel Wilson NP  Authorized by: Daniel Wilson NP     Indications:  Pain  Procedure site marked: Yes    Timeout: Prior to procedure the correct patient, procedure, and site was verified      Location:  Shoulder  Site:  L glenohumeral  Prep: Patient was prepped and draped in usual sterile fashion    Ultrasonic Guidance for needle placement: No  Needle size:  27 G  Approach:  Anterior  Medications:  80 mg methylPREDNISolone acetate 80 mg/mL  Patient tolerance:  Patient tolerated the procedure well with no immediate complications

## 2019-04-01 NOTE — PROGRESS NOTES
"Subjective:      Patient ID: Dulce Boogie is a 58 y.o. female.    Chief Complaint: pain to left side of the body .    HPI:  Patient states she is having pain to her left shoulder.  She has had an injection many years ago for the pain per an ortho doctor.  Denies any heavy lifting, trauma.  Says it is causing pain down her arm, hurts to lift the arm up.  Has taken Flexeril, Mobic with little relief    Past Medical History:   Diagnosis Date    CAD (coronary artery disease)     Depression     History of colon polyps     Hyperlipidemia associated with type 2 diabetes mellitus     Hypertension associated with diabetes     NSTEMI (non-ST elevated myocardial infarction) 2018    Obesity     Type II diabetes mellitus with complication        Past Surgical History:   Procedure Laterality Date    CAROTID STENT       SECTION, LOW TRANSVERSE      x 2    COLONOSCOPY N/A 2014    Performed by [facility] at [facility] ENDO    HEART CATH-LEFT Left 2018    Performed by Aimee Cooper MD at [facility] CATH LAB       Lab Results   Component Value Date    WBC 9.87 2019    HGB 12.1 2019    HCT 38.7 2019     2019    CHOL 192 2019    TRIG 169 (H) 2019    HDL 38 (L) 2019    ALT 19 2019    AST 21 2019     2019    K 3.6 2019     2019    CREATININE 0.9 2019    BUN 22 (H) 2019    CO2 24 2019    TSH 2.667 2019    INR 1.0 2018    GLUF 106 2005    HGBA1C 7.0 (H) 2019       /86   Pulse 69   Temp 98 °F (36.7 °C)   Resp 16   Ht 5' 4" (1.626 m)   Wt 97.6 kg (215 lb 2.7 oz)   LMP 2003   SpO2 98%   BMI 36.93 kg/m²       Review of Systems   Constitutional: Negative for appetite change, fatigue and unexpected weight change.   HENT: Negative for congestion, ear pain, postnasal drip, rhinorrhea, sinus pressure, sneezing, sore throat, tinnitus, trouble swallowing and voice change. "    Eyes: Negative for pain, discharge, redness, itching and visual disturbance.   Respiratory: Negative for cough, chest tightness, shortness of breath and wheezing.    Cardiovascular: Negative for chest pain, palpitations and leg swelling.   Gastrointestinal: Negative for abdominal distention, abdominal pain, blood in stool, constipation, diarrhea, nausea and vomiting.        No reflux.   Genitourinary: Negative for difficulty urinating, dyspareunia, flank pain, menstrual problem and pelvic pain.   Musculoskeletal: Positive for arthralgias. Negative for back pain, myalgias and neck stiffness.   Skin: Negative for color change and rash.   Neurological: Negative for dizziness and headaches.   Psychiatric/Behavioral: Negative for confusion and sleep disturbance. The patient is not nervous/anxious.       Objective:     Physical Exam   Constitutional: She is oriented to person, place, and time. She appears well-developed and well-nourished. No distress.   Musculoskeletal:   Normal gait  Left shoulder painful ROM, arm push is painful, can rotate behind back but is painful. Side arm raise painful.  TTP to joint. Shoulder has no warmth, redness, or erythema   Neurological: She is alert and oriented to person, place, and time.   Skin: Skin is warm and dry.   Psychiatric: She has a normal mood and affect. Her behavior is normal.     Assessment:      1. Chronic left shoulder pain      Plan:   Chronic left shoulder pain  -     X-Ray Shoulder 2 or More Views Left; Future; Expected date: 04/01/2019    Other orders  -     Diabetes Digital Medicine (DDMP) Enrollment Order  -     Diabetes Digital Medicine (DDMP): Assign Onboarding Questionnaires  -     Hypertension Digital Medicine (HDMP) Enrollment Order  -     Hypertension Digital Medicine (HDMP): Assign Onboarding Questionnaires  -     diclofenac sodium (VOLTAREN) 1 % Gel; Apply 2 g topically 3 (three) times daily as needed.  Dispense: 100 g; Refill: 0    will try the topical,  no Mobic, on anticoags.  Refer to ortho if not improved.  Ice the shoulder at home today      Current Outpatient Medications:     ALPRAZolam (XANAX) 0.5 MG tablet, TAKE 1 TABLET BY MOUTH EVERY DAY AT NIGHT FOR ANXIETY, Disp: 90 tablet, Rfl: 1    amLODIPine (NORVASC) 10 MG tablet, TAKE 1 TABLET BY MOUTH DAILY, Disp: 90 tablet, Rfl: 3    aspirin (ECOTRIN) 81 MG EC tablet, Take 1 tablet (81 mg total) by mouth once daily., Disp: , Rfl: 0    cyclobenzaprine (FLEXERIL) 10 MG tablet, TAKE 1 TABLET (10 MG TOTAL) BY MOUTH 3 (THREE) TIMES DAILY., Disp: 50 tablet, Rfl: 3    doxazosin (CARDURA) 4 MG tablet, Take 1 tablet (4 mg total) by mouth once daily., Disp: 90 tablet, Rfl: 3    fenofibrate micronized (LOFIBRA) 134 MG Cap, TAKE ONE CAPSULE BY MOUTH EVERY DAY WITH BREAKFAST., Disp: , Rfl: 3    glimepiride (AMARYL) 4 MG tablet, Take 1 tablet (4 mg total) by mouth before breakfast., Disp: 90 tablet, Rfl: 3    hydrocodone-acetaminophen 5-325mg (NORCO) 5-325 mg per tablet, Take 1 tablet by mouth 3 (three) times daily as needed for Pain., Disp: 60 tablet, Rfl: 0    isosorbide mononitrate (ISMO,MONOKET) 20 MG Tab, Take 20 mg by mouth., Disp: , Rfl:     metFORMIN (GLUCOPHAGE) 500 MG tablet, TAKE 1 TABLET BY MOUTH 2 TIMES DAILY WITH MEALS., Disp: 180 tablet, Rfl: 3    metoprolol tartrate (LOPRESSOR) 25 MG tablet, Take 25 mg by mouth 2 (two) times daily., Disp: , Rfl: 10    niacin (NIASPAN) 750 MG CR tablet, TAKE 1 TABLET (750 MG TOTAL) BY MOUTH NIGHTLY., Disp: 30 tablet, Rfl: 11    nitroGLYCERIN (NITROSTAT) 0.4 MG SL tablet, PLACE 1 TABLET UNDER TONGUE AT FIRST SIGN OF HEART PAIN, AND REPEAT EVERY 5 MINUTES IF PAIN PERSISTS, Disp: 25 tablet, Rfl: 1    omeprazole (PRILOSEC) 40 MG capsule, Take 1 capsule (40 mg total) by mouth once daily., Disp: 90 capsule, Rfl: 3    ondansetron (ZOFRAN) 4 MG tablet, Take 1 tablet (4 mg total) by mouth every 8 (eight) hours as needed for Nausea., Disp: 12 tablet, Rfl: 0    rosuvastatin  (CRESTOR) 40 MG Tab, TAKE 1 TABLET (40 MG TOTAL) BY MOUTH EVERY EVENING., Disp: 30 tablet, Rfl: 11    ticagrelor (BRILINTA) 90 mg tablet, Take 1 tablet (90 mg total) by mouth 2 (two) times daily., Disp: 60 tablet, Rfl: 11    diclofenac sodium (VOLTAREN) 1 % Gel, Apply 2 g topically 3 (three) times daily as needed., Disp: 100 g, Rfl: 0    sodium,potassium,mag sulfates (SUPREP BOWEL PREP KIT) 17.5-3.13-1.6 gram SolR, As directed, Disp: 354 mL, Rfl: 0

## 2019-04-12 RX ORDER — CYCLOBENZAPRINE HCL 10 MG
10 TABLET ORAL 3 TIMES DAILY
Qty: 50 TABLET | Refills: 3 | Status: SHIPPED | OUTPATIENT
Start: 2019-04-12 | End: 2019-12-02 | Stop reason: ALTCHOICE

## 2019-04-16 ENCOUNTER — TELEPHONE (OUTPATIENT)
Dept: ENDOSCOPY | Facility: HOSPITAL | Age: 59
End: 2019-04-16

## 2019-05-21 ENCOUNTER — LAB VISIT (OUTPATIENT)
Dept: LAB | Facility: HOSPITAL | Age: 59
End: 2019-05-21
Attending: INTERNAL MEDICINE
Payer: MEDICARE

## 2019-05-21 DIAGNOSIS — I25.10 CORONARY ARTERY DISEASE INVOLVING NATIVE CORONARY ARTERY OF NATIVE HEART WITHOUT ANGINA PECTORIS: Chronic | ICD-10-CM

## 2019-05-21 DIAGNOSIS — E11.8 TYPE 2 DIABETES MELLITUS WITH COMPLICATION, WITHOUT LONG-TERM CURRENT USE OF INSULIN: ICD-10-CM

## 2019-05-21 LAB
ALBUMIN SERPL BCP-MCNC: 3.7 G/DL (ref 3.5–5.2)
ALP SERPL-CCNC: 67 U/L (ref 55–135)
ALT SERPL W/O P-5'-P-CCNC: 22 U/L (ref 10–44)
ANION GAP SERPL CALC-SCNC: 11 MMOL/L (ref 8–16)
AST SERPL-CCNC: 16 U/L (ref 10–40)
BILIRUB SERPL-MCNC: 0.2 MG/DL (ref 0.1–1)
BUN SERPL-MCNC: 15 MG/DL (ref 6–20)
CALCIUM SERPL-MCNC: 9.4 MG/DL (ref 8.7–10.5)
CHLORIDE SERPL-SCNC: 106 MMOL/L (ref 95–110)
CHOLEST SERPL-MCNC: 226 MG/DL (ref 120–199)
CHOLEST/HDLC SERPL: 5.4 {RATIO} (ref 2–5)
CO2 SERPL-SCNC: 23 MMOL/L (ref 23–29)
CREAT SERPL-MCNC: 1 MG/DL (ref 0.5–1.4)
EST. GFR  (AFRICAN AMERICAN): >60 ML/MIN/1.73 M^2
EST. GFR  (NON AFRICAN AMERICAN): >60 ML/MIN/1.73 M^2
ESTIMATED AVG GLUCOSE: 148 MG/DL (ref 68–131)
GLUCOSE SERPL-MCNC: 153 MG/DL (ref 70–110)
HBA1C MFR BLD HPLC: 6.8 % (ref 4–5.6)
HDLC SERPL-MCNC: 42 MG/DL (ref 40–75)
HDLC SERPL: 18.6 % (ref 20–50)
LDLC SERPL CALC-MCNC: 138 MG/DL (ref 63–159)
NONHDLC SERPL-MCNC: 184 MG/DL
POTASSIUM SERPL-SCNC: 3.4 MMOL/L (ref 3.5–5.1)
PROT SERPL-MCNC: 7.6 G/DL (ref 6–8.4)
SODIUM SERPL-SCNC: 140 MMOL/L (ref 136–145)
TRIGL SERPL-MCNC: 230 MG/DL (ref 30–150)

## 2019-05-21 PROCEDURE — 83036 HEMOGLOBIN GLYCOSYLATED A1C: CPT

## 2019-05-21 PROCEDURE — 36415 COLL VENOUS BLD VENIPUNCTURE: CPT

## 2019-05-21 PROCEDURE — 80053 COMPREHEN METABOLIC PANEL: CPT

## 2019-05-21 PROCEDURE — 80061 LIPID PANEL: CPT

## 2019-05-29 ENCOUNTER — CLINICAL SUPPORT (OUTPATIENT)
Dept: CARDIOLOGY | Facility: CLINIC | Age: 59
End: 2019-05-29
Payer: MEDICARE

## 2019-05-29 ENCOUNTER — OFFICE VISIT (OUTPATIENT)
Dept: CARDIOLOGY | Facility: CLINIC | Age: 59
End: 2019-05-29
Payer: MEDICARE

## 2019-05-29 ENCOUNTER — TELEPHONE (OUTPATIENT)
Dept: ORTHOPEDICS | Facility: CLINIC | Age: 59
End: 2019-05-29

## 2019-05-29 VITALS
HEART RATE: 67 BPM | HEIGHT: 64 IN | SYSTOLIC BLOOD PRESSURE: 158 MMHG | BODY MASS INDEX: 36.7 KG/M2 | DIASTOLIC BLOOD PRESSURE: 89 MMHG | WEIGHT: 215 LBS

## 2019-05-29 DIAGNOSIS — E11.69 HYPERLIPIDEMIA ASSOCIATED WITH TYPE 2 DIABETES MELLITUS: ICD-10-CM

## 2019-05-29 DIAGNOSIS — I25.10 CORONARY ARTERY DISEASE INVOLVING NATIVE CORONARY ARTERY OF NATIVE HEART WITHOUT ANGINA PECTORIS: Primary | Chronic | ICD-10-CM

## 2019-05-29 DIAGNOSIS — I15.2 HYPERTENSION ASSOCIATED WITH DIABETES: ICD-10-CM

## 2019-05-29 DIAGNOSIS — E11.59 HYPERTENSION ASSOCIATED WITH DIABETES: ICD-10-CM

## 2019-05-29 DIAGNOSIS — I25.10 CORONARY ARTERY DISEASE INVOLVING NATIVE CORONARY ARTERY OF NATIVE HEART WITHOUT ANGINA PECTORIS: Chronic | ICD-10-CM

## 2019-05-29 DIAGNOSIS — E78.5 HYPERLIPIDEMIA ASSOCIATED WITH TYPE 2 DIABETES MELLITUS: ICD-10-CM

## 2019-05-29 DIAGNOSIS — M25.512 LEFT SHOULDER PAIN, UNSPECIFIED CHRONICITY: ICD-10-CM

## 2019-05-29 DIAGNOSIS — E11.8 TYPE 2 DIABETES MELLITUS WITH COMPLICATION, WITHOUT LONG-TERM CURRENT USE OF INSULIN: ICD-10-CM

## 2019-05-29 PROCEDURE — 99999 PR PBB SHADOW E&M-EST. PATIENT-LVL V: CPT | Mod: PBBFAC,,, | Performed by: NURSE PRACTITIONER

## 2019-05-29 PROCEDURE — 99214 PR OFFICE/OUTPT VISIT, EST, LEVL IV, 30-39 MIN: ICD-10-PCS | Mod: S$PBB,,, | Performed by: NURSE PRACTITIONER

## 2019-05-29 PROCEDURE — 93010 EKG 12-LEAD: ICD-10-PCS | Mod: S$PBB,,, | Performed by: INTERNAL MEDICINE

## 2019-05-29 PROCEDURE — 99214 OFFICE O/P EST MOD 30 MIN: CPT | Mod: S$PBB,,, | Performed by: NURSE PRACTITIONER

## 2019-05-29 PROCEDURE — 99215 OFFICE O/P EST HI 40 MIN: CPT | Mod: PBBFAC,25 | Performed by: NURSE PRACTITIONER

## 2019-05-29 PROCEDURE — 99999 PR PBB SHADOW E&M-EST. PATIENT-LVL V: ICD-10-PCS | Mod: PBBFAC,,, | Performed by: NURSE PRACTITIONER

## 2019-05-29 PROCEDURE — 93010 ELECTROCARDIOGRAM REPORT: CPT | Mod: S$PBB,,, | Performed by: INTERNAL MEDICINE

## 2019-05-29 PROCEDURE — 93005 ELECTROCARDIOGRAM TRACING: CPT | Mod: PBBFAC | Performed by: INTERNAL MEDICINE

## 2019-05-29 RX ORDER — LOSARTAN POTASSIUM 25 MG/1
25 TABLET ORAL DAILY
Qty: 30 TABLET | Refills: 6 | Status: SHIPPED | OUTPATIENT
Start: 2019-05-29 | End: 2019-07-17

## 2019-05-29 NOTE — PROGRESS NOTES
Subjective:   Patient ID:  Dulce Boogie is a 58 y.o. female who presents for follow up of Coronary Artery Disease; Hypertension; and Hyperlipidemia      HPI  Ms. Boogie's current medical conditions include CAD with remote PCI in  (previously followed by Dr. Valle at  Cardiology), HTN, HLD, DM II. Admitted May 2018 with unstable angina. Underwent LHC on 18 and noted to have LAD 50% mid long lesion ffr 0.81. Also noted LCX non obs CAD and patent stent. Also found to have RCA 90% prox treated with solange x2 and PDA distal 80% treated with SOLANGE x 2    Denies any chest pain, SOB, COOPER,  orthopnea, PND, dizziness, palpitations,  near syncope, syncope, edema or palpitations. Has no symptoms concerning for angina or equivalent. No CNS Complaints to suggest TIA or CVA. Does well with limiting sodium intake.  BP elevated today in clinic. Has been having issues with her left shoulder. Has limited ROM and pain.   DM well controlled. Lipids not quite at target.   Has no abnormal bleeding on dual antiplatelets   EKG today shows NSR with incomplete RBBB.       Past Medical History:   Diagnosis Date    CAD (coronary artery disease)     Depression     History of colon polyps     Hyperlipidemia associated with type 2 diabetes mellitus     Hypertension associated with diabetes     NSTEMI (non-ST elevated myocardial infarction) 2018    Obesity     Type II diabetes mellitus with complication        Past Surgical History:   Procedure Laterality Date    CAROTID STENT       SECTION, LOW TRANSVERSE      x 2    COLONOSCOPY N/A 2014    Performed by [facility] at [facility] ENDO    HEART CATH-LEFT Left 2018    Performed by Aimee Cooper MD at [facility] CATH LAB       Social History     Tobacco Use    Smoking status: Never Smoker    Smokeless tobacco: Never Used   Substance Use Topics    Alcohol use: No    Drug use: No       Family History   Problem Relation Age of Onset    Hypertension Mother      Hyperlipidemia Mother     Hypertension Father     Diabetes Father     Hyperlipidemia Father     Colon cancer Father     Hypertension Brother     Breast cancer Neg Hx     Ovarian cancer Neg Hx     Uterine cancer Neg Hx        Current Outpatient Medications   Medication Sig    ALPRAZolam (XANAX) 0.5 MG tablet TAKE 1 TABLET BY MOUTH EVERY DAY AT NIGHT FOR ANXIETY    amLODIPine (NORVASC) 10 MG tablet TAKE 1 TABLET BY MOUTH DAILY    aspirin (ECOTRIN) 81 MG EC tablet Take 1 tablet (81 mg total) by mouth once daily.    cyclobenzaprine (FLEXERIL) 10 MG tablet TAKE 1 TABLET (10 MG TOTAL) BY MOUTH 3 (THREE) TIMES DAILY.    diclofenac sodium (VOLTAREN) 1 % Gel Apply 2 g topically 3 (three) times daily as needed.    doxazosin (CARDURA) 4 MG tablet Take 1 tablet (4 mg total) by mouth once daily.    fenofibrate micronized (LOFIBRA) 134 MG Cap TAKE ONE CAPSULE BY MOUTH EVERY DAY WITH BREAKFAST.    glimepiride (AMARYL) 4 MG tablet Take 1 tablet (4 mg total) by mouth before breakfast.    hydrocodone-acetaminophen 5-325mg (NORCO) 5-325 mg per tablet Take 1 tablet by mouth 3 (three) times daily as needed for Pain.    isosorbide mononitrate (ISMO,MONOKET) 20 MG Tab Take 20 mg by mouth.    metFORMIN (GLUCOPHAGE) 500 MG tablet TAKE 1 TABLET BY MOUTH 2 TIMES DAILY WITH MEALS.    metoprolol tartrate (LOPRESSOR) 25 MG tablet Take 25 mg by mouth 2 (two) times daily.    niacin (NIASPAN) 750 MG CR tablet TAKE 1 TABLET (750 MG TOTAL) BY MOUTH NIGHTLY.    nitroGLYCERIN (NITROSTAT) 0.4 MG SL tablet PLACE 1 TABLET UNDER TONGUE AT FIRST SIGN OF HEART PAIN, AND REPEAT EVERY 5 MINUTES IF PAIN PERSISTS    omeprazole (PRILOSEC) 40 MG capsule Take 1 capsule (40 mg total) by mouth once daily.    ondansetron (ZOFRAN) 4 MG tablet Take 1 tablet (4 mg total) by mouth every 8 (eight) hours as needed for Nausea.    rosuvastatin (CRESTOR) 40 MG Tab TAKE 1 TABLET (40 MG TOTAL) BY MOUTH EVERY EVENING.    sodium,potassium,mag sulfates  (SUPREP BOWEL PREP KIT) 17.5-3.13-1.6 gram SolR As directed    ticagrelor (BRILINTA) 90 mg tablet Take 1 tablet (90 mg total) by mouth 2 (two) times daily.     No current facility-administered medications for this visit.      Current Outpatient Medications on File Prior to Visit   Medication Sig    ALPRAZolam (XANAX) 0.5 MG tablet TAKE 1 TABLET BY MOUTH EVERY DAY AT NIGHT FOR ANXIETY    amLODIPine (NORVASC) 10 MG tablet TAKE 1 TABLET BY MOUTH DAILY    aspirin (ECOTRIN) 81 MG EC tablet Take 1 tablet (81 mg total) by mouth once daily.    cyclobenzaprine (FLEXERIL) 10 MG tablet TAKE 1 TABLET (10 MG TOTAL) BY MOUTH 3 (THREE) TIMES DAILY.    diclofenac sodium (VOLTAREN) 1 % Gel Apply 2 g topically 3 (three) times daily as needed.    doxazosin (CARDURA) 4 MG tablet Take 1 tablet (4 mg total) by mouth once daily.    fenofibrate micronized (LOFIBRA) 134 MG Cap TAKE ONE CAPSULE BY MOUTH EVERY DAY WITH BREAKFAST.    glimepiride (AMARYL) 4 MG tablet Take 1 tablet (4 mg total) by mouth before breakfast.    hydrocodone-acetaminophen 5-325mg (NORCO) 5-325 mg per tablet Take 1 tablet by mouth 3 (three) times daily as needed for Pain.    isosorbide mononitrate (ISMO,MONOKET) 20 MG Tab Take 20 mg by mouth.    metFORMIN (GLUCOPHAGE) 500 MG tablet TAKE 1 TABLET BY MOUTH 2 TIMES DAILY WITH MEALS.    metoprolol tartrate (LOPRESSOR) 25 MG tablet Take 25 mg by mouth 2 (two) times daily.    niacin (NIASPAN) 750 MG CR tablet TAKE 1 TABLET (750 MG TOTAL) BY MOUTH NIGHTLY.    nitroGLYCERIN (NITROSTAT) 0.4 MG SL tablet PLACE 1 TABLET UNDER TONGUE AT FIRST SIGN OF HEART PAIN, AND REPEAT EVERY 5 MINUTES IF PAIN PERSISTS    omeprazole (PRILOSEC) 40 MG capsule Take 1 capsule (40 mg total) by mouth once daily.    ondansetron (ZOFRAN) 4 MG tablet Take 1 tablet (4 mg total) by mouth every 8 (eight) hours as needed for Nausea.    rosuvastatin (CRESTOR) 40 MG Tab TAKE 1 TABLET (40 MG TOTAL) BY MOUTH EVERY EVENING.     sodium,potassium,mag sulfates (SUPREP BOWEL PREP KIT) 17.5-3.13-1.6 gram SolR As directed    ticagrelor (BRILINTA) 90 mg tablet Take 1 tablet (90 mg total) by mouth 2 (two) times daily.     No current facility-administered medications on file prior to visit.        Review of Systems   Constitution: Negative for diaphoresis, malaise/fatigue, weight gain and weight loss.   HENT: Negative for congestion and nosebleeds.    Cardiovascular: Negative for chest pain, claudication, cyanosis, dyspnea on exertion, irregular heartbeat, leg swelling, near-syncope, orthopnea, palpitations, paroxysmal nocturnal dyspnea and syncope.   Respiratory: Negative for cough, hemoptysis, shortness of breath, sleep disturbances due to breathing, snoring, sputum production and wheezing.    Hematologic/Lymphatic: Negative for bleeding problem. Does not bruise/bleed easily.   Skin: Negative for rash.   Musculoskeletal: Negative for arthritis, back pain, falls, joint pain, muscle cramps and muscle weakness.        Left shoulder limited ROM and pain    Gastrointestinal: Negative for abdominal pain, constipation, diarrhea, heartburn, hematemesis, hematochezia, melena and nausea.   Genitourinary: Negative for dysuria, hematuria and nocturia.   Neurological: Negative for excessive daytime sleepiness, dizziness, headaches, light-headedness, loss of balance, numbness, vertigo and weakness.       Objective:   Physical Exam   Constitutional: She is oriented to person, place, and time. She appears well-developed and well-nourished.   Neck: Neck supple. No JVD present.   Cardiovascular: Normal rate, regular rhythm, normal heart sounds and normal pulses. Exam reveals no friction rub.   No murmur heard.  Pulmonary/Chest: Effort normal and breath sounds normal. No respiratory distress. She has no wheezes. She has no rales.   Abdominal: Soft. Bowel sounds are normal. She exhibits no distension.   Musculoskeletal: She exhibits no edema or tenderness.  "  Neurological: She is alert and oriented to person, place, and time.   Skin: Skin is warm and dry. No rash noted.   Psychiatric: She has a normal mood and affect. Her behavior is normal.   Nursing note and vitals reviewed.    Vitals:    05/29/19 1116 05/29/19 1118   BP: (!) 162/92 (!) 158/89   BP Location: Left arm    Pulse: 67    Weight: 97.5 kg (215 lb)    Height: 5' 4" (1.626 m)      Lab Results   Component Value Date    CHOL 226 (H) 05/21/2019    CHOL 192 02/04/2019    CHOL 171 11/27/2018     Lab Results   Component Value Date    HDL 42 05/21/2019    HDL 38 (L) 02/04/2019    HDL 36 (L) 11/27/2018     Lab Results   Component Value Date    LDLCALC 138.0 05/21/2019    LDLCALC 120.2 02/04/2019    LDLCALC 93.8 11/27/2018     Lab Results   Component Value Date    TRIG 230 (H) 05/21/2019    TRIG 169 (H) 02/04/2019    TRIG 206 (H) 11/27/2018     Lab Results   Component Value Date    CHOLHDL 18.6 (L) 05/21/2019    CHOLHDL 19.8 (L) 02/04/2019    CHOLHDL 21.1 11/27/2018       Chemistry        Component Value Date/Time     05/21/2019 0826    K 3.4 (L) 05/21/2019 0826     05/21/2019 0826    CO2 23 05/21/2019 0826    BUN 15 05/21/2019 0826    CREATININE 1.0 05/21/2019 0826     (H) 05/21/2019 0826        Component Value Date/Time    CALCIUM 9.4 05/21/2019 0826    ALKPHOS 67 05/21/2019 0826    AST 16 05/21/2019 0826    ALT 22 05/21/2019 0826    BILITOT 0.2 05/21/2019 0826    ESTGFRAFRICA >60 05/21/2019 0826    EGFRNONAA >60 05/21/2019 0826          Lab Results   Component Value Date    TSH 2.667 02/04/2019     Lab Results   Component Value Date    INR 1.0 05/21/2018    INR 1.0 12/21/2013    INR 1.0 07/10/2011     Lab Results   Component Value Date    WBC 9.87 02/04/2019    HGB 12.1 02/04/2019    HCT 38.7 02/04/2019    MCV 84 02/04/2019     02/04/2019     BMP  Sodium   Date Value Ref Range Status   05/21/2019 140 136 - 145 mmol/L Final     Potassium   Date Value Ref Range Status   05/21/2019 3.4 (L) " 3.5 - 5.1 mmol/L Final     Chloride   Date Value Ref Range Status   05/21/2019 106 95 - 110 mmol/L Final     CO2   Date Value Ref Range Status   05/21/2019 23 23 - 29 mmol/L Final     BUN, Bld   Date Value Ref Range Status   05/21/2019 15 6 - 20 mg/dL Final     Creatinine   Date Value Ref Range Status   05/21/2019 1.0 0.5 - 1.4 mg/dL Final     Calcium   Date Value Ref Range Status   05/21/2019 9.4 8.7 - 10.5 mg/dL Final     Anion Gap   Date Value Ref Range Status   05/21/2019 11 8 - 16 mmol/L Final     eGFR if    Date Value Ref Range Status   05/21/2019 >60 >60 mL/min/1.73 m^2 Final     eGFR if non    Date Value Ref Range Status   05/21/2019 >60 >60 mL/min/1.73 m^2 Final     Comment:     Calculation used to obtain the estimated glomerular filtration  rate (eGFR) is the CKD-EPI equation.        CrCl cannot be calculated (Patient's most recent lab result is older than the maximum 7 days allowed.).    Assessment:     1. Coronary artery disease involving native coronary artery of native heart without angina pectoris    2. Hyperlipidemia associated with type 2 diabetes mellitus    3. Hypertension associated with diabetes    4. Type 2 diabetes mellitus with complication, without long-term current use of insulin        Plan:   Coronary artery disease involving native coronary artery of native heart without angina pectoris  Continue ASA, Brilinta, Statin, BB and Isordil   Heart healthy diet   Weight loss     Hyperlipidemia associated with type 2 diabetes mellitus  Continue statin   Lipid panel as scheduled     Hypertension associated with diabetes  Continue amlodipine, Isordil and Cardura  Start Losartan 25mg daily   Heart healthy diet     Type 2 diabetes mellitus with complication, without long-term current use of insulin  Continue PCP recommendations    Patient clear to proceed with colonoscopy at moderate risk. Clear to hold ASA and Brilinta for 5 days prior to procedure and will need to  resume after cleared by GI   RTC in 1 month for BP check

## 2019-05-30 ENCOUNTER — TELEPHONE (OUTPATIENT)
Dept: ORTHOPEDICS | Facility: CLINIC | Age: 59
End: 2019-05-30

## 2019-06-05 ENCOUNTER — OFFICE VISIT (OUTPATIENT)
Dept: ORTHOPEDICS | Facility: CLINIC | Age: 59
End: 2019-06-05
Payer: MEDICARE

## 2019-06-05 VITALS
DIASTOLIC BLOOD PRESSURE: 89 MMHG | SYSTOLIC BLOOD PRESSURE: 146 MMHG | HEIGHT: 64 IN | HEART RATE: 70 BPM | WEIGHT: 214.94 LBS | BODY MASS INDEX: 36.7 KG/M2

## 2019-06-05 DIAGNOSIS — I21.4 NSTEMI (NON-ST ELEVATED MYOCARDIAL INFARCTION): ICD-10-CM

## 2019-06-05 DIAGNOSIS — E11.69 HYPERLIPIDEMIA ASSOCIATED WITH TYPE 2 DIABETES MELLITUS: ICD-10-CM

## 2019-06-05 DIAGNOSIS — M75.42 IMPINGEMENT SYNDROME OF LEFT SHOULDER: ICD-10-CM

## 2019-06-05 DIAGNOSIS — G89.29 CHRONIC LEFT SHOULDER PAIN: Primary | ICD-10-CM

## 2019-06-05 DIAGNOSIS — E78.5 HYPERLIPIDEMIA ASSOCIATED WITH TYPE 2 DIABETES MELLITUS: ICD-10-CM

## 2019-06-05 DIAGNOSIS — M75.82 ROTATOR CUFF TENDINITIS, LEFT: ICD-10-CM

## 2019-06-05 DIAGNOSIS — M25.512 CHRONIC LEFT SHOULDER PAIN: Primary | ICD-10-CM

## 2019-06-05 PROCEDURE — 20610 PR DRAIN/INJECT LARGE JOINT/BURSA: ICD-10-PCS | Mod: S$PBB,LT,, | Performed by: PHYSICIAN ASSISTANT

## 2019-06-05 PROCEDURE — 99204 PR OFFICE/OUTPT VISIT, NEW, LEVL IV, 45-59 MIN: ICD-10-PCS | Mod: 25,S$PBB,, | Performed by: PHYSICIAN ASSISTANT

## 2019-06-05 PROCEDURE — 99999 PR PBB SHADOW E&M-EST. PATIENT-LVL V: CPT | Mod: PBBFAC,,, | Performed by: PHYSICIAN ASSISTANT

## 2019-06-05 PROCEDURE — 99999 PR PBB SHADOW E&M-EST. PATIENT-LVL V: ICD-10-PCS | Mod: PBBFAC,,, | Performed by: PHYSICIAN ASSISTANT

## 2019-06-05 PROCEDURE — 20610 DRAIN/INJ JOINT/BURSA W/O US: CPT | Mod: S$PBB,LT,, | Performed by: PHYSICIAN ASSISTANT

## 2019-06-05 PROCEDURE — 99204 OFFICE O/P NEW MOD 45 MIN: CPT | Mod: 25,S$PBB,, | Performed by: PHYSICIAN ASSISTANT

## 2019-06-05 PROCEDURE — 99215 OFFICE O/P EST HI 40 MIN: CPT | Mod: PBBFAC | Performed by: PHYSICIAN ASSISTANT

## 2019-06-05 PROCEDURE — 20610 DRAIN/INJ JOINT/BURSA W/O US: CPT | Mod: PBBFAC | Performed by: PHYSICIAN ASSISTANT

## 2019-06-05 RX ORDER — METHYLPREDNISOLONE ACETATE 80 MG/ML
80 INJECTION, SUSPENSION INTRA-ARTICULAR; INTRALESIONAL; INTRAMUSCULAR; SOFT TISSUE ONCE
Status: COMPLETED | OUTPATIENT
Start: 2019-06-05 | End: 2019-06-05

## 2019-06-05 RX ADMIN — METHYLPREDNISOLONE ACETATE 80 MG: 80 INJECTION, SUSPENSION INTRALESIONAL; INTRAMUSCULAR; INTRASYNOVIAL; SOFT TISSUE at 03:06

## 2019-06-05 NOTE — LETTER
June 6, 2019      Kira Jaimes, [facility]  [address]  [place] [address]           [facility]  [address] [place] [id number]  Phone: [phone]  Fax: [phone]          Patient: Dulce Boogie   MR Number: [id number]   YOB: 1960   Date of Visit: 6/5/2019       Dear Kira Jaimes:    Thank you for referring Dulce Boogie to me for evaluation. Attached you will find relevant portions of my assessment and plan of care.    If you have questions, please do not hesitate to call me. I look forward to following Dulce Boogie along with you.    Sincerely,    Arleth Clayton PA-C    Enclosure  CC:  No Recipients    If you would like to receive this communication electronically, please contact [email or web address] or [phone] to request more information on [employer] access.    For providers and/or their staff who would like to refer a patient to [facility], please contact us through our one-stop-shop provider referral line, [facility] , at [phone].    If you feel you have received this communication in error or would no longer like to receive these types of communications, please e-mail [email or web address]

## 2019-06-05 NOTE — PROGRESS NOTES
Subjective:      Patient ID: Dulce Boogie is a 58 y.o. female.    Chief Complaint: Pain of the Left Shoulder      HPI: Dulce Boogie  is a 58 y.o. female who c/o Pain of the Left Shoulder   for duration of 3 months.  She denies any inciting injury.  Pain level is 10/10 in severity.  Quality is aching and throbbing.  It is constant.  Alleviating factors include nothing.  She has tried over-the-counter creams, she has tried an injection, she has tried Tylenol.  Nothing is giving her relief.  Aggravating factors include overhead movement.    Past Medical History:   Diagnosis Date    CAD (coronary artery disease)     Depression     History of colon polyps     Hyperlipidemia associated with type 2 diabetes mellitus     Hypertension associated with diabetes     NSTEMI (non-ST elevated myocardial infarction) 2018    Obesity     Type II diabetes mellitus with complication      Past Surgical History:   Procedure Laterality Date    CAROTID STENT       SECTION, LOW TRANSVERSE      x 2    COLONOSCOPY N/A 2014    Performed by [facility] MD at [facility] ENDO    HEART CATH-LEFT Left 2018    Performed by Aimee Cooper MD at [facility] CATH LAB     Family History   Problem Relation Age of Onset    Hypertension Mother     Hyperlipidemia Mother     Hypertension Father     Diabetes Father     Hyperlipidemia Father     Colon cancer Father     Hypertension Brother     Breast cancer Neg Hx     Ovarian cancer Neg Hx     Uterine cancer Neg Hx      Social History     Socioeconomic History    Marital status:      Spouse name: Not on file    Number of children: Not on file    Years of education: Not on file    Highest education level: Not on file   Occupational History    Not on file   Social Needs    Financial resource strain: Not on file    Food insecurity:     Worry: Not on file     Inability: Not on file    Transportation needs:     Medical: Not on file     Non-medical: Not on file    Tobacco Use    Smoking status: Never Smoker    Smokeless tobacco: Never Used   Substance and Sexual Activity    Alcohol use: No    Drug use: No    Sexual activity: Yes     Partners: Male     Birth control/protection: None, Post-menopausal   Lifestyle    Physical activity:     Days per week: Not on file     Minutes per session: Not on file    Stress: Not on file   Relationships    Social connections:     Talks on phone: Not on file     Gets together: Not on file     Attends Caoda[employer] service: Not on file     Active member of club or organization: Not on file     Attends meetings of clubs or organizations: Not on file     Relationship status: Not on file   Other Topics Concern    Not on file   Social History Narrative    Not on file     Medication List with Changes/Refills   Current Medications    ALPRAZOLAM (XANAX) 0.5 MG TABLET    TAKE 1 TABLET BY MOUTH EVERY DAY AT NIGHT FOR ANXIETY    AMLODIPINE (NORVASC) 10 MG TABLET    TAKE 1 TABLET BY MOUTH DAILY    ASPIRIN (ECOTRIN) 81 MG EC TABLET    Take 1 tablet (81 mg total) by mouth once daily.    CYCLOBENZAPRINE (FLEXERIL) 10 MG TABLET    TAKE 1 TABLET (10 MG TOTAL) BY MOUTH 3 (THREE) TIMES DAILY.    DICLOFENAC SODIUM (VOLTAREN) 1 % GEL    Apply 2 g topically 3 (three) times daily as needed.    DOXAZOSIN (CARDURA) 4 MG TABLET    Take 1 tablet (4 mg total) by mouth once daily.    FENOFIBRATE MICRONIZED (LOFIBRA) 134 MG CAP    TAKE ONE CAPSULE BY MOUTH EVERY DAY WITH BREAKFAST.    GLIMEPIRIDE (AMARYL) 4 MG TABLET    Take 1 tablet (4 mg total) by mouth before breakfast.    HYDROCODONE-ACETAMINOPHEN 5-325MG (NORCO) 5-325 MG PER TABLET    Take 1 tablet by mouth 3 (three) times daily as needed for Pain.    ISOSORBIDE MONONITRATE (ISMO,MONOKET) 20 MG TAB    Take 20 mg by mouth once daily.     LOSARTAN (COZAAR) 25 MG TABLET    Take 1 tablet (25 mg total) by mouth once daily.    METFORMIN (GLUCOPHAGE) 500 MG TABLET    TAKE 1 TABLET BY MOUTH 2 TIMES DAILY WITH MEALS.     METOPROLOL TARTRATE (LOPRESSOR) 25 MG TABLET    Take 25 mg by mouth 2 (two) times daily.    NIACIN (NIASPAN) 750 MG CR TABLET    TAKE 1 TABLET (750 MG TOTAL) BY MOUTH NIGHTLY.    NITROGLYCERIN (NITROSTAT) 0.4 MG SL TABLET    PLACE 1 TABLET UNDER TONGUE AT FIRST SIGN OF HEART PAIN, AND REPEAT EVERY 5 MINUTES IF PAIN PERSISTS    OMEPRAZOLE (PRILOSEC) 40 MG CAPSULE    Take 1 capsule (40 mg total) by mouth once daily.    ONDANSETRON (ZOFRAN) 4 MG TABLET    Take 1 tablet (4 mg total) by mouth every 8 (eight) hours as needed for Nausea.    ROSUVASTATIN (CRESTOR) 40 MG TAB    TAKE 1 TABLET (40 MG TOTAL) BY MOUTH EVERY EVENING.    SODIUM,POTASSIUM,MAG SULFATES (SUPREP BOWEL PREP KIT) 17.5-3.13-1.6 GRAM SOLR    As directed    TICAGRELOR (BRILINTA) 90 MG TABLET    Take 1 tablet (90 mg total) by mouth 2 (two) times daily.     Review of patient's allergies indicates:   Allergen Reactions    No known drug allergies        Review of Systems   Constitution: Negative for fever.   Cardiovascular: Negative for chest pain.   Respiratory: Negative for cough and shortness of breath.    Skin: Negative for rash.   Musculoskeletal: Positive for joint pain. Negative for joint swelling and stiffness.   Gastrointestinal: Negative for heartburn.   Neurological: Negative for headaches and numbness.         Objective:        General    Nursing note and vitals reviewed.  Constitutional: She is oriented to person, place, and time. She appears well-developed and well-nourished.   HENT:   Head: Normocephalic and atraumatic.   Eyes: EOM are normal.   Cardiovascular: Normal rate and regular rhythm.    Pulmonary/Chest: Effort normal and breath sounds normal.   Abdominal: Soft.   Neurological: She is alert and oriented to person, place, and time.   Psychiatric: She has a normal mood and affect. Her behavior is normal.         Right Shoulder Exam     Inspection/Observation   Swelling: absent  Bruising: absent  Scars: absent  Deformity:  absent  Scapular Dyskinesia: negative    Range of Motion   Active abduction: normal   Passive abduction: normal   Extension: normal   Forward Flexion: normal   Forward Elevation: normal  Adduction: normal  External Rotation 0 degrees: normal   Internal rotation 0 degrees: normal     Left Shoulder Exam     Inspection/Observation   Swelling: absent  Bruising: absent  Scars: absent  Deformity: absent  Scapular Dyskinesia: negative    Tenderness   The patient is tender to palpation of the acromioclavicular joint and greater tuberosity.    Range of Motion   Active abduction: normal   Passive abduction: normal   Extension: normal   Forward Flexion: normal   Forward Elevation: normal  Adduction: normal  External Rotation 0 degrees: normal   Internal rotation 0 degrees: normal     Tests & Signs   Drop arm: negative  Jiménez test: positive  Impingement: positive  Rotator Cuff Painful Arc/Range: moderate  Active Compression test (Sawyer's Sign): negative  Speed's Test: negative    Other   Sensation: normal     Comments:  No TTP over bicipital groove, greater tuberosity, nor ac joint.        Muscle Strength   Right Upper Extremity   Shoulder Abduction: 5/5   Shoulder Internal Rotation: 5/5   Shoulder External Rotation: 5/5   Subscapularis: 5/5/5   Biceps: 5/5/5   Left Upper Extremity  Shoulder Abduction: 5/5   Shoulder Internal Rotation: 5/5   Shoulder External Rotation: 5/5   Subscapularis: 5/5/5   Biceps: 5/5/5     Vascular Exam       Left Pulses      Radial:                    2+      Capillary Refill  Left Hand: normal capillary refill            Xray:   Left shoulder 04/01/2019 images and report were reviewed today.  I agree with the radiologist's interpretation.  There is no radiographic evidence of acute osseous, articular, or soft tissue abnormality.  There is mild AC joint arthrosis with some undersurface spurring present at the acromion.  Glenohumeral joint space appears preserved.    Labs  Hgb A1c 6.8% on 5/21/19 -  indicates good glycemic control    Assessment:       Encounter Diagnoses   Name Primary?    Chronic left shoulder pain Yes    Impingement syndrome of left shoulder     Rotator cuff tendinitis, left     Hyperlipidemia associated with type 2 diabetes mellitus     NSTEMI (non-ST elevated myocardial infarction)           Plan:       Dulce was seen today for pain.    Diagnoses and all orders for this visit:    Chronic left shoulder pain  -     methylPREDNISolone acetate injection 80 mg  -     Ambulatory Referral to Physical/Occupational Therapy    Impingement syndrome of left shoulder  -     methylPREDNISolone acetate injection 80 mg  -     Ambulatory Referral to Physical/Occupational Therapy    Rotator cuff tendinitis, left  -     methylPREDNISolone acetate injection 80 mg  -     Ambulatory Referral to Physical/Occupational Therapy    Hyperlipidemia associated with type 2 diabetes mellitus    NSTEMI (non-ST elevated myocardial infarction)    Ms. Cardona is a patient with a new problem.  According to her, she had an injection in the shoulder but it was done anteriorly.  She did not have a subacromial injection.  We discussed risks and benefits of proceeding with a subacromial injection.  She wishes to proceed with that today.  Additionally, she would likely benefit from formal physical therapy for range of motion and rotator cuff strengthening.  We briefly discussed further diagnostic workup with an MRI.  However, she would like to try to avoid that for now and see if she improves with more conservative measures.  I will plan to see her back in the office in 6 weeks to re-evaluate her progress.  If she has any problems before then, she will notify the office.  She verbalizes understanding and agrees with the above plan.    Follow up in about 6 weeks (around 7/17/2019).    Left Shoulder Injection Report:  After verbal consent was obtained for left shoulder injection, patient ID, site, and side were verified.  The left  shoulder was sterilly prepped in the standard fashion.  A 22-gauge needle was introduced into left subacromial space from the posterior portal approach without complication. The left shoulder was then injected with 20 mg lidocaine plain and 80 mg depomedrol.  A sterile bandaid was applied.  The patient was informed to apply an ice pack approximately 10min once arriving home and not to do anything strenuous for 24hours. She was instructed to call if there were any problems. The patient was discharged in stable condition.      The patient understands, chooses and consents to this plan and accepts all   the risks which include but are not limited to the risks mentioned above.     Disclaimer: This note was prepared using a voice recognition system and is likely to have sound alike errors within the text.

## 2019-06-06 RX ORDER — SODIUM, POTASSIUM,MAG SULFATES 17.5-3.13G
SOLUTION, RECONSTITUTED, ORAL ORAL
Qty: 354 ML | Refills: 0 | Status: ON HOLD | OUTPATIENT
Start: 2019-06-06 | End: 2019-06-10 | Stop reason: HOSPADM

## 2019-06-07 NOTE — TELEPHONE ENCOUNTER
----- Message from Montana Morrissey sent at 6/7/2019 12:10 PM CDT -----  Contact: Pt   Type:  RX Refill Request    Who Called:  Pt   Refill or New Rx:refill   RX Name and Strength:brilinta 90 mg   How is the patient currently taking it? (ex. 1XDay):twice daily   Is this a 30 day or 90 day RX:90 days   Preferred Pharmacy with phone number:[facility]  Local or Mail Order:local   Ordering Provider:reema   Would the patient rather a call back or a response via [facility]? Either is fine   Best Call Back Number:[phone]  Additional Information:

## 2019-06-07 NOTE — TELEPHONE ENCOUNTER
Approved Medications      ticagrelor (BRILINTA) 90 mg tablet         Sig: Take 1 tablet (90 mg total) by mouth 2 (two) times daily.    Disp:  60 tablet    Refills:  11    Start: 6/7/2019 - 6/12/2020    Class: Normal    Authorized by: Theresa Cox NP        To be filled at: [facility]/pharmacy #[id number] - [place], [place] - [address] AT [facility]

## 2019-06-10 ENCOUNTER — ANESTHESIA (OUTPATIENT)
Dept: ENDOSCOPY | Facility: HOSPITAL | Age: 59
End: 2019-06-10
Payer: MEDICARE

## 2019-06-10 ENCOUNTER — ANESTHESIA EVENT (OUTPATIENT)
Dept: ENDOSCOPY | Facility: HOSPITAL | Age: 59
End: 2019-06-10
Payer: MEDICARE

## 2019-06-10 ENCOUNTER — HOSPITAL ENCOUNTER (OUTPATIENT)
Facility: HOSPITAL | Age: 59
Discharge: HOME | End: 2019-06-10
Attending: INTERNAL MEDICINE | Admitting: INTERNAL MEDICINE
Payer: MEDICARE

## 2019-06-10 DIAGNOSIS — Z12.11 COLON CANCER SCREENING: Primary | ICD-10-CM

## 2019-06-10 LAB — POCT GLUCOSE: 118 MG/DL (ref 70–110)

## 2019-06-10 PROCEDURE — 88305 TISSUE EXAM BY PATHOLOGIST: CPT | Performed by: PATHOLOGY

## 2019-06-10 PROCEDURE — 88305 TISSUE EXAM BY PATHOLOGIST: CPT | Mod: 26,,, | Performed by: PATHOLOGY

## 2019-06-10 PROCEDURE — 25000003 PHARM REV CODE 250: Performed by: INTERNAL MEDICINE

## 2019-06-10 PROCEDURE — 45380 COLONOSCOPY AND BIOPSY: CPT | Mod: PT,,, | Performed by: INTERNAL MEDICINE

## 2019-06-10 PROCEDURE — 82962 GLUCOSE BLOOD TEST: CPT | Performed by: INTERNAL MEDICINE

## 2019-06-10 PROCEDURE — 37000009 HC ANESTHESIA EA ADD 15 MINS: Performed by: INTERNAL MEDICINE

## 2019-06-10 PROCEDURE — 45380 PR COLONOSCOPY,BIOPSY: ICD-10-PCS | Mod: PT,,, | Performed by: INTERNAL MEDICINE

## 2019-06-10 PROCEDURE — 63600175 PHARM REV CODE 636 W HCPCS: Performed by: NURSE ANESTHETIST, CERTIFIED REGISTERED

## 2019-06-10 PROCEDURE — 27201012 HC FORCEPS, HOT/COLD, DISP: Performed by: INTERNAL MEDICINE

## 2019-06-10 PROCEDURE — 37000008 HC ANESTHESIA 1ST 15 MINUTES: Performed by: INTERNAL MEDICINE

## 2019-06-10 PROCEDURE — 25000003 PHARM REV CODE 250: Performed by: NURSE ANESTHETIST, CERTIFIED REGISTERED

## 2019-06-10 PROCEDURE — 88305 TISSUE SPECIMEN TO PATHOLOGY - SURGERY: ICD-10-PCS | Mod: 26,,, | Performed by: PATHOLOGY

## 2019-06-10 PROCEDURE — 45380 COLONOSCOPY AND BIOPSY: CPT | Performed by: INTERNAL MEDICINE

## 2019-06-10 RX ORDER — SODIUM CHLORIDE, SODIUM LACTATE, POTASSIUM CHLORIDE, CALCIUM CHLORIDE 600; 310; 30; 20 MG/100ML; MG/100ML; MG/100ML; MG/100ML
INJECTION, SOLUTION INTRAVENOUS CONTINUOUS
Status: DISCONTINUED | OUTPATIENT
Start: 2019-06-10 | End: 2019-06-10 | Stop reason: HOSPADM

## 2019-06-10 RX ORDER — PROPOFOL 10 MG/ML
VIAL (ML) INTRAVENOUS
Status: DISCONTINUED | OUTPATIENT
Start: 2019-06-10 | End: 2019-06-10

## 2019-06-10 RX ORDER — LIDOCAINE HYDROCHLORIDE 10 MG/ML
INJECTION, SOLUTION EPIDURAL; INFILTRATION; INTRACAUDAL; PERINEURAL
Status: DISCONTINUED | OUTPATIENT
Start: 2019-06-10 | End: 2019-06-10

## 2019-06-10 RX ORDER — METFORMIN HYDROCHLORIDE 500 MG/1
TABLET ORAL
Qty: 180 TABLET | Refills: 3 | Status: SHIPPED | OUTPATIENT
Start: 2019-06-10 | End: 2019-08-13 | Stop reason: SDUPTHER

## 2019-06-10 RX ADMIN — PROPOFOL 50 MG: 10 INJECTION, EMULSION INTRAVENOUS at 10:06

## 2019-06-10 RX ADMIN — LIDOCAINE HYDROCHLORIDE 50 MG: 10 INJECTION, SOLUTION EPIDURAL; INFILTRATION; INTRACAUDAL; PERINEURAL at 10:06

## 2019-06-10 RX ADMIN — SODIUM CHLORIDE, SODIUM LACTATE, POTASSIUM CHLORIDE, AND CALCIUM CHLORIDE: 600; 310; 30; 20 INJECTION, SOLUTION INTRAVENOUS at 08:06

## 2019-06-10 RX ADMIN — SODIUM CHLORIDE, SODIUM LACTATE, POTASSIUM CHLORIDE, AND CALCIUM CHLORIDE: 600; 310; 30; 20 INJECTION, SOLUTION INTRAVENOUS at 10:06

## 2019-06-10 RX ADMIN — PROPOFOL 150 MG: 10 INJECTION, EMULSION INTRAVENOUS at 10:06

## 2019-06-10 NOTE — DISCHARGE SUMMARY
Endoscopy Discharge Summary      Admit Date: 6/10/2019    Discharge Date and Time:  6/10/2019 11:07 AM    Attending Physician: Maricarmen Boo MD     Discharge Physician: Maricarmen Boo MD     Principal Admitting Diagnoses: colon screen         Discharge Diagnosis: The encounter diagnosis was Colon cancer screening.     Discharged Condition: Good    Indication for Admission: colon cancer screening     Hospital Course: Patient was admitted for an inpatient procedure and tolerated the procedure well with no complications.    Significant Diagnostic Studies: Colonoscopy     Pathology (if any):  Specimen (12h ago, onward)    Start     Ordered    06/10/19 1103  Specimen to Pathology - Surgery  Once     Comments:  #1 transverse polyps x2#2 rectal polyp     Start Status     06/10/19 1103 Collected (06/10/19 1104) Order ID: [id number]       06/10/19 1103    Signed and Held  Specimen to Pathology - Surgery  Once     Comments:  #1 transverse polyps x2#2 rectal polyp      Signed and Held          Estimated Blood Loss: 1 ml.    Discussed with: patient.    Disposition: Home.    Follow Up/Patient Instructions:   Current Discharge Medication List      CONTINUE these medications which have NOT CHANGED    Details   ALPRAZolam (XANAX) 0.5 MG tablet TAKE 1 TABLET BY MOUTH EVERY DAY AT NIGHT FOR ANXIETY  Qty: 90 tablet, Refills: 1    Comments: This request is for a new prescription for a controlled substance as required by Federal/State law.      amLODIPine (NORVASC) 10 MG tablet TAKE 1 TABLET BY MOUTH DAILY  Qty: 90 tablet, Refills: 3      cyclobenzaprine (FLEXERIL) 10 MG tablet TAKE 1 TABLET (10 MG TOTAL) BY MOUTH 3 (THREE) TIMES DAILY.  Qty: 50 tablet, Refills: 3      diclofenac sodium (VOLTAREN) 1 % Gel Apply 2 g topically 3 (three) times daily as needed.  Qty: 100 g, Refills: 0      doxazosin (CARDURA) 4 MG tablet Take 1 tablet (4 mg total) by mouth once daily.  Qty: 90 tablet, Refills: 3      fenofibrate micronized (LOFIBRA)  134 MG Cap TAKE ONE CAPSULE BY MOUTH EVERY DAY WITH BREAKFAST.  Refills: 3      hydrocodone-acetaminophen 5-325mg (NORCO) 5-325 mg per tablet Take 1 tablet by mouth 3 (three) times daily as needed for Pain.  Qty: 60 tablet, Refills: 0      isosorbide mononitrate (ISMO,MONOKET) 20 MG Tab Take 20 mg by mouth once daily.       losartan (COZAAR) 25 MG tablet Take 1 tablet (25 mg total) by mouth once daily.  Qty: 30 tablet, Refills: 6      metFORMIN (GLUCOPHAGE) 500 MG tablet TAKE 1 TABLET BY MOUTH 2 TIMES DAILY WITH MEALS.  Qty: 180 tablet, Refills: 3      metoprolol tartrate (LOPRESSOR) 25 MG tablet Take 25 mg by mouth 2 (two) times daily.  Refills: 10      niacin (NIASPAN) 750 MG CR tablet TAKE 1 TABLET (750 MG TOTAL) BY MOUTH NIGHTLY.  Qty: 30 tablet, Refills: 11      omeprazole (PRILOSEC) 40 MG capsule Take 1 capsule (40 mg total) by mouth once daily.  Qty: 90 capsule, Refills: 3      ondansetron (ZOFRAN) 4 MG tablet Take 1 tablet (4 mg total) by mouth every 8 (eight) hours as needed for Nausea.  Qty: 12 tablet, Refills: 0      rosuvastatin (CRESTOR) 40 MG Tab TAKE 1 TABLET (40 MG TOTAL) BY MOUTH EVERY EVENING.  Qty: 30 tablet, Refills: 11      ticagrelor (BRILINTA) 90 mg tablet Take 1 tablet (90 mg total) by mouth 2 (two) times daily.  Qty: 60 tablet, Refills: 11      aspirin (ECOTRIN) 81 MG EC tablet Take 1 tablet (81 mg total) by mouth once daily.  Refills: 0      glimepiride (AMARYL) 4 MG tablet Take 1 tablet (4 mg total) by mouth before breakfast.  Qty: 90 tablet, Refills: 3      nitroGLYCERIN (NITROSTAT) 0.4 MG SL tablet PLACE 1 TABLET UNDER TONGUE AT FIRST SIGN OF HEART PAIN, AND REPEAT EVERY 5 MINUTES IF PAIN PERSISTS  Qty: 25 tablet, Refills: 1         STOP taking these medications       sodium,potassium,mag sulfates (SUPREP BOWEL PREP KIT) 17.5-3.13-1.6 gram SolR Comments:   Reason for Stopping:               Discharge Procedure Orders   Diet general     Call MD for:  temperature >100.4     Call MD for:   persistent nausea and vomiting     Call MD for:  severe uncontrolled pain     Call MD for:  difficulty breathing, headache or visual disturbances     Activity as tolerated

## 2019-06-10 NOTE — DISCHARGE INSTRUCTIONS
Understanding Colon and Rectal Polyps    The colon (also called the large intestine) is a muscular tube that forms the last part of the digestive tract. It absorbs water and stores food waste. The colon is about 4 to 6 feet long. The rectum is the last 6 inches of the colon. The colon and rectum have a smooth lining composed of millions of cells. Changes in these cells can lead to growths in the colon that can become cancerous and should be removed. Multiple tests are available to screen for colon cancer, but the colonoscopy is the most recommended test. During colonoscopy, these polyps can be removed. How often you need this test depends on many things including your condition, your family history, symptoms, and what the findings were at the previous colonoscopy.   When the colon lining changes  Changes that happen in the cells that line the colon or rectum can lead to growths called polyps. Over a period of years, polyps can turn cancerous. Removing polyps early may prevent cancer from ever forming.  Polyps  Polyps are fleshy clumps of tissue that form on the lining of the colon or rectum. Small polyps are usually benign (not cancerous). However, over time, cells in a polyp can change and become cancerous. Certain types of polyps known as adenomatous polyps are premalignant. The risk for invasive cancer increases with the size of the polyp and certain cell and gene features. This means that they can become cancerous if they're not removed. Hyperplastic polyps are benign. They can grow quite large and not turn cancerous.   Cancer  Almost all colorectal cancers start when polyp cells begin growing abnormally. As a cancerous tumor grows, it may involve more and more of the colon or rectum. In time, cancer can also grow beyond the colon or rectum and spread to nearby organs or to glands called lymph nodes. The cells can also travel to other parts of the body. This is known as metastasis. The earlier a cancerous  tumor is removed, the better the chance of preventing its spread.    Date Last Reviewed: 8/1/2016  © 8932-1669 [employer]. [address] [place] [address], [place] [address]. All rights reserved. This information is not intended as a substitute for professional medical care. Always follow your healthcare professional's instructions.        Colonoscopy     A camera attached to a flexible tube with a viewing lens is used to take video pictures.     Colonoscopy is a test to view the inside of your lower digestive tract (colon and rectum). Sometimes it can show the last part of the small intestine (ileum). During the test, small pieces of tissue may be removed for testing. This is called a biopsy. Small growths, such as polyps, may also be removed.   Why is colonoscopy done?  The test is done to help look for colon cancer. And it can help find the source of abdominal pain, bleeding, and changes in bowel habits. It may be needed once a year, depending on factors such as your:  · Age  · Health history  · Family health history  · Symptoms  · Results from any prior colonoscopy  Risks and possible complications  These include:  · Bleeding               · A puncture or tear in the colon   · Risks of anesthesia  · A cancer lesion not being seen  Getting ready   To prepare for the test:  · Talk with your healthcare provider about the risks of the test (see below). Also ask your healthcare provider about alternatives to the test.  · Tell your healthcare provider about any medicines you take. Also tell him or her about any health conditions you may have.  · Make sure your rectum and colon are empty for the test. Follow the diet and bowel prep instructions exactly. If you dont, the test may need to be rescheduled.  · Plan for a friend or family member to drive you home after the test.     Colonoscopy provides an inside view of the entire colon.     You may discuss the results with your doctor right away or at a future  visit.  During the test   The test is usually done in the hospital on an outpatient basis. This means you go home the same day. The procedure takes about 30 minutes. During that time:  · You are given relaxing (sedating) medicine through an IV line. You may be drowsy, or fully asleep.  · The healthcare provider will first give you a physical exam to check for anal and rectal problems.  · Then the anus is lubricated and the scope inserted.  · If you are awake, you may have a feeling similar to needing to have a bowel movement. You may also feel pressure as air is pumped into the colon. Its OK to pass gas during the procedure.  · Biopsy, polyp removal, or other treatments may be done during the test.  After the test   You may have gas right after the test. It can help to try to pass it to help prevent later bloating. Your healthcare provider may discuss the results with you right away. Or you may need to schedule a follow-up visit to talk about the results. After the test, you can go back to your normal eating and other activities. You may be tired from the sedation and need to rest for a few hours.  Date Last Reviewed: 11/1/2016  © 6622-8816 [employer]. [address] [place] [address], [place] [address]. All rights reserved. This information is not intended as a substitute for professional medical care. Always follow your healthcare professional's instructions.

## 2019-06-10 NOTE — ANESTHESIA PREPROCEDURE EVALUATION
06/10/2019  Dulce Boogie is a 58 y.o., female.    Anesthesia Evaluation    I have reviewed the Patient Summary Reports.    I have reviewed the Nursing Notes.   I have reviewed the Medications.     Review of Systems  Anesthesia Hx:  No problems with previous Anesthesia  Denies Family Hx of Anesthesia complications.   Denies Personal Hx of Anesthesia complications.   Social:  Non-Smoker, No Alcohol Use    EENT/Dental:   Upper and lower partials   Cardiovascular:   Exercise tolerance: good Hypertension, well controlled Past MI CAD  CABG/stent  ECG has been reviewed. Test Reason : I25.10,    Vent. Rate : 067 BPM     Atrial Rate : 067 BPM     P-R Int : 178 ms          QRS Dur : 094 ms      QT Int : 374 ms       P-R-T Axes : 057 -17 042 degrees     QTc Int : 395 ms    Normal sinus rhythm  Incomplete right bundle branch block  Nonspecific T wave abnormality  Abnormal ECG  When compared with ECG of 22-MAY-2018 14:59,  No significant change was found  Confirmed by ELICIA SKINNER MD ([id number]) on 6/4/2019 6:56:21 PM    Referred By: HAVEN RODARTE           Confirmed By:ELICIA SKINNER MD   Pulmonary:  Pulmonary Normal    Renal/:  Renal/ Normal     Hepatic/GI:  Hepatic/GI Normal    Musculoskeletal:  Musculoskeletal Normal    Neurological:  Neurology Normal    Endocrine:   Diabetes, well controlled, type 2    Dermatological:  Skin Normal    Psych:   Psychiatric History anxiety          Physical Exam  General:  Well nourished, Obesity    Airway/Jaw/Neck:  Airway Findings: Mouth Opening: Normal Tongue: Large  General Airway Assessment: Adult, Possible difficult mask airway  Mallampati: III  Improves to II with phonation.  TM Distance: Normal, at least 6 cm      Dental:  Dental Findings: Upper partial dentures, Lower partial dentures   Chest/Lungs:  Chest/Lungs Findings: Clear to auscultation, Normal Respiratory Rate      Heart/Vascular:  Heart Findings: Rate: Normal  Rhythm: Regular Rhythm  Sounds: Normal        Mental Status:  Mental Status Findings:  Cooperative, Alert and Oriented         Anesthesia Plan  Type of Anesthesia, risks & benefits discussed:  Anesthesia Type:  MAC  Patient's Preference:   Intra-op Monitoring Plan:   Intra-op Monitoring Plan Comments:   Post Op Pain Control Plan:   Post Op Pain Control Plan Comments:   Induction:   IV  Beta Blocker:  Patient is on a Beta-Blocker and has received one dose within the past 24 hours (No further documentation required).       Informed Consent: Patient understands risks and agrees with Anesthesia plan.  Questions answered. Anesthesia consent signed with patient.  ASA Score: 2     Day of Surgery Review of History & Physical: I have interviewed and examined the patient. I have reviewed the patient's H&P dated:  There are no significant changes.          Ready For Surgery From Anesthesia Perspective.

## 2019-06-10 NOTE — H&P
PRE PROCEDURE H&P    Patient Name: Dulce Boogie  MRN: [id number]  : 1960  Date of Procedure:  6/10/2019  Referring Physician: Zeus Hanson MD  Primary Physician: Zeus Hanson MD  Procedure Physician: Maricarmen Boo MD       Planned Procedure: Colonoscopy  Diagnosis: previous adenomatous polyp  Chief Complaint: Same as above    HPI: Patient is an 58 y.o. female is here for the above.     Last colonoscopy:   Family history: negative   Anticoagulation: Brilinta     Past Medical History:   Past Medical History:   Diagnosis Date    Anticoagulant long-term use     CAD (coronary artery disease)     Depression     History of colon polyps     Hyperlipidemia associated with type 2 diabetes mellitus     Hypertension associated with diabetes     NSTEMI (non-ST elevated myocardial infarction) 2018    Obesity     Type II diabetes mellitus with complication         Past Surgical History:  Past Surgical History:   Procedure Laterality Date    CAROTID STENT       SECTION, LOW TRANSVERSE      x 2    COLONOSCOPY N/A 2014    Performed by Aury Horn MD at [facility] ENDO    HEART CATH-LEFT Left 2018    Performed by Aimee Cooper MD at [facility] CATH LAB        Home Medications:  Prior to Admission medications    Medication Sig Start Date End Date Taking? Authorizing Provider   ALPRAZolam (XANAX) 0.5 MG tablet TAKE 1 TABLET BY MOUTH EVERY DAY AT NIGHT FOR ANXIETY 3/5/19  Yes Zeus Hanson MD   amLODIPine (NORVASC) 10 MG tablet TAKE 1 TABLET BY MOUTH DAILY 18  Yes Zeus Hanson MD   cyclobenzaprine (FLEXERIL) 10 MG tablet TAKE 1 TABLET (10 MG TOTAL) BY MOUTH 3 (THREE) TIMES DAILY. 19  Yes Zeus Hanson MD   diclofenac sodium (VOLTAREN) 1 % Gel Apply 2 g topically 3 (three) times daily as needed. 19  Yes Daniel Wilson NP   doxazosin (CARDURA) 4 MG tablet Take 1 tablet (4 mg total) by mouth once daily. 18  Yes Zeus Hanson MD   fenofibrate micronized (LOFIBRA) 134  MG Cap TAKE ONE CAPSULE BY MOUTH EVERY DAY WITH BREAKFAST. 6/6/18  Yes Historical Provider, MD   hydrocodone-acetaminophen 5-325mg (NORCO) 5-325 mg per tablet Take 1 tablet by mouth 3 (three) times daily as needed for Pain. 9/27/17  Yes Zeus Hanson MD   isosorbide mononitrate (ISMO,MONOKET) 20 MG Tab Take 20 mg by mouth once daily.    Yes Historical Provider, MD   losartan (COZAAR) 25 MG tablet Take 1 tablet (25 mg total) by mouth once daily. 5/29/19  Yes TONYA Perry   metFORMIN (GLUCOPHAGE) 500 MG tablet TAKE 1 TABLET BY MOUTH 2 TIMES DAILY WITH MEALS. 6/10/19  Yes Zeus Hanson MD   metoprolol tartrate (LOPRESSOR) 25 MG tablet Take 25 mg by mouth 2 (two) times daily. 6/28/18  Yes Historical Provider, MD   niacin (NIASPAN) 750 MG CR tablet TAKE 1 TABLET (750 MG TOTAL) BY MOUTH NIGHTLY. 3/15/18  Yes Zeus Hanson MD   omeprazole (PRILOSEC) 40 MG capsule Take 1 capsule (40 mg total) by mouth once daily. 11/27/18  Yes Zeus Hanson MD   ondansetron (ZOFRAN) 4 MG tablet Take 1 tablet (4 mg total) by mouth every 8 (eight) hours as needed for Nausea. 3/21/18  Yes Trevon Palomares MD   rosuvastatin (CRESTOR) 40 MG Tab TAKE 1 TABLET (40 MG TOTAL) BY MOUTH EVERY EVENING. 3/5/19 3/4/20 Yes Daniel Wilson NP   ticagrelor (BRILINTA) 90 mg tablet Take 1 tablet (90 mg total) by mouth 2 (two) times daily. 6/7/19 6/12/20 Yes Theresa Cox NP   aspirin (ECOTRIN) 81 MG EC tablet Take 1 tablet (81 mg total) by mouth once daily. 5/24/18 5/29/19  Lynda Lewis NP   glimepiride (AMARYL) 4 MG tablet Take 1 tablet (4 mg total) by mouth before breakfast. 4/24/18 5/29/19  Zeus Hanson MD   nitroGLYCERIN (NITROSTAT) 0.4 MG SL tablet PLACE 1 TABLET UNDER TONGUE AT FIRST SIGN OF HEART PAIN, AND REPEAT EVERY 5 MINUTES IF PAIN PERSISTS 9/5/18   Zeus Hanson MD   sodium,potassium,mag sulfates (SUPREP BOWEL PREP KIT) 17.5-3.13-1.6 gram SolR Use as directed 6/6/19   Cole Mcclelland PA-C     "    Allergies:  Review of patient's allergies indicates:   Allergen Reactions    No known drug allergies         Social History:   Social History     Socioeconomic History    Marital status:      Spouse name: Not on file    Number of children: Not on file    Years of education: Not on file    Highest education level: Not on file   Occupational History    Not on file   Social Needs    Financial resource strain: Not on file    Food insecurity:     Worry: Not on file     Inability: Not on file    Transportation needs:     Medical: Not on file     Non-medical: Not on file   Tobacco Use    Smoking status: Never Smoker    Smokeless tobacco: Never Used   Substance and Sexual Activity    Alcohol use: No    Drug use: No    Sexual activity: Not on file   Lifestyle    Physical activity:     Days per week: Not on file     Minutes per session: Not on file    Stress: Not on file   Relationships    Social connections:     Talks on phone: Not on file     Gets together: Not on file     Attends [employer] service: Not on file     Active member of club or organization: Not on file     Attends meetings of clubs or organizations: Not on file     Relationship status: Not on file   Other Topics Concern    Not on file   Social History Narrative    Not on file       Family History:  Family History   Problem Relation Age of Onset    Hypertension Mother     Hyperlipidemia Mother     Hypertension Father     Diabetes Father     Hyperlipidemia Father     Colon cancer Father     Hypertension Brother     Breast cancer Neg Hx     Ovarian cancer Neg Hx     Uterine cancer Neg Hx        ROS: No acute cardiac events, no acute respiratory complaints.     Physical Exam (all patients):    /75 (BP Location: Left arm, Patient Position: Lying)   Pulse 71   Temp 97 °F (36.1 °C) (Temporal)   Resp 17   Ht 5' 5" (1.651 m)   Wt 95.2 kg (209 lb 14.1 oz)   LMP 06/21/2003   SpO2 97%   Breastfeeding? No   BMI 34.93 " kg/m²   Lungs: Clear to auscultation bilaterally, respirations unlabored  Heart: Regular rate and rhythm, S1 and S2 normal, no obvious murmurs  Abdomen:         Soft, non-tender, bowel sounds normal, no masses, no organomegaly    Lab Results   Component Value Date    WBC 9.87 02/04/2019    MCV 84 02/04/2019    RDW 16.1 (H) 02/04/2019     02/04/2019    INR 1.0 05/21/2018     (H) 05/21/2019    HGBA1C 6.8 (H) 05/21/2019    BUN 15 05/21/2019     05/21/2019    K 3.4 (L) 05/21/2019     05/21/2019        SEDATION PLAN: per anesthesia      History reviewed, vital signs satisfactory, cardiopulmonary status satisfactory, sedation options, risks and plans have been discussed with the patient  All their questions were answered and the patient agrees to the sedation procedures as planned and the patient is deemed an appropriate candidate for the sedation as planned.    Procedure explained to patient, informed consent obtained and placed in chart.    Maricarmen Boo  6/10/2019  11:01 AM

## 2019-06-10 NOTE — PLAN OF CARE
Pt waiting on Dr Boo.  Pt c/o air discomfort and allowed to relieve more air in bathroom.  Pain level is 8 out of 10.

## 2019-06-10 NOTE — ANESTHESIA POSTPROCEDURE EVALUATION
Anesthesia Post Evaluation    Patient: Dulce Boogie    Procedure(s) Performed: Procedure(s) (LRB):  COLONOSCOPY (N/A)    Final Anesthesia Type: MAC  Patient location during evaluation: PACU  Patient participation: Yes- Able to Participate  Level of consciousness: awake and alert and oriented  Post-procedure vital signs: reviewed and stable  Pain management: adequate  Airway patency: patent  PONV status at discharge: No PONV  Anesthetic complications: no      Cardiovascular status: blood pressure returned to baseline and hemodynamically stable  Respiratory status: unassisted, spontaneous ventilation and room air  Hydration status: euvolemic  Follow-up not needed.          Vitals Value Taken Time   /75 6/10/2019  8:41 AM   Temp 36.1 °C (97 °F) 6/10/2019  8:41 AM   Pulse 71 6/10/2019  8:41 AM   Resp 17 6/10/2019  8:41 AM   SpO2 97 % 6/10/2019  8:41 AM         No case tracking events are documented in the log.      Pain/María Score: No data recorded

## 2019-06-10 NOTE — [employer] PATIENT INSTRUCTIONS
Discharge Summary/Instructions after an Endoscopic Procedure  Patient Name: Dulce Boogie  Patient MRN: [id number]  Patient YOB: 1960     Monday, Paulette 10, 2019 Maricarmen Boo MD  RESTRICTIONS:  During your procedure today, you received medications for sedation.  These   medications may affect your judgment, balance and coordination.  Therefore,   for 24 hours, you have the following restrictions:   - DO NOT drive a car, operate machinery, make legal/financial decisions,   sign important papers or drink alcohol.    ACTIVITY:  Today: no heavy lifting, straining or running due to procedural   sedation/anesthesia.  The following day: return to full activity including work.  DIET:  Eat and drink normally unless instructed otherwise.     TREATMENT FOR COMMON SIDE EFFECTS:  - Mild abdominal pain, nausea, belching, bloating or excessive gas:  rest,   eat lightly and use a heating pad.  - Sore Throat: treat with throat lozenges and/or gargle with warm salt   water.  - Because air was used during the procedure, expelling large amounts of air   from your rectum or belching is normal.  - If a bowel prep was taken, you may not have a bowel movement for 1-3 days.    This is normal.  SYMPTOMS TO WATCH FOR AND REPORT TO YOUR PHYSICIAN:  1. Abdominal pain or bloating, other than gas cramps.  2. Chest pain.  3. Back pain.  4. Signs of infection such as: chills or fever occurring within 24 hours   after the procedure.  5. Rectal bleeding, which would show as bright red, maroon, or black stools.   (A tablespoon of blood from the rectum is not serious, especially if   hemorrhoids are present.)  6. Vomiting.  7. Weakness or dizziness.  GO DIRECTLY TO THE NEAREST EMERGENCY ROOM IF YOU HAVE ANY OF THE FOLLOWING:      Difficulty breathing              Chills and/or fever over 101 F   Persistent vomiting and/or vomiting blood   Severe abdominal pain   Severe chest pain   Black, tarry stools   Bleeding- more than one tablespoon   Any  other symptom or condition that you feel may need urgent attention  Your doctor recommends these additional instructions:  If any biopsies were taken, your doctors clinic will contact you in 1 to 2   weeks with any results.  - Patient has a contact number available for emergencies.  The signs and   symptoms of potential delayed complications were discussed with the   patient.  Return to normal activities tomorrow.  Written discharge   instructions were provided to the patient.   - Discharge patient to home (via wheelchair).   - Resume previous diet today.   - Continue present medications.   - Resume Brilinta at prior dose today.   - Repeat colonoscopy in 3 years for surveillance and because bowel prep was   suboptimal.  For questions, problems or results please call your physician Maricarmen Boo MD at Work:  [phone]  If you have any questions about the above instructions, call the GI   department at [phone] or call the endoscopy unit at [phone]   from 7am until 3 pm.  [facility] [place], EMERGENCY ROOM PHONE NUMBER:   [phone]  IF A COMPLICATION OR EMERGENCY SITUATION ARISES AND YOU ARE UNABLE TO REACH   YOUR PHYSICIAN - GO DIRECTLY TO THE EMERGENCY ROOM.  I have read or have had read to me these discharge instructions for my   procedure and have received a written copy.  I understand these   instructions and will follow-up with my physician if I have any questions.     __________________________________       _____________________________________  Nurse Signature                                          Patient/Designated   Responsible Party Signature  MD Maricarmen Parker MD  6/10/2019 11:07:10 AM  This report has been verified and signed electronically.  [employer]

## 2019-06-10 NOTE — TRANSFER OF CARE
"Anesthesia Transfer of Care Note    Patient: Dulce Boogie    Procedure(s) Performed: Procedure(s) (LRB):  COLONOSCOPY (N/A)    Patient location: PACU    Anesthesia Type: MAC    Transport from OR: Transported from OR on room air with adequate spontaneous ventilation    Post pain: adequate analgesia    Post assessment: no apparent anesthetic complications and tolerated procedure well    Post vital signs: stable    Level of consciousness: awake, oriented and alert    Nausea/Vomiting: no nausea/vomiting    Complications: none    Transfer of care protocol was followed      Last vitals:   Visit Vitals  /75 (BP Location: Left arm, Patient Position: Lying)   Pulse 71   Temp 36.1 °C (97 °F) (Temporal)   Resp 17   Ht 5' 5" (1.651 m)   Wt 95.2 kg (209 lb 14.1 oz)   LMP 06/21/2003   SpO2 97%   Breastfeeding? No   BMI 34.93 kg/m²     "

## 2019-06-11 ENCOUNTER — PES CALL (OUTPATIENT)
Dept: ADMINISTRATIVE | Facility: CLINIC | Age: 59
End: 2019-06-11

## 2019-06-11 VITALS
OXYGEN SATURATION: 97 % | WEIGHT: 209.88 LBS | DIASTOLIC BLOOD PRESSURE: 84 MMHG | HEART RATE: 62 BPM | TEMPERATURE: 99 F | RESPIRATION RATE: 17 BRPM | BODY MASS INDEX: 34.97 KG/M2 | SYSTOLIC BLOOD PRESSURE: 140 MMHG | HEIGHT: 65 IN

## 2019-06-14 ENCOUNTER — CLINICAL SUPPORT (OUTPATIENT)
Dept: REHABILITATION | Facility: HOSPITAL | Age: 59
End: 2019-06-14
Payer: MEDICARE

## 2019-06-14 DIAGNOSIS — G89.29 CHRONIC LEFT SHOULDER PAIN: Primary | ICD-10-CM

## 2019-06-14 DIAGNOSIS — M25.512 CHRONIC LEFT SHOULDER PAIN: Primary | ICD-10-CM

## 2019-06-14 PROCEDURE — 97014 ELECTRIC STIMULATION THERAPY: CPT

## 2019-06-14 PROCEDURE — 97110 THERAPEUTIC EXERCISES: CPT

## 2019-06-14 PROCEDURE — 97161 PT EVAL LOW COMPLEX 20 MIN: CPT

## 2019-06-14 NOTE — PROGRESS NOTES
[facility]  Physical Therapy Initial Evaluation    Name: Dulce Boogie  Clinic Number: [id number]    Therapy Diagnosis:   Encounter Diagnosis   Name Primary?    Chronic left shoulder pain Yes     Physician: Arleth Clayton,*    Physician Orders: PT Eval and Treat   Medical Diagnosis from Referral: Chronic left shoulder pain  Evaluation Date: 2019  Authorization Period Expiration: 2019  Plan of Care Expiration: 2019  Visit # / Visits authorized:     Time In: 08  Time Out: 0915  Total Billable Time: 50 minutes    Precautions: Standard    Subjective   Date of onset: 2019  History of current condition - Dulce reports: pain in the left arm with sudden onset. She reports having been dx with a bone spur in the left shoulder after her recent x-ray. She had an injection last week which has eased the pain some. She reports numbness and aching in the left arm that is constant.     Pain:  Current 5/10, worst 10/10, best 5/10   Location: left shoulder  and arm   Description: Aching, Tingling and heaviness  Aggravating Factors: movement of arm and aches more when sleeping  Easing Factors: injection    Prior Therapy: none  Social History:  lives with their spouse  Occupation: not employed  Prior Level of Function: independent without pain  Current Level of Function: chronic pain - needs assist with ADLs    Imaging, CT scan films:     Medical History:   Past Medical History:   Diagnosis Date    Anticoagulant long-term use     CAD (coronary artery disease)     Depression     History of colon polyps     Hyperlipidemia associated with type 2 diabetes mellitus     Hypertension associated with diabetes     NSTEMI (non-ST elevated myocardial infarction) 2018    Obesity     Type II diabetes mellitus with complication        Surgical History:   Dulce Boogie  has a past surgical history that includes  section, low transverse; Carotid stent; Left heart  catheterization (Left, 5/22/2018); and Colonoscopy (N/A, 6/10/2019).    Medications:   Dulce has a current medication list which includes the following prescription(s): alprazolam, amlodipine, aspirin, cyclobenzaprine, diclofenac sodium, doxazosin, fenofibrate micronized, glimepiride, hydrocodone-acetaminophen, isosorbide mononitrate, losartan, metformin, metoprolol tartrate, niacin, nitroglycerin, omeprazole, ondansetron, rosuvastatin, and ticagrelor.    Allergies:   Review of patient's allergies indicates:   Allergen Reactions    No known drug allergies         Pts goals: perform ADLs pain-free    Objective              Posture: Pt noted to present with forward head/rounded shoulder posture.    Scapular AROM standing:     Shoulder ROM:   Active/Passive Joint Range Right Left   Flexion  115/120   ABDuction  70/85   External Rotation  70/75   Internal Rotation  35/40   Extension       Cervical Spine AROM:   % limited Pain   FB 0 N   BB 50% Y (in shoulder)   RR 50% tight   LR 50% Y (shoulder)   RSB 50% Y   LSB 50% Y     Strength:  Muscle (Myotome) Right Left   Shoulder Flex 5/5 3-/5   Shoulder Abduction 5/5 2+/5   ER/IR 5/5 3-/5   Wrist Extensors (C6) 5/5 5/5   Elbow Extensors (C7) and Flexors (C6) 5/5 4/5     Sensation: Intact/Demined/Absent  Reflexes: Intact/Diminished/Absent.    Special Test:  Jiménez +   Neers +     Painful arc +   Speeds -       Function: Patient reports 61% disability based on score of the Upper Extremity Functional Scale.    Quick DASH Shoulder Questionnaire           Score 1-5  1. Opening a tight jar       4  2. Do heavy household chores     4  3. Carry a shopping bag or briefcase     3  4. Wash your back      4  5. Use a knife to cut food     3  6. Recreactional activities requiring force   4  7. Social Limitation      4  8. Work/ADL limitation      4  9. Arm, Shoulder or hand Pain    4  10. Tingling       4  11. Sleeping Limitation      4      Tenderness to palpation:  Patient tender to  palpate along anterior and lateral shoulder with minimal depth of palpation.    TREATMENT   Treatment Time In: 0845  Treatment Time Out: 0915  Total Treatment time separate from Evaluation: 30 minutes    Dulce received therapeutic exercises to develop ROM, posture and pain management for 15 minutes including:  -cervical retraction in sitting and supine  -LUE shoulder ROM exercises: pendulum, dowel chest press in supine, dowel ER/IR  -postural correction in sitting and standing  -scapular retraction x 10  -shoulder shrugs x 10    Dulce received the following supervised modalities after being cleared for contradictions: IFC Electrical Stimulation:  Dulce received IFC Electrical Stimulation for pain control applied to the left posterior shoulder/upper trap. Pt received stimulation at 40 % scan at a frequency of  for 15 minutes. Dulce tolderated treatment well without any adverse effects.      Dulce received hot pack for 15 minutes to left shoulder with IFC for pain management of muscle spasms.    Home Exercises and Patient Education Provided    Education provided:   -Education on condition, HEP, and postural correction    Written Home Exercises Provided: yes.  Exercises were reviewed and Dulce was able to demonstrate them prior to the end of the session.  Dulce demonstrated good  understanding of the education provided.       Assessment   Dulce is a 58 y.o. female referred to outpatient Physical Therapy with a medical diagnosis of chronic left shoulder pain/impingement syndrome left shoulder. Pt presents with impaired c/s ROM and left shoulder ROM due to pain/muscle tightness. She presents with impaired strength left shoulder which is affecting her ability to perform ADLs. She reports neurological signs as well (numbness in arm which is present most of the time)    Pt prognosis is Good.   Pt will benefit from skilled outpatient Physical Therapy to address the deficits stated above and in the chart below, provide  pt/family education, and to maximize pt's level of independence.     Plan of care discussed with patient: Yes  Pt's spiritual, cultural and educational needs considered and patient is agreeable to the plan of care and goals as stated below:     Anticipated Barriers for therapy: none anticipated    Medical Necessity is demonstrated by the following  History  Co-morbidities and personal factors that may impact the plan of care Co-morbidities:   none    Personal Factors:   no deficits     low   Examination  Body Structures and Functions, activity limitations and participation restrictions that may impact the plan of care Body Regions:   neck  upper extremities    Body Systems:    ROM  strength    Participation Restrictions:   none    Activity limitations:   Learning and applying knowledge  no deficits    General Tasks and Commands  no deficits    Communication  no deficits    Mobility  lifting and carrying objects  fine hand use (grasping/picking up)  driving (bike, car, motorcycle)    Self care  washing oneself (bathing, drying, washing hands)  dressing    Domestic Life  shopping  cooking  doing house work (cleaning house, washing dishes, laundry)  assisting others    Interactions/Relationships  no deficits    Life Areas  no deficits    Community and Social Life  no deficits         low   Clinical Presentation stable and uncomplicated low   Decision Making/ Complexity Score: low     Goals:  Short Term Goals: In 4 weeks   1.I with HEP  2.Patient to increase GH ROM to 130-150 degrees shoulder flexion and abduction  3.Patient to increase MMT strength from 3-/5 to 3/5    4.Patient to have pain less than 5/10 (centralized) at worst  5.Patient to score less than 50% impaired on the Quick Dash    Long Term Goals: In 10 weeks  1. Patient to score less than 30% impaired on the Quick Dash  2. Patient to demo increase in LUE strength to 4/5  3. Patient to have decreased pain to 3/10 at all times.  4. Patient to demo increase GH  ROM to WNL all planes  5. Patient to perform daily activities including reaching, sleeping, dressing, and housework without limitation.      Plan   Plan of care Certification: 6/14/2019 to 7/12/2019.    Outpatient Physical Therapy 2 times weekly for 10 weeks to include the following interventions: Cervical/Lumbar Traction, Electrical Stimulation IFC, Manual Therapy, Moist Heat/ Ice and Therapeutic Exercise.     Jenniffer Vuaghn, PT    Thank you for this referral.    These services are reasonable and necessary for the conditions set forth above while under my care.

## 2019-06-18 ENCOUNTER — CLINICAL SUPPORT (OUTPATIENT)
Dept: REHABILITATION | Facility: HOSPITAL | Age: 59
End: 2019-06-18
Payer: MEDICARE

## 2019-06-18 DIAGNOSIS — M25.512 CHRONIC LEFT SHOULDER PAIN: Primary | ICD-10-CM

## 2019-06-18 DIAGNOSIS — G89.29 CHRONIC LEFT SHOULDER PAIN: Primary | ICD-10-CM

## 2019-06-18 PROCEDURE — 97140 MANUAL THERAPY 1/> REGIONS: CPT

## 2019-06-18 PROCEDURE — 97110 THERAPEUTIC EXERCISES: CPT

## 2019-06-18 PROCEDURE — 97014 ELECTRIC STIMULATION THERAPY: CPT

## 2019-06-18 NOTE — PROGRESS NOTES
Physical Therapy Daily Treatment Note     Name: Dulce Boogie  [facility] Number: [id number]    Therapy Diagnosis:   Encounter Diagnosis   Name Primary?    Chronic left shoulder pain Yes     Physician: Arleth Clayton,*    Visit Date: 6/18/2019     Physician Orders: PT Eval and Treat   Medical Diagnosis from Referral: Chronic left shoulder pain  Evaluation Date: 6/14/2019  Authorization Period Expiration: 12/31/2019  Plan of Care Expiration: 7/12/2019  Visit # / Visits authorized: 1/20 (includes eval)    Time In: 0900  Time Out:1010  Total Billable Time: 60 minutes    Precautions: Standard    Subjective     Pt reports: 5/10 left shoulder pain that eased up after exercises on neck and shoulder.  She was compliant with home exercise program.  Response to previous treatment: good  Functional change: she reports being able to move arm better but still has pain    Pain: 5/10  Location: left shoulder      Objective     Dulce received therapeutic exercises to develop ROM, posture and pain management for 30 minutes including:  -UBE x 3'  -cervical retraction in sitting and supine/cervical retraction with rotation right in sitting and supine  -LUE shoulder ROM exercises (supine) dowel chest press, ER/IR with shoulder at 90 degrees, shoulder flexion and abduction with dowel 2x10 reps  -postural correction in sitting and standing  -scapular retraction x 10  -shoulder shrugs x 10  -wall slides: flexion and abduction x 10 each way    Dulce received the following manual therapy techniques: Joint mobilizations (P/A), Manual traction and sub-occipital massage were applied to the: cervical spine for 10 minutes, including:    Dulce received the following supervised modalities after being cleared for contradictions: IFC Electrical Stimulation:  Dulce received IFC Electrical Stimulation for pain control applied to the left posterior shoulder/upper trap. Pt received stimulation at 40 % scan at a frequency of  for 15 minutes. Dulce  tolderated treatment well without any adverse effects.      Dulce received hot pack for 15 minutes with [facility] for pain management of muscle spasms.      Home Exercises Provided and Patient Education Provided     Education provided:   - HEP and postural correction, avoid prolonged neck flexion    Written Home Exercises Provided: yes.  Exercises were reviewed and Dulce was able to demonstrate them prior to the end of the session.  Dulce demonstrated good  understanding of the education provided.       Assessment     Dulce improved with left shoulder ROM and pain decreased during the session. She verbalized understanding of exercises to perform and positions to avoid so that neck and shoulder pain will improve.  Dulce is progressing well towards her goals.   Pt prognosis is Good.     Pt will continue to benefit from skilled outpatient physical therapy to address the deficits listed in the problem list box on initial evaluation, provide pt/family education and to maximize pt's level of independence in the home and community environment.     Pt's spiritual, cultural and educational needs considered and pt agreeable to plan of care and goals.     Anticipated barriers to physical therapy: none    Goals:     Short Term Goals: In 4 weeks   1.I with HEP  2.Patient to increase GH ROM to 130-150 degrees shoulder flexion and abduction  3.Patient to increase MMT strength from 3-/5 to 3/5    4.Patient to have pain less than 5/10 (centralized) at worst  5.Patient to score less than 50% impaired on the Quick Dash     Long Term Goals: In 10 weeks  1. Patient to score less than 30% impaired on the Quick Dash  2. Patient to demo increase in LUE strength to 4/5  3. Patient to have decreased pain to 3/10 at all times.  4. Patient to demo increase GH ROM to WNL all planes  5. Patient to perform daily activities including reaching, sleeping, dressing, and housework without limitation.        Plan   Plan of care Certification: 6/14/2019 to  7/12/2019.     Outpatient Physical Therapy 2 times weekly  to include the following interventions: Cervical/Lumbar Traction, Electrical Stimulation IFC, Manual Therapy, Moist Heat/ Ice and Therapeutic Exercise.       Jenniffer Vaughn, PT

## 2019-06-19 NOTE — PROGRESS NOTES
Physical Therapy Daily Treatment Note     Name: Dulce Boogie  [facility] Number: [id number]    Therapy Diagnosis:   Encounter Diagnosis   Name Primary?    Chronic left shoulder pain Yes     Physician: Arleth Clayton,*    Visit Date: 6/20/2019     Physician Orders: PT Eval and Treat   Medical Diagnosis from Referral: Chronic left shoulder pain  Evaluation Date: 6/14/2019  Authorization Period Expiration: 12/31/2019  Plan of Care Expiration: 7/12/2019  Visit # / Visits authorized: 3/20 (includes eval)    Time In: 0900am  Time Out: 0955am  Total Billable Time:55 minutes    Precautions: Standard    Subjective     Pt reports: 4/10 left shoulder pain. She reports that her shoulder ROM is better and that she can do more things with her left arm now.  She was compliant with home exercise program.  Response to previous treatment: good  Functional change: she reports being able to move arm better but still has pain    Pain: 4/10  Location: left shoulder      Objective     Dulce received therapeutic exercises to develop ROM, posture and pain management for 35 minutes including:  -UBE x 3'  -cervical retraction in sitting and supine/cervical retraction with rotation right in sitting and supine  -LUE shoulder ROM exercises (supine) dowel chest press, ER/IR with shoulder at 90 degrees, shoulder flexion and abduction with dowel 2x10 reps  -postural correction in sitting and standing  -scapular retraction x 10  -wall slides: flexion and abduction x 10 each way       Dulce received the following manual therapy techniques: Joint mobilizations (P/A), Manual traction and sub-occipital massage were applied to the: cervical spine for 10 minutes, including:  -Upper Trap massage bilaterally (supine) due to tight mm and spasms.    Dulce received the following supervised modalities after being cleared for contradictions: IFC Electrical Stimulation:  Dulce received IFC Electrical Stimulation for pain control applied to the left posterior  shoulder/upper trap. Pt received stimulation at 40 % scan at a frequency of  for 15 minutes. Dulce tolderated treatment well without any adverse effects.      Dulce received hot pack for 15 minutes with [facility] for pain management of muscle spasms.      Home Exercises Provided and Patient Education Provided     Education provided:   - HEP and postural correction, avoid prolonged neck flexion    Written Home Exercises Provided: yes.  Exercises were reviewed and Dulce was able to demonstrate them prior to the end of the session.  Dulce demonstrated good  understanding of the education provided.       Assessment   Dulce improved with left shoulder ROM and pain decreased during the session. She verbalized understanding of exercises to perform and positions to avoid so that neck and shoulder pain will improve. In general, patient feels like her shoulder is improving.  Dulce is progressing well towards her goals.   Pt prognosis is Good.     Pt will continue to benefit from skilled outpatient physical therapy to address the deficits listed in the problem list box on initial evaluation, provide pt/family education and to maximize pt's level of independence in the home and community environment.     Pt's spiritual, cultural and educational needs considered and pt agreeable to plan of care and goals.     Anticipated barriers to physical therapy: none    Goals:     Short Term Goals: In 4 weeks   1.I with HEP  2.Patient to increase GH ROM to 130-150 degrees shoulder flexion and abduction  3.Patient to increase MMT strength from 3-/5 to 3/5    4.Patient to have pain less than 5/10 (centralized) at worst  5.Patient to score less than 50% impaired on the Quick Dash     Long Term Goals: In 10 weeks  1. Patient to score less than 30% impaired on the Quick Dash  2. Patient to demo increase in LUE strength to 4/5  3. Patient to have decreased pain to 3/10 at all times.  4. Patient to demo increase GH ROM to WNL all planes  5. Patient to  perform daily activities including reaching, sleeping, dressing, and housework without limitation.        Plan   Plan of care Certification: 6/14/2019 to 7/12/2019.     Outpatient Physical Therapy 2 times weekly  to include the following interventions: Cervical/Lumbar Traction, Electrical Stimulation IFC, Manual Therapy, Moist Heat/ Ice and Therapeutic Exercise.       Jenniffer Vaughn, PT

## 2019-06-20 ENCOUNTER — CLINICAL SUPPORT (OUTPATIENT)
Dept: REHABILITATION | Facility: HOSPITAL | Age: 59
End: 2019-06-20
Payer: MEDICARE

## 2019-06-20 DIAGNOSIS — M25.512 CHRONIC LEFT SHOULDER PAIN: Primary | ICD-10-CM

## 2019-06-20 DIAGNOSIS — G89.29 CHRONIC LEFT SHOULDER PAIN: Primary | ICD-10-CM

## 2019-06-20 PROCEDURE — 97140 MANUAL THERAPY 1/> REGIONS: CPT

## 2019-06-20 PROCEDURE — 97014 ELECTRIC STIMULATION THERAPY: CPT

## 2019-06-20 PROCEDURE — 97110 THERAPEUTIC EXERCISES: CPT

## 2019-07-01 NOTE — PROGRESS NOTES
Physical Therapy Daily Treatment Note     Name: Dulce Boogie  [facility] Number: [id number]    Therapy Diagnosis:   Encounter Diagnosis   Name Primary?    Chronic left shoulder pain Yes     Physician: Arleth Clayton,*    Visit Date: 7/2/2019     Physician Orders: PT Eval and Treat   Medical Diagnosis from Referral: Chronic left shoulder pain  Evaluation Date: 6/14/2019  Authorization Period Expiration: 12/31/2019  Plan of Care Expiration: 7/12/2019 TODAY IS A REASSESSMENT VISIT  Visit # / Visits authorized: 4/20 (includes eval)    Time In: 0930  Time Out: 1030  Total Billable Time: 60  minutes    Precautions: Standard    Subjective     Pt reports: 3/10 left shoulder pain. She reports that her shoulder ROM is better and that she can do more things with her left arm now. She continues to have pain in shoulder when reaching behind her back and raising her arm above head  She was compliant with home exercise program.  Response to previous treatment: good  Functional change: she reports being able to move arm better but still has pain    Pain: 3/10  Location: left shoulder      Objective     Dulce received therapeutic exercises to develop ROM, posture and pain management for 35 minutes including:  -wall slides: flexion and abduction x 1' each way for ROM  -Cervical retraction in prone (midline and with left cervical rotation) x 15 each way  -airplane in prone for T/S strengthening (2x10)  -LUE shoulder flexion with YTB to 90 degrees in standing 2x10 (1/2 foam roll used at wall for posture)  -LUE shoulder abduction without resistance to 90 degrees 2x10 (1/2 foam roll used at wall for posture)  - with and without YTB 2x10 (1/2 foam roll used at wall for posture)  -cervical spine retraction with rotation left and right on a ball in standing x 2'    Dulce received the following manual therapy techniques: Joint mobilizations (P/A) cervical spine for 10 minutes, including:  -Upper Trap massage left (supine) due to  tight mm and spasms.  -c/s P/A mobs performed in prone    Dulce received the following supervised modalities after being cleared for contradictions: IFC Electrical Stimulation:  Dulce received IFC Electrical Stimulation for pain control applied to the left posterior shoulder/upper trap. Pt received stimulation at 40 % scan at a frequency of  for 15 minutes. Dulce tolderated treatment well without any adverse effects.      Dulce received hot pack for 15 minutes with IFC for pain management of muscle spasms.      Home Exercises Provided and Patient Education Provided     Education provided:   - HEP and postural correction, avoid prolonged neck flexion    Written Home Exercises Provided: yes.  Exercises were reviewed and Dulce was able to demonstrate them prior to the end of the session.  Dulce demonstrated good  understanding of the education provided.       Assessment   Dulce is progressing well towards her goals.   Pt prognosis is Good.     Dulce has improved with overall pain in neck/shoulder, ROM of left shoulder and strength of left shoulder. Her disability percentage has also dropped from 61% to 52%.    Pt will continue to benefit from skilled outpatient physical therapy to address the deficits listed in the problem list box on initial evaluation, provide pt/family education and to maximize pt's level of independence in the home and community environment.     Pt's spiritual, cultural and educational needs considered and pt agreeable to plan of care and goals.     Anticipated barriers to physical therapy: none    Goals:     Short Term Goals: In 4 weeks   1.I with HEP- ONGOING GOAL  2.Patient to increase GH ROM to 130-150 degrees shoulder flexion and abduction-GOAL MET  3.Patient to increase MMT strength from 3-/5 to 3/5  -GOAL MET  4.Patient to have pain less than 5/10 (centralized) at worst- GOAL MET  5.Patient to score less than 50% impaired on the Quick Dash- GOAL PARTIALLY MET     Long Term Goals: In 10  weeks  1. Patient to score less than 30% impaired on the Quick Dash-ONGOING GOAL  2. Patient to demo increase in LUE strength to 4/5-ONGOING GOAL  3. Patient to have decreased pain to 3/10 at all times.-ONGOING GOAL  4. Patient to demo increase GH ROM to WNL all planes-ONGOING GOAL  5. Patient to perform daily activities including reaching, sleeping, dressing, and housework without limitation.-ONGOING GOAL        Plan   Plan of care Certification: 7/2/2019 to 8/1/2019     Outpatient Physical Therapy 2 times weekly  to include the following interventions: Cervical/Lumbar Traction, Electrical Stimulation IFC, Manual Therapy, Moist Heat/ Ice and Therapeutic Exercise.       Jenniffer Vaughn, PT

## 2019-07-02 ENCOUNTER — CLINICAL SUPPORT (OUTPATIENT)
Dept: REHABILITATION | Facility: HOSPITAL | Age: 59
End: 2019-07-02
Payer: MEDICARE

## 2019-07-02 DIAGNOSIS — M25.512 CHRONIC LEFT SHOULDER PAIN: Primary | ICD-10-CM

## 2019-07-02 DIAGNOSIS — G89.29 CHRONIC LEFT SHOULDER PAIN: Primary | ICD-10-CM

## 2019-07-02 PROCEDURE — 97110 THERAPEUTIC EXERCISES: CPT

## 2019-07-02 PROCEDURE — 97140 MANUAL THERAPY 1/> REGIONS: CPT

## 2019-07-02 PROCEDURE — 97014 ELECTRIC STIMULATION THERAPY: CPT

## 2019-07-02 NOTE — PROGRESS NOTES
Outpatient Therapy Updated Plan of Care     Visit Date: 7/2/2019  Name: Dulce Boogie  [facility] Number: [id number]    Therapy Diagnosis:   Encounter Diagnosis   Name Primary?    Chronic left shoulder pain Yes     Physician: Arleth Clayton,*    Physician Orders: PT TO EVAL AND TREAT  Medical Diagnosis: Chronic left shoulder pain  Evaluation Date: 6/14/2019    Total Visits Received: 4  Cancelled Visits: 2  No Show Visits: 0    Current Certification Period:  7/2/2019 to 8/1/2019  Precautions:  standard  Functional Level Prior to Evaluation:  Independent and pain-free    Subjective     Update: Pain in general has decreased to 3/10 at neck and/or shoulder. Patient reports that she is able to perform more activities around the house with less difficulty.    Objective     Update: Dulce has improved with overall pain in neck/shoulder, ROM of left shoulder and strength of left shoulder. Her disability percentage has also dropped from 61% to 52%.      Shoulder ROM:            Active/Passive Joint Range Right Left   Flexion   150/160   ABDuction   120/125   External Rotation   70/75   Internal Rotation   40/50   Extension    wnl      Cervical Spine AROM:    % limited Pain   FB 0 N   BB 0 N   RR 0 N   LR 25% Y (minimal)   RSB 25 Y (minimal)   LSB 25 Y (minimal)      Strength:  Muscle (Myotome) Right Left   Shoulder Flex 5/5 3/5   Shoulder Abduction 5/5 3/5   ER/IR 5/5 3/5   Wrist Extensors (C6) 5/5 5/5   Elbow Extensors (C7) and Flexors (C6) 5/5 5/5      Sensation: Intact/Demined/Absent  Reflexes: Intact/Diminished/Absent.                                           Function: Patient reports 52% disability based on score of the Upper Extremity Functional Scale. (improvement from 61% disabled)     Quick DASH Shoulder Questionnaire                                                                                         Score 1-5  1. Opening a tight jar                                                                      3  2. Do  heavy household chores                                          4  3. Carry a shopping bag or briefcase                                           2  4. Wash your back                                                              4  5. Use a knife to cut food                                                   2  6. Recreactional activities requiring force                                   4  7. Social Limitation                                                             3  8. Work/ADL limitation                                                                   3  9. Arm, Shoulder or hand Pain                                          3  10. Tingling                                                                            3  11. Sleeping Limitation                                                                     3        Tenderness to palpation:  Patient tender to palpate along anterior and lateral shoulder and along c/s 4-6 with minimal depth of palpation          Assessment     Update:     Short Term Goals: In 4 weeks   1.I with HEP  2.Patient to increase GH ROM to 130-150 degrees shoulder flexion and abduction -GOAL MET  3.Patient to increase MMT strength from 3-/5 to 3/5  -GOAL MET  4.Patient to have pain less than 5/10 (centralized) at worst - GOAL MET  5.Patient to score less than 50% impaired on the Quick Dash- GOAL PARTIALLY MET     Long Term Goals: In 10 weeks  1. Patient to score less than 30% impaired on the Quick Dash- ONGOING GOAL  2. Patient to demo increase in LUE strength to 4/5- ONGOING GOAL  3. Patient to have decreased pain to 3/10 at all times.- ONGOING GOAL  4. Patient to demo increase GH ROM to WNL all planes- ONGOING GOAL  5. Patient to perform daily activities including reaching, sleeping, dressing, and housework without limitation.-ONGOING GOAL    Previous Short Term Goals Status:   ALL GOALS MET EXCEPT DISABILITY SCORE %  New Short Term Goals Status:   CONTINUE PER STG/LTG  Long Term Goal  Status:   continue per initial plan of care.  Reasons for Recertification of Therapy:   INCREASE ROM LEFT SHOULDER AND STRENGTH OF LEFT SHOULDER    Plan     Updated Certification Period: 7/2/2019 to 8/1/2019  Recommended Treatment Plan: 2 times per week for 4 weeks: Electrical Stimulation IFC, Manual Therapy, Moist Heat/ Ice and Therapeutic Exercise  Other Recommendations: n/a    Jenniffer Vaughn, PT  7/2/2019      I CERTIFY THE NEED FOR THESE SERVICES FURNISHED UNDER THIS PLAN OF TREATMENT AND WHILE UNDER MY CARE    Physician's comments:        Physician's Signature: ___________________________________________________

## 2019-07-17 ENCOUNTER — OFFICE VISIT (OUTPATIENT)
Dept: CARDIOLOGY | Facility: CLINIC | Age: 59
End: 2019-07-17
Payer: MEDICARE

## 2019-07-17 VITALS
OXYGEN SATURATION: 97 % | HEIGHT: 65 IN | WEIGHT: 216.69 LBS | SYSTOLIC BLOOD PRESSURE: 138 MMHG | HEART RATE: 74 BPM | BODY MASS INDEX: 36.1 KG/M2 | DIASTOLIC BLOOD PRESSURE: 80 MMHG

## 2019-07-17 DIAGNOSIS — E78.5 HYPERLIPIDEMIA ASSOCIATED WITH TYPE 2 DIABETES MELLITUS: ICD-10-CM

## 2019-07-17 DIAGNOSIS — I15.2 HYPERTENSION ASSOCIATED WITH DIABETES: ICD-10-CM

## 2019-07-17 DIAGNOSIS — E11.69 HYPERLIPIDEMIA ASSOCIATED WITH TYPE 2 DIABETES MELLITUS: ICD-10-CM

## 2019-07-17 DIAGNOSIS — I25.10 CORONARY ARTERY DISEASE INVOLVING NATIVE CORONARY ARTERY OF NATIVE HEART WITHOUT ANGINA PECTORIS: Primary | Chronic | ICD-10-CM

## 2019-07-17 DIAGNOSIS — E11.59 HYPERTENSION ASSOCIATED WITH DIABETES: ICD-10-CM

## 2019-07-17 PROCEDURE — 99213 OFFICE O/P EST LOW 20 MIN: CPT | Mod: PBBFAC | Performed by: NURSE PRACTITIONER

## 2019-07-17 PROCEDURE — 99214 OFFICE O/P EST MOD 30 MIN: CPT | Mod: S$PBB,,, | Performed by: NURSE PRACTITIONER

## 2019-07-17 PROCEDURE — 99999 PR PBB SHADOW E&M-EST. PATIENT-LVL III: CPT | Mod: PBBFAC,,, | Performed by: NURSE PRACTITIONER

## 2019-07-17 PROCEDURE — 99214 PR OFFICE/OUTPT VISIT, EST, LEVL IV, 30-39 MIN: ICD-10-PCS | Mod: S$PBB,,, | Performed by: NURSE PRACTITIONER

## 2019-07-17 PROCEDURE — 99999 PR PBB SHADOW E&M-EST. PATIENT-LVL III: ICD-10-PCS | Mod: PBBFAC,,, | Performed by: NURSE PRACTITIONER

## 2019-07-17 RX ORDER — AMLODIPINE AND BENAZEPRIL HYDROCHLORIDE 10; 20 MG/1; MG/1
1 CAPSULE ORAL DAILY
Qty: 30 CAPSULE | Refills: 6 | Status: SHIPPED | OUTPATIENT
Start: 2019-07-17 | End: 2019-08-13

## 2019-07-17 RX ORDER — AMLODIPINE AND BENAZEPRIL HYDROCHLORIDE 5; 10 MG/1; MG/1
1 CAPSULE ORAL DAILY
COMMUNITY
End: 2019-07-17 | Stop reason: DRUGHIGH

## 2019-07-17 NOTE — PROGRESS NOTES
Subjective:   Patient ID:  Dulce Boogie is a 58 y.o. female who presents for follow up of Coronary Artery Disease and Hypertension      HPI   Ms. Boogie presents to clinic today for BP check after starting Losartan 25mg daily. Her current conditions include CAD with remote PCI in  (previously followed by Dr. Valle at  Cardiology), HTN, HLD, DM II. Admitted May 2018 with unstable angina. Underwent LHC on 18 and noted to have LAD 50% mid long lesion ffr 0.81. Also noted LCX non obs CAD and patent stent. Also found to have RCA 90% prox treated with solange x2 and PDA distal 80% treated with SOLANGE x 2.   Has no abnormal bleeding on ASA and Brilinta.   Had uneventful colonoscopy in 2019. DAPT resumed.   BP looks ok today       Past Medical History:   Diagnosis Date    Anticoagulant long-term use     CAD (coronary artery disease)     Depression     History of colon polyps     Hyperlipidemia associated with type 2 diabetes mellitus     Hypertension associated with diabetes     NSTEMI (non-ST elevated myocardial infarction) 2018    Obesity     Type II diabetes mellitus with complication        Past Surgical History:   Procedure Laterality Date    CAROTID STENT       SECTION, LOW TRANSVERSE      x 2    COLONOSCOPY N/A 6/10/2019    Performed [facility] Boo [facility] ENDO    COLONOSCOPY N/A 2014    Performed by Aury Horn [facility] at [facility] ENDO    HEART CATH-LEFT Left 2018    Performed by Aimee Cooper MD at [facility] CATH LAB       Social History     Tobacco Use    Smoking status: Never Smoker    Smokeless tobacco: Never Used   Substance Use Topics    Alcohol use: No    Drug use: No       Family History   Problem Relation Age of Onset    Hypertension Mother     Hyperlipidemia Mother     Hypertension Father     Diabetes Father     Hyperlipidemia Father     Colon cancer Father     Hypertension Brother     Breast cancer Neg Hx     Ovarian cancer Neg Hx     Uterine cancer  Neg Hx        Current Outpatient Medications   Medication Sig    ALPRAZolam (XANAX) 0.5 MG tablet TAKE 1 TABLET BY MOUTH EVERY DAY AT NIGHT FOR ANXIETY    amLODIPine (NORVASC) 10 MG tablet TAKE 1 TABLET BY MOUTH DAILY    amlodipine-benazepril 5-10 mg (LOTREL) 5-10 mg per capsule Take 1 capsule by mouth once daily.    aspirin (ECOTRIN) 81 MG EC tablet Take 1 tablet (81 mg total) by mouth once daily.    cyclobenzaprine (FLEXERIL) 10 MG tablet TAKE 1 TABLET (10 MG TOTAL) BY MOUTH 3 (THREE) TIMES DAILY.    diclofenac sodium (VOLTAREN) 1 % Gel Apply 2 g topically 3 (three) times daily as needed.    doxazosin (CARDURA) 4 MG tablet Take 1 tablet (4 mg total) by mouth once daily.    fenofibrate micronized (LOFIBRA) 134 MG Cap TAKE ONE CAPSULE BY MOUTH EVERY DAY WITH BREAKFAST.    glimepiride (AMARYL) 4 MG tablet Take 1 tablet (4 mg total) by mouth before breakfast.    hydrocodone-acetaminophen 5-325mg (NORCO) 5-325 mg per tablet Take 1 tablet by mouth 3 (three) times daily as needed for Pain.    isosorbide mononitrate (ISMO,MONOKET) 20 MG Tab Take 20 mg by mouth once daily.     losartan (COZAAR) 25 MG tablet Take 1 tablet (25 mg total) by mouth once daily.    metFORMIN (GLUCOPHAGE) 500 MG tablet TAKE 1 TABLET BY MOUTH 2 TIMES DAILY WITH MEALS.    metoprolol tartrate (LOPRESSOR) 25 MG tablet Take 25 mg by mouth 2 (two) times daily.    niacin (NIASPAN) 750 MG CR tablet TAKE 1 TABLET (750 MG TOTAL) BY MOUTH NIGHTLY.    nitroGLYCERIN (NITROSTAT) 0.4 MG SL tablet PLACE 1 TABLET UNDER TONGUE AT FIRST SIGN OF HEART PAIN, AND REPEAT EVERY 5 MINUTES IF PAIN PERSISTS    omeprazole (PRILOSEC) 40 MG capsule Take 1 capsule (40 mg total) by mouth once daily.    ondansetron (ZOFRAN) 4 MG tablet Take 1 tablet (4 mg total) by mouth every 8 (eight) hours as needed for Nausea.    rosuvastatin (CRESTOR) 40 MG Tab TAKE 1 TABLET (40 MG TOTAL) BY MOUTH EVERY EVENING.    ticagrelor (BRILINTA) 90 mg tablet Take 1 tablet (90 mg  total) by mouth 2 (two) times daily.     No current facility-administered medications for this visit.      Current Outpatient Medications on File Prior to Visit   Medication Sig    ALPRAZolam (XANAX) 0.5 MG tablet TAKE 1 TABLET BY MOUTH EVERY DAY AT NIGHT FOR ANXIETY    amLODIPine (NORVASC) 10 MG tablet TAKE 1 TABLET BY MOUTH DAILY    amlodipine-benazepril 5-10 mg (LOTREL) 5-10 mg per capsule Take 1 capsule by mouth once daily.    aspirin (ECOTRIN) 81 MG EC tablet Take 1 tablet (81 mg total) by mouth once daily.    cyclobenzaprine (FLEXERIL) 10 MG tablet TAKE 1 TABLET (10 MG TOTAL) BY MOUTH 3 (THREE) TIMES DAILY.    diclofenac sodium (VOLTAREN) 1 % Gel Apply 2 g topically 3 (three) times daily as needed.    doxazosin (CARDURA) 4 MG tablet Take 1 tablet (4 mg total) by mouth once daily.    fenofibrate micronized (LOFIBRA) 134 MG Cap TAKE ONE CAPSULE BY MOUTH EVERY DAY WITH BREAKFAST.    glimepiride (AMARYL) 4 MG tablet Take 1 tablet (4 mg total) by mouth before breakfast.    hydrocodone-acetaminophen 5-325mg (NORCO) 5-325 mg per tablet Take 1 tablet by mouth 3 (three) times daily as needed for Pain.    isosorbide mononitrate (ISMO,MONOKET) 20 MG Tab Take 20 mg by mouth once daily.     losartan (COZAAR) 25 MG tablet Take 1 tablet (25 mg total) by mouth once daily.    metFORMIN (GLUCOPHAGE) 500 MG tablet TAKE 1 TABLET BY MOUTH 2 TIMES DAILY WITH MEALS.    metoprolol tartrate (LOPRESSOR) 25 MG tablet Take 25 mg by mouth 2 (two) times daily.    niacin (NIASPAN) 750 MG CR tablet TAKE 1 TABLET (750 MG TOTAL) BY MOUTH NIGHTLY.    nitroGLYCERIN (NITROSTAT) 0.4 MG SL tablet PLACE 1 TABLET UNDER TONGUE AT FIRST SIGN OF HEART PAIN, AND REPEAT EVERY 5 MINUTES IF PAIN PERSISTS    omeprazole (PRILOSEC) 40 MG capsule Take 1 capsule (40 mg total) by mouth once daily.    ondansetron (ZOFRAN) 4 MG tablet Take 1 tablet (4 mg total) by mouth every 8 (eight) hours as needed for Nausea.    rosuvastatin (CRESTOR) 40 MG  Tab TAKE 1 TABLET (40 MG TOTAL) BY MOUTH EVERY EVENING.    ticagrelor (BRILINTA) 90 mg tablet Take 1 tablet (90 mg total) by mouth 2 (two) times daily.     No current facility-administered medications on file prior to visit.        Review of Systems   Constitution: Negative for diaphoresis, malaise/fatigue, weight gain and weight loss.   HENT: Negative for congestion and nosebleeds.    Cardiovascular: Negative for chest pain, claudication, cyanosis, dyspnea on exertion, irregular heartbeat, leg swelling, near-syncope, orthopnea, palpitations, paroxysmal nocturnal dyspnea and syncope.   Respiratory: Negative for cough, hemoptysis, shortness of breath, sleep disturbances due to breathing, snoring, sputum production and wheezing.    Hematologic/Lymphatic: Negative for bleeding problem. Does not bruise/bleed easily.   Skin: Negative for rash.   Musculoskeletal: Negative for arthritis, back pain, falls, joint pain, muscle cramps and muscle weakness.   Gastrointestinal: Negative for abdominal pain, constipation, diarrhea, heartburn, hematemesis, hematochezia, melena, nausea and vomiting.   Genitourinary: Negative for dysuria, hematuria and nocturia.   Neurological: Negative for excessive daytime sleepiness, dizziness, headaches, light-headedness, loss of balance, numbness, vertigo and weakness.       Objective:   Physical Exam   Constitutional: She is oriented to person, place, and time. She appears well-developed and well-nourished.   Neck: Neck supple. No JVD present.   Cardiovascular: Normal rate, regular rhythm, normal heart sounds and normal pulses. Exam reveals no friction rub.   No murmur heard.  Pulmonary/Chest: Effort normal and breath sounds normal. No respiratory distress. She has no wheezes. She has no rales.   Abdominal: Soft. Bowel sounds are normal. She exhibits no distension.   Musculoskeletal: She exhibits no edema or tenderness.   Neurological: She is alert and oriented to person, place, and time.  "  Skin: Skin is warm and dry. No rash noted.   Psychiatric: She has a normal mood and affect. Her behavior is normal.   Nursing note and vitals reviewed.    Vitals:    07/17/19 1126   BP: 138/80   Pulse: 74   SpO2: 97%   Weight: 98.3 kg (216 lb 11.4 oz)   Height: 5' 5" (1.651 m)     Lab Results   Component Value Date    CHOL 226 (H) 05/21/2019    CHOL 192 02/04/2019    CHOL 171 11/27/2018     Lab Results   Component Value Date    HDL 42 05/21/2019    HDL 38 (L) 02/04/2019    HDL 36 (L) 11/27/2018     Lab Results   Component Value Date    LDLCALC 138.0 05/21/2019    LDLCALC 120.2 02/04/2019    LDLCALC 93.8 11/27/2018     Lab Results   Component Value Date    TRIG 230 (H) 05/21/2019    TRIG 169 (H) 02/04/2019    TRIG 206 (H) 11/27/2018     Lab Results   Component Value Date    CHOLHDL 18.6 (L) 05/21/2019    CHOLHDL 19.8 (L) 02/04/2019    CHOLHDL 21.1 11/27/2018       Chemistry        Component Value Date/Time     05/21/2019 0826    K 3.4 (L) 05/21/2019 0826     05/21/2019 0826    CO2 23 05/21/2019 0826    BUN 15 05/21/2019 0826    CREATININE 1.0 05/21/2019 0826     (H) 05/21/2019 0826        Component Value Date/Time    CALCIUM 9.4 05/21/2019 0826    ALKPHOS 67 05/21/2019 0826    AST 16 05/21/2019 0826    ALT 22 05/21/2019 0826    BILITOT 0.2 05/21/2019 0826    ESTGFRAFRICA >60 05/21/2019 0826    EGFRNONAA >60 05/21/2019 0826          Lab Results   Component Value Date    TSH 2.667 02/04/2019     Lab Results   Component Value Date    INR 1.0 05/21/2018    INR 1.0 12/21/2013    INR 1.0 07/10/2011     Lab Results   Component Value Date    WBC 9.87 02/04/2019    HGB 12.1 02/04/2019    HCT 38.7 02/04/2019    MCV 84 02/04/2019     02/04/2019     BMP  Sodium   Date Value Ref Range Status   05/21/2019 140 136 - 145 mmol/L Final     Potassium   Date Value Ref Range Status   05/21/2019 3.4 (L) 3.5 - 5.1 mmol/L Final     Chloride   Date Value Ref Range Status   05/21/2019 106 95 - 110 mmol/L Final "     CO2   Date Value Ref Range Status   05/21/2019 23 23 - 29 mmol/L Final     BUN, Bld   Date Value Ref Range Status   05/21/2019 15 6 - 20 mg/dL Final     Creatinine   Date Value Ref Range Status   05/21/2019 1.0 0.5 - 1.4 mg/dL Final     Calcium   Date Value Ref Range Status   05/21/2019 9.4 8.7 - 10.5 mg/dL Final     Anion Gap   Date Value Ref Range Status   05/21/2019 11 8 - 16 mmol/L Final     eGFR if    Date Value Ref Range Status   05/21/2019 >60 >60 mL/min/1.73 m^2 Final     eGFR if non    Date Value Ref Range Status   05/21/2019 >60 >60 mL/min/1.73 m^2 Final     Comment:     Calculation used to obtain the estimated glomerular filtration  rate (eGFR) is the CKD-EPI equation.        CrCl cannot be calculated (Patient's most recent lab result is older than the maximum 7 days allowed.).    Assessment:     1. Coronary artery disease involving native coronary artery of native heart without angina pectoris    2. Hyperlipidemia associated with type 2 diabetes mellitus    3. Hypertension associated with diabetes        Plan:     Stop Losartan due to her restarting Lotrel  Increase Lotrel to 10-20mg daily   Continue ASA, Brilinta, Imdur, metoprolol and statin   Heart healthy diet  Exercise routine   Weight loss  RTC in 1 month for BP check since med adjustment

## 2019-07-19 ENCOUNTER — HOSPITAL ENCOUNTER (OUTPATIENT)
Dept: RADIOLOGY | Facility: HOSPITAL | Age: 59
Discharge: HOME | End: 2019-07-19
Attending: PHYSICIAN ASSISTANT
Payer: MEDICARE

## 2019-07-19 ENCOUNTER — OFFICE VISIT (OUTPATIENT)
Dept: ORTHOPEDICS | Facility: CLINIC | Age: 59
End: 2019-07-19
Payer: MEDICARE

## 2019-07-19 VITALS
SYSTOLIC BLOOD PRESSURE: 144 MMHG | BODY MASS INDEX: 35.99 KG/M2 | DIASTOLIC BLOOD PRESSURE: 83 MMHG | WEIGHT: 216 LBS | HEIGHT: 65 IN | HEART RATE: 80 BPM

## 2019-07-19 DIAGNOSIS — M25.551 PAIN OF RIGHT HIP JOINT: ICD-10-CM

## 2019-07-19 DIAGNOSIS — M75.42 IMPINGEMENT SYNDROME OF LEFT SHOULDER: ICD-10-CM

## 2019-07-19 DIAGNOSIS — M75.82 ROTATOR CUFF TENDINITIS, LEFT: ICD-10-CM

## 2019-07-19 DIAGNOSIS — M54.50 LUMBOSACRAL PAIN: ICD-10-CM

## 2019-07-19 DIAGNOSIS — G89.29 CHRONIC LEFT SHOULDER PAIN: Primary | ICD-10-CM

## 2019-07-19 DIAGNOSIS — M25.512 CHRONIC LEFT SHOULDER PAIN: Primary | ICD-10-CM

## 2019-07-19 PROCEDURE — 99214 OFFICE O/P EST MOD 30 MIN: CPT | Mod: S$PBB,,, | Performed by: PHYSICIAN ASSISTANT

## 2019-07-19 PROCEDURE — 99215 OFFICE O/P EST HI 40 MIN: CPT | Mod: PBBFAC | Performed by: PHYSICIAN ASSISTANT

## 2019-07-19 PROCEDURE — 99214 PR OFFICE/OUTPT VISIT, EST, LEVL IV, 30-39 MIN: ICD-10-PCS | Mod: S$PBB,,, | Performed by: PHYSICIAN ASSISTANT

## 2019-07-19 PROCEDURE — 73502 X-RAY EXAM HIP UNI 2-3 VIEWS: CPT | Mod: 26,RT,, | Performed by: RADIOLOGY

## 2019-07-19 PROCEDURE — 72110 X-RAY EXAM L-2 SPINE 4/>VWS: CPT | Mod: 26,,, | Performed by: RADIOLOGY

## 2019-07-19 PROCEDURE — 73502 XR HIP 2 VIEW RIGHT: ICD-10-PCS | Mod: 26,RT,, | Performed by: RADIOLOGY

## 2019-07-19 PROCEDURE — 72110 XR LUMBAR SPINE COMPLETE 5 VIEW: ICD-10-PCS | Mod: 26,,, | Performed by: RADIOLOGY

## 2019-07-19 PROCEDURE — 72110 X-RAY EXAM L-2 SPINE 4/>VWS: CPT | Mod: TC

## 2019-07-19 PROCEDURE — 99999 PR PBB SHADOW E&M-EST. PATIENT-LVL V: CPT | Mod: PBBFAC,,, | Performed by: PHYSICIAN ASSISTANT

## 2019-07-19 PROCEDURE — 73502 X-RAY EXAM HIP UNI 2-3 VIEWS: CPT | Mod: TC,RT

## 2019-07-19 PROCEDURE — 99999 PR PBB SHADOW E&M-EST. PATIENT-LVL V: ICD-10-PCS | Mod: PBBFAC,,, | Performed by: PHYSICIAN ASSISTANT

## 2019-07-19 RX ORDER — METHOCARBAMOL 500 MG/1
500 TABLET, FILM COATED ORAL 3 TIMES DAILY PRN
Qty: 30 TABLET | Refills: 1 | Status: SHIPPED | OUTPATIENT
Start: 2019-07-19 | End: 2019-10-25

## 2019-07-19 NOTE — PROGRESS NOTES
Patient ID: Dulce Boogie is a 58 y.o. female.    Chief Complaint: Pain and Follow-up of the Left Shoulder      HPI: Dulce Boogie  is a 58 y.o. female who c/o Pain and Follow-up of the Left Shoulder   for duration of several months.  I gave her steroid injection at her last office visit in order physical therapy.  She says the left shoulder has really improved.  It is not feeling near is stiff.  Quality is a constant aching pain with occasional stinging pain.  Alleviating factors include physical therapy and injection.  Aggravating factors include doing a lot of overhead work.  Pain level today is 3/10 in severity.  She has a new complaint today as well of the right hip.  She points to the groin is to wear the hip is located as well as the right buttock region.  This is been going on about a week and a half.  She denies an injury.  Quality is sharp and aching.  She denies radicular symptoms she denies numbness and tingling 9/10 in severity.    Past Medical History:   Diagnosis Date    Anticoagulant long-term use     CAD (coronary artery disease)     Depression     History of colon polyps     Hyperlipidemia associated with type 2 diabetes mellitus     Hypertension associated with diabetes     NSTEMI (non-ST elevated myocardial infarction) 2018    Obesity     Type II diabetes mellitus with complication      Past Surgical History:   Procedure Laterality Date    CAROTID STENT       SECTION, LOW TRANSVERSE      x 2    COLONOSCOPY N/A 6/10/2019    Performed by [facility]oo [facility] at [facility] END[facility]    COLONOSCOPY N/A 2014    Performed by [facility]ury Horn [facility] at [facility] ENDO    HEART CATH-LEFT Left 2018    Performed by Aimee Cooper MD at [facility] CATH LAB     Family History   Problem Relation Age of Onset    Hypertension Mother     Hyperlipidemia Mother     Hypertension Father     Diabetes Father     Hyperlipidemia Father     Colon cancer Father     Hypertension Brother     Breast cancer Neg  Hx     Ovarian cancer Neg Hx     Uterine cancer Neg Hx      Social History     Socioeconomic History    Marital status:      Spouse name: Not on file    Number of children: Not on file    Years of education: Not on file    Highest education level: Not on file   Occupational History    Not on file   Social Needs    Financial resource strain: Not on file    Food insecurity:     Worry: Not on file     Inability: Not on file    Transportation needs:     Medical: Not on file     Non-medical: Not on file   Tobacco Use    Smoking status: Never Smoker    Smokeless tobacco: Never Used   Substance and Sexual Activity    Alcohol use: No    Drug use: No    Sexual activity: Not on file   Lifestyle    Physical activity:     Days per week: Not on file     Minutes per session: Not on file    Stress: Not on file   Relationships    Social connections:     Talks on phone: Not on file     Gets together: Not on file     Attends [employer] service: Not on file     Active member of club or organization: Not on file     Attends meetings of clubs or organizations: Not on file     Relationship status: Not on file   Other Topics Concern    Not on file   Social History Narrative    Not on file     Medication List with Changes/Refills   New Medications    METHOCARBAMOL (ROBAXIN) 500 MG TAB    Take 1 tablet (500 mg total) by mouth 3 (three) times daily as needed (muscle spasms).   Current Medications    ALPRAZOLAM (XANAX) 0.5 MG TABLET    TAKE 1 TABLET BY MOUTH EVERY DAY AT NIGHT FOR ANXIETY    AMLODIPINE-BENAZEPRIL 10-20MG (LOTREL) 10-20 MG PER CAPSULE    Take 1 capsule by mouth once daily.    ASPIRIN (ECOTRIN) 81 MG EC TABLET    Take 1 tablet (81 mg total) by mouth once daily.    CYCLOBENZAPRINE (FLEXERIL) 10 MG TABLET    TAKE 1 TABLET (10 MG TOTAL) BY MOUTH 3 (THREE) TIMES DAILY.    DICLOFENAC SODIUM (VOLTAREN) 1 % GEL    Apply 2 g topically 3 (three) times daily as needed.    DOXAZOSIN (CARDURA) 4 MG TABLET    Take  1 tablet (4 mg total) by mouth once daily.    FENOFIBRATE MICRONIZED (LOFIBRA) 134 MG CAP    TAKE ONE CAPSULE BY MOUTH EVERY DAY WITH BREAKFAST.    GLIMEPIRIDE (AMARYL) 4 MG TABLET    Take 1 tablet (4 mg total) by mouth before breakfast.    HYDROCODONE-ACETAMINOPHEN 5-325MG (NORCO) 5-325 MG PER TABLET    Take 1 tablet by mouth 3 (three) times daily as needed for Pain.    ISOSORBIDE MONONITRATE (ISMO,MONOKET) 20 MG TAB    Take 20 mg by mouth once daily.     METFORMIN (GLUCOPHAGE) 500 MG TABLET    TAKE 1 TABLET BY MOUTH 2 TIMES DAILY WITH MEALS.    METOPROLOL TARTRATE (LOPRESSOR) 25 MG TABLET    Take 25 mg by mouth 2 (two) times daily.    NIACIN (NIASPAN) 750 MG CR TABLET    TAKE 1 TABLET (750 MG TOTAL) BY MOUTH NIGHTLY.    NITROGLYCERIN (NITROSTAT) 0.4 MG SL TABLET    PLACE 1 TABLET UNDER TONGUE AT FIRST SIGN OF HEART PAIN, AND REPEAT EVERY 5 MINUTES IF PAIN PERSISTS    OMEPRAZOLE (PRILOSEC) 40 MG CAPSULE    Take 1 capsule (40 mg total) by mouth once daily.    ONDANSETRON (ZOFRAN) 4 MG TABLET    Take 1 tablet (4 mg total) by mouth every 8 (eight) hours as needed for Nausea.    ROSUVASTATIN (CRESTOR) 40 MG TAB    TAKE 1 TABLET (40 MG TOTAL) BY MOUTH EVERY EVENING.    TICAGRELOR (BRILINTA) 90 MG TABLET    Take 1 tablet (90 mg total) by mouth 2 (two) times daily.     Review of patient's allergies indicates:   Allergen Reactions    No known drug allergies            Objective:        General    Nursing note and vitals reviewed.  Constitutional: She is oriented to person, place, and time. She appears well-developed and well-nourished.   HENT:   Head: Normocephalic and atraumatic.   Eyes: EOM are normal.   Cardiovascular: Normal rate and regular rhythm.    Pulmonary/Chest: Effort normal and breath sounds normal.   Abdominal: Soft.   Neurological: She is alert and oriented to person, place, and time.   Psychiatric: She has a normal mood and affect. Her behavior is normal.             Right Hip Exam     Inspection   Scars:  absent  Swelling: absent  Bruising: absent  No deformity of hip.  Quadriceps Atrophy:  Negative  Erythema: absent    Range of Motion   Abduction: normal   Adduction: normal   Extension: normal   Flexion: normal   External rotation: normal   Internal rotation: normal     Tests   Pain w/ forced internal rotation (NATALI): present  Pain w/ forced external rotation (FADIR): present  Kirsty: negative  Log Roll: negative    Other   Sensation: normal    Comments:  Mild TTP troch bursa, piriformis, nor SI joint.   Motor intact to EHL/FHL/GS/TA  Sensation intact to the S/S/SPN/DPN/Tib   (-)SLR test  Left Hip Exam     Other   Sensation: normal    Comments:  Motor intact to EHL/FHL/GS/TA  Sensation intact to the S/S/SPN/DPN/Tib   (-)SLR test      Right Shoulder Exam     Inspection/Observation   Swelling: absent  Bruising: absent  Scars: absent  Deformity: absent  Scapular Dyskinesia: negative    Range of Motion   Active abduction: normal   Passive abduction: normal   Extension: normal   Forward Flexion: normal   Forward Elevation: normal  Adduction: normal  External Rotation 0 degrees: normal   Internal rotation 0 degrees: normal     Left Shoulder Exam     Inspection/Observation   Swelling: absent  Bruising: absent  Scars: absent  Deformity: absent  Scapular Dyskinesia: negative    Range of Motion   Active abduction: normal   Passive abduction: normal   Extension: normal   Forward Flexion: normal   Forward Elevation: normal  Adduction: normal  External Rotation 0 degrees: normal   Internal rotation 0 degrees: normal     Tests & Signs   Drop arm: negative  Jiménez test: negative  Impingement: negative  Rotator Cuff Painful Arc/Range: moderate  Active Compression test ([place]'s Sign): negative  Speed's Test: negative    Other   Sensation: normal     Comments:  TTP has improved      Muscle Strength   Right Upper Extremity   Shoulder Abduction: 5/5   Shoulder Internal Rotation: 5/5   Shoulder External Rotation: 5/5   Subscapularis:  5/5/5   Biceps: 5/5/5   Left Upper Extremity  Shoulder Abduction: 5/5   Shoulder Internal Rotation: 5/5   Shoulder External Rotation: 5/5   Subscapularis: 5/5/5   Biceps: 5/5/5   Right Lower Extremity   Hip Abduction: 5/5   Hip Adduction: 5/5   Hip Flexion: 5/5   Ankle Dorsiflexion:  5/5   Left Lower Extremity   Hip Abduction: 5/5   Hip Adduction: 5/5   Hip Flexion: 5/5   Ankle Dorsiflexion:  5/5     Reflexes     Left Side  Quadriceps:  2+  Achilles:  2+    Right Side   Quadriceps:  2+  Achilles:  2+    Vascular Exam     Right Pulses  Dorsalis Pedis:      2+          Left Pulses      Radial:                    2+      Capillary Refill  Right Hand: normal capillary refill  Left Hand: normal capillary refill    Edema  Right Upper Leg: absent              Assessment:       Encounter Diagnoses   Name Primary?    Chronic left shoulder pain Yes    Impingement syndrome of left shoulder     Rotator cuff tendinitis, left     Pain of right hip joint     Lumbosacral pain           Plan:       Dulce was seen today for pain and follow-up.    Diagnoses and all orders for this visit:    Chronic left shoulder pain  -     Ambulatory Referral to Physical/Occupational Therapy    Impingement syndrome of left shoulder  -     Ambulatory Referral to Physical/Occupational Therapy    Rotator cuff tendinitis, left  -     Ambulatory Referral to Physical/Occupational Therapy    Pain of right hip joint  -     Ambulatory Referral to Physical/Occupational Therapy  -     X-Ray Hip 2 or 3 views Right; Future  -     X-Ray Lumbar Spine Complete 5 View; Future  -     methocarbamol (ROBAXIN) 500 MG Tab; Take 1 tablet (500 mg total) by mouth 3 (three) times daily as needed (muscle spasms).    Lumbosacral pain  -     Ambulatory Referral to Physical/Occupational Therapy  -     X-Ray Lumbar Spine Complete 5 View; Future  -     methocarbamol (ROBAXIN) 500 MG Tab; Take 1 tablet (500 mg total) by mouth 3 (three) times daily as needed (muscle  spasms).        Dulce Boogie is an established pt here for for a new problem in the right hip as well as recheck of the left shoulder.  As far as the shoulder is concerned, she is doing much better.  She will continue with physical therapy.  She is having new lumbosacral pain and right hip pain.  I am having difficulty differentiating whether it is actually intra-articular in nature or low back related.  I would like her to complete a course of physical therapy for the right hip.  She will also get an x-ray of her back and her hip on the way out.  I will notify her BMI chart of those results later today.  Additionally, I will give her prescription of Robaxin as above.  The Flexeril does not seem to be helping her too much.  She will hold off on Flexeril for now.  I will see her back in the office in 2 months to re-evaluate progress or sooner if necessary.  She verbalizes understanding and agrees.    Follow up in about 2 months (around 9/19/2019).          The patient understands, chooses and consents to this plan and accepts all   the risks which include but are not limited to the risks mentioned above.     Disclaimer: This note was prepared using a voice recognition system and is likely to have sound alike errors within the text.

## 2019-07-23 ENCOUNTER — CLINICAL SUPPORT (OUTPATIENT)
Dept: REHABILITATION | Facility: HOSPITAL | Age: 59
End: 2019-07-23
Payer: MEDICARE

## 2019-07-23 DIAGNOSIS — M25.512 CHRONIC LEFT SHOULDER PAIN: Primary | ICD-10-CM

## 2019-07-23 DIAGNOSIS — G89.29 CHRONIC LEFT SHOULDER PAIN: Primary | ICD-10-CM

## 2019-07-23 PROCEDURE — 97140 MANUAL THERAPY 1/> REGIONS: CPT

## 2019-07-23 PROCEDURE — 97014 ELECTRIC STIMULATION THERAPY: CPT

## 2019-07-23 PROCEDURE — 97110 THERAPEUTIC EXERCISES: CPT

## 2019-07-23 NOTE — PROGRESS NOTES
Physical Therapy Daily Treatment Note     Name: Dulce Boogie  [facility] Number: [id number]    Therapy Diagnosis:   Encounter Diagnosis   Name Primary?    Chronic left shoulder pain Yes     Physician: Arleth Clayton,*    Visit Date: 7/23/2019     Physician Orders: PT Eval and Treat   Medical Diagnosis from Referral: Chronic left shoulder pain  Evaluation Date: 6/14/2019  Authorization Period Expiration: 12/31/2019  Plan of Care Expiration: 8/1/2019  Visit # / Visits authorized: 5/20 (includes eval)    Time In: 1030am  Time Out: 1130am  Total Billable Time: 60  minutes    Precautions: Standard    Subjective     Pt reports: 5/10 left shoulder pain and now has a new onset of right hip pain. She stated that she had x-rays on her back and hip and that the md was suppose to send over the referral.     .She was compliant with home exercise program.  Response to previous treatment: good  Functional change: increase in shoulder pain    Pain: 3/10  Location: left shoulder      Objective     Dulce received therapeutic exercises to develop ROM, posture and pain management for 35 minutes including:  -wall slides: flexion and abduction x 1' each way for ROM  -Cervical retraction in prone (midline and with left cervical rotation) x 15 each way  -airplane in prone for T/S strengthening (2x10)  -T/s foam roll in supine: UE movements overhead performed on foam roll  -T/s thoracic ext over chair x 10  -LUE shoulder flexion with YTB to 90 degrees in standing 2x10 (1/2 foam roll used at wall for posture)  -LUE shoulder abduction without resistance to 90 degrees 2x10 (1/2 foam roll used at wall for posture)  - with and without YTB 2x10 (1/2 foam roll used at wall for posture)  -cervical spine retraction with rotation left and right on a ball in standing x 2'    Dulce received the following manual therapy techniques: Joint mobilizations (P/A) cervical spine for 10 minutes, including:  -Upper Trap massage left (supine) due to tight  mm and spasms.  -c/s side glides     Dulce received the following supervised modalities after being cleared for contradictions: IFC Electrical Stimulation:  Dulce received IFC Electrical Stimulation for pain control applied to the left posterior shoulder/upper trap. Pt received stimulation at 40 % scan at a frequency of  for 15 minutes. Dulce tolderated treatment well without any adverse effects.      Dulce received hot pack for 15 minutes with IFC for pain management of muscle spasms.      Home Exercises Provided and Patient Education Provided     Education provided:   - HEP and postural correction, avoid prolonged neck flexion    Written Home Exercises Provided: yes.  Exercises were reviewed and Dulce was able to demonstrate them prior to the end of the session.  Dulce demonstrated good  understanding of the education provided.       Assessment   Dulce is progressing well towards her goals.   Pt prognosis is Good.     Dulce had an increase in shoulder pain since last visit. She has not attended therapy since 7/2/2019. She now reports right hip pain. Shoulder pain subsides with exercise and e-stim/heat.  Pt will continue to benefit from skilled outpatient physical therapy to address the deficits listed in the problem list box on initial evaluation, provide pt/family education and to maximize pt's level of independence in the home and community environment.     Pt's spiritual, cultural and educational needs considered and pt agreeable to plan of care and goals.     Anticipated barriers to physical therapy: none    Goals:     Short Term Goals: In 4 weeks   1.I with HEP- ONGOING GOAL  2.Patient to increase GH ROM to 130-150 degrees shoulder flexion and abduction-GOAL MET  3.Patient to increase MMT strength from 3-/5 to 3/5  -GOAL MET  4.Patient to have pain less than 5/10 (centralized) at worst- GOAL MET  5.Patient to score less than 50% impaired on the Quick Dash- GOAL PARTIALLY MET     Long Term Goals: In 10 weeks  1.  Patient to score less than 30% impaired on the Quick Dash-ONGOING GOAL  2. Patient to demo increase in LUE strength to 4/5-ONGOING GOAL  3. Patient to have decreased pain to 3/10 at all times.-ONGOING GOAL  4. Patient to demo increase GH ROM to WNL all planes-ONGOING GOAL  5. Patient to perform daily activities including reaching, sleeping, dressing, and housework without limitation.-ONGOING GOAL        Plan   Plan of care Certification: 7/2/2019 to 8/1/2019     Outpatient Physical Therapy 2 times weekly  to include the following interventions: Cervical/Lumbar Traction, Electrical Stimulation IFC, Manual Therapy, Moist Heat/ Ice and Therapeutic Exercise.       Jenniffer Vaughn, PT

## 2019-07-30 ENCOUNTER — CLINICAL SUPPORT (OUTPATIENT)
Dept: REHABILITATION | Facility: HOSPITAL | Age: 59
End: 2019-07-30
Payer: MEDICARE

## 2019-07-30 DIAGNOSIS — G89.29 CHRONIC LEFT SHOULDER PAIN: Primary | ICD-10-CM

## 2019-07-30 DIAGNOSIS — M25.512 CHRONIC LEFT SHOULDER PAIN: Primary | ICD-10-CM

## 2019-07-30 PROCEDURE — 97110 THERAPEUTIC EXERCISES: CPT

## 2019-07-30 PROCEDURE — 97140 MANUAL THERAPY 1/> REGIONS: CPT

## 2019-07-30 PROCEDURE — 97014 ELECTRIC STIMULATION THERAPY: CPT

## 2019-07-30 NOTE — PROGRESS NOTES
Outpatient Therapy Updated Plan of Care      Visit Date: 7/2/2019  Name: Dulce Boogie  Clinic Number: [id number]     Therapy Diagnosis:        Encounter Diagnosis   Name Primary?    Chronic left shoulder pain Yes      Physician: Arleth Clayton,*     Physician Orders: PT TO EVAL AND TREAT  Medical Diagnosis: Chronic left shoulder pain  Evaluation Date: 6/14/2019     Total Visits Received: 6  Cancelled Visits: 3  No Show Visits: 0     Current Certification Period:  7/2/2019 to 8/1/2019  Precautions:  standard  Functional Level Prior to Evaluation:  Independent and pain-free     Subjective      Update: Pain in general has decreased to 3/10 at neck and/or shoulder with movement into shoulder flexion and abduction. Patient reports that she is able to perform more activities around the house with less difficulty.     Objective      Update: Dulce has improved with overall pain in neck/shoulder, ROM of left shoulder and strength of left shoulder. Her disability percentage has also dropped from 61% to 45%.        Shoulder ROM:            Active/Passive Joint Range Right Left   Flexion   150/160   ABDuction   140/150   External Rotation   70/75   Internal Rotation   50/60   Extension    wnl      Cervical Spine AROM:    % limited Pain   FB 0 N   BB 0 N   RR 0 N   LR 0 N   RSB 25 Y (minimal)   LSB 25 Y (minimal)      Strength:  Muscle (Myotome) Right Left   Shoulder Flex 5/5 3/5   Shoulder Abduction 5/5 3/5   ER/IR 5/5 3+/5   Wrist Extensors (C6) 5/5 5/5   Elbow Extensors (C7) and Flexors (C6) 5/5 5/5      Sensation: Intact/Demined/Absent  Reflexes: Intact/Diminished/Absent.        Function: Patient reports 45% disability based on score of the Upper Extremity Functional Scale. (improvement from 61% disabled)     Quick DASH Shoulder Questionnaire                                                                                         Score 1-5  1. Opening a tight  jar                                                                      2  2. Do heavy household chores                                          4  3. Carry a shopping bag or briefcase                                           2  4. Wash your back                                                              3  5. Use a knife to cut food                                                   2  6. Recreactional activities requiring force                                   4  7. Social Limitation                                                             3  8. Work/ADL limitation                                                                   3  9. Arm, Shoulder or hand Pain                                          3  10. Tingling                                                                            2  11. Sleeping Limitation                                                                     3        Tenderness to palpation:  Patient tender to palpate along anterior and lateral shoulder and along c/s 4-6 with minimal depth of palpation              Assessment      Update:      Short Term Goals: In 4 weeks   1.I with HEP  2.Patient to increase GH ROM to 130-150 degrees shoulder flexion and abduction -GOAL MET  3.Patient to increase MMT strength from 3-/5 to 3/5  -GOAL MET  4.Patient to have pain less than 5/10 (centralized) at worst - GOAL MET  5.Patient to score less than 50% impaired on the Quick Dash- GOAL MET     Long Term Goals: In 10 weeks  1. Patient to score less than 30% impaired on the Quick Dash- ONGOING GOAL  2. Patient to demo increase in LUE strength to 4/5- ONGOING GOAL  3. Patient to have decreased pain to 3/10 at all times.- ONGOING GOAL  4. Patient to demo increase GH ROM to WNL all planes- ONGOING GOAL  5. Patient to perform daily activities including reaching, sleeping, dressing, and housework without limitation.-ONGOING GOAL     Previous Short Term Goals Status:   ALL STG MET  New Short Term  Goals Status:   CONTINUE PER LTG  Long Term Goal Status:   continue per initial plan of care.  Reasons for Recertification of Therapy:   INCREASE ROM LEFT SHOULDER AND STRENGTH OF LEFT SHOULDER, DECREASE PAIN     Plan      Updated Certification Period: 7/30/2019 TO 8/29/2019  Recommended Treatment Plan: 2 times per week for 4 weeks: Electrical Stimulation IFC, Manual Therapy, Moist Heat/ Ice and Therapeutic Exercise  Other Recommendations: n/a     Jenniffer Vaughn, PT  7/2/2019        I CERTIFY THE NEED FOR THESE SERVICES FURNISHED UNDER THIS PLAN OF TREATMENT AND WHILE UNDER MY CARE     Physician's comments:           Physician's Signature: ___________________________________________________

## 2019-07-30 NOTE — PROGRESS NOTES
Physical Therapy Daily Treatment Note     Name: Dulce Boogie  [facility] Number: [id number]    Therapy Diagnosis:   Encounter Diagnosis   Name Primary?    Chronic left shoulder pain Yes     Physician: Arleth Clayton,*    Visit Date: 7/30/2019     Physician Orders: PT Eval and Treat   Medical Diagnosis from Referral: Chronic left shoulder pain  Evaluation Date: 6/14/2019  Authorization Period Expiration: 12/31/2019  Plan of Care Expiration: 8/1/2019  Visit # / Visits authorized: 6/20 (includes eval)     Time In: 0930am  Time Out: 1030am  Total Billable Time:  60 minutes    Precautions: Standard    Subjective     Pt reports: 0/10 left shoulder pain that has been relieved with pain meds. She reports 3/10 pain at end range of left shoulder flexion and abduction  .She was compliant with home exercise program.  Response to previous treatment: good  Functional change: increase in shoulder pain    Pain: 3/10  Location: left shoulder      Objective     Dulce received therapeutic exercises to develop ROM, posture and pain management for 25 minutes including:  -wall slides: flexion and abduction x 1' each way for ROM  -Cervical retraction in prone (midline and with left cervical rotation) x 15 each way (added YTB for c/s retraction + retraction with rotation left while propped on elbows)  -airplane in prone for T/S strengthening (2x10)  -T/s thoracic ext over chair x 10  -LUE shoulder flexion with YTB to 90 degrees in standing x 5 (1/2 foam roll used at wall for posture) - pain with motion  -LUE shoulder abduction to 90 degrees with YTB x 5 (1/2 foam roll used at wall for posture)-pain with motion  -Scaption with YTB 2x10 - no pain  - with and without YTB 2x10 (1/2 foam roll used at wall for posture),  with YTB performed in supine   -superman with alternating UE/LE 2x10  -prone W x 10    Dulce received the following manual therapy techniques: Joint mobilizations (P/A) cervical spine for 20 minutes,  including:  -Upper Trap massage left (supine) due to tight mm and spasms.  -c/s side glides and P/A mobs  -manual traction  -subscapular release left  -STM upper trap, rhomboids, teres minor left    Dulce received the following supervised modalities after being cleared for contradictions: IFC Electrical Stimulation:  Dulce received IFC Electrical Stimulation for pain control applied to the left posterior shoulder/upper trap. Pt received stimulation at 40 % scan at a frequency of  for 15 minutes. Dulce tolderated treatment well without any adverse effects.      Dulce received hot pack for 15 minutes with IFC for pain management of muscle spasms.      Home Exercises Provided and Patient Education Provided     Education provided:   - HEP and postural correction, avoid prolonged neck flexion    Written Home Exercises Provided: yes.  Exercises were reviewed and Dulce was able to demonstrate them prior to the end of the session.  Dulce demonstrated good  understanding of the education provided.       Assessment   Dulce is progressing well towards her goals.   Pt prognosis is Good.     Dulce had no shoulder pain coming to therapy but had taken a pain pill prior to the session. She continues to have pain at end-range of left shoulder flexion and abduction but at lesser intensity as compared to the initial visit. She denied hip pain today.    Pt will continue to benefit from skilled outpatient physical therapy to address the deficits listed in the problem list box on initial evaluation, provide pt/family education and to maximize pt's level of independence in the home and community environment.     Pt's spiritual, cultural and educational needs considered and pt agreeable to plan of care and goals.     Anticipated barriers to physical therapy: none    Goals:     Short Term Goals: In 4 weeks   1.I with HEP- ONGOING GOAL  2.Patient to increase GH ROM to 130-150 degrees shoulder flexion and abduction-GOAL MET  3.Patient to  increase MMT strength from 3-/5 to 3/5  -GOAL MET  4.Patient to have pain less than 5/10 (centralized) at worst- GOAL MET  5.Patient to score less than 50% impaired on the Quick Dash- GOAL  MET     Long Term Goals: In 10 weeks  1. Patient to score less than 30% impaired on the Quick Dash-ONGOING GOAL  2. Patient to demo increase in LUE strength to 4/5-ONGOING GOAL  3. Patient to have decreased pain to 3/10 at all times.-ONGOING GOAL  4. Patient to demo increase GH ROM to WNL all planes-ONGOING GOAL  5. Patient to perform daily activities including reaching, sleeping, dressing, and housework without limitation.-ONGOING GOAL        Plan   Plan of care Certification: 7/2/2019 to 8/1/2019  New certification: 7/30/2019 to 8/29/2019     Outpatient Physical Therapy 2 times weekly  to include the following interventions: Cervical/Lumbar Traction, Electrical Stimulation IFC, Manual Therapy, Moist Heat/ Ice and Therapeutic Exercise.       Jenniffer Vaughn, PT

## 2019-08-06 ENCOUNTER — LAB VISIT (OUTPATIENT)
Dept: LAB | Facility: HOSPITAL | Age: 59
End: 2019-08-06
Attending: INTERNAL MEDICINE
Payer: MEDICARE

## 2019-08-06 DIAGNOSIS — E11.8 TYPE 2 DIABETES MELLITUS WITH COMPLICATION, WITHOUT LONG-TERM CURRENT USE OF INSULIN: ICD-10-CM

## 2019-08-06 LAB
ALBUMIN SERPL BCP-MCNC: 3.7 G/DL (ref 3.5–5.2)
ALP SERPL-CCNC: 65 U/L (ref 55–135)
ALT SERPL W/O P-5'-P-CCNC: 16 U/L (ref 10–44)
ANION GAP SERPL CALC-SCNC: 8 MMOL/L (ref 8–16)
AST SERPL-CCNC: 14 U/L (ref 10–40)
BILIRUB SERPL-MCNC: 0.2 MG/DL (ref 0.1–1)
BUN SERPL-MCNC: 20 MG/DL (ref 6–20)
CALCIUM SERPL-MCNC: 9.4 MG/DL (ref 8.7–10.5)
CHLORIDE SERPL-SCNC: 108 MMOL/L (ref 95–110)
CHOLEST SERPL-MCNC: 226 MG/DL (ref 120–199)
CHOLEST/HDLC SERPL: 5.9 {RATIO} (ref 2–5)
CO2 SERPL-SCNC: 25 MMOL/L (ref 23–29)
CREAT SERPL-MCNC: 0.9 MG/DL (ref 0.5–1.4)
EST. GFR  (AFRICAN AMERICAN): >60 ML/MIN/1.73 M^2
EST. GFR  (NON AFRICAN AMERICAN): >60 ML/MIN/1.73 M^2
ESTIMATED AVG GLUCOSE: 154 MG/DL (ref 68–131)
GLUCOSE SERPL-MCNC: 137 MG/DL (ref 70–110)
HBA1C MFR BLD HPLC: 7 % (ref 4–5.6)
HDLC SERPL-MCNC: 38 MG/DL (ref 40–75)
HDLC SERPL: 16.8 % (ref 20–50)
LDLC SERPL CALC-MCNC: 153 MG/DL (ref 63–159)
NONHDLC SERPL-MCNC: 188 MG/DL
POTASSIUM SERPL-SCNC: 4.1 MMOL/L (ref 3.5–5.1)
PROT SERPL-MCNC: 7.4 G/DL (ref 6–8.4)
SODIUM SERPL-SCNC: 141 MMOL/L (ref 136–145)
TRIGL SERPL-MCNC: 175 MG/DL (ref 30–150)
TSH SERPL DL<=0.005 MIU/L-ACNC: 1.17 UIU/ML (ref 0.4–4)

## 2019-08-06 PROCEDURE — 84443 ASSAY THYROID STIM HORMONE: CPT

## 2019-08-06 PROCEDURE — 80053 COMPREHEN METABOLIC PANEL: CPT

## 2019-08-06 PROCEDURE — 80061 LIPID PANEL: CPT

## 2019-08-06 PROCEDURE — 36415 COLL VENOUS BLD VENIPUNCTURE: CPT

## 2019-08-06 PROCEDURE — 83036 HEMOGLOBIN GLYCOSYLATED A1C: CPT

## 2019-08-08 ENCOUNTER — CLINICAL SUPPORT (OUTPATIENT)
Dept: REHABILITATION | Facility: HOSPITAL | Age: 59
End: 2019-08-08
Payer: MEDICARE

## 2019-08-08 DIAGNOSIS — G89.29 CHRONIC LEFT SHOULDER PAIN: Primary | ICD-10-CM

## 2019-08-08 DIAGNOSIS — M25.512 CHRONIC LEFT SHOULDER PAIN: Primary | ICD-10-CM

## 2019-08-08 PROCEDURE — 97110 THERAPEUTIC EXERCISES: CPT

## 2019-08-08 PROCEDURE — 97014 ELECTRIC STIMULATION THERAPY: CPT

## 2019-08-08 PROCEDURE — 97140 MANUAL THERAPY 1/> REGIONS: CPT

## 2019-08-08 NOTE — PROGRESS NOTES
Physical Therapy Daily Treatment Note     Name: Dulce Boogie  [facility] Number: [id number]    Therapy Diagnosis:   Encounter Diagnosis   Name Primary?    Chronic left shoulder pain Yes     Physician: Arleth Clayton,*    Visit Date: 8/8/2019     Physician Orders: PT Eval and Treat   Medical Diagnosis from Referral: Chronic left shoulder pain  Evaluation Date: 6/14/2019  Authorization Period Expiration: 12/31/2019  Plan of Care Expiration: 8/1/2019  Visit # / Visits authorized: 7/20 (includes eval)     Time In: 10:15 am  Time Out: 11:30 am  Total Billable Time:  60 minutes    Precautions: Standard    Subjective     Pt reports: 5/10 left shoulder pain that has been relieved with pain meds. She reports 3/10 pain at end range of left shoulder flexion and abduction  .She was compliant with home exercise program.  Response to previous treatment: good  Functional change: increase in shoulder pain    Pain: 5/10  Location: left shoulder    (improved to 3/10 after e-stim/exercise)  Objective     Dulce received therapeutic exercises to develop ROM, posture and pain management for 25 minutes including:    -Cervical retraction in prone (midline and with left cervical rotation) x 15 each way (added YTB for c/s retraction + retraction with rotation left while propped on elbows)  -airplane in prone for T/S strengthening (2x10)  -T/s thoracic ext over chair x 10  -LUE shoulder flexion with YTB to 90 degrees in standing x 5 (1/2 foam roll used at wall for posture) - pain with motion  -LUE shoulder abduction to 90 degrees with YTB x 5 (1/2 foam roll used at wall for posture)-pain with motion  -Scaption with YTB 2x10 - no pain  - with and without YTB 2x10 (1/2 foam roll used at wall for posture),  with YTB performed in supine   -superman with alternating UE/LE 2x10  -prone W x 10  -Cervical retraction with extension at neutral and during left rotation performed x 5 each way with improvement of left shoulder  symstoms    ROM, RC strengthening, stretching, manual modals as appropriate     Please also eval and treat right hip pain - gluteal strengthening, IT Band/ hip flexor stretching, manual modals    Dulce received the following manual therapy techniques: Joint mobilizations (P/A) cervical spine for 20 minutes, including:  -Upper Trap massage left (supine) due to tight mm and spasms.  -c/s side glides and P/A mobs  -manual traction  -subscapular release left  -STM upper trap, rhomboids, teres minor left    Dulce received the following supervised modalities after being cleared for contradictions: IFC Electrical Stimulation:  Dulce received NMES Electrical Stimulation for muscle spasm applied to the left posterior shoulder/upper trap. Pt received stimulation for 15 minutes. Dulce tolderated treatment well without any adverse effects.      Dulce received hot pack for 15 minutes with IFC for pain management of muscle spasms.      Home Exercises Provided and Patient Education Provided     Education provided:   - HEP and postural correction, avoid prolonged neck flexion    Written Home Exercises Provided: yes.  Exercises were reviewed and Dulce was able to demonstrate them prior to the end of the session.  Dulce demonstrated good  understanding of the education provided.       Assessment   Dulce is progressing well towards her goals.   Pt prognosis is Good.     Patient reports that her back and hip have been feeling good and has no complaints. She did not want to perform back/hip exercises today and only focus on left shoulder pain. She responded well to cervical retraction with extension at neutral and during rotation to the left. Symptoms alleviated in the shoulder after performing the neck exercises. She is performing the RC/thoracic mm exercises in prone without an exacerbation of symptoms.    Pt will continue to benefit from skilled outpatient physical therapy to address the deficits listed in the problem list box on initial  evaluation, provide pt/family education and to maximize pt's level of independence in the home and community environment.     Pt's spiritual, cultural and educational needs considered and pt agreeable to plan of care and goals.     Anticipated barriers to physical therapy: none    Goals:     Short Term Goals: In 4 weeks   1.I with HEP- ONGOING GOAL  2.Patient to increase GH ROM to 130-150 degrees shoulder flexion and abduction-GOAL MET  3.Patient to increase MMT strength from 3-/5 to 3/5  -GOAL MET  4.Patient to have pain less than 5/10 (centralized) at worst- GOAL NOT MET  5.Patient to score less than 50% impaired on the Quick Dash- GOAL  MET     Long Term Goals: In 10 weeks  1. Patient to score less than 30% impaired on the Quick Dash-ONGOING GOAL  2. Patient to demo increase in LUE strength to 4/5-ONGOING GOAL  3. Patient to have decreased pain to 3/10 at all times.-ONGOING GOAL  4. Patient to demo increase GH ROM to WNL all planes-ONGOING GOAL  5. Patient to perform daily activities including reaching, sleeping, dressing, and housework without limitation.-ONGOING GOAL        Plan   Plan of care Certification:  7/30/2019 to 8/29/2019     Outpatient Physical Therapy 2 times weekly  to include the following interventions: Cervical/Lumbar Traction, Electrical Stimulation IFC, Manual Therapy, Moist Heat/ Ice and Therapeutic Exercise.       Jenniffer Vaughn, PT

## 2019-08-13 ENCOUNTER — OFFICE VISIT (OUTPATIENT)
Dept: INTERNAL MEDICINE | Facility: CLINIC | Age: 59
End: 2019-08-13
Payer: MEDICARE

## 2019-08-13 VITALS
OXYGEN SATURATION: 96 % | BODY MASS INDEX: 35.55 KG/M2 | HEART RATE: 82 BPM | RESPIRATION RATE: 18 BRPM | WEIGHT: 213.38 LBS | HEIGHT: 65 IN | TEMPERATURE: 97 F | DIASTOLIC BLOOD PRESSURE: 76 MMHG | SYSTOLIC BLOOD PRESSURE: 156 MMHG

## 2019-08-13 DIAGNOSIS — F32.5 MAJOR DEPRESSION IN REMISSION: ICD-10-CM

## 2019-08-13 DIAGNOSIS — E11.59 HYPERTENSION ASSOCIATED WITH DIABETES: ICD-10-CM

## 2019-08-13 DIAGNOSIS — I15.2 HYPERTENSION ASSOCIATED WITH DIABETES: ICD-10-CM

## 2019-08-13 DIAGNOSIS — Z12.4 ROUTINE CERVICAL SMEAR: ICD-10-CM

## 2019-08-13 DIAGNOSIS — E11.8 TYPE 2 DIABETES MELLITUS WITH COMPLICATION, WITHOUT LONG-TERM CURRENT USE OF INSULIN: ICD-10-CM

## 2019-08-13 DIAGNOSIS — E11.69 HYPERLIPIDEMIA ASSOCIATED WITH TYPE 2 DIABETES MELLITUS: Primary | ICD-10-CM

## 2019-08-13 DIAGNOSIS — I77.819 AORTIC ECTASIA: ICD-10-CM

## 2019-08-13 DIAGNOSIS — Z86.010 HISTORY OF COLON POLYPS: ICD-10-CM

## 2019-08-13 DIAGNOSIS — Z12.39 SCREENING BREAST EXAMINATION: ICD-10-CM

## 2019-08-13 DIAGNOSIS — E78.5 HYPERLIPIDEMIA ASSOCIATED WITH TYPE 2 DIABETES MELLITUS: Primary | ICD-10-CM

## 2019-08-13 DIAGNOSIS — I25.10 CORONARY ARTERY DISEASE INVOLVING NATIVE CORONARY ARTERY OF NATIVE HEART WITHOUT ANGINA PECTORIS: Chronic | ICD-10-CM

## 2019-08-13 DIAGNOSIS — I21.4 NSTEMI (NON-ST ELEVATED MYOCARDIAL INFARCTION): ICD-10-CM

## 2019-08-13 PROCEDURE — 99213 OFFICE O/P EST LOW 20 MIN: CPT | Mod: PBBFAC,PO | Performed by: INTERNAL MEDICINE

## 2019-08-13 PROCEDURE — 99999 PR PBB SHADOW E&M-EST. PATIENT-LVL III: CPT | Mod: PBBFAC,,, | Performed by: INTERNAL MEDICINE

## 2019-08-13 PROCEDURE — 88175 CYTOPATH C/V AUTO FLUID REDO: CPT

## 2019-08-13 PROCEDURE — 99214 PR OFFICE/OUTPT VISIT, EST, LEVL IV, 30-39 MIN: ICD-10-PCS | Mod: S$PBB,,, | Performed by: INTERNAL MEDICINE

## 2019-08-13 PROCEDURE — 99999 PR PBB SHADOW E&M-EST. PATIENT-LVL III: ICD-10-PCS | Mod: PBBFAC,,, | Performed by: INTERNAL MEDICINE

## 2019-08-13 PROCEDURE — 99214 OFFICE O/P EST MOD 30 MIN: CPT | Mod: S$PBB,,, | Performed by: INTERNAL MEDICINE

## 2019-08-13 RX ORDER — AMLODIPINE AND BENAZEPRIL HYDROCHLORIDE 10; 40 MG/1; MG/1
1 CAPSULE ORAL DAILY
Qty: 90 CAPSULE | Refills: 3 | Status: SHIPPED | OUTPATIENT
Start: 2019-08-13 | End: 2020-08-24 | Stop reason: SDUPTHER

## 2019-08-13 RX ORDER — METOPROLOL TARTRATE 25 MG/1
25 TABLET, FILM COATED ORAL 2 TIMES DAILY
Qty: 180 TABLET | Refills: 3 | Status: SHIPPED | OUTPATIENT
Start: 2019-08-13 | End: 2020-02-13

## 2019-08-13 RX ORDER — METFORMIN HYDROCHLORIDE 500 MG/1
500 TABLET ORAL 2 TIMES DAILY WITH MEALS
Qty: 180 TABLET | Refills: 3 | Status: SHIPPED | OUTPATIENT
Start: 2019-08-13 | End: 2020-02-13 | Stop reason: SDUPTHER

## 2019-08-13 RX ORDER — FENOFIBRATE 134 MG/1
CAPSULE ORAL
Qty: 90 CAPSULE | Refills: 3 | Status: SHIPPED | OUTPATIENT
Start: 2019-08-13 | End: 2020-08-24 | Stop reason: SDUPTHER

## 2019-08-13 RX ORDER — GLIMEPIRIDE 4 MG/1
4 TABLET ORAL
Qty: 90 TABLET | Refills: 3 | Status: SHIPPED | OUTPATIENT
Start: 2019-08-13 | End: 2020-11-16 | Stop reason: SDUPTHER

## 2019-08-13 RX ORDER — DOXAZOSIN 4 MG/1
4 TABLET ORAL DAILY
Qty: 90 TABLET | Refills: 3 | Status: SHIPPED | OUTPATIENT
Start: 2019-08-13 | End: 2020-08-28 | Stop reason: SDUPTHER

## 2019-08-13 RX ORDER — OMEPRAZOLE 40 MG/1
40 CAPSULE, DELAYED RELEASE ORAL DAILY
Qty: 90 CAPSULE | Refills: 3 | Status: SHIPPED | OUTPATIENT
Start: 2019-08-13 | End: 2020-11-16 | Stop reason: SDUPTHER

## 2019-08-13 RX ORDER — ROSUVASTATIN CALCIUM 40 MG/1
40 TABLET, COATED ORAL NIGHTLY
Qty: 30 TABLET | Refills: 11 | Status: SHIPPED | OUTPATIENT
Start: 2019-08-13 | End: 2020-08-24 | Stop reason: SDUPTHER

## 2019-08-13 RX ORDER — ISOSORBIDE MONONITRATE 20 MG/1
20 TABLET ORAL DAILY
Qty: 90 TABLET | Refills: 3 | Status: SHIPPED | OUTPATIENT
Start: 2019-08-13 | End: 2020-02-25

## 2019-08-13 NOTE — PROGRESS NOTES
"HPI:  Patient is a 59-year-old female comes today for follow-up of her diabetes, hypertension, lipids, coronary disease, and for her annual physical exam.  She denies any chest pains or shortness of breath.  She her blood pressure at home has been typically 21635/80.  She denies any hypoglycemic problems. There have been no other new complaints.    Current MEDS: medcard review, verified and update  Allergies: Per the electronic medical record    Past Medical History:   Diagnosis Date    Anticoagulant long-term use     CAD (coronary artery disease)     Depression     History of colon polyps     Hyperlipidemia associated with type 2 diabetes mellitus     Hypertension associated with diabetes     NSTEMI (non-ST elevated myocardial infarction) 2018    Obesity     Type II diabetes mellitus with complication        Past Surgical History:   Procedure Laterality Date    CAROTID STENT       SECTION, LOW TRANSVERSE      x 2    COLONOSCOPY N/A 6/10/2019    Performed by Maricarmen Boo MD at [facility]    COLONOSCOPY N/A 2014    Performed by Aury Horn MD at [facility]    HEART CATH-LEFT Left 2018    Performed by Aimee Cooper MD at [facility] CATH LAB       SHx: per the electronic medical record    FHx: recorded in the electronic medical record    ROS:    denies any chest pains or shortness of breath. Denies any nausea, vomiting or diarrhea. Denies any fever, chills or sweats. Denies any change in weight, voice, stool, skin or hair. Denies any dysuria, dyspepsia or dysphagia. Denies any change in vision, hearing or headaches. Denies any swollen lymph nodes or loss of memory.    PE:  BP (!) 156/76   Pulse 82   Temp 97.2 °F (36.2 °C)   Resp 18   Ht 5' 5" (1.651 m)   Wt 96.8 kg (213 lb 6.5 oz)   LMP 2003   SpO2 96%   BMI 35.51 kg/m²   Gen: Well-developed, well-nourished, female, in no acute distress, oriented x3  HEENT: neck is supple, no adenopathy, carotids 2+ equal without " bruits, thyroid exam normal size without nodules.  CHEST: clear to auscultation and percussion  CVS: regular rate and rhythm without significant murmur, gallop, or rubs  ABD: soft, benign, no rebound no guarding, no distention. Bowel sounds are normal.     Nontender,  no palpable masses, no organomegaly and no audible bruits.  BREAST: no masses.  No nipple inversion, retraction, or deviation.  EXT: no clubbing, cyanosis, or edema  LYMPH: no cervical, inguinal, or axillary adenopathy  FEET: no loss of sensation.  No ulcers or pressure sores.  Monofilament testing normal  NEURO: gait normal.  Cranial nerves II- XII intact. No nystagmus.  Speech normal.   Gross motor and sensory unremarkable.  PELVIC:  External genitalia unremarkable.  Uterus, cervix, adnexa all normal.  Pap smear was done    Lab Results   Component Value Date    WBC 9.87 02/04/2019    HGB 12.1 02/04/2019    HCT 38.7 02/04/2019     02/04/2019    CHOL 226 (H) 08/06/2019    TRIG 175 (H) 08/06/2019    HDL 38 (L) 08/06/2019    ALT 16 08/06/2019    AST 14 08/06/2019     08/06/2019    K 4.1 08/06/2019     08/06/2019    CREATININE 0.9 08/06/2019    BUN 20 08/06/2019    CO2 25 08/06/2019    TSH 1.171 08/06/2019    INR 1.0 05/21/2018    GLUF 106 01/07/2005    HGBA1C 7.0 (H) 08/06/2019       Impression:  Diabetes, to goal  Hyperlipidemia, not to goal, due to noncompliance with her low Crestor  Hypertension, not to goal  Other problems below, stable  Patient Active Problem List   Diagnosis    CAD with far remote PCI of unknown vessel    History of colon polyps    NSTEMI (non-ST elevated myocardial infarction)    Type II diabetes mellitus with complication    Hypertension associated with diabetes    Hyperlipidemia associated with type 2 diabetes mellitus    Major depression in remission    Aortic ectasia       Plan:   Orders Placed This Encounter    Mammo Digital Screening Bilat    Hemoglobin A1c    Comprehensive metabolic panel     Lipid panel    Protein / creatinine ratio, urine    TSH    CBC auto differential    Liquid-based pap smear, screening    amlodipine-benazepril (LOTREL) 10-40 mg per capsule    doxazosin (CARDURA) 4 MG tablet    fenofibrate micronized (LOFIBRA) 134 MG Cap    glimepiride (AMARYL) 4 MG tablet    isosorbide mononitrate (ISMO,MONOKET) 20 MG Tab    metFORMIN (GLUCOPHAGE) 500 MG tablet    metoprolol tartrate (LOPRESSOR) 25 MG tablet    omeprazole (PRILOSEC) 40 MG capsule    rosuvastatin (CRESTOR) 40 MG Tab    ticagrelor (BRILINTA) 90 mg tablet     Patient will be faithful with her Crestor.  She will increase to Lotrel to 10/41 today.  She will call me in 2-3 weeks and let me know if her blood pressure is not less than 130/80.  She will otherwise see me in 6 months with above lab work.    This note is generated with speech recognition software and is subject to transcription error and sound alike phrases that may be missed by proofreading.

## 2019-08-19 NOTE — PROGRESS NOTES
Physical Therapy Daily Treatment Note     Name: Dulce Boogie  [facility] Number: [id number]    Therapy Diagnosis:   Encounter Diagnosis   Name Primary?    Chronic left shoulder pain Yes     Physician: Arleth Clayton,*    Visit Date: 8/20/2019     Physician Orders: PT Eval and Treat   Medical Diagnosis from Referral: Chronic left shoulder pain  Evaluation Date: 6/14/2019  Authorization Period Expiration: 12/31/2019  Plan of Care Expiration: 8/1/2019  Visit # / Visits authorized: 8/20 (includes eval)     Time In: 1000  Time Out: 1030  Total Billable Time: 30 minutes    Precautions: Standard    Subjective     Pt reports: overall pain in left shoulder. She reports that pain is resolving slowly    .She was compliant with home exercise program.  Response to previous treatment: good  Functional change: increase in shoulder pain    Pain: 1/10  Location: left shoulder      Objective     Dulce received therapeutic exercises to develop ROM, posture and pain management for 20 minutes including:    -Cervical retraction in prone (midline and with left cervical rotation) x 15 each way (added YTB for c/s retraction + retraction with rotation left while propped on elbows)  -airplane in prone for T/S strengthening (2x10)  -T/s thoracic ext over chair x 10  -LUE shoulder flexion with YTB to 90 degrees in sitting - no pain  -LUE shoulder abduction to 90 degrees with YTB x 10  - no pain  -Scaption with YTB 2x10 (sitting)- no pain  - with and without YTB 2x10 (1/2 foam roll used at wall for posture),  with YTB performed in supine   -superman with alternating UE/LE 2x10  -prone Y to W to scapular retraction (combo exercise) 2 x 5 reps  -Cervical retraction with extension at neutral and during left rotation performed x 5 each way with improvement of left shoulder symstoms- reviewed only    Dulce received the following manual therapy techniques: Joint mobilizations (P/A) cervical spine for 10 minutes, including:  Cervical  traction with retraction  P/A mobs C4-7  Subscapularis soft tissue massage left      Dulce received the following supervised modalities after being cleared for contradictions: IFC Electrical Stimulation:  Dulce received NMES Electrical Stimulation for muscle spasm applied to the left posterior shoulder/upper trap. Pt received stimulation for 15 minutes. Dulce tolderated treatment well without any adverse effects.      Dulce received hot pack for 15 minutes with IFC for pain management of muscle spasms.      Home Exercises Provided and Patient Education Provided     Education provided:   - HEP and postural correction, avoid prolonged neck flexion    Written Home Exercises Provided: yes.  Exercises were reviewed and Dulce was able to demonstrate them prior to the end of the session.  Dulce demonstrated good  understanding of the education provided.       Assessment   Dulce is progressing well towards her goals. She has less shoulder pain, but general weakness in left shoulder is observable. Shoulder strengthening does not exacerbate shoulder pain.  Pt prognosis is Good.     Pt will continue to benefit from skilled outpatient physical therapy to address the deficits listed in the problem list box on initial evaluation, provide pt/family education and to maximize pt's level of independence in the home and community environment.     Pt's spiritual, cultural and educational needs considered and pt agreeable to plan of care and goals.     Anticipated barriers to physical therapy: none    Goals:     Short Term Goals: In 4 weeks   1.I with HEP- ONGOING GOAL  2.Patient to increase GH ROM to 130-150 degrees shoulder flexion and abduction-GOAL MET  3.Patient to increase MMT strength from 3-/5 to 3/5  -GOAL MET  4.Patient to have pain less than 5/10 (centralized) at worst- GOAL NOT MET  5.Patient to score less than 50% impaired on the Quick Dash- GOAL  MET     Long Term Goals: In 10 weeks  1. Patient to score less than 30% impaired on  the Quick Dash-ONGOING GOAL  2. Patient to demo increase in LUE strength to 4/5-ONGOING GOAL  3. Patient to have decreased pain to 3/10 at all times.-ONGOING GOAL  4. Patient to demo increase GH ROM to WNL all planes-ONGOING GOAL  5. Patient to perform daily activities including reaching, sleeping, dressing, and housework without limitation.-ONGOING GOAL        Plan   Plan of care Certification:  7/30/2019 to 8/29/2019     Outpatient Physical Therapy 2 times weekly  to include the following interventions: Cervical/Lumbar Traction, Electrical Stimulation IFC, Manual Therapy, Moist Heat/ Ice and Therapeutic Exercise.       Jenniffer Vaughn, PT

## 2019-08-20 ENCOUNTER — CLINICAL SUPPORT (OUTPATIENT)
Dept: REHABILITATION | Facility: HOSPITAL | Age: 59
End: 2019-08-20
Payer: MEDICARE

## 2019-08-20 DIAGNOSIS — M25.512 CHRONIC LEFT SHOULDER PAIN: Primary | ICD-10-CM

## 2019-08-20 DIAGNOSIS — G89.29 CHRONIC LEFT SHOULDER PAIN: Primary | ICD-10-CM

## 2019-08-20 PROCEDURE — 97140 MANUAL THERAPY 1/> REGIONS: CPT

## 2019-08-20 PROCEDURE — 97110 THERAPEUTIC EXERCISES: CPT

## 2019-08-23 ENCOUNTER — OFFICE VISIT (OUTPATIENT)
Dept: CARDIOLOGY | Facility: CLINIC | Age: 59
End: 2019-08-23
Payer: MEDICARE

## 2019-08-23 VITALS
BODY MASS INDEX: 35.37 KG/M2 | WEIGHT: 212.31 LBS | SYSTOLIC BLOOD PRESSURE: 152 MMHG | OXYGEN SATURATION: 98 % | HEIGHT: 65 IN | DIASTOLIC BLOOD PRESSURE: 82 MMHG | HEART RATE: 73 BPM

## 2019-08-23 DIAGNOSIS — E11.59 HYPERTENSION ASSOCIATED WITH DIABETES: Primary | ICD-10-CM

## 2019-08-23 DIAGNOSIS — I15.2 HYPERTENSION ASSOCIATED WITH DIABETES: Primary | ICD-10-CM

## 2019-08-23 PROCEDURE — 99214 PR OFFICE/OUTPT VISIT, EST, LEVL IV, 30-39 MIN: ICD-10-PCS | Mod: S$PBB,,, | Performed by: NURSE PRACTITIONER

## 2019-08-23 PROCEDURE — 99214 OFFICE O/P EST MOD 30 MIN: CPT | Mod: PBBFAC | Performed by: NURSE PRACTITIONER

## 2019-08-23 PROCEDURE — 99214 OFFICE O/P EST MOD 30 MIN: CPT | Mod: S$PBB,,, | Performed by: NURSE PRACTITIONER

## 2019-08-23 PROCEDURE — 99999 PR PBB SHADOW E&M-EST. PATIENT-LVL IV: ICD-10-PCS | Mod: PBBFAC,,, | Performed by: NURSE PRACTITIONER

## 2019-08-23 PROCEDURE — 99999 PR PBB SHADOW E&M-EST. PATIENT-LVL IV: CPT | Mod: PBBFAC,,, | Performed by: NURSE PRACTITIONER

## 2019-08-23 NOTE — PROGRESS NOTES
Subjective:   Patient ID:  Dulce Boogie is a 59 y.o. female who presents for follow up of Hypertension and Coronary Artery Disease      HPI  Ms. Boogie's current conditions include CAD with remote PCI in  (previously followed by Dr. Valle at  Cardiology), HTN, HLD, DM II. Admitted May 2018 with unstable angina. Underwent LHC on 18 and noted to have LAD 50% mid long lesion ffr 0.81. Also noted LCX non obs CAD and patent stent. Also found to have RCA 90% prox treated with solange x2 and PDA distal 80% treated with SOLANGE x 2.   Has no abnormal bleeding on ASA and Brilinta.    Lotrel increased to 10-40mg by PCP approximately 2 weeks ago. She is finishing her old script first, then switching to new script today.  BP up some today in clinic.   Denies any chest pain, SOB, COOPER,  orthopnea, PND, dizziness, palpitations,  near syncope, syncope or edema . Has no symptoms concerning for angina or equivalent. No CNS Complaints to suggest TIA or CVA.   Does well with limiting sodium intake.        Past Medical History:   Diagnosis Date    Anticoagulant long-term use     CAD (coronary artery disease)     Depression     History of colon polyps     Hyperlipidemia associated with type 2 diabetes mellitus     Hypertension associated with diabetes     NSTEMI (non-ST elevated myocardial infarction) 2018    Obesity     Type II diabetes mellitus with complication        Past Surgical History:   Procedure Laterality Date    CAROTID STENT       SECTION, LOW TRANSVERSE      x 2    COLONOSCOPY N/A 6/10/2019    Performed by Maricarmen Boo MD at [facility] ENDO    COLONOSCOPY N/A 2014    Performed by Aury Horn MD at [facility] ENDO    HEART CATH-LEFT Left 2018    Performed by Aimee Cooper MD at [facility] CATH LAB       Social History     Tobacco Use    Smoking status: Never Smoker    Smokeless tobacco: Never Used   Substance Use Topics    Alcohol use: No    Drug use: No       Family History   Problem  Relation Age of Onset    Hypertension Mother     Hyperlipidemia Mother     Hypertension Father     Diabetes Father     Hyperlipidemia Father     Colon cancer Father     Hypertension Brother     Breast cancer Neg Hx     Ovarian cancer Neg Hx     Uterine cancer Neg Hx        Current Outpatient Medications   Medication Sig    ALPRAZolam (XANAX) 0.5 MG tablet TAKE 1 TABLET BY MOUTH EVERY DAY AT NIGHT FOR ANXIETY    amlodipine-benazepril (LOTREL) 10-40 mg per capsule Take 1 capsule by mouth once daily.    aspirin (ECOTRIN) 81 MG EC tablet Take 1 tablet (81 mg total) by mouth once daily.    cyclobenzaprine (FLEXERIL) 10 MG tablet TAKE 1 TABLET (10 MG TOTAL) BY MOUTH 3 (THREE) TIMES DAILY.    diclofenac sodium (VOLTAREN) 1 % Gel Apply 2 g topically 3 (three) times daily as needed.    doxazosin (CARDURA) 4 MG tablet Take 1 tablet (4 mg total) by mouth once daily.    fenofibrate micronized (LOFIBRA) 134 MG Cap TAKE ONE CAPSULE BY MOUTH EVERY DAY WITH BREAKFAST.    glimepiride (AMARYL) 4 MG tablet Take 1 tablet (4 mg total) by mouth before breakfast.    hydrocodone-acetaminophen 5-325mg (NORCO) 5-325 mg per tablet Take 1 tablet by mouth 3 (three) times daily as needed for Pain.    isosorbide mononitrate (ISMO,MONOKET) 20 MG Tab Take 1 tablet (20 mg total) by mouth once daily.    metFORMIN (GLUCOPHAGE) 500 MG tablet Take 1 tablet (500 mg total) by mouth 2 (two) times daily with meals.    methocarbamol (ROBAXIN) 500 MG Tab Take 1 tablet (500 mg total) by mouth 3 (three) times daily as needed (muscle spasms).    metoprolol tartrate (LOPRESSOR) 25 MG tablet Take 1 tablet (25 mg total) by mouth 2 (two) times daily.    niacin (NIASPAN) 750 MG CR tablet TAKE 1 TABLET (750 MG TOTAL) BY MOUTH NIGHTLY.    nitroGLYCERIN (NITROSTAT) 0.4 MG SL tablet PLACE 1 TABLET UNDER TONGUE AT FIRST SIGN OF HEART PAIN, AND REPEAT EVERY 5 MINUTES IF PAIN PERSISTS    omeprazole (PRILOSEC) 40 MG capsule Take 1 capsule (40 mg  total) by mouth once daily.    ondansetron (ZOFRAN) 4 MG tablet Take 1 tablet (4 mg total) by mouth every 8 (eight) hours as needed for Nausea.    rosuvastatin (CRESTOR) 40 MG Tab Take 1 tablet (40 mg total) by mouth every evening.    ticagrelor (BRILINTA) 90 mg tablet Take 1 tablet (90 mg total) by mouth 2 (two) times daily.     No current facility-administered medications for this visit.      Current Outpatient Medications on File Prior to Visit   Medication Sig    ALPRAZolam (XANAX) 0.5 MG tablet TAKE 1 TABLET BY MOUTH EVERY DAY AT NIGHT FOR ANXIETY    amlodipine-benazepril (LOTREL) 10-40 mg per capsule Take 1 capsule by mouth once daily.    aspirin (ECOTRIN) 81 MG EC tablet Take 1 tablet (81 mg total) by mouth once daily.    cyclobenzaprine (FLEXERIL) 10 MG tablet TAKE 1 TABLET (10 MG TOTAL) BY MOUTH 3 (THREE) TIMES DAILY.    diclofenac sodium (VOLTAREN) 1 % Gel Apply 2 g topically 3 (three) times daily as needed.    doxazosin (CARDURA) 4 MG tablet Take 1 tablet (4 mg total) by mouth once daily.    fenofibrate micronized (LOFIBRA) 134 MG Cap TAKE ONE CAPSULE BY MOUTH EVERY DAY WITH BREAKFAST.    glimepiride (AMARYL) 4 MG tablet Take 1 tablet (4 mg total) by mouth before breakfast.    hydrocodone-acetaminophen 5-325mg (NORCO) 5-325 mg per tablet Take 1 tablet by mouth 3 (three) times daily as needed for Pain.    isosorbide mononitrate (ISMO,MONOKET) 20 MG Tab Take 1 tablet (20 mg total) by mouth once daily.    metFORMIN (GLUCOPHAGE) 500 MG tablet Take 1 tablet (500 mg total) by mouth 2 (two) times daily with meals.    methocarbamol (ROBAXIN) 500 MG Tab Take 1 tablet (500 mg total) by mouth 3 (three) times daily as needed (muscle spasms).    metoprolol tartrate (LOPRESSOR) 25 MG tablet Take 1 tablet (25 mg total) by mouth 2 (two) times daily.    niacin (NIASPAN) 750 MG CR tablet TAKE 1 TABLET (750 MG TOTAL) BY MOUTH NIGHTLY.    nitroGLYCERIN (NITROSTAT) 0.4 MG SL tablet PLACE 1 TABLET UNDER  TONGUE AT FIRST SIGN OF HEART PAIN, AND REPEAT EVERY 5 MINUTES IF PAIN PERSISTS    omeprazole (PRILOSEC) 40 MG capsule Take 1 capsule (40 mg total) by mouth once daily.    ondansetron (ZOFRAN) 4 MG tablet Take 1 tablet (4 mg total) by mouth every 8 (eight) hours as needed for Nausea.    rosuvastatin (CRESTOR) 40 MG Tab Take 1 tablet (40 mg total) by mouth every evening.    ticagrelor (BRILINTA) 90 mg tablet Take 1 tablet (90 mg total) by mouth 2 (two) times daily.     No current facility-administered medications on file prior to visit.        Review of Systems   Constitution: Negative for diaphoresis, malaise/fatigue, weight gain and weight loss.   HENT: Negative for congestion and nosebleeds.    Cardiovascular: Negative for chest pain, claudication, cyanosis, dyspnea on exertion, irregular heartbeat, leg swelling, near-syncope, orthopnea, palpitations, paroxysmal nocturnal dyspnea and syncope.   Respiratory: Negative for cough, hemoptysis, shortness of breath, sleep disturbances due to breathing, snoring, sputum production and wheezing.    Hematologic/Lymphatic: Negative for bleeding problem. Does not bruise/bleed easily.   Skin: Negative for rash.   Musculoskeletal: Negative for arthritis, back pain, falls, joint pain, muscle cramps and muscle weakness.   Gastrointestinal: Negative for abdominal pain, constipation, diarrhea, heartburn, hematemesis, hematochezia, melena, nausea and vomiting.   Genitourinary: Negative for dysuria, hematuria and nocturia.   Neurological: Negative for excessive daytime sleepiness, dizziness, headaches, light-headedness, loss of balance, numbness, vertigo and weakness.       Objective:   Physical Exam   Constitutional: She is oriented to person, place, and time. She appears well-developed and well-nourished.   Neck: Neck supple. No JVD present.   Cardiovascular: Normal rate, regular rhythm, normal heart sounds and normal pulses. Exam reveals no friction rub.   No murmur  "heard.  Pulmonary/Chest: Effort normal and breath sounds normal. No respiratory distress. She has no wheezes. She has no rales.   Abdominal: Soft. Bowel sounds are normal. She exhibits no distension.   Musculoskeletal: She exhibits no edema or tenderness.   Neurological: She is alert and oriented to person, place, and time.   Skin: Skin is warm and dry. No rash noted.   Psychiatric: She has a normal mood and affect. Her behavior is normal.   Nursing note and vitals reviewed.    Vitals:    08/23/19 0831 08/23/19 0834   BP: (!) 142/82 (!) 152/82   BP Location: Right arm Left arm   Pulse: 73    SpO2: 98%    Weight: 96.3 kg (212 lb 4.9 oz)    Height: 5' 5" (1.651 m)      Lab Results   Component Value Date    CHOL 226 (H) 08/06/2019    CHOL 226 (H) 05/21/2019    CHOL 192 02/04/2019     Lab Results   Component Value Date    HDL 38 (L) 08/06/2019    HDL 42 05/21/2019    HDL 38 (L) 02/04/2019     Lab Results   Component Value Date    LDLCALC 153.0 08/06/2019    LDLCALC 138.0 05/21/2019    LDLCALC 120.2 02/04/2019     Lab Results   Component Value Date    TRIG 175 (H) 08/06/2019    TRIG 230 (H) 05/21/2019    TRIG 169 (H) 02/04/2019     Lab Results   Component Value Date    CHOLHDL 16.8 (L) 08/06/2019    CHOLHDL 18.6 (L) 05/21/2019    CHOLHDL 19.8 (L) 02/04/2019       Chemistry        Component Value Date/Time     08/06/2019 0822    K 4.1 08/06/2019 0822     08/06/2019 0822    CO2 25 08/06/2019 0822    BUN 20 08/06/2019 0822    CREATININE 0.9 08/06/2019 0822     (H) 08/06/2019 0822        Component Value Date/Time    CALCIUM 9.4 08/06/2019 0822    ALKPHOS 65 08/06/2019 0822    AST 14 08/06/2019 0822    ALT 16 08/06/2019 0822    BILITOT 0.2 08/06/2019 0822    ESTGFRAFRICA >60.0 08/06/2019 0822    EGFRNONAA >60.0 08/06/2019 0822          Lab Results   Component Value Date    TSH 1.171 08/06/2019     Lab Results   Component Value Date    INR 1.0 05/21/2018    INR 1.0 12/21/2013    INR 1.0 07/10/2011     Lab " Results   Component Value Date    WBC 9.87 02/04/2019    HGB 12.1 02/04/2019    HCT 38.7 02/04/2019    MCV 84 02/04/2019     02/04/2019     BMP  Sodium   Date Value Ref Range Status   08/06/2019 141 136 - 145 mmol/L Final     Potassium   Date Value Ref Range Status   08/06/2019 4.1 3.5 - 5.1 mmol/L Final     Chloride   Date Value Ref Range Status   08/06/2019 108 95 - 110 mmol/L Final     CO2   Date Value Ref Range Status   08/06/2019 25 23 - 29 mmol/L Final     BUN, Bld   Date Value Ref Range Status   08/06/2019 20 6 - 20 mg/dL Final     Creatinine   Date Value Ref Range Status   08/06/2019 0.9 0.5 - 1.4 mg/dL Final     Calcium   Date Value Ref Range Status   08/06/2019 9.4 8.7 - 10.5 mg/dL Final     Anion Gap   Date Value Ref Range Status   08/06/2019 8 8 - 16 mmol/L Final     eGFR if    Date Value Ref Range Status   08/06/2019 >60.0 >60 mL/min/1.73 m^2 Final     eGFR if non    Date Value Ref Range Status   08/06/2019 >60.0 >60 mL/min/1.73 m^2 Final     Comment:     Calculation used to obtain the estimated glomerular filtration  rate (eGFR) is the CKD-EPI equation.        CrCl cannot be calculated (Patient's most recent lab result is older than the maximum 7 days allowed.).    Assessment:     1. Hypertension associated with diabetes        Plan:   She should continue with her recommended increase in Lotrel to 10-40mg daily .   Continue other current medical management   Heart healthy diet  Exercise program   RTC in 6 months or sooner

## 2019-09-06 RX ORDER — ALPRAZOLAM 0.5 MG/1
TABLET ORAL
Qty: 90 TABLET | Refills: 1 | Status: SHIPPED | OUTPATIENT
Start: 2019-09-06 | End: 2020-05-12 | Stop reason: SDUPTHER

## 2019-09-18 ENCOUNTER — TELEPHONE (OUTPATIENT)
Dept: ORTHOPEDICS | Facility: CLINIC | Age: 59
End: 2019-09-18

## 2019-09-18 NOTE — TELEPHONE ENCOUNTER
Spoke with the patient about their appointment on 9/27/19 with Arleth Clayton. Informed the patient that we have to reschedule their appointment for another day due to the fact that Arleth Clayton will be out office on that day. I got the patient reschedule to see Arleth Clayton on 10/11/19 at 11:20. Patient verbalized understanding. Patient was thankful for the call.EDUAR

## 2019-09-26 ENCOUNTER — PES CALL (OUTPATIENT)
Dept: ADMINISTRATIVE | Facility: CLINIC | Age: 59
End: 2019-09-26

## 2019-10-11 ENCOUNTER — OFFICE VISIT (OUTPATIENT)
Dept: ORTHOPEDICS | Facility: CLINIC | Age: 59
End: 2019-10-11
Payer: MEDICARE

## 2019-10-11 VITALS
BODY MASS INDEX: 35.37 KG/M2 | DIASTOLIC BLOOD PRESSURE: 83 MMHG | WEIGHT: 212.31 LBS | HEIGHT: 65 IN | SYSTOLIC BLOOD PRESSURE: 136 MMHG | HEART RATE: 64 BPM

## 2019-10-11 DIAGNOSIS — M25.512 LEFT SHOULDER PAIN, UNSPECIFIED CHRONICITY: Primary | ICD-10-CM

## 2019-10-11 DIAGNOSIS — G89.29 CHRONIC LEFT SHOULDER PAIN: Primary | ICD-10-CM

## 2019-10-11 DIAGNOSIS — M75.42 IMPINGEMENT SYNDROME OF LEFT SHOULDER: ICD-10-CM

## 2019-10-11 DIAGNOSIS — M75.82 ROTATOR CUFF TENDINITIS, LEFT: ICD-10-CM

## 2019-10-11 DIAGNOSIS — E11.8 TYPE II DIABETES MELLITUS WITH COMPLICATION: ICD-10-CM

## 2019-10-11 DIAGNOSIS — M25.512 CHRONIC LEFT SHOULDER PAIN: Primary | ICD-10-CM

## 2019-10-11 PROCEDURE — 20610 PR DRAIN/INJECT LARGE JOINT/BURSA: ICD-10-PCS | Mod: S$PBB,LT,, | Performed by: PHYSICIAN ASSISTANT

## 2019-10-11 PROCEDURE — 99214 OFFICE O/P EST MOD 30 MIN: CPT | Mod: PBBFAC,25 | Performed by: PHYSICIAN ASSISTANT

## 2019-10-11 PROCEDURE — 20610 DRAIN/INJ JOINT/BURSA W/O US: CPT | Mod: S$PBB,LT,, | Performed by: PHYSICIAN ASSISTANT

## 2019-10-11 PROCEDURE — 99999 PR PBB SHADOW E&M-EST. PATIENT-LVL IV: ICD-10-PCS | Mod: PBBFAC,,, | Performed by: PHYSICIAN ASSISTANT

## 2019-10-11 PROCEDURE — 99214 PR OFFICE/OUTPT VISIT, EST, LEVL IV, 30-39 MIN: ICD-10-PCS | Mod: 25,S$PBB,, | Performed by: PHYSICIAN ASSISTANT

## 2019-10-11 PROCEDURE — 20610 DRAIN/INJ JOINT/BURSA W/O US: CPT | Mod: PBBFAC | Performed by: PHYSICIAN ASSISTANT

## 2019-10-11 PROCEDURE — 99214 OFFICE O/P EST MOD 30 MIN: CPT | Mod: 25,S$PBB,, | Performed by: PHYSICIAN ASSISTANT

## 2019-10-11 PROCEDURE — 99999 PR PBB SHADOW E&M-EST. PATIENT-LVL IV: CPT | Mod: PBBFAC,,, | Performed by: PHYSICIAN ASSISTANT

## 2019-10-11 RX ORDER — METHOCARBAMOL 500 MG/1
500 TABLET, FILM COATED ORAL 3 TIMES DAILY PRN
Qty: 60 TABLET | Refills: 1 | Status: SHIPPED | OUTPATIENT
Start: 2019-10-11 | End: 2019-11-30 | Stop reason: SDUPTHER

## 2019-10-11 RX ORDER — MELOXICAM 7.5 MG/1
TABLET ORAL
Qty: 180 TABLET | Refills: 3 | Status: SHIPPED | OUTPATIENT
Start: 2019-10-11 | End: 2020-11-23

## 2019-10-11 RX ORDER — METHYLPREDNISOLONE ACETATE 80 MG/ML
80 INJECTION, SUSPENSION INTRA-ARTICULAR; INTRALESIONAL; INTRAMUSCULAR; SOFT TISSUE ONCE
Status: COMPLETED | OUTPATIENT
Start: 2019-10-11 | End: 2019-10-11

## 2019-10-11 RX ADMIN — METHYLPREDNISOLONE ACETATE 80 MG: 80 INJECTION, SUSPENSION INTRALESIONAL; INTRAMUSCULAR; INTRASYNOVIAL; SOFT TISSUE at 12:10

## 2019-10-11 NOTE — PROGRESS NOTES
Patient ID: Dulce Boogie is a 59 y.o. female.    Chief Complaint: Pain of the Left Shoulder and Pain of the Right Hip      HPI: Dulce Boogie  is a 59 y.o. female who c/o Pain of the Left Shoulder and Pain of the Right Hip   for duration of more than 6 months.  Her main complaint today is the left shoulder.  The right hip is doing great.  She is not having any pain in it.  She has been working with physical therapy and they seem to be helping range of motion, but it is not helping pain any more.  I gave her a corticosteroid injection in  which gave symptomatic relief for 3-4 months.  Pain level today 8/10.  Quality is aching and constant.  Worsened with laying down on her left side as well as overhead motion.  Alleviating factors include Robaxin and prior corticosteroid injection.    Past Medical History:   Diagnosis Date    Anticoagulant long-term use     CAD (coronary artery disease)     Depression     History of colon polyps     Hyperlipidemia associated with type 2 diabetes mellitus     Hypertension associated with diabetes     NSTEMI (non-ST elevated myocardial infarction) 2018    Obesity     Type II diabetes mellitus with complication      Past Surgical History:   Procedure Laterality Date    CAROTID STENT       SECTION, LOW TRANSVERSE      x 2    COLONOSCOPY N/A 6/10/2019    Procedure: COLONOSCOPY;  Surgeon: Maricarmen Boo MD;  Location: [facility] ENDO;  Service: Endoscopy;  Laterality: N/A;    LEFT HEART CATHETERIZATION Left 2018    Procedure: HEART CATH-LEFT;  Surgeon: Aimee Cooper MD;  Location: [facility] CATH LAB;  Service: Cardiology;  Laterality: Left;     Family History   Problem Relation Age of Onset    Hypertension Mother     Hyperlipidemia Mother     Hypertension Father     Diabetes Father     Hyperlipidemia Father     Colon cancer Father     Hypertension Brother     Breast cancer Neg Hx     Ovarian cancer Neg Hx     Uterine cancer Neg Hx      Social History      Socioeconomic History    Marital status:      Spouse name: Not on file    Number of children: Not on file    Years of education: Not on file    Highest education level: Not on file   Occupational History    Not on file   Social Needs    Financial resource strain: Not on file    Food insecurity:     Worry: Not on file     Inability: Not on file    Transportation needs:     Medical: Not on file     Non-medical: Not on file   Tobacco Use    Smoking status: Never Smoker    Smokeless tobacco: Never Used   Substance and Sexual Activity    Alcohol use: No    Drug use: No    Sexual activity: Not Currently   Lifestyle    Physical activity:     Days per week: 0 days     Minutes per session: 0 min    Stress: Rather much   Relationships    Social connections:     Talks on phone: Twice a week     Gets together: Once a week     Attends Nondenominational service: Not on file     Active member of club or organization: No     Attends meetings of clubs or organizations: Never     Relationship status: Patient refused   Other Topics Concern    Not on file   Social History Narrative    Not on file     Medication List with Changes/Refills   New Medications    METHOCARBAMOL (ROBAXIN) 500 MG TAB    Take 1 tablet (500 mg total) by mouth 3 (three) times daily as needed (muscle spasms).   Current Medications    ALPRAZOLAM (XANAX) 0.5 MG TABLET    TAKE 1 TABLET BY MOUTH EVERY DAY AT NIGHT FOR ANXIETY    AMLODIPINE-BENAZEPRIL (LOTREL) 10-40 MG PER CAPSULE    Take 1 capsule by mouth once daily.    ASPIRIN (ECOTRIN) 81 MG EC TABLET    Take 1 tablet (81 mg total) by mouth once daily.    CYCLOBENZAPRINE (FLEXERIL) 10 MG TABLET    TAKE 1 TABLET (10 MG TOTAL) BY MOUTH 3 (THREE) TIMES DAILY.    DICLOFENAC SODIUM (VOLTAREN) 1 % GEL    Apply 2 g topically 3 (three) times daily as needed.    DOXAZOSIN (CARDURA) 4 MG TABLET    Take 1 tablet (4 mg total) by mouth once daily.    FENOFIBRATE MICRONIZED (LOFIBRA) 134 MG CAP    TAKE ONE  CAPSULE BY MOUTH EVERY DAY WITH BREAKFAST.    GLIMEPIRIDE (AMARYL) 4 MG TABLET    Take 1 tablet (4 mg total) by mouth before breakfast.    HYDROCODONE-ACETAMINOPHEN 5-325MG (NORCO) 5-325 MG PER TABLET    Take 1 tablet by mouth 3 (three) times daily as needed for Pain.    ISOSORBIDE MONONITRATE (ISMO,MONOKET) 20 MG TAB    Take 1 tablet (20 mg total) by mouth once daily.    MELOXICAM (MOBIC) 7.5 MG TABLET    TAKE 1 TABLET BY MOUTH TWICE A DAY WITH A MEAL FOR PAIN/INFLAMMATION    METFORMIN (GLUCOPHAGE) 500 MG TABLET    Take 1 tablet (500 mg total) by mouth 2 (two) times daily with meals.    METHOCARBAMOL (ROBAXIN) 500 MG TAB    Take 1 tablet (500 mg total) by mouth 3 (three) times daily as needed (muscle spasms).    METOPROLOL TARTRATE (LOPRESSOR) 25 MG TABLET    Take 1 tablet (25 mg total) by mouth 2 (two) times daily.    NIACIN (NIASPAN) 750 MG CR TABLET    TAKE 1 TABLET (750 MG TOTAL) BY MOUTH NIGHTLY.    NITROGLYCERIN (NITROSTAT) 0.4 MG SL TABLET    PLACE 1 TABLET UNDER TONGUE AT FIRST SIGN OF HEART PAIN, AND REPEAT EVERY 5 MINUTES IF PAIN PERSISTS    OMEPRAZOLE (PRILOSEC) 40 MG CAPSULE    Take 1 capsule (40 mg total) by mouth once daily.    ONDANSETRON (ZOFRAN) 4 MG TABLET    Take 1 tablet (4 mg total) by mouth every 8 (eight) hours as needed for Nausea.    ROSUVASTATIN (CRESTOR) 40 MG TAB    Take 1 tablet (40 mg total) by mouth every evening.    TICAGRELOR (BRILINTA) 90 MG TABLET    Take 1 tablet (90 mg total) by mouth 2 (two) times daily.     Review of patient's allergies indicates:   Allergen Reactions    No known drug allergies            Objective:        General    Nursing note and vitals reviewed.  Constitutional: She is oriented to person, place, and time. She appears well-developed and well-nourished.   HENT:   Head: Normocephalic and atraumatic.   Eyes: EOM are normal.   Cardiovascular: Normal rate and regular rhythm.    Pulmonary/Chest: Effort normal and breath sounds normal.   Abdominal: Soft.    Neurological: She is alert and oriented to person, place, and time.   Psychiatric: She has a normal mood and affect. Her behavior is normal.         Right Shoulder Exam     Inspection/Observation   Swelling: absent  Bruising: absent  Scars: absent  Deformity: absent  Scapular Dyskinesia: negative    Range of Motion   Active abduction: normal   Passive abduction: normal   Extension: normal   Forward Flexion: normal   Forward Elevation: normal  Adduction: normal  External Rotation 0 degrees: normal   Internal rotation 0 degrees: normal     Left Shoulder Exam     Inspection/Observation   Swelling: absent  Bruising: absent  Scars: absent  Deformity: absent  Scapular Dyskinesia: negative    Tenderness   The patient is tender to palpation of the greater tuberosity, acromioclavicular joint and biceps tendon.    Range of Motion   Active abduction: normal   Passive abduction: normal   Extension: normal   Forward Flexion: normal   Forward Elevation: normal  Adduction: normal  External Rotation 0 degrees: normal   Internal rotation 0 degrees:  Sacrum abnormal     Tests & Signs   Drop arm: negative  Jiménez test: positive  Impingement: positive  Rotator Cuff Painful Arc/Range: moderate  Active Compression Test (Atkins's Sign): positive  Speed's Test: positive    Other   Sensation: normal       Muscle Strength   Right Upper Extremity   Shoulder Abduction: 5/5   Shoulder Internal Rotation: 5/5   Shoulder External Rotation: 5/5   Subscapularis: 5/5/5   Biceps: 5/5/5   Left Upper Extremity  Shoulder Abduction: 4/5   Shoulder Internal Rotation: 5/5   Shoulder External Rotation: 5/5   Subscapularis: 5/5/5   Biceps: 5/5/5     Vascular Exam       Left Pulses      Radial:                    2+      Capillary Refill  Left Hand: normal capillary refill              Assessment:       Encounter Diagnoses   Name Primary?    Chronic left shoulder pain Yes    Impingement syndrome of left shoulder     Rotator cuff tendinitis, left      Type II diabetes mellitus with complication           Plan:       Dulce was seen today for pain and pain.    Diagnoses and all orders for this visit:    Chronic left shoulder pain  -     methocarbamol (ROBAXIN) 500 MG Tab; Take 1 tablet (500 mg total) by mouth 3 (three) times daily as needed (muscle spasms).  -     methylPREDNISolone acetate injection 80 mg    Impingement syndrome of left shoulder  -     methocarbamol (ROBAXIN) 500 MG Tab; Take 1 tablet (500 mg total) by mouth 3 (three) times daily as needed (muscle spasms).  -     methylPREDNISolone acetate injection 80 mg    Rotator cuff tendinitis, left  -     methocarbamol (ROBAXIN) 500 MG Tab; Take 1 tablet (500 mg total) by mouth 3 (three) times daily as needed (muscle spasms).  -     methylPREDNISolone acetate injection 80 mg    Type II diabetes mellitus with complication        Dulce Boogie is an established pt here for follow-up on her left shoulder.  She has left shoulder impingement.  We have discussed risks and benefits of repeating corticosteroid injection. She wishes to proceed with that today. I have given her refill of Robaxin which she may continue to take at nighttime.  She will continue with a home exercise program.  At some point in the future, she may benefit from a left shoulder scope with subacromial decompression and distal clavicle excision.  However, she is the sole caregiver for her 9-year-old granddaughter.  She states that she would have to wait until the summer time to even consider any sort of surgical intervention.  We will continue to manage her conservatively for now.  She verbalizes understanding and agrees    Follow up in about 3 months (around 1/11/2020) for f/u left shoulder with xray.        Left Shoulder Injection Report:  After verbal consent was obtained for left shoulder injection, patient ID, site, and side were verified.  The left shoulder was sterilly prepped in the standard fashion.  A 22-gauge needle was introduced into  left subacromial space from the posterior portal approach without complication. The left shoulder was then injected with 20 mg lidocaine plain and 80 mg depomedrol.  A sterile bandaid was applied.  The patient was informed to apply an ice pack approximately 10min once arriving home and not to do anything strenuous for 24hours. She was instructed to call if there were any problems. The patient was discharged in stable condition.    The patient understands, chooses and consents to this plan and accepts all   the risks which include but are not limited to the risks mentioned above.     Disclaimer: This note was prepared using a voice recognition system and is likely to have sound alike errors within the text.

## 2019-10-11 NOTE — LETTER
October 11, 2019      Zeus Hanson MD  [address]  [address]  [facility] [address]           [facility]  [address] [place] [address]  Phone: [phone]  Fax: [phone]          Patient: Dulce Boogie   MR Number: [id number]   YOB: 1960   Date of Visit: 10/11/2019       Dear Dr. Zeus Hanson:    Thank you for referring Dulce Boogie to me for evaluation. Attached you will find relevant portions of my assessment and plan of care.    If you have questions, please do not hesitate to call me. I look forward to following Dulce Boogie along with you.    Sincerely,    Arleth Clayton PA-C    Enclosure  CC:  No Recipients    If you would like to receive this communication electronically, please contact [email or web address] or [phone] to request more information on [facility] Link access.    For providers and/or their staff who would like to refer a patient to [facility], please contact us through our one-stop-shop provider referral line, [facility] , at [phone].    If you feel you have received this communication in error or would no longer like to receive these types of communications, please e-mail [email or web address]

## 2019-10-18 ENCOUNTER — PATIENT OUTREACH (OUTPATIENT)
Dept: ADMINISTRATIVE | Facility: HOSPITAL | Age: 59
End: 2019-10-18

## 2019-10-18 NOTE — PROGRESS NOTES
PreVisit Chart Audit Perfomed  ANGELICA sent to [facility]     Lucy ENGLAND LPN Care Coordinator  Care Coordination Department  [facility]  [phone]

## 2019-10-18 NOTE — LETTER
AUTHORIZATION FOR RELEASE OF   CONFIDENTIAL INFORMATION    Dear Dr. Sterling     We are seeing Dulce Boogie, date of birth 1960, in the clinic at [facility] INTERNAL MEDICINE. Zeus Hanson MD is the patient's PCP. Dulce Boogie has an outstanding lab/procedure at the time we reviewed her chart. In order to help keep her health information updated, she has authorized us to request the following medical record(s):        (  )  MAMMOGRAM                                      (  )  COLONOSCOPY      (  )  PAP SMEAR                                          (  )  OUTSIDE LAB RESULTS     ( X )  DEXA SCAN                                          (  )  EYE EXAM            (  )  FOOT EXAM                                          (  )  ENTIRE RECORD     (  )  OUTSIDE IMMUNIZATIONS                 (  )  _______________         Please fax records to [facility], Keith A Holmes, MD, [phone]     If you have any questions, please contact    Lucy ENGLAND LPN Care Coordinator  Care Coordination Department  [facility]  [phone]       Patient Name: Dulce Boogie  : 1960  Patient Phone #: [phone]

## 2019-10-25 ENCOUNTER — OFFICE VISIT (OUTPATIENT)
Dept: INTERNAL MEDICINE | Facility: CLINIC | Age: 59
End: 2019-10-25
Payer: MEDICARE

## 2019-10-25 VITALS
DIASTOLIC BLOOD PRESSURE: 74 MMHG | HEART RATE: 68 BPM | HEIGHT: 65 IN | BODY MASS INDEX: 36.87 KG/M2 | OXYGEN SATURATION: 98 % | SYSTOLIC BLOOD PRESSURE: 122 MMHG | WEIGHT: 221.31 LBS | TEMPERATURE: 98 F

## 2019-10-25 DIAGNOSIS — E11.69 HYPERLIPIDEMIA ASSOCIATED WITH TYPE 2 DIABETES MELLITUS: ICD-10-CM

## 2019-10-25 DIAGNOSIS — E11.59 HYPERTENSION ASSOCIATED WITH DIABETES: Primary | ICD-10-CM

## 2019-10-25 DIAGNOSIS — I15.2 HYPERTENSION ASSOCIATED WITH DIABETES: Primary | ICD-10-CM

## 2019-10-25 DIAGNOSIS — E78.5 HYPERLIPIDEMIA ASSOCIATED WITH TYPE 2 DIABETES MELLITUS: ICD-10-CM

## 2019-10-25 DIAGNOSIS — E11.8 TYPE II DIABETES MELLITUS WITH COMPLICATION: ICD-10-CM

## 2019-10-25 PROCEDURE — 90662 IIV NO PRSV INCREASED AG IM: CPT | Mod: PBBFAC,PO

## 2019-10-25 PROCEDURE — 99999 PR PBB SHADOW E&M-EST. PATIENT-LVL V: ICD-10-PCS | Mod: PBBFAC,,, | Performed by: NURSE PRACTITIONER

## 2019-10-25 PROCEDURE — 99214 OFFICE O/P EST MOD 30 MIN: CPT | Mod: 25,S$PBB,, | Performed by: NURSE PRACTITIONER

## 2019-10-25 PROCEDURE — 99214 PR OFFICE/OUTPT VISIT, EST, LEVL IV, 30-39 MIN: ICD-10-PCS | Mod: 25,S$PBB,, | Performed by: NURSE PRACTITIONER

## 2019-10-25 PROCEDURE — 99999 PR PBB SHADOW E&M-EST. PATIENT-LVL V: CPT | Mod: PBBFAC,,, | Performed by: NURSE PRACTITIONER

## 2019-10-25 PROCEDURE — 99215 OFFICE O/P EST HI 40 MIN: CPT | Mod: PBBFAC,PO,25 | Performed by: NURSE PRACTITIONER

## 2019-10-25 NOTE — PROGRESS NOTES
"Subjective:      Patient ID: Dulce Boogie is a 59 y.o. female.    Chief Complaint: Follow-up    HPI:  Patient is here for a follow up of her BP. It is improved on her new dose of lotrel.  She says she is tolerating it well, home readings are similar.  No complains today    Past Medical History:   Diagnosis Date    Anticoagulant long-term use     CAD (coronary artery disease)     Depression     History of colon polyps     Hyperlipidemia associated with type 2 diabetes mellitus     Hypertension associated with diabetes     NSTEMI (non-ST elevated myocardial infarction) 2018    Obesity     Type II diabetes mellitus with complication        Past Surgical History:   Procedure Laterality Date    CAROTID STENT       SECTION, LOW TRANSVERSE      x 2    COLONOSCOPY N/A 6/10/2019    Procedure: COLONOSCOPY;  Surgeon: Maricarmen Boo MD;  Location: [facility] ENDO;  Service: Endoscopy;  Laterality: N/A;    LEFT HEART CATHETERIZATION Left 2018    Procedure: HEART CATH-LEFT;  Surgeon: Aimee Cooper MD;  Location: [facility] CATH LAB;  Service: Cardiology;  Laterality: Left;       Lab Results   Component Value Date    WBC 9.87 2019    HGB 12.1 2019    HCT 38.7 2019     2019    CHOL 226 (H) 2019    TRIG 175 (H) 2019    HDL 38 (L) 2019    ALT 16 2019    AST 14 2019     2019    K 4.1 2019     2019    CREATININE 0.9 2019    BUN 20 2019    CO2 25 2019    TSH 1.171 2019    INR 1.0 2018    GLUF 106 2005    HGBA1C 7.0 (H) 2019       /74 (BP Location: Left arm)   Pulse 68   Temp 98 °F (36.7 °C) (Tympanic)   Ht 5' 5" (1.651 m)   Wt 100.4 kg (221 lb 5.5 oz)   LMP 2003   SpO2 98%   BMI 36.83 kg/m²       Review of Systems   Constitutional: Negative for appetite change, fatigue and unexpected weight change.   HENT: Negative for congestion, ear pain, postnasal drip, " rhinorrhea, sinus pressure, sneezing, sore throat, tinnitus, trouble swallowing and voice change.    Eyes: Negative for pain, discharge, redness, itching and visual disturbance.   Respiratory: Negative for cough, chest tightness, shortness of breath and wheezing.    Cardiovascular: Negative for chest pain, palpitations and leg swelling.   Gastrointestinal: Negative for abdominal distention, abdominal pain, blood in stool, constipation, diarrhea, nausea and vomiting.        No reflux.   Genitourinary: Negative for difficulty urinating, dyspareunia, flank pain, menstrual problem and pelvic pain.   Musculoskeletal: Negative for arthralgias, back pain, myalgias and neck stiffness.   Skin: Negative for color change and rash.   Neurological: Negative for dizziness and headaches.   Psychiatric/Behavioral: Negative for confusion and sleep disturbance. The patient is not nervous/anxious.       Objective:     Physical Exam   Constitutional: She is oriented to person, place, and time. She appears well-developed and well-nourished. No distress.   Musculoskeletal:   Normal gait   Neurological: She is alert and oriented to person, place, and time.   Skin: Skin is warm and dry.   Psychiatric: She has a normal mood and affect. Her behavior is normal.     Assessment:      1. Hypertension associated with diabetes    2. Hyperlipidemia associated with type 2 diabetes mellitus    3. Type II diabetes mellitus with complication      Plan:   Hypertension associated with diabetes    Hyperlipidemia associated with type 2 diabetes mellitus    Type II diabetes mellitus with complication    Other orders  -     Hypertension Digital Medicine (HDMP) Enrollment Order  -     Hypertension Digital Medicine (HDMP): Assign Onboarding Questionnaires    will see her pcp as scheduled.  Continue current medications.  She had a flu shot today      Current Outpatient Medications:     ALPRAZolam (XANAX) 0.5 MG tablet, TAKE 1 TABLET BY MOUTH EVERY DAY AT NIGHT  FOR ANXIETY, Disp: 90 tablet, Rfl: 1    amlodipine-benazepril (LOTREL) 10-40 mg per capsule, Take 1 capsule by mouth once daily., Disp: 90 capsule, Rfl: 3    cyclobenzaprine (FLEXERIL) 10 MG tablet, TAKE 1 TABLET (10 MG TOTAL) BY MOUTH 3 (THREE) TIMES DAILY., Disp: 50 tablet, Rfl: 3    diclofenac sodium (VOLTAREN) 1 % Gel, Apply 2 g topically 3 (three) times daily as needed., Disp: 100 g, Rfl: 0    doxazosin (CARDURA) 4 MG tablet, Take 1 tablet (4 mg total) by mouth once daily., Disp: 90 tablet, Rfl: 3    fenofibrate micronized (LOFIBRA) 134 MG Cap, TAKE ONE CAPSULE BY MOUTH EVERY DAY WITH BREAKFAST., Disp: 90 capsule, Rfl: 3    glimepiride (AMARYL) 4 MG tablet, Take 1 tablet (4 mg total) by mouth before breakfast., Disp: 90 tablet, Rfl: 3    hydrocodone-acetaminophen 5-325mg (NORCO) 5-325 mg per tablet, Take 1 tablet by mouth 3 (three) times daily as needed for Pain., Disp: 60 tablet, Rfl: 0    isosorbide mononitrate (ISMO,MONOKET) 20 MG Tab, Take 1 tablet (20 mg total) by mouth once daily., Disp: 90 tablet, Rfl: 3    meloxicam (MOBIC) 7.5 MG tablet, TAKE 1 TABLET BY MOUTH TWICE A DAY WITH A MEAL FOR PAIN/INFLAMMATION, Disp: 180 tablet, Rfl: 3    metFORMIN (GLUCOPHAGE) 500 MG tablet, Take 1 tablet (500 mg total) by mouth 2 (two) times daily with meals., Disp: 180 tablet, Rfl: 3    methocarbamol (ROBAXIN) 500 MG Tab, Take 1 tablet (500 mg total) by mouth 3 (three) times daily as needed (muscle spasms)., Disp: 60 tablet, Rfl: 1    metoprolol tartrate (LOPRESSOR) 25 MG tablet, Take 1 tablet (25 mg total) by mouth 2 (two) times daily., Disp: 180 tablet, Rfl: 3    niacin (NIASPAN) 750 MG CR tablet, TAKE 1 TABLET (750 MG TOTAL) BY MOUTH NIGHTLY., Disp: 30 tablet, Rfl: 11    nitroGLYCERIN (NITROSTAT) 0.4 MG SL tablet, PLACE 1 TABLET UNDER TONGUE AT FIRST SIGN OF HEART PAIN, AND REPEAT EVERY 5 MINUTES IF PAIN PERSISTS, Disp: 25 tablet, Rfl: 1    omeprazole (PRILOSEC) 40 MG capsule, Take 1 capsule (40 mg  total) by mouth once daily., Disp: 90 capsule, Rfl: 3    ondansetron (ZOFRAN) 4 MG tablet, Take 1 tablet (4 mg total) by mouth every 8 (eight) hours as needed for Nausea., Disp: 12 tablet, Rfl: 0    rosuvastatin (CRESTOR) 40 MG Tab, Take 1 tablet (40 mg total) by mouth every evening., Disp: 30 tablet, Rfl: 11    ticagrelor (BRILINTA) 90 mg tablet, Take 1 tablet (90 mg total) by mouth 2 (two) times daily., Disp: 180 tablet, Rfl: 3    aspirin (ECOTRIN) 81 MG EC tablet, Take 1 tablet (81 mg total) by mouth once daily., Disp: , Rfl: 0

## 2019-11-30 DIAGNOSIS — M75.82 ROTATOR CUFF TENDINITIS, LEFT: ICD-10-CM

## 2019-11-30 DIAGNOSIS — M75.42 IMPINGEMENT SYNDROME OF LEFT SHOULDER: ICD-10-CM

## 2019-11-30 DIAGNOSIS — M25.512 CHRONIC LEFT SHOULDER PAIN: ICD-10-CM

## 2019-11-30 DIAGNOSIS — G89.29 CHRONIC LEFT SHOULDER PAIN: ICD-10-CM

## 2019-12-02 RX ORDER — METHOCARBAMOL 500 MG/1
TABLET, FILM COATED ORAL
Qty: 60 TABLET | Refills: 0 | Status: SHIPPED | OUTPATIENT
Start: 2019-12-02 | End: 2019-12-23

## 2019-12-22 DIAGNOSIS — M75.82 ROTATOR CUFF TENDINITIS, LEFT: ICD-10-CM

## 2019-12-22 DIAGNOSIS — M75.42 IMPINGEMENT SYNDROME OF LEFT SHOULDER: ICD-10-CM

## 2019-12-22 DIAGNOSIS — G89.29 CHRONIC LEFT SHOULDER PAIN: ICD-10-CM

## 2019-12-22 DIAGNOSIS — M25.512 CHRONIC LEFT SHOULDER PAIN: ICD-10-CM

## 2019-12-23 RX ORDER — METHOCARBAMOL 500 MG/1
TABLET, FILM COATED ORAL
Qty: 60 TABLET | Refills: 0 | Status: SHIPPED | OUTPATIENT
Start: 2019-12-23 | End: 2022-10-06 | Stop reason: SDUPTHER

## 2020-01-01 ENCOUNTER — HOSPITAL ENCOUNTER (EMERGENCY)
Facility: HOSPITAL | Age: 60
Discharge: HOME | End: 2020-01-01
Attending: EMERGENCY MEDICINE
Payer: MEDICARE

## 2020-01-01 VITALS
RESPIRATION RATE: 18 BRPM | HEART RATE: 69 BPM | HEIGHT: 65 IN | SYSTOLIC BLOOD PRESSURE: 162 MMHG | DIASTOLIC BLOOD PRESSURE: 88 MMHG | TEMPERATURE: 99 F | OXYGEN SATURATION: 99 % | BODY MASS INDEX: 35.63 KG/M2 | WEIGHT: 213.88 LBS

## 2020-01-01 DIAGNOSIS — S39.012A BACK STRAIN, INITIAL ENCOUNTER: ICD-10-CM
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
## 2020-01-01 DIAGNOSIS — M51.35 DDD (DEGENERATIVE DISC DISEASE), THORACOLUMBAR: ICD-10-CM

## 2020-01-01 PROCEDURE — 63600175 PHARM REV CODE 636 W HCPCS: Performed by: NURSE PRACTITIONER

## 2020-01-01 PROCEDURE — 96372 THER/PROPH/DIAG INJ SC/IM: CPT | Mod: 59

## 2020-01-01 PROCEDURE — 25000003 PHARM REV CODE 250: Performed by: NURSE PRACTITIONER

## 2020-01-01 PROCEDURE — 99284 EMERGENCY DEPT VISIT MOD MDM: CPT | Mod: 25

## 2020-01-01 RX ORDER — ONDANSETRON 8 MG/1
8 TABLET, ORALLY DISINTEGRATING ORAL
Status: COMPLETED | OUTPATIENT
Start: 2020-01-01 | End: 2020-01-01

## 2020-01-01 RX ORDER — MORPHINE SULFATE 4 MG/ML
8 INJECTION, SOLUTION INTRAMUSCULAR; INTRAVENOUS
Status: COMPLETED | OUTPATIENT
Start: 2020-01-01 | End: 2020-01-01

## 2020-01-01 RX ORDER — DICLOFENAC SODIUM 50 MG/1
50 TABLET, DELAYED RELEASE ORAL 2 TIMES DAILY
Qty: 15 TABLET | Refills: 0 | Status: SHIPPED | OUTPATIENT
Start: 2020-01-01 | End: 2020-08-03 | Stop reason: SDUPTHER

## 2020-01-01 RX ORDER — TRAMADOL HYDROCHLORIDE 50 MG/1
50 TABLET ORAL EVERY 6 HOURS PRN
Qty: 12 TABLET | Refills: 0 | Status: SHIPPED | OUTPATIENT
Start: 2020-01-01 | End: 2020-01-05

## 2020-01-01 RX ORDER — ORPHENADRINE CITRATE 30 MG/ML
60 INJECTION INTRAMUSCULAR; INTRAVENOUS
Status: COMPLETED | OUTPATIENT
Start: 2020-01-01 | End: 2020-01-01

## 2020-01-01 RX ORDER — ORPHENADRINE CITRATE 100 MG/1
100 TABLET, EXTENDED RELEASE ORAL 2 TIMES DAILY PRN
Qty: 15 TABLET | Refills: 0 | Status: SHIPPED | OUTPATIENT
Start: 2020-01-01 | End: 2020-01-11

## 2020-01-01 RX ADMIN — MORPHINE SULFATE 8 MG: 4 INJECTION INTRAVENOUS at 09:01

## 2020-01-01 RX ADMIN — ORPHENADRINE CITRATE 60 MG: 60 INJECTION INTRAMUSCULAR; INTRAVENOUS at 09:01

## 2020-01-01 RX ADMIN — ONDANSETRON 8 MG: 8 TABLET, ORALLY DISINTEGRATING ORAL at 09:01
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
## 2020-01-06 ENCOUNTER — DOCUMENTATION ONLY (OUTPATIENT)
Dept: REHABILITATION | Facility: HOSPITAL | Age: 60
End: 2020-01-06

## 2020-01-06 NOTE — PROGRESS NOTES
Outpatient Therapy Discharge Summary     Name: Dulce Boogie  [facility] Number: [id number]    Physician: Arleth Clayton,*       Physician Orders: PT Eval and Treat   Medical Diagnosis from Referral: Chronic left shoulder pain  Evaluation Date: 6/14/2019    Date of Last visit: 8/20/2019  Total Visits Received: 8  Cancelled Visits: -  No Show Visits: -    Assessment    Goals: Short Term Goals: In 4 weeks   1.I with HEP- GOAL PARTIALLY MET  2.Patient to increase GH ROM to 130-150 degrees shoulder flexion and abduction -GOAL MET  3.Patient to increase MMT strength from 3-/5 to 3/5  -GOAL MET  4.Patient to have pain less than 5/10 (centralized) at worst - GOAL MET  5.Patient to score less than 50% impaired on the Quick Dash- GOAL MET     Long Term Goals: In 10 weeks  1. Patient to score less than 30% impaired on the Quick Dash- ONGOING GOAL  2. Patient to demo increase in LUE strength to 4/5- ONGOING GOAL  3. Patient to have decreased pain to 3/10 at all times.- ONGOING GOAL  4. Patient to demo increase GH ROM to WNL all planes- ONGOING GOAL  5. Patient to perform daily activities including reaching, sleeping, dressing, and housework without limitation.-ONGOING GOAL    Pain in general has decreased to 3/10 at neck and/or shoulder with movement into shoulder flexion and abduction. Patient reports that she is able to perform more activities around the house with less difficulty.      Dulce has improved with overall pain in neck/shoulder, ROM of left shoulder and strength of left shoulder. Her disability percentage has also dropped from 61% to 45%.    Discharge reason: Patient has not attended therapy since 8/20/2019    Plan   This patient is discharged from Physical Therapy

## 2020-01-09 RX ORDER — NITROGLYCERIN 0.4 MG/1
TABLET SUBLINGUAL
Qty: 25 TABLET | Refills: 1 | Status: SHIPPED | OUTPATIENT
Start: 2020-01-09 | End: 2020-01-10

## 2020-01-10 RX ORDER — NITROGLYCERIN 0.4 MG/1
TABLET SUBLINGUAL
Qty: 25 TABLET | Refills: 1 | Status: SHIPPED | OUTPATIENT
Start: 2020-01-10 | End: 2020-05-19 | Stop reason: SDUPTHER

## 2020-01-17 ENCOUNTER — OFFICE VISIT (OUTPATIENT)
Dept: ORTHOPEDICS | Facility: CLINIC | Age: 60
End: 2020-01-17
Payer: MEDICARE

## 2020-01-17 ENCOUNTER — HOSPITAL ENCOUNTER (OUTPATIENT)
Dept: RADIOLOGY | Facility: HOSPITAL | Age: 60
Discharge: HOME | End: 2020-01-17
Attending: PHYSICIAN ASSISTANT
Payer: MEDICARE

## 2020-01-17 VITALS
WEIGHT: 213.88 LBS | BODY MASS INDEX: 35.63 KG/M2 | HEIGHT: 65 IN | DIASTOLIC BLOOD PRESSURE: 88 MMHG | SYSTOLIC BLOOD PRESSURE: 152 MMHG | HEART RATE: 68 BPM

## 2020-01-17 DIAGNOSIS — M25.512 LEFT SHOULDER PAIN, UNSPECIFIED CHRONICITY: ICD-10-CM

## 2020-01-17 DIAGNOSIS — M75.42 IMPINGEMENT SYNDROME OF LEFT SHOULDER: ICD-10-CM

## 2020-01-17 DIAGNOSIS — M75.82 ROTATOR CUFF TENDINITIS, LEFT: ICD-10-CM

## 2020-01-17 DIAGNOSIS — G89.29 CHRONIC LEFT SHOULDER PAIN: Primary | ICD-10-CM

## 2020-01-17 DIAGNOSIS — M25.512 CHRONIC LEFT SHOULDER PAIN: Primary | ICD-10-CM

## 2020-01-17 PROCEDURE — 99214 PR OFFICE/OUTPT VISIT, EST, LEVL IV, 30-39 MIN: ICD-10-PCS | Mod: S$PBB,,, | Performed by: PHYSICIAN ASSISTANT

## 2020-01-17 PROCEDURE — 99214 OFFICE O/P EST MOD 30 MIN: CPT | Mod: S$PBB,,, | Performed by: PHYSICIAN ASSISTANT

## 2020-01-17 PROCEDURE — 73030 XR SHOULDER COMPLETE 2 OR MORE VIEWS LEFT: ICD-10-PCS | Mod: 26,LT,, | Performed by: RADIOLOGY

## 2020-01-17 PROCEDURE — 99999 PR PBB SHADOW E&M-EST. PATIENT-LVL V: ICD-10-PCS | Mod: PBBFAC,,, | Performed by: PHYSICIAN ASSISTANT

## 2020-01-17 PROCEDURE — 99215 OFFICE O/P EST HI 40 MIN: CPT | Mod: PBBFAC,25 | Performed by: PHYSICIAN ASSISTANT

## 2020-01-17 PROCEDURE — 73030 X-RAY EXAM OF SHOULDER: CPT | Mod: TC,LT

## 2020-01-17 PROCEDURE — 99999 PR PBB SHADOW E&M-EST. PATIENT-LVL V: CPT | Mod: PBBFAC,,, | Performed by: PHYSICIAN ASSISTANT

## 2020-01-17 PROCEDURE — 73030 X-RAY EXAM OF SHOULDER: CPT | Mod: 26,LT,, | Performed by: RADIOLOGY

## 2020-01-17 NOTE — LETTER
January 17, 2020      Zeus Hanson MD  [address]  [address]  [facility] [address]           [facility]  [address] [place] [address]  Phone: [phone]  Fax: [phone]          Patient: Dulce Boogie   MR Number: [id number]   YOB: 1960   Date of Visit: 1/17/2020       Dear Dr. Zeus Hanson:    Thank you for referring Dulce Boogie to me for evaluation. Attached you will find relevant portions of my assessment and plan of care.    If you have questions, please do not hesitate to call me. I look forward to following Dulce Boogie along with you.    Sincerely,    Arleth Clayton PA-C    Enclosure  CC:  No Recipients    If you would like to receive this communication electronically, please contact [email or web address] or [phone] to request more information on [employer] Link access.    For providers and/or their staff who would like to refer a patient to [facility], please contact us through our one-stop-shop provider referral line, [facility], at [phone].    If you feel you have received this communication in error or would no longer like to receive these types of communications, please e-mail [email or web address]
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
## 2020-01-29 ENCOUNTER — TELEPHONE (OUTPATIENT)
Dept: CARDIOLOGY | Facility: CLINIC | Age: 60
End: 2020-01-29

## 2020-01-29 NOTE — TELEPHONE ENCOUNTER
----- Message from TONYA Perry sent at 1/29/2020  1:47 PM CST -----  Contact: Patient  Patient can have MRI with cardiac stents and is ok to hold ASA and Brilinta for 1 week prior to dental work since her last stent was in May 2018.       ----- Message -----  From: Theresa Tesfaye MA  Sent: 1/29/2020   1:36 PM CST  To: TONYA ePrry    Miss Boogie wants to know if ok to have an MRI since she has a stent, also she has a dental procedure scheduled for 2/4/20 and she wants to know when she should stop her blood thinners.  ----- Message -----  From: Cynthia Moreno  Sent: 1/29/2020   1:25 PM CST  To: Christos Malone Staff    Patient states that she is having an MRI and needs to make sure its ok to have, also having dental work and needs to be off meds, please call her back at [phone]. Thank you

## 2020-01-30 ENCOUNTER — TELEPHONE (OUTPATIENT)
Dept: RADIOLOGY | Facility: HOSPITAL | Age: 60
End: 2020-01-30

## 2020-01-31 ENCOUNTER — HOSPITAL ENCOUNTER (OUTPATIENT)
Dept: RADIOLOGY | Facility: HOSPITAL | Age: 60
Discharge: HOME | End: 2020-01-31
Attending: PHYSICIAN ASSISTANT
Payer: MEDICARE

## 2020-01-31 ENCOUNTER — TELEPHONE (OUTPATIENT)
Dept: ORTHOPEDICS | Facility: CLINIC | Age: 60
End: 2020-01-31

## 2020-01-31 ENCOUNTER — PATIENT MESSAGE (OUTPATIENT)
Dept: ORTHOPEDICS | Facility: CLINIC | Age: 60
End: 2020-01-31

## 2020-01-31 DIAGNOSIS — M75.42 IMPINGEMENT SYNDROME OF LEFT SHOULDER: ICD-10-CM

## 2020-01-31 DIAGNOSIS — M25.512 CHRONIC LEFT SHOULDER PAIN: ICD-10-CM

## 2020-01-31 DIAGNOSIS — G89.29 CHRONIC LEFT SHOULDER PAIN: ICD-10-CM

## 2020-01-31 DIAGNOSIS — M75.82 ROTATOR CUFF TENDINITIS, LEFT: ICD-10-CM

## 2020-01-31 PROCEDURE — 73221 MRI JOINT UPR EXTREM W/O DYE: CPT | Mod: TC,LT

## 2020-01-31 PROCEDURE — 73221 MRI SHOULDER WITHOUT CONTRAST LEFT: ICD-10-PCS | Mod: 26,LT,, | Performed by: RADIOLOGY

## 2020-01-31 PROCEDURE — 73221 MRI JOINT UPR EXTREM W/O DYE: CPT | Mod: 26,LT,, | Performed by: RADIOLOGY

## 2020-01-31 NOTE — TELEPHONE ENCOUNTER
Phoned patient to schedule her an appointment with a surgeon. Left a message for her to call back.      WILLI Junior Staff      I spoke to the patient regarding her MRI results.  She has a a full-thickness rotator cuff tear. I would like her to see 1 of our sports medicine surgeons to discuss the possibility of a rotator cuff repair.  I will have staff contact her to get her reschedule.

## 2020-01-31 NOTE — TELEPHONE ENCOUNTER
I spoke to the patient regarding her MRI results.  She has a a full-thickness rotator cuff tear. I would like her to see 1 of our sports medicine surgeons to discuss the possibility of a rotator cuff repair.  I will have staff contact her to get her reschedule.

## 2020-02-05 ENCOUNTER — TELEPHONE (OUTPATIENT)
Dept: ORTHOPEDICS | Facility: CLINIC | Age: 60
End: 2020-02-05

## 2020-02-05 NOTE — TELEPHONE ENCOUNTER
Spoke with patient and rescheduled her appointment to see a surgeon. Patient verbalized understanding.

## 2020-02-06 ENCOUNTER — HOSPITAL ENCOUNTER (OUTPATIENT)
Dept: RADIOLOGY | Facility: HOSPITAL | Age: 60
Discharge: HOME | End: 2020-02-06
Attending: INTERNAL MEDICINE
Payer: MEDICARE

## 2020-02-06 VITALS — BODY MASS INDEX: 35.49 KG/M2 | HEIGHT: 65 IN | WEIGHT: 213 LBS

## 2020-02-06 DIAGNOSIS — Z12.39 SCREENING BREAST EXAMINATION: ICD-10-CM

## 2020-02-06 DIAGNOSIS — Z12.31 VISIT FOR SCREENING MAMMOGRAM: ICD-10-CM

## 2020-02-06 PROCEDURE — 77063 MAMMO DIGITAL SCREENING BILAT WITH TOMOSYNTHESIS_CAD: ICD-10-PCS | Mod: 26,,, | Performed by: RADIOLOGY

## 2020-02-06 PROCEDURE — 77067 SCR MAMMO BI INCL CAD: CPT | Mod: 26,,, | Performed by: RADIOLOGY

## 2020-02-06 PROCEDURE — 77067 SCR MAMMO BI INCL CAD: CPT | Mod: TC

## 2020-02-06 PROCEDURE — 77067 MAMMO DIGITAL SCREENING BILAT WITH TOMOSYNTHESIS_CAD: ICD-10-PCS | Mod: 26,,, | Performed by: RADIOLOGY

## 2020-02-06 PROCEDURE — 77063 BREAST TOMOSYNTHESIS BI: CPT | Mod: 26,,, | Performed by: RADIOLOGY

## 2020-02-13 ENCOUNTER — OFFICE VISIT (OUTPATIENT)
Dept: INTERNAL MEDICINE | Facility: CLINIC | Age: 60
End: 2020-02-13
Payer: MEDICARE

## 2020-02-13 VITALS
BODY MASS INDEX: 36.96 KG/M2 | SYSTOLIC BLOOD PRESSURE: 136 MMHG | HEART RATE: 100 BPM | DIASTOLIC BLOOD PRESSURE: 84 MMHG | TEMPERATURE: 98 F | HEIGHT: 65 IN | OXYGEN SATURATION: 98 % | WEIGHT: 221.81 LBS

## 2020-02-13 DIAGNOSIS — E11.59 HYPERTENSION ASSOCIATED WITH DIABETES: ICD-10-CM

## 2020-02-13 DIAGNOSIS — I21.4 NSTEMI (NON-ST ELEVATED MYOCARDIAL INFARCTION): ICD-10-CM

## 2020-02-13 DIAGNOSIS — Z86.010 HISTORY OF COLON POLYPS: ICD-10-CM

## 2020-02-13 DIAGNOSIS — I15.2 HYPERTENSION ASSOCIATED WITH DIABETES: ICD-10-CM

## 2020-02-13 DIAGNOSIS — I25.10 CORONARY ARTERY DISEASE INVOLVING NATIVE CORONARY ARTERY OF NATIVE HEART WITHOUT ANGINA PECTORIS: Chronic | ICD-10-CM

## 2020-02-13 DIAGNOSIS — E11.69 HYPERLIPIDEMIA ASSOCIATED WITH TYPE 2 DIABETES MELLITUS: Primary | ICD-10-CM

## 2020-02-13 DIAGNOSIS — E11.29 MICROALBUMINURIA DUE TO TYPE 2 DIABETES MELLITUS: ICD-10-CM

## 2020-02-13 DIAGNOSIS — F32.5 MAJOR DEPRESSION IN REMISSION: ICD-10-CM

## 2020-02-13 DIAGNOSIS — E78.5 HYPERLIPIDEMIA ASSOCIATED WITH TYPE 2 DIABETES MELLITUS: Primary | ICD-10-CM

## 2020-02-13 DIAGNOSIS — E11.8 TYPE II DIABETES MELLITUS WITH COMPLICATION: ICD-10-CM

## 2020-02-13 DIAGNOSIS — I77.819 AORTIC ECTASIA: ICD-10-CM

## 2020-02-13 DIAGNOSIS — R80.9 MICROALBUMINURIA DUE TO TYPE 2 DIABETES MELLITUS: ICD-10-CM

## 2020-02-13 PROCEDURE — 99214 PR OFFICE/OUTPT VISIT, EST, LEVL IV, 30-39 MIN: ICD-10-PCS | Mod: S$PBB,,, | Performed by: INTERNAL MEDICINE

## 2020-02-13 PROCEDURE — 99213 OFFICE O/P EST LOW 20 MIN: CPT | Mod: PBBFAC,PO | Performed by: INTERNAL MEDICINE

## 2020-02-13 PROCEDURE — 99999 PR PBB SHADOW E&M-EST. PATIENT-LVL III: ICD-10-PCS | Mod: PBBFAC,,, | Performed by: INTERNAL MEDICINE

## 2020-02-13 PROCEDURE — 99214 OFFICE O/P EST MOD 30 MIN: CPT | Mod: S$PBB,,, | Performed by: INTERNAL MEDICINE

## 2020-02-13 PROCEDURE — 99999 PR PBB SHADOW E&M-EST. PATIENT-LVL III: CPT | Mod: PBBFAC,,, | Performed by: INTERNAL MEDICINE

## 2020-02-13 RX ORDER — METOPROLOL SUCCINATE 50 MG/1
50 TABLET, EXTENDED RELEASE ORAL 2 TIMES DAILY
Qty: 180 TABLET | Refills: 3 | Status: SHIPPED | OUTPATIENT
Start: 2020-02-13 | End: 2020-07-13 | Stop reason: ALTCHOICE

## 2020-02-13 RX ORDER — METFORMIN HYDROCHLORIDE 500 MG/1
1000 TABLET ORAL 2 TIMES DAILY WITH MEALS
Qty: 360 TABLET | Refills: 3 | Status: SHIPPED | OUTPATIENT
Start: 2020-02-13 | End: 2020-11-09 | Stop reason: SDUPTHER

## 2020-02-13 RX ORDER — EZETIMIBE 10 MG/1
10 TABLET ORAL DAILY
Qty: 90 TABLET | Refills: 3 | Status: SHIPPED | OUTPATIENT
Start: 2020-02-13 | End: 2020-11-23 | Stop reason: SDUPTHER

## 2020-02-13 NOTE — PROGRESS NOTES
"HPI:  Patient is a 59-year-old female who comes in today for follow-up of diabetes, hypertension, lipids, and coronary artery disease.  She denies any angina type pain.  She does have some atypical chest wall pain.  She is due to see her cardiologist this month.  Patient states her blood pressure at home is between 130-140/80 most the time.  She states her blood sugars goes up and down all the time.  She denies any hypoglycemia.  She states he is faithful with her medications including her cholesterol medication.    Current meds have been verified and updated per the EMR  Exam:/84   Pulse 100   Temp 98 °F (36.7 °C) (Tympanic)   Ht 5' 5" (1.651 m)   Wt 100.6 kg (221 lb 12.5 oz)   LMP 06/21/2003   SpO2 98%   BMI 36.91 kg/m²   Chest clear  Cardiovascular regular rate and rhythm without murmur gallop or rub  Extremities without edema    Lab Results   Component Value Date    WBC 8.16 02/06/2020    HGB 12.4 02/06/2020    HCT 39.9 02/06/2020     02/06/2020    CHOL 248 (H) 02/06/2020    TRIG 182 (H) 02/06/2020    HDL 38 (L) 02/06/2020    ALT 16 02/06/2020    AST 20 02/06/2020     02/06/2020    K 3.3 (L) 02/06/2020     02/06/2020    CREATININE 0.9 02/06/2020    BUN 14 02/06/2020    CO2 24 02/06/2020    TSH 1.353 02/06/2020    INR 1.0 05/21/2018    GLUF 106 01/07/2005    HGBA1C 7.4 (H) 02/06/2020       Impression:  Diabetes, not to goal  Hypertension, not as well controlled as I would like.  I would like it to be less than 130/80.  Microalbuminuria, due to her diabetes and hypertension  Hyperlipidemia, LDL is 170.  It needs to be less than 70.  She states she has faithfully taking the Crestor 40 mg a day.  She needs to be on a injectable immunotherapy for her lipids.  She needs to discuss this with her cardiologist  Patient Active Problem List   Diagnosis    CAD with far remote PCI of unknown vessel    History of colon polyps    NSTEMI (non-ST elevated myocardial infarction)    Type II " diabetes mellitus with complication    Hypertension associated with diabetes    Hyperlipidemia associated with type 2 diabetes mellitus    Major depression in remission    Aortic ectasia       Plan:  Orders Placed This Encounter    Hemoglobin A1c    Lipid panel    Basic metabolic panel    ezetimibe (ZETIA) 10 mg tablet    metFORMIN (GLUCOPHAGE) 500 MG tablet    metoprolol succinate (TOPROL-XL) 50 MG 24 hr tablet    She will increase the metformin to take a 1000 mg twice a day.  She will increase the metoprolol to 50 mg twice a day.  She will be started on Zetia 10 mg today.  She will see me in 3 months.  She needs make a follow-up appointment with her cardiologist to discuss her lipid management therapy.  Strongly encouraged her to try to lose some weight      This note is generated with speech recognition software and is subject to transcription error and sound alike phrases that may be missed by proofreading.

## 2020-02-20 ENCOUNTER — PATIENT MESSAGE (OUTPATIENT)
Dept: ADMINISTRATIVE | Facility: OTHER | Age: 60
End: 2020-02-20

## 2020-02-25 ENCOUNTER — OFFICE VISIT (OUTPATIENT)
Dept: CARDIOLOGY | Facility: CLINIC | Age: 60
End: 2020-02-25
Payer: MEDICARE

## 2020-02-25 ENCOUNTER — HOSPITAL ENCOUNTER (OUTPATIENT)
Dept: CARDIOLOGY | Facility: HOSPITAL | Age: 60
Discharge: HOME | End: 2020-02-25
Payer: MEDICARE

## 2020-02-25 VITALS
WEIGHT: 219.38 LBS | HEART RATE: 69 BPM | BODY MASS INDEX: 36.55 KG/M2 | DIASTOLIC BLOOD PRESSURE: 78 MMHG | SYSTOLIC BLOOD PRESSURE: 158 MMHG | HEIGHT: 65 IN

## 2020-02-25 DIAGNOSIS — I25.10 CORONARY ARTERY DISEASE INVOLVING NATIVE CORONARY ARTERY OF NATIVE HEART WITHOUT ANGINA PECTORIS: Primary | Chronic | ICD-10-CM

## 2020-02-25 DIAGNOSIS — I15.2 HYPERTENSION ASSOCIATED WITH DIABETES: ICD-10-CM

## 2020-02-25 DIAGNOSIS — E11.59 HYPERTENSION ASSOCIATED WITH DIABETES: ICD-10-CM

## 2020-02-25 DIAGNOSIS — I25.10 CORONARY ARTERY DISEASE INVOLVING NATIVE CORONARY ARTERY OF NATIVE HEART WITHOUT ANGINA PECTORIS: Chronic | ICD-10-CM

## 2020-02-25 PROCEDURE — 93010 ELECTROCARDIOGRAM REPORT: CPT | Mod: ,,, | Performed by: INTERNAL MEDICINE

## 2020-02-25 PROCEDURE — 93010 EKG 12-LEAD: ICD-10-PCS | Mod: ,,, | Performed by: INTERNAL MEDICINE

## 2020-02-25 PROCEDURE — 99214 PR OFFICE/OUTPT VISIT, EST, LEVL IV, 30-39 MIN: ICD-10-PCS | Mod: S$PBB,,, | Performed by: NURSE PRACTITIONER

## 2020-02-25 PROCEDURE — 93005 ELECTROCARDIOGRAM TRACING: CPT

## 2020-02-25 PROCEDURE — 99999 PR PBB SHADOW E&M-EST. PATIENT-LVL III: ICD-10-PCS | Mod: PBBFAC,,, | Performed by: NURSE PRACTITIONER

## 2020-02-25 PROCEDURE — 99214 OFFICE O/P EST MOD 30 MIN: CPT | Mod: S$PBB,,, | Performed by: NURSE PRACTITIONER

## 2020-02-25 PROCEDURE — 99213 OFFICE O/P EST LOW 20 MIN: CPT | Mod: PBBFAC,25 | Performed by: NURSE PRACTITIONER

## 2020-02-25 PROCEDURE — 99999 PR PBB SHADOW E&M-EST. PATIENT-LVL III: CPT | Mod: PBBFAC,,, | Performed by: NURSE PRACTITIONER

## 2020-02-25 RX ORDER — ISOSORBIDE MONONITRATE 20 MG/1
20 TABLET ORAL 2 TIMES DAILY WITH MEALS
Qty: 180 TABLET | Refills: 3 | Status: SHIPPED | OUTPATIENT
Start: 2020-02-25 | End: 2020-11-23 | Stop reason: SDUPTHER

## 2020-02-25 NOTE — PROGRESS NOTES
Subjective:   Patient ID:  Dulce Boogie is a 59 y.o. female who presents for follow up of Coronary Artery Disease      HPI  Ms. Boogie's current conditions include CAD with remote PCI in  (previously followed by Dr. Valle at  Cardiology), HTN, HLD, DM II. Admitted May 2018 with unstable angina. Underwent LHC on 18 and noted to have LAD 50% mid long lesion ffr 0.81. Also noted LCX non obs CAD and patent stent, RCA 90% prox treated with solange x2 and PDA distal 80% treated with SOLANGE x 2.   Has no abnormal bleeding on ASA and Brilinta.    She presents today with reports of one episode of chest pain and SOB. Took 1 SL nitro and had relief. Has had any additional episodes.  Episode felt different from when she needed PCI. States that her BP has been fluctuating.     EKG today shows NSR with no ischemic changes.   BP up from her baseline today. Metoprolol recently increased by PCP      Past Medical History:   Diagnosis Date    Anticoagulant long-term use     CAD (coronary artery disease)     Depression     History of colon polyps     Hyperlipidemia associated with type 2 diabetes mellitus     Hypertension associated with diabetes     Microalbuminuria due to type 2 diabetes mellitus     NSTEMI (non-ST elevated myocardial infarction) 2018    Obesity     Type II diabetes mellitus with complication        Past Surgical History:   Procedure Laterality Date    CAROTID STENT       SECTION, LOW TRANSVERSE      x 2    COLONOSCOPY N/A 6/10/2019    Procedure: COLONOSCOPY;  Surgeon: Maricarmen Boo MD;  Location: [facility];  Service: Endoscopy;  Laterality: N/A;    LEFT HEART CATHETERIZATION Left 2018    Procedure: HEART CATH-LEFT;  Surgeon: Aimee Cooper MD;  Location: [facility] CATH LAB;  Service: Cardiology;  Laterality: Left;       Social History     Tobacco Use    Smoking status: Never Smoker    Smokeless tobacco: Never Used   Substance Use Topics    Alcohol use: No    Drug use: No        Family History   Problem Relation Age of Onset    Hypertension Mother     Hyperlipidemia Mother     Hypertension Father     Diabetes Father     Hyperlipidemia Father     Colon cancer Father     Hypertension Brother     Breast cancer Neg Hx     Ovarian cancer Neg Hx     Uterine cancer Neg Hx        Current Outpatient Medications   Medication Sig    ALPRAZolam (XANAX) 0.5 MG tablet TAKE 1 TABLET BY MOUTH EVERY DAY AT NIGHT FOR ANXIETY    amlodipine-benazepril (LOTREL) 10-40 mg per capsule Take 1 capsule by mouth once daily.    aspirin (ECOTRIN) 81 MG EC tablet Take 1 tablet (81 mg total) by mouth once daily.    diclofenac (VOLTAREN) 50 MG EC tablet Take 1 tablet (50 mg total) by mouth 2 (two) times daily.    doxazosin (CARDURA) 4 MG tablet Take 1 tablet (4 mg total) by mouth once daily.    ezetimibe (ZETIA) 10 mg tablet Take 1 tablet (10 mg total) by mouth once daily.    fenofibrate micronized (LOFIBRA) 134 MG Cap TAKE ONE CAPSULE BY MOUTH EVERY DAY WITH BREAKFAST.    glimepiride (AMARYL) 4 MG tablet Take 1 tablet (4 mg total) by mouth before breakfast.    hydrocodone-acetaminophen 5-325mg (NORCO) 5-325 mg per tablet Take 1 tablet by mouth 3 (three) times daily as needed for Pain.    isosorbide mononitrate (ISMO,MONOKET) 20 MG Tab Take 1 tablet (20 mg total) by mouth 2 (two) times daily with meals.    meloxicam (MOBIC) 7.5 MG tablet TAKE 1 TABLET BY MOUTH TWICE A DAY WITH A MEAL FOR PAIN/INFLAMMATION    metFORMIN (GLUCOPHAGE) 500 MG tablet Take 2 tablets (1,000 mg total) by mouth 2 (two) times daily with meals.    methocarbamol (ROBAXIN) 500 MG Tab TAKE 1 TABLET BY MOUTH THREE TIMES A DAY AS NEEDED MUSCLE SPASM    metoprolol succinate (TOPROL-XL) 50 MG 24 hr tablet Take 1 tablet (50 mg total) by mouth 2 (two) times daily.    niacin (NIASPAN) 750 MG CR tablet TAKE 1 TABLET (750 MG TOTAL) BY MOUTH NIGHTLY.    nitroGLYCERIN (NITROSTAT) 0.4 MG SL tablet PLACE 1 TABLET UNDER TONGUE AT FIRST  SIGN OF HEART PAIN, AND REPEAT EVERY 5 MINUTES IF PAIN PERSISTS    omeprazole (PRILOSEC) 40 MG capsule Take 1 capsule (40 mg total) by mouth once daily.    ondansetron (ZOFRAN) 4 MG tablet Take 1 tablet (4 mg total) by mouth every 8 (eight) hours as needed for Nausea.    rosuvastatin (CRESTOR) 40 MG Tab Take 1 tablet (40 mg total) by mouth every evening.    ticagrelor (BRILINTA) 90 mg tablet Take 1 tablet (90 mg total) by mouth 2 (two) times daily.    diclofenac sodium (VOLTAREN) 1 % Gel Apply 2 g topically 3 (three) times daily as needed. (Patient not taking: Reported on 2/25/2020)     No current facility-administered medications for this visit.      Current Outpatient Medications on File Prior to Visit   Medication Sig    ALPRAZolam (XANAX) 0.5 MG tablet TAKE 1 TABLET BY MOUTH EVERY DAY AT NIGHT FOR ANXIETY    amlodipine-benazepril (LOTREL) 10-40 mg per capsule Take 1 capsule by mouth once daily.    aspirin (ECOTRIN) 81 MG EC tablet Take 1 tablet (81 mg total) by mouth once daily.    diclofenac (VOLTAREN) 50 MG EC tablet Take 1 tablet (50 mg total) by mouth 2 (two) times daily.    doxazosin (CARDURA) 4 MG tablet Take 1 tablet (4 mg total) by mouth once daily.    ezetimibe (ZETIA) 10 mg tablet Take 1 tablet (10 mg total) by mouth once daily.    fenofibrate micronized (LOFIBRA) 134 MG Cap TAKE ONE CAPSULE BY MOUTH EVERY DAY WITH BREAKFAST.    glimepiride (AMARYL) 4 MG tablet Take 1 tablet (4 mg total) by mouth before breakfast.    hydrocodone-acetaminophen 5-325mg (NORCO) 5-325 mg per tablet Take 1 tablet by mouth 3 (three) times daily as needed for Pain.    meloxicam (MOBIC) 7.5 MG tablet TAKE 1 TABLET BY MOUTH TWICE A DAY WITH A MEAL FOR PAIN/INFLAMMATION    metFORMIN (GLUCOPHAGE) 500 MG tablet Take 2 tablets (1,000 mg total) by mouth 2 (two) times daily with meals.    methocarbamol (ROBAXIN) 500 MG Tab TAKE 1 TABLET BY MOUTH THREE TIMES A DAY AS NEEDED MUSCLE SPASM    metoprolol succinate  (TOPROL-XL) 50 MG 24 hr tablet Take 1 tablet (50 mg total) by mouth 2 (two) times daily.    niacin (NIASPAN) 750 MG CR tablet TAKE 1 TABLET (750 MG TOTAL) BY MOUTH NIGHTLY.    nitroGLYCERIN (NITROSTAT) 0.4 MG SL tablet PLACE 1 TABLET UNDER TONGUE AT FIRST SIGN OF HEART PAIN, AND REPEAT EVERY 5 MINUTES IF PAIN PERSISTS    omeprazole (PRILOSEC) 40 MG capsule Take 1 capsule (40 mg total) by mouth once daily.    ondansetron (ZOFRAN) 4 MG tablet Take 1 tablet (4 mg total) by mouth every 8 (eight) hours as needed for Nausea.    rosuvastatin (CRESTOR) 40 MG Tab Take 1 tablet (40 mg total) by mouth every evening.    ticagrelor (BRILINTA) 90 mg tablet Take 1 tablet (90 mg total) by mouth 2 (two) times daily.    [DISCONTINUED] isosorbide mononitrate (ISMO,MONOKET) 20 MG Tab Take 1 tablet (20 mg total) by mouth once daily.    diclofenac sodium (VOLTAREN) 1 % Gel Apply 2 g topically 3 (three) times daily as needed. (Patient not taking: Reported on 2/25/2020)     No current facility-administered medications on file prior to visit.        Review of Systems   Constitution: Negative for diaphoresis, malaise/fatigue, weight gain and weight loss.   HENT: Negative for congestion and nosebleeds.    Cardiovascular: Positive for chest pain. Negative for claudication, cyanosis, dyspnea on exertion, irregular heartbeat, leg swelling, near-syncope, orthopnea, palpitations, paroxysmal nocturnal dyspnea and syncope.   Respiratory: Positive for shortness of breath. Negative for cough, hemoptysis, sleep disturbances due to breathing, snoring, sputum production and wheezing.    Hematologic/Lymphatic: Negative for bleeding problem. Does not bruise/bleed easily.   Skin: Negative for rash.   Musculoskeletal: Negative for arthritis, back pain, falls, joint pain, muscle cramps and muscle weakness.   Gastrointestinal: Negative for abdominal pain, constipation, diarrhea, heartburn, hematemesis, hematochezia, melena, nausea and vomiting.  "  Genitourinary: Negative for dysuria, hematuria and nocturia.   Neurological: Negative for excessive daytime sleepiness, dizziness, headaches, light-headedness, loss of balance, numbness, vertigo and weakness.       Objective:   Physical Exam   Constitutional: She is oriented to person, place, and time. She appears well-developed and well-nourished.   Neck: Neck supple. No JVD present.   Cardiovascular: Normal rate, regular rhythm, normal heart sounds and normal pulses. Exam reveals no friction rub.   No murmur heard.  Pulmonary/Chest: Effort normal and breath sounds normal. No respiratory distress. She has no wheezes. She has no rales.   Abdominal: Soft. Bowel sounds are normal. She exhibits no distension.   Musculoskeletal: She exhibits no edema or tenderness.   Neurological: She is alert and oriented to person, place, and time.   Skin: Skin is warm and dry. No rash noted.   Psychiatric: She has a normal mood and affect. Her behavior is normal.   Nursing note and vitals reviewed.    Vitals:    02/25/20 0840   BP: (!) 158/78   Pulse: 69   Weight: 99.5 kg (219 lb 5.7 oz)   Height: 5' 5" (1.651 m)     Lab Results   Component Value Date    CHOL 248 (H) 02/06/2020    CHOL 226 (H) 08/06/2019    CHOL 226 (H) 05/21/2019     Lab Results   Component Value Date    HDL 38 (L) 02/06/2020    HDL 38 (L) 08/06/2019    HDL 42 05/21/2019     Lab Results   Component Value Date    LDLCALC 173.6 (H) 02/06/2020    LDLCALC 153.0 08/06/2019    LDLCALC 138.0 05/21/2019     Lab Results   Component Value Date    TRIG 182 (H) 02/06/2020    TRIG 175 (H) 08/06/2019    TRIG 230 (H) 05/21/2019     Lab Results   Component Value Date    CHOLHDL 15.3 (L) 02/06/2020    CHOLHDL 16.8 (L) 08/06/2019    CHOLHDL 18.6 (L) 05/21/2019       Chemistry        Component Value Date/Time     02/06/2020 0841    K 3.3 (L) 02/06/2020 0841     02/06/2020 0841    CO2 24 02/06/2020 0841    BUN 14 02/06/2020 0841    CREATININE 0.9 02/06/2020 0841    GLU " 152 (H) 02/06/2020 0841        Component Value Date/Time    CALCIUM 9.0 02/06/2020 0841    ALKPHOS 71 02/06/2020 0841    AST 20 02/06/2020 0841    ALT 16 02/06/2020 0841    BILITOT 0.3 02/06/2020 0841    ESTGFRAFRICA >60.0 02/06/2020 0841    EGFRNONAA >60.0 02/06/2020 0841          Lab Results   Component Value Date    TSH 1.353 02/06/2020     Lab Results   Component Value Date    INR 1.0 05/21/2018    INR 1.0 12/21/2013    INR 1.0 07/10/2011     Lab Results   Component Value Date    WBC 8.16 02/06/2020    HGB 12.4 02/06/2020    HCT 39.9 02/06/2020    MCV 87 02/06/2020     02/06/2020     BMP  Sodium   Date Value Ref Range Status   02/06/2020 143 136 - 145 mmol/L Final     Potassium   Date Value Ref Range Status   02/06/2020 3.3 (L) 3.5 - 5.1 mmol/L Final     Chloride   Date Value Ref Range Status   02/06/2020 108 95 - 110 mmol/L Final     CO2   Date Value Ref Range Status   02/06/2020 24 23 - 29 mmol/L Final     BUN, Bld   Date Value Ref Range Status   02/06/2020 14 6 - 20 mg/dL Final     Creatinine   Date Value Ref Range Status   02/06/2020 0.9 0.5 - 1.4 mg/dL Final     Calcium   Date Value Ref Range Status   02/06/2020 9.0 8.7 - 10.5 mg/dL Final     Anion Gap   Date Value Ref Range Status   02/06/2020 11 8 - 16 mmol/L Final     eGFR if    Date Value Ref Range Status   02/06/2020 >60.0 >60 mL/min/1.73 m^2 Final     eGFR if non    Date Value Ref Range Status   02/06/2020 >60.0 >60 mL/min/1.73 m^2 Final     Comment:     Calculation used to obtain the estimated glomerular filtration  rate (eGFR) is the CKD-EPI equation.        CrCl cannot be calculated (Patient's most recent lab result is older than the maximum 7 days allowed.).    Assessment:     1. Coronary artery disease involving native coronary artery of native heart without angina pectoris    2. Hypertension associated with diabetes        Plan:   Coronary artery disease involving native coronary artery of native heart  without angina pectoris  Needs BP control   Increase Isordil to 20mg BID   Continue ASA, Brilinta, Statin, BB, Lotrel   Heart healthy diet  Weight loss     Hypertension associated with diabetes  Continue current plan with changes as mentioned to Isordil     Patient wants to follow up in 3 months or sooner if needed. Has been feeling better, but will increase Isordil.

## 2020-05-12 RX ORDER — CYCLOBENZAPRINE HCL 10 MG
TABLET ORAL
COMMUNITY
End: 2020-05-19 | Stop reason: SDUPTHER

## 2020-05-13 ENCOUNTER — LAB VISIT (OUTPATIENT)
Dept: LAB | Facility: HOSPITAL | Age: 60
End: 2020-05-13
Attending: INTERNAL MEDICINE
Payer: MEDICARE

## 2020-05-13 DIAGNOSIS — E11.8 TYPE II DIABETES MELLITUS WITH COMPLICATION: ICD-10-CM

## 2020-05-13 LAB
ANION GAP SERPL CALC-SCNC: 7 MMOL/L (ref 8–16)
BUN SERPL-MCNC: 16 MG/DL (ref 6–20)
CALCIUM SERPL-MCNC: 9.2 MG/DL (ref 8.7–10.5)
CHLORIDE SERPL-SCNC: 109 MMOL/L (ref 95–110)
CHOLEST SERPL-MCNC: 261 MG/DL (ref 120–199)
CHOLEST/HDLC SERPL: 7.5 {RATIO} (ref 2–5)
CO2 SERPL-SCNC: 26 MMOL/L (ref 23–29)
CREAT SERPL-MCNC: 1 MG/DL (ref 0.5–1.4)
EST. GFR  (AFRICAN AMERICAN): >60 ML/MIN/1.73 M^2
EST. GFR  (NON AFRICAN AMERICAN): >60 ML/MIN/1.73 M^2
ESTIMATED AVG GLUCOSE: 166 MG/DL (ref 68–131)
GLUCOSE SERPL-MCNC: 177 MG/DL (ref 70–110)
HBA1C MFR BLD HPLC: 7.4 % (ref 4–5.6)
HDLC SERPL-MCNC: 35 MG/DL (ref 40–75)
HDLC SERPL: 13.4 % (ref 20–50)
LDLC SERPL CALC-MCNC: 182 MG/DL (ref 63–159)
NONHDLC SERPL-MCNC: 226 MG/DL
POTASSIUM SERPL-SCNC: 4 MMOL/L (ref 3.5–5.1)
SODIUM SERPL-SCNC: 142 MMOL/L (ref 136–145)
TRIGL SERPL-MCNC: 220 MG/DL (ref 30–150)

## 2020-05-13 PROCEDURE — 83036 HEMOGLOBIN GLYCOSYLATED A1C: CPT

## 2020-05-13 PROCEDURE — 80048 BASIC METABOLIC PNL TOTAL CA: CPT

## 2020-05-13 PROCEDURE — 36415 COLL VENOUS BLD VENIPUNCTURE: CPT | Mod: PO

## 2020-05-13 PROCEDURE — 80061 LIPID PANEL: CPT

## 2020-05-19 ENCOUNTER — OFFICE VISIT (OUTPATIENT)
Dept: INTERNAL MEDICINE | Facility: CLINIC | Age: 60
End: 2020-05-19
Payer: MEDICARE

## 2020-05-19 VITALS
WEIGHT: 216.69 LBS | HEIGHT: 65 IN | BODY MASS INDEX: 36.1 KG/M2 | DIASTOLIC BLOOD PRESSURE: 80 MMHG | SYSTOLIC BLOOD PRESSURE: 124 MMHG | OXYGEN SATURATION: 97 % | HEART RATE: 80 BPM

## 2020-05-19 DIAGNOSIS — E11.69 HYPERLIPIDEMIA ASSOCIATED WITH TYPE 2 DIABETES MELLITUS: Primary | ICD-10-CM

## 2020-05-19 DIAGNOSIS — F32.5 MAJOR DEPRESSION IN REMISSION: ICD-10-CM

## 2020-05-19 DIAGNOSIS — E78.5 HYPERLIPIDEMIA ASSOCIATED WITH TYPE 2 DIABETES MELLITUS: Primary | ICD-10-CM

## 2020-05-19 DIAGNOSIS — E11.8 TYPE II DIABETES MELLITUS WITH COMPLICATION: ICD-10-CM

## 2020-05-19 DIAGNOSIS — I25.10 CORONARY ARTERY DISEASE INVOLVING NATIVE CORONARY ARTERY OF NATIVE HEART WITHOUT ANGINA PECTORIS: Chronic | ICD-10-CM

## 2020-05-19 DIAGNOSIS — Z86.010 HISTORY OF COLON POLYPS: ICD-10-CM

## 2020-05-19 DIAGNOSIS — I15.2 HYPERTENSION ASSOCIATED WITH DIABETES: ICD-10-CM

## 2020-05-19 DIAGNOSIS — E11.59 HYPERTENSION ASSOCIATED WITH DIABETES: ICD-10-CM

## 2020-05-19 PROCEDURE — 99214 OFFICE O/P EST MOD 30 MIN: CPT | Mod: S$PBB,,, | Performed by: INTERNAL MEDICINE

## 2020-05-19 PROCEDURE — 99214 PR OFFICE/OUTPT VISIT, EST, LEVL IV, 30-39 MIN: ICD-10-PCS | Mod: S$PBB,,, | Performed by: INTERNAL MEDICINE

## 2020-05-19 PROCEDURE — 99999 PR PBB SHADOW E&M-EST. PATIENT-LVL III: CPT | Mod: PBBFAC,,, | Performed by: INTERNAL MEDICINE

## 2020-05-19 PROCEDURE — 99999 PR PBB SHADOW E&M-EST. PATIENT-LVL III: ICD-10-PCS | Mod: PBBFAC,,, | Performed by: INTERNAL MEDICINE

## 2020-05-19 PROCEDURE — 99213 OFFICE O/P EST LOW 20 MIN: CPT | Mod: PBBFAC,PO | Performed by: INTERNAL MEDICINE

## 2020-05-19 RX ORDER — CYCLOBENZAPRINE HCL 10 MG
10 TABLET ORAL 3 TIMES DAILY PRN
Qty: 90 TABLET | Refills: 5 | Status: SHIPPED | OUTPATIENT
Start: 2020-05-19 | End: 2020-11-23

## 2020-05-19 RX ORDER — NITROGLYCERIN 0.4 MG/1
TABLET SUBLINGUAL
Qty: 25 TABLET | Refills: 1 | Status: SHIPPED | OUTPATIENT
Start: 2020-05-19 | End: 2020-11-16 | Stop reason: SDUPTHER

## 2020-05-19 RX ORDER — METOPROLOL TARTRATE 25 MG/1
50 TABLET, FILM COATED ORAL 2 TIMES DAILY
COMMUNITY
Start: 2020-03-02 | End: 2020-11-23

## 2020-05-19 NOTE — PROGRESS NOTES
"HPI:  Patient is a 59-year-old female comes today for follow-up of her diabetes, hypertension, lipids, and coronary artery disease.  She states she is doing well.  She denies any chest pains at all.  She has had no shortness of breath.  Her blood pressures been well controlled.  She admits she does not faithfully consistently take her cholesterol medications.  She also on only takes the metformin 1 pill a morning 1 pill in the evening.  She does complain of some left flank pain for last week.  It is intermittent.  It is made worse by bending lifting or twisting.    Current meds have been verified and updated per the EMR  Exam:/80   Pulse 80   Ht 5' 5" (1.651 m)   Wt 98.3 kg (216 lb 11.4 oz)   LMP 06/21/2003   SpO2 97%   BMI 36.06 kg/m²   Carotids 2+ equal without bruit  Chest clear  Cardiovascular regular rate and rhythm without murmur gallop or rub  Abdomen soft benign no rebound or guarding or distention.  She does have some tenderness over the left lateral lumbar flank area    Lab Results   Component Value Date    WBC 8.16 02/06/2020    HGB 12.4 02/06/2020    HCT 39.9 02/06/2020     02/06/2020    CHOL 261 (H) 05/13/2020    TRIG 220 (H) 05/13/2020    HDL 35 (L) 05/13/2020    ALT 16 02/06/2020    AST 20 02/06/2020     05/13/2020    K 4.0 05/13/2020     05/13/2020    CREATININE 1.0 05/13/2020    BUN 16 05/13/2020    CO2 26 05/13/2020    TSH 1.353 02/06/2020    INR 1.0 05/21/2018    GLUF 106 01/07/2005    HGBA1C 7.4 (H) 05/13/2020       Impression:  Hypertension, diabetes, coronary disease, stable  Hyperlipidemia, not to goal  Left-sided lower lumbar muscular discomfort  Other medical problems below, stable  Patient Active Problem List   Diagnosis    CAD with far remote PCI of unknown vessel    History of colon polyps    NSTEMI (non-ST elevated myocardial infarction)    Type II diabetes mellitus with complication    Hypertension associated with diabetes    Hyperlipidemia " associated with type 2 diabetes mellitus    Major depression in remission    Aortic ectasia    Microalbuminuria due to type 2 diabetes mellitus       Plan:  Orders Placed This Encounter    Hemoglobin A1C    Comprehensive metabolic panel    Lipid Panel    Protein / creatinine ratio, urine    TSH    CBC auto differential    nitroGLYCERIN (NITROSTAT) 0.4 MG SL tablet    cyclobenzaprine (FLEXERIL) 10 MG tablet     She will use her Flexeril as needed.  Patient will get back on the Crestor and take it nightly.  She will take the metformin 2 tablets twice a day.  She will see me again in 6 months with above lab work.  Patient has been encouraged to be active in exercising.    This note is generated with speech recognition software and is subject to transcription error and sound alike phrases that may be missed by proofreading.

## 2020-07-09 ENCOUNTER — PATIENT OUTREACH (OUTPATIENT)
Dept: ADMINISTRATIVE | Facility: OTHER | Age: 60
End: 2020-07-09

## 2020-07-13 ENCOUNTER — OFFICE VISIT (OUTPATIENT)
Dept: CARDIOLOGY | Facility: CLINIC | Age: 60
End: 2020-07-13
Payer: MEDICARE

## 2020-07-13 VITALS
DIASTOLIC BLOOD PRESSURE: 76 MMHG | OXYGEN SATURATION: 99 % | SYSTOLIC BLOOD PRESSURE: 130 MMHG | BODY MASS INDEX: 35.48 KG/M2 | HEART RATE: 71 BPM | WEIGHT: 213.19 LBS

## 2020-07-13 DIAGNOSIS — E11.69 HYPERLIPIDEMIA ASSOCIATED WITH TYPE 2 DIABETES MELLITUS: ICD-10-CM

## 2020-07-13 DIAGNOSIS — I15.2 HYPERTENSION ASSOCIATED WITH DIABETES: ICD-10-CM

## 2020-07-13 DIAGNOSIS — E11.59 HYPERTENSION ASSOCIATED WITH DIABETES: ICD-10-CM

## 2020-07-13 DIAGNOSIS — I25.10 CORONARY ARTERY DISEASE INVOLVING NATIVE CORONARY ARTERY OF NATIVE HEART WITHOUT ANGINA PECTORIS: Primary | Chronic | ICD-10-CM

## 2020-07-13 DIAGNOSIS — E78.5 HYPERLIPIDEMIA ASSOCIATED WITH TYPE 2 DIABETES MELLITUS: ICD-10-CM

## 2020-07-13 PROCEDURE — 99214 OFFICE O/P EST MOD 30 MIN: CPT | Mod: S$PBB,,, | Performed by: NURSE PRACTITIONER

## 2020-07-13 PROCEDURE — 99214 PR OFFICE/OUTPT VISIT, EST, LEVL IV, 30-39 MIN: ICD-10-PCS | Mod: S$PBB,,, | Performed by: NURSE PRACTITIONER

## 2020-07-13 PROCEDURE — 99999 PR PBB SHADOW E&M-EST. PATIENT-LVL III: ICD-10-PCS | Mod: PBBFAC,,, | Performed by: NURSE PRACTITIONER

## 2020-07-13 PROCEDURE — 99999 PR PBB SHADOW E&M-EST. PATIENT-LVL III: CPT | Mod: PBBFAC,,, | Performed by: NURSE PRACTITIONER

## 2020-07-13 PROCEDURE — 99213 OFFICE O/P EST LOW 20 MIN: CPT | Mod: PBBFAC | Performed by: NURSE PRACTITIONER

## 2020-07-13 NOTE — PROGRESS NOTES
Subjective:   Patient ID:  Dulce Boogie is a 59 y.o. female who presents for follow up of Hypertension and Coronary Artery Disease      HPI  Ms. Boogie's current conditions include CAD with remote PCI in  (previously followed by Dr. Valle at  Cardiology), HTN, HLD, DM II. Admitted May 2018 with unstable angina. Underwent LHC on 18 and noted to have LAD 50% mid long lesion ffr 0.81. Also noted LCX non obs CAD and patent stent, RCA 90% prox treated with solange x2 and PDA distal 80% treated with SOLANGE x 2.   Has no abnormal bleeding on ASA and Brilinta.  Has not had to use PRN nitro.   Lipids not at goal. Zetia started 2 months ago   A1C 7.4.     Past Medical History:   Diagnosis Date    Anticoagulant long-term use     CAD (coronary artery disease)     Depression     History of colon polyps     Hyperlipidemia associated with type 2 diabetes mellitus     Hypertension associated with diabetes     Microalbuminuria due to type 2 diabetes mellitus     NSTEMI (non-ST elevated myocardial infarction) 2018    Obesity     Type II diabetes mellitus with complication        Past Surgical History:   Procedure Laterality Date    CAROTID STENT       SECTION, LOW TRANSVERSE      x 2    COLONOSCOPY N/A 6/10/2019    Procedure: COLONOSCOPY;  Surgeon: Maricarmen Boo MD;  Location: [facility] ENDO;  Service: Endoscopy;  Laterality: N/A;    LEFT HEART CATHETERIZATION Left 2018    Procedure: HEART CATH-LEFT;  Surgeon: Aimee Cooper MD;  Location: [facility] CATH LAB;  Service: Cardiology;  Laterality: Left;       Social History     Tobacco Use    Smoking status: Never Smoker    Smokeless tobacco: Never Used   Substance Use Topics    Alcohol use: No    Drug use: No       Family History   Problem Relation Age of Onset    Hypertension Mother     Hyperlipidemia Mother     Hypertension Father     Diabetes Father     Hyperlipidemia Father     Colon cancer Father     Hypertension Brother     Breast cancer  Neg Hx     Ovarian cancer Neg Hx     Uterine cancer Neg Hx        Current Outpatient Medications   Medication Sig    ALPRAZolam (XANAX) 0.5 MG tablet Take 1 tablet (0.5 mg total) by mouth daily as needed for Anxiety.    amlodipine-benazepril (LOTREL) 10-40 mg per capsule Take 1 capsule by mouth once daily.    cyclobenzaprine (FLEXERIL) 10 MG tablet Take 1 tablet (10 mg total) by mouth 3 (three) times daily as needed for Muscle spasms.    diclofenac (VOLTAREN) 50 MG EC tablet Take 1 tablet (50 mg total) by mouth 2 (two) times daily.    diclofenac sodium (VOLTAREN) 1 % Gel Apply 2 g topically 3 (three) times daily as needed.    doxazosin (CARDURA) 4 MG tablet Take 1 tablet (4 mg total) by mouth once daily.    ezetimibe (ZETIA) 10 mg tablet Take 1 tablet (10 mg total) by mouth once daily.    fenofibrate micronized (LOFIBRA) 134 MG Cap TAKE ONE CAPSULE BY MOUTH EVERY DAY WITH BREAKFAST.    glimepiride (AMARYL) 4 MG tablet Take 1 tablet (4 mg total) by mouth before breakfast.    hydrocodone-acetaminophen 5-325mg (NORCO) 5-325 mg per tablet Take 1 tablet by mouth 3 (three) times daily as needed for Pain.    isosorbide mononitrate (ISMO,MONOKET) 20 MG Tab Take 1 tablet (20 mg total) by mouth 2 (two) times daily with meals.    meloxicam (MOBIC) 7.5 MG tablet TAKE 1 TABLET BY MOUTH TWICE A DAY WITH A MEAL FOR PAIN/INFLAMMATION    metFORMIN (GLUCOPHAGE) 500 MG tablet Take 2 tablets (1,000 mg total) by mouth 2 (two) times daily with meals.    methocarbamol (ROBAXIN) 500 MG Tab TAKE 1 TABLET BY MOUTH THREE TIMES A DAY AS NEEDED MUSCLE SPASM    metoprolol succinate (TOPROL-XL) 50 MG 24 hr tablet Take 1 tablet (50 mg total) by mouth 2 (two) times daily.    metoprolol tartrate (LOPRESSOR) 25 MG tablet     niacin (NIASPAN) 750 MG CR tablet TAKE 1 TABLET (750 MG TOTAL) BY MOUTH NIGHTLY.    nitroGLYCERIN (NITROSTAT) 0.4 MG SL tablet PLACE 1 TABLET UNDER TONGUE AT FIRST SIGN OF HEART PAIN, AND REPEAT EVERY 5  MINUTES IF PAIN PERSISTS    omeprazole (PRILOSEC) 40 MG capsule Take 1 capsule (40 mg total) by mouth once daily.    ondansetron (ZOFRAN) 4 MG tablet Take 1 tablet (4 mg total) by mouth every 8 (eight) hours as needed for Nausea.    rosuvastatin (CRESTOR) 40 MG Tab Take 1 tablet (40 mg total) by mouth every evening.    ticagrelor (BRILINTA) 90 mg tablet Take 1 tablet (90 mg total) by mouth 2 (two) times daily.    aspirin (ECOTRIN) 81 MG EC tablet Take 1 tablet (81 mg total) by mouth once daily.     No current facility-administered medications for this visit.      Current Outpatient Medications on File Prior to Visit   Medication Sig    ALPRAZolam (XANAX) 0.5 MG tablet Take 1 tablet (0.5 mg total) by mouth daily as needed for Anxiety.    amlodipine-benazepril (LOTREL) 10-40 mg per capsule Take 1 capsule by mouth once daily.    cyclobenzaprine (FLEXERIL) 10 MG tablet Take 1 tablet (10 mg total) by mouth 3 (three) times daily as needed for Muscle spasms.    diclofenac (VOLTAREN) 50 MG EC tablet Take 1 tablet (50 mg total) by mouth 2 (two) times daily.    diclofenac sodium (VOLTAREN) 1 % Gel Apply 2 g topically 3 (three) times daily as needed.    doxazosin (CARDURA) 4 MG tablet Take 1 tablet (4 mg total) by mouth once daily.    ezetimibe (ZETIA) 10 mg tablet Take 1 tablet (10 mg total) by mouth once daily.    fenofibrate micronized (LOFIBRA) 134 MG Cap TAKE ONE CAPSULE BY MOUTH EVERY DAY WITH BREAKFAST.    glimepiride (AMARYL) 4 MG tablet Take 1 tablet (4 mg total) by mouth before breakfast.    hydrocodone-acetaminophen 5-325mg (NORCO) 5-325 mg per tablet Take 1 tablet by mouth 3 (three) times daily as needed for Pain.    isosorbide mononitrate (ISMO,MONOKET) 20 MG Tab Take 1 tablet (20 mg total) by mouth 2 (two) times daily with meals.    meloxicam (MOBIC) 7.5 MG tablet TAKE 1 TABLET BY MOUTH TWICE A DAY WITH A MEAL FOR PAIN/INFLAMMATION    metFORMIN (GLUCOPHAGE) 500 MG tablet Take 2 tablets (1,000 mg  total) by mouth 2 (two) times daily with meals.    methocarbamol (ROBAXIN) 500 MG Tab TAKE 1 TABLET BY MOUTH THREE TIMES A DAY AS NEEDED MUSCLE SPASM    metoprolol succinate (TOPROL-XL) 50 MG 24 hr tablet Take 1 tablet (50 mg total) by mouth 2 (two) times daily.    metoprolol tartrate (LOPRESSOR) 25 MG tablet     niacin (NIASPAN) 750 MG CR tablet TAKE 1 TABLET (750 MG TOTAL) BY MOUTH NIGHTLY.    nitroGLYCERIN (NITROSTAT) 0.4 MG SL tablet PLACE 1 TABLET UNDER TONGUE AT FIRST SIGN OF HEART PAIN, AND REPEAT EVERY 5 MINUTES IF PAIN PERSISTS    omeprazole (PRILOSEC) 40 MG capsule Take 1 capsule (40 mg total) by mouth once daily.    ondansetron (ZOFRAN) 4 MG tablet Take 1 tablet (4 mg total) by mouth every 8 (eight) hours as needed for Nausea.    rosuvastatin (CRESTOR) 40 MG Tab Take 1 tablet (40 mg total) by mouth every evening.    ticagrelor (BRILINTA) 90 mg tablet Take 1 tablet (90 mg total) by mouth 2 (two) times daily.    aspirin (ECOTRIN) 81 MG EC tablet Take 1 tablet (81 mg total) by mouth once daily.     No current facility-administered medications on file prior to visit.        Review of Systems   Constitution: Negative for diaphoresis, malaise/fatigue, weight gain and weight loss.   HENT: Negative for congestion and nosebleeds.    Cardiovascular: Negative for chest pain, claudication, cyanosis, dyspnea on exertion, irregular heartbeat, leg swelling, near-syncope, orthopnea, palpitations, paroxysmal nocturnal dyspnea and syncope.   Respiratory: Negative for cough, hemoptysis, shortness of breath, sleep disturbances due to breathing, snoring, sputum production and wheezing.    Hematologic/Lymphatic: Negative for bleeding problem. Does not bruise/bleed easily.   Skin: Negative for rash.   Musculoskeletal: Negative for arthritis, back pain, falls, joint pain, muscle cramps and muscle weakness.   Gastrointestinal: Negative for abdominal pain, constipation, diarrhea, heartburn, hematemesis, hematochezia,  melena, nausea and vomiting.   Genitourinary: Negative for dysuria, hematuria and nocturia.   Neurological: Negative for excessive daytime sleepiness, dizziness, headaches, light-headedness, loss of balance, numbness, vertigo and weakness.       Objective:   Physical Exam   Constitutional: She is oriented to person, place, and time. She appears well-developed and well-nourished.   Neck: Neck supple. No JVD present.   Cardiovascular: Normal rate, regular rhythm, normal heart sounds and normal pulses. Exam reveals no friction rub.   No murmur heard.  Pulmonary/Chest: Effort normal and breath sounds normal. No respiratory distress. She has no wheezes. She has no rales.   Abdominal: Soft. Bowel sounds are normal. She exhibits no distension.   Musculoskeletal:         General: No tenderness or edema.   Neurological: She is alert and oriented to person, place, and time.   Skin: Skin is warm and dry. No rash noted.   Psychiatric: She has a normal mood and affect. Her behavior is normal.   Nursing note and vitals reviewed.    Vitals:    07/13/20 1523   BP: 130/76   BP Location: Right arm   Patient Position: Sitting   BP Method: Medium (Manual)   Pulse: 71   SpO2: 99%   Weight: 96.7 kg (213 lb 3 oz)     Lab Results   Component Value Date    CHOL 261 (H) 05/13/2020    CHOL 248 (H) 02/06/2020    CHOL 226 (H) 08/06/2019     Lab Results   Component Value Date    HDL 35 (L) 05/13/2020    HDL 38 (L) 02/06/2020    HDL 38 (L) 08/06/2019     Lab Results   Component Value Date    LDLCALC 182.0 (H) 05/13/2020    LDLCALC 173.6 (H) 02/06/2020    LDLCALC 153.0 08/06/2019     Lab Results   Component Value Date    TRIG 220 (H) 05/13/2020    TRIG 182 (H) 02/06/2020    TRIG 175 (H) 08/06/2019     Lab Results   Component Value Date    CHOLHDL 13.4 (L) 05/13/2020    CHOLHDL 15.3 (L) 02/06/2020    CHOLHDL 16.8 (L) 08/06/2019       Chemistry        Component Value Date/Time     05/13/2020 1000    K 4.0 05/13/2020 1000     05/13/2020  1000    CO2 26 05/13/2020 1000    BUN 16 05/13/2020 1000    CREATININE 1.0 05/13/2020 1000     (H) 05/13/2020 1000        Component Value Date/Time    CALCIUM 9.2 05/13/2020 1000    ALKPHOS 71 02/06/2020 0841    AST 20 02/06/2020 0841    ALT 16 02/06/2020 0841    BILITOT 0.3 02/06/2020 0841    ESTGFRAFRICA >60.0 05/13/2020 1000    EGFRNONAA >60.0 05/13/2020 1000          Lab Results   Component Value Date    TSH 1.353 02/06/2020     Lab Results   Component Value Date    INR 1.0 05/21/2018    INR 1.0 12/21/2013    INR 1.0 07/10/2011     Lab Results   Component Value Date    WBC 8.16 02/06/2020    HGB 12.4 02/06/2020    HCT 39.9 02/06/2020    MCV 87 02/06/2020     02/06/2020     BMP  Sodium   Date Value Ref Range Status   05/13/2020 142 136 - 145 mmol/L Final     Potassium   Date Value Ref Range Status   05/13/2020 4.0 3.5 - 5.1 mmol/L Final     Chloride   Date Value Ref Range Status   05/13/2020 109 95 - 110 mmol/L Final     CO2   Date Value Ref Range Status   05/13/2020 26 23 - 29 mmol/L Final     BUN, Bld   Date Value Ref Range Status   05/13/2020 16 6 - 20 mg/dL Final     Creatinine   Date Value Ref Range Status   05/13/2020 1.0 0.5 - 1.4 mg/dL Final     Calcium   Date Value Ref Range Status   05/13/2020 9.2 8.7 - 10.5 mg/dL Final     Anion Gap   Date Value Ref Range Status   05/13/2020 7 (L) 8 - 16 mmol/L Final     eGFR if    Date Value Ref Range Status   05/13/2020 >60.0 >60 mL/min/1.73 m^2 Final     eGFR if non    Date Value Ref Range Status   05/13/2020 >60.0 >60 mL/min/1.73 m^2 Final     Comment:     Calculation used to obtain the estimated glomerular filtration  rate (eGFR) is the CKD-EPI equation.        CrCl cannot be calculated (Patient's most recent lab result is older than the maximum 7 days allowed.).    Assessment:     1. Coronary artery disease involving native coronary artery of native heart without angina pectoris    2. Hypertension associated with  diabetes        Plan:   Continue current medical therapy   Lipid panel in 2 months   If lipids still not controlled, will need to start Repatha  Exercise progarm  Heart healthy diet  Weight loss  RTC in 6 months or sooner

## 2020-08-03 ENCOUNTER — OFFICE VISIT (OUTPATIENT)
Dept: INTERNAL MEDICINE | Facility: CLINIC | Age: 60
End: 2020-08-03
Payer: MEDICARE

## 2020-08-03 VITALS
OXYGEN SATURATION: 98 % | WEIGHT: 211.19 LBS | DIASTOLIC BLOOD PRESSURE: 70 MMHG | SYSTOLIC BLOOD PRESSURE: 128 MMHG | TEMPERATURE: 99 F | HEIGHT: 65 IN | BODY MASS INDEX: 35.18 KG/M2 | HEART RATE: 80 BPM

## 2020-08-03 DIAGNOSIS — I15.2 HYPERTENSION ASSOCIATED WITH DIABETES: ICD-10-CM

## 2020-08-03 DIAGNOSIS — M54.50 LUMBAR BACK PAIN: Primary | ICD-10-CM

## 2020-08-03 DIAGNOSIS — E11.59 HYPERTENSION ASSOCIATED WITH DIABETES: ICD-10-CM

## 2020-08-03 DIAGNOSIS — R21 SKIN RASH: ICD-10-CM

## 2020-08-03 PROCEDURE — 99999 PR PBB SHADOW E&M-EST. PATIENT-LVL IV: CPT | Mod: PBBFAC,,, | Performed by: INTERNAL MEDICINE

## 2020-08-03 PROCEDURE — 99213 OFFICE O/P EST LOW 20 MIN: CPT | Mod: S$PBB,,, | Performed by: INTERNAL MEDICINE

## 2020-08-03 PROCEDURE — 99213 PR OFFICE/OUTPT VISIT, EST, LEVL III, 20-29 MIN: ICD-10-PCS | Mod: S$PBB,,, | Performed by: INTERNAL MEDICINE

## 2020-08-03 PROCEDURE — 99999 PR PBB SHADOW E&M-EST. PATIENT-LVL IV: ICD-10-PCS | Mod: PBBFAC,,, | Performed by: INTERNAL MEDICINE

## 2020-08-03 PROCEDURE — 99214 OFFICE O/P EST MOD 30 MIN: CPT | Mod: PBBFAC,PO | Performed by: INTERNAL MEDICINE

## 2020-08-03 RX ORDER — DICLOFENAC SODIUM 50 MG/1
50 TABLET, DELAYED RELEASE ORAL 2 TIMES DAILY
Qty: 60 TABLET | Refills: 1 | Status: SHIPPED | OUTPATIENT
Start: 2020-08-03 | End: 2020-10-01

## 2020-08-03 NOTE — PROGRESS NOTES
"HPI:  Patient is a 59-year-old female who comes today with complaints of left-sided lower lumbar back pain for the last 4 days.  She denies any trauma or injury.  She denies any unusual activity.  She has been taking her Flexeril without significant improvement.  She denies any radiation of pain into the lower extremities.  She also over the last 24-48 hours developed a skin rash on the right side of her neck area.  She does not recall anything biting her.  She has been outside but not extensively.  Just in her yd.    Current meds have been verified and updated per the EMR  Exam:/70 (BP Location: Right arm)   Pulse 80   Temp 98.8 °F (37.1 °C) (Tympanic)   Ht 5' 5" (1.651 m)   Wt 95.8 kg (211 lb 3.2 oz)   LMP 06/21/2003   SpO2 98%   BMI 35.15 kg/m²   Lumbar back is reveals some mild muscular tenderness over the right paraspinous muscles of the lumbar region.  She has no vertebral tenderness.  Her straight leg raises are normal.  Abdomen soft and benign without rebound or guarding or distention.  Skin exam reveals 3 small areas on the right side of her neck that is linear in nature.  It looks more like insect bite versus is mild poison ivy but I would suspect more like an insect bite.  It does not have any vesicles like you would see with herpes zoster.  Lab Results   Component Value Date    WBC 8.16 02/06/2020    HGB 12.4 02/06/2020    HCT 39.9 02/06/2020     02/06/2020    CHOL 261 (H) 05/13/2020    TRIG 220 (H) 05/13/2020    HDL 35 (L) 05/13/2020    ALT 16 02/06/2020    AST 20 02/06/2020     05/13/2020    K 4.0 05/13/2020     05/13/2020    CREATININE 1.0 05/13/2020    BUN 16 05/13/2020    CO2 26 05/13/2020    TSH 1.353 02/06/2020    INR 1.0 05/21/2018    GLUF 106 01/07/2005    HGBA1C 7.4 (H) 05/13/2020       Impression:  Lumbar back strain, muscular  Skin rash consistent with insect bite  Patient Active Problem List   Diagnosis    CAD with far remote PCI of unknown vessel    History " of colon polyps    NSTEMI (non-ST elevated myocardial infarction)    Type II diabetes mellitus with complication    Hypertension associated with diabetes    Hyperlipidemia associated with type 2 diabetes mellitus    Major depression in remission    Aortic ectasia    Microalbuminuria due to type 2 diabetes mellitus       Plan:  Orders Placed This Encounter    diclofenac (VOLTAREN) 50 MG EC tablet     She was given diclofenac to use along with the Flexeril.  Patient will follow-up with me in 1 week if not better.    This note is generated with speech recognition software and is subject to transcription error and sound alike phrases that may be missed by proofreading.

## 2020-08-24 RX ORDER — AMLODIPINE AND BENAZEPRIL HYDROCHLORIDE 10; 40 MG/1; MG/1
1 CAPSULE ORAL DAILY
Qty: 90 CAPSULE | Refills: 3 | Status: SHIPPED | OUTPATIENT
Start: 2020-08-24 | End: 2020-11-23 | Stop reason: SDUPTHER

## 2020-08-24 RX ORDER — ROSUVASTATIN CALCIUM 40 MG/1
40 TABLET, COATED ORAL NIGHTLY
Qty: 30 TABLET | Refills: 11 | Status: SHIPPED | OUTPATIENT
Start: 2020-08-24 | End: 2020-11-23 | Stop reason: SDUPTHER

## 2020-08-24 RX ORDER — FENOFIBRATE 134 MG/1
CAPSULE ORAL
Qty: 90 CAPSULE | Refills: 3 | Status: SHIPPED | OUTPATIENT
Start: 2020-08-24 | End: 2020-11-23 | Stop reason: SDUPTHER

## 2020-08-28 RX ORDER — DOXAZOSIN 4 MG/1
4 TABLET ORAL DAILY
Qty: 90 TABLET | Refills: 3 | Status: SHIPPED | OUTPATIENT
Start: 2020-08-28 | End: 2020-11-16 | Stop reason: SDUPTHER

## 2020-09-08 ENCOUNTER — PATIENT OUTREACH (OUTPATIENT)
Dept: ADMINISTRATIVE | Facility: HOSPITAL | Age: 60
End: 2020-09-08

## 2020-09-29 ENCOUNTER — PATIENT MESSAGE (OUTPATIENT)
Dept: OTHER | Facility: OTHER | Age: 60
End: 2020-09-29

## 2020-10-13 ENCOUNTER — LAB VISIT (OUTPATIENT)
Dept: LAB | Facility: HOSPITAL | Age: 60
End: 2020-10-13
Attending: NURSE PRACTITIONER
Payer: MEDICARE

## 2020-10-13 DIAGNOSIS — E78.5 HYPERLIPIDEMIA ASSOCIATED WITH TYPE 2 DIABETES MELLITUS: ICD-10-CM

## 2020-10-13 DIAGNOSIS — E11.69 HYPERLIPIDEMIA ASSOCIATED WITH TYPE 2 DIABETES MELLITUS: ICD-10-CM

## 2020-10-13 DIAGNOSIS — I25.10 CORONARY ARTERY DISEASE INVOLVING NATIVE CORONARY ARTERY OF NATIVE HEART WITHOUT ANGINA PECTORIS: Chronic | ICD-10-CM

## 2020-10-13 LAB
CHOLEST SERPL-MCNC: 194 MG/DL (ref 120–199)
CHOLEST/HDLC SERPL: 4.7 {RATIO} (ref 2–5)
HDLC SERPL-MCNC: 41 MG/DL (ref 40–75)
HDLC SERPL: 21.1 % (ref 20–50)
LDLC SERPL CALC-MCNC: 110.8 MG/DL (ref 63–159)
NONHDLC SERPL-MCNC: 153 MG/DL
TRIGL SERPL-MCNC: 211 MG/DL (ref 30–150)

## 2020-10-13 PROCEDURE — 80061 LIPID PANEL: CPT

## 2020-10-13 PROCEDURE — 36415 COLL VENOUS BLD VENIPUNCTURE: CPT

## 2020-11-09 RX ORDER — METFORMIN HYDROCHLORIDE 500 MG/1
1000 TABLET ORAL 2 TIMES DAILY WITH MEALS
Qty: 360 TABLET | Refills: 3 | Status: SHIPPED | OUTPATIENT
Start: 2020-11-09 | End: 2020-11-23 | Stop reason: SDUPTHER

## 2020-11-16 ENCOUNTER — LAB VISIT (OUTPATIENT)
Dept: LAB | Facility: HOSPITAL | Age: 60
End: 2020-11-16
Attending: INTERNAL MEDICINE
Payer: MEDICARE

## 2020-11-16 DIAGNOSIS — M25.512 CHRONIC LEFT SHOULDER PAIN: ICD-10-CM

## 2020-11-16 DIAGNOSIS — G89.29 CHRONIC LEFT SHOULDER PAIN: ICD-10-CM

## 2020-11-16 DIAGNOSIS — M75.42 IMPINGEMENT SYNDROME OF LEFT SHOULDER: ICD-10-CM

## 2020-11-16 DIAGNOSIS — E11.8 TYPE II DIABETES MELLITUS WITH COMPLICATION: ICD-10-CM

## 2020-11-16 DIAGNOSIS — M75.82 ROTATOR CUFF TENDINITIS, LEFT: ICD-10-CM

## 2020-11-16 LAB
BASOPHILS # BLD AUTO: 0.06 K/UL (ref 0–0.2)
BASOPHILS NFR BLD: 0.6 % (ref 0–1.9)
DIFFERENTIAL METHOD: ABNORMAL
EOSINOPHIL # BLD AUTO: 0.2 K/UL (ref 0–0.5)
EOSINOPHIL NFR BLD: 2.2 % (ref 0–8)
ERYTHROCYTE [DISTWIDTH] IN BLOOD BY AUTOMATED COUNT: 15.3 % (ref 11.5–14.5)
HCT VFR BLD AUTO: 41.8 % (ref 37–48.5)
HGB BLD-MCNC: 13.3 G/DL (ref 12–16)
IMM GRANULOCYTES # BLD AUTO: 0.02 K/UL (ref 0–0.04)
IMM GRANULOCYTES NFR BLD AUTO: 0.2 % (ref 0–0.5)
LYMPHOCYTES # BLD AUTO: 4.7 K/UL (ref 1–4.8)
LYMPHOCYTES NFR BLD: 47.3 % (ref 18–48)
MCH RBC QN AUTO: 27.1 PG (ref 27–31)
MCHC RBC AUTO-ENTMCNC: 31.8 G/DL (ref 32–36)
MCV RBC AUTO: 85 FL (ref 82–98)
MONOCYTES # BLD AUTO: 0.7 K/UL (ref 0.3–1)
MONOCYTES NFR BLD: 7.4 % (ref 4–15)
NEUTROPHILS # BLD AUTO: 4.2 K/UL (ref 1.8–7.7)
NEUTROPHILS NFR BLD: 42.3 % (ref 38–73)
NRBC BLD-RTO: 0 /100 WBC
PLATELET # BLD AUTO: 196 K/UL (ref 150–350)
PMV BLD AUTO: 13.5 FL (ref 9.2–12.9)
RBC # BLD AUTO: 4.9 M/UL (ref 4–5.4)
TSH SERPL DL<=0.005 MIU/L-ACNC: 2.25 UIU/ML (ref 0.4–4)
WBC # BLD AUTO: 9.88 K/UL (ref 3.9–12.7)

## 2020-11-16 PROCEDURE — 80061 LIPID PANEL: CPT

## 2020-11-16 PROCEDURE — 36415 COLL VENOUS BLD VENIPUNCTURE: CPT | Mod: PO

## 2020-11-16 PROCEDURE — 80053 COMPREHEN METABOLIC PANEL: CPT

## 2020-11-16 PROCEDURE — 85025 COMPLETE CBC W/AUTO DIFF WBC: CPT

## 2020-11-16 PROCEDURE — 83036 HEMOGLOBIN GLYCOSYLATED A1C: CPT

## 2020-11-16 PROCEDURE — 84443 ASSAY THYROID STIM HORMONE: CPT

## 2020-11-16 RX ORDER — METHOCARBAMOL 500 MG/1
TABLET, FILM COATED ORAL
Qty: 60 TABLET | Refills: 0 | OUTPATIENT
Start: 2020-11-16

## 2020-11-16 NOTE — TELEPHONE ENCOUNTER
Phoned patient regarding her prescription refill denial. Left a message for her to call back.     Refused Prescriptions     methocarbamoL (ROBAXIN) 500 MG Tab         Sig: N/A    Disp:  60 tablet    Refills:  0    Start: 11/16/2020    Class: Normal    Refused by: [facility]    Refusal reason: Patient needs an appointment    For: Chronic left shoulder pain; Impingement syndrome of left shoulder; Rotator cuff tendinitis, left        Fill requested from: [facility]/pharmacy #[id number] - [place], [place] - [address] AT [facility]

## 2020-11-17 LAB
ALBUMIN SERPL BCP-MCNC: 3.7 G/DL (ref 3.5–5.2)
ALP SERPL-CCNC: 79 U/L (ref 55–135)
ALT SERPL W/O P-5'-P-CCNC: 28 U/L (ref 10–44)
ANION GAP SERPL CALC-SCNC: 12 MMOL/L (ref 8–16)
AST SERPL-CCNC: 23 U/L (ref 10–40)
BILIRUB SERPL-MCNC: 0.3 MG/DL (ref 0.1–1)
BUN SERPL-MCNC: 16 MG/DL (ref 8–23)
CALCIUM SERPL-MCNC: 8.7 MG/DL (ref 8.7–10.5)
CHLORIDE SERPL-SCNC: 108 MMOL/L (ref 95–110)
CHOLEST SERPL-MCNC: 285 MG/DL (ref 120–199)
CHOLEST/HDLC SERPL: 7 {RATIO} (ref 2–5)
CO2 SERPL-SCNC: 22 MMOL/L (ref 23–29)
CREAT SERPL-MCNC: 0.9 MG/DL (ref 0.5–1.4)
EST. GFR  (AFRICAN AMERICAN): >60 ML/MIN/1.73 M^2
EST. GFR  (NON AFRICAN AMERICAN): >60 ML/MIN/1.73 M^2
ESTIMATED AVG GLUCOSE: 160 MG/DL (ref 68–131)
GLUCOSE SERPL-MCNC: 150 MG/DL (ref 70–110)
HBA1C MFR BLD HPLC: 7.2 % (ref 4–5.6)
HDLC SERPL-MCNC: 41 MG/DL (ref 40–75)
HDLC SERPL: 14.4 % (ref 20–50)
LDLC SERPL CALC-MCNC: 204 MG/DL (ref 63–159)
NONHDLC SERPL-MCNC: 244 MG/DL
POTASSIUM SERPL-SCNC: 3.5 MMOL/L (ref 3.5–5.1)
PROT SERPL-MCNC: 7.4 G/DL (ref 6–8.4)
SODIUM SERPL-SCNC: 142 MMOL/L (ref 136–145)
TRIGL SERPL-MCNC: 200 MG/DL (ref 30–150)

## 2020-11-23 ENCOUNTER — OFFICE VISIT (OUTPATIENT)
Dept: INTERNAL MEDICINE | Facility: CLINIC | Age: 60
End: 2020-11-23
Payer: MEDICARE

## 2020-11-23 VITALS
BODY MASS INDEX: 35.4 KG/M2 | DIASTOLIC BLOOD PRESSURE: 90 MMHG | HEIGHT: 65 IN | HEART RATE: 77 BPM | WEIGHT: 212.5 LBS | SYSTOLIC BLOOD PRESSURE: 146 MMHG | TEMPERATURE: 98 F | OXYGEN SATURATION: 98 %

## 2020-11-23 DIAGNOSIS — I77.819 AORTIC ECTASIA: ICD-10-CM

## 2020-11-23 DIAGNOSIS — I25.10 CORONARY ARTERY DISEASE INVOLVING NATIVE CORONARY ARTERY OF NATIVE HEART WITHOUT ANGINA PECTORIS: Chronic | ICD-10-CM

## 2020-11-23 DIAGNOSIS — E78.5 HYPERLIPIDEMIA ASSOCIATED WITH TYPE 2 DIABETES MELLITUS: ICD-10-CM

## 2020-11-23 DIAGNOSIS — R80.9 MICROALBUMINURIA DUE TO TYPE 2 DIABETES MELLITUS: Primary | ICD-10-CM

## 2020-11-23 DIAGNOSIS — E11.8 TYPE II DIABETES MELLITUS WITH COMPLICATION: ICD-10-CM

## 2020-11-23 DIAGNOSIS — F32.5 MAJOR DEPRESSION IN REMISSION: ICD-10-CM

## 2020-11-23 DIAGNOSIS — E11.29 MICROALBUMINURIA DUE TO TYPE 2 DIABETES MELLITUS: ICD-10-CM

## 2020-11-23 DIAGNOSIS — E11.69 HYPERLIPIDEMIA ASSOCIATED WITH TYPE 2 DIABETES MELLITUS: ICD-10-CM

## 2020-11-23 DIAGNOSIS — I15.2 HYPERTENSION ASSOCIATED WITH DIABETES: ICD-10-CM

## 2020-11-23 DIAGNOSIS — E11.59 HYPERTENSION ASSOCIATED WITH DIABETES: ICD-10-CM

## 2020-11-23 DIAGNOSIS — Z12.31 BREAST CANCER SCREENING BY MAMMOGRAM: ICD-10-CM

## 2020-11-23 DIAGNOSIS — E78.1 HYPERTRIGLYCERIDEMIA: ICD-10-CM

## 2020-11-23 DIAGNOSIS — R80.9 MICROALBUMINURIA DUE TO TYPE 2 DIABETES MELLITUS: ICD-10-CM

## 2020-11-23 DIAGNOSIS — Z00.00 ENCOUNTER FOR PREVENTIVE HEALTH EXAMINATION: Primary | ICD-10-CM

## 2020-11-23 DIAGNOSIS — R26.9 ABNORMALITY OF GAIT AND MOBILITY: ICD-10-CM

## 2020-11-23 DIAGNOSIS — Z86.010 HISTORY OF COLON POLYPS: ICD-10-CM

## 2020-11-23 DIAGNOSIS — I21.4 NSTEMI (NON-ST ELEVATED MYOCARDIAL INFARCTION): ICD-10-CM

## 2020-11-23 DIAGNOSIS — E66.01 SEVERE OBESITY (BMI 35.0-35.9 WITH COMORBIDITY): ICD-10-CM

## 2020-11-23 DIAGNOSIS — E11.29 MICROALBUMINURIA DUE TO TYPE 2 DIABETES MELLITUS: Primary | ICD-10-CM

## 2020-11-23 PROCEDURE — 99214 OFFICE O/P EST MOD 30 MIN: CPT | Mod: S$PBB,25,, | Performed by: INTERNAL MEDICINE

## 2020-11-23 PROCEDURE — G0439 PPPS, SUBSEQ VISIT: HCPCS | Mod: S$GLB,,, | Performed by: NURSE PRACTITIONER

## 2020-11-23 PROCEDURE — 99999 PR PBB SHADOW E&M-EST. PATIENT-LVL III: ICD-10-PCS | Mod: PBBFAC,,, | Performed by: INTERNAL MEDICINE

## 2020-11-23 PROCEDURE — G0439 PR MEDICARE ANNUAL WELLNESS SUBSEQUENT VISIT: ICD-10-PCS | Mod: S$GLB,,, | Performed by: NURSE PRACTITIONER

## 2020-11-23 PROCEDURE — 99999 PR PBB SHADOW E&M-EST. PATIENT-LVL III: CPT | Mod: PBBFAC,,, | Performed by: INTERNAL MEDICINE

## 2020-11-23 PROCEDURE — 99999 PR PBB SHADOW E&M-EST. PATIENT-LVL II: CPT | Mod: PBBFAC,,, | Performed by: NURSE PRACTITIONER

## 2020-11-23 PROCEDURE — 99214 PR OFFICE/OUTPT VISIT, EST, LEVL IV, 30-39 MIN: ICD-10-PCS | Mod: S$PBB,25,, | Performed by: INTERNAL MEDICINE

## 2020-11-23 PROCEDURE — 99212 OFFICE O/P EST SF 10 MIN: CPT | Mod: PBBFAC,27,PO | Performed by: NURSE PRACTITIONER

## 2020-11-23 PROCEDURE — 99213 OFFICE O/P EST LOW 20 MIN: CPT | Mod: PBBFAC,PO,25 | Performed by: INTERNAL MEDICINE

## 2020-11-23 PROCEDURE — G0008 ADMIN INFLUENZA VIRUS VAC: HCPCS

## 2020-11-23 PROCEDURE — 99999 PR PBB SHADOW E&M-EST. PATIENT-LVL II: ICD-10-PCS | Mod: PBBFAC,,, | Performed by: NURSE PRACTITIONER

## 2020-11-23 PROCEDURE — 90694 VACC AIIV4 NO PRSRV 0.5ML IM: CPT | Mod: PBBFAC,PO | Performed by: NURSE PRACTITIONER

## 2020-11-23 RX ORDER — METOPROLOL SUCCINATE 50 MG/1
50 TABLET, EXTENDED RELEASE ORAL 2 TIMES DAILY
COMMUNITY
Start: 2020-11-08 | End: 2020-11-23 | Stop reason: SDUPTHER

## 2020-11-23 RX ORDER — GLIMEPIRIDE 4 MG/1
4 TABLET ORAL
Qty: 90 TABLET | Refills: 3 | Status: SHIPPED | OUTPATIENT
Start: 2020-11-23 | End: 2021-12-10

## 2020-11-23 RX ORDER — DOXAZOSIN 4 MG/1
4 TABLET ORAL DAILY
Qty: 90 TABLET | Refills: 3 | Status: SHIPPED | OUTPATIENT
Start: 2020-11-23 | End: 2021-08-05 | Stop reason: SDUPTHER

## 2020-11-23 RX ORDER — OMEPRAZOLE 40 MG/1
40 CAPSULE, DELAYED RELEASE ORAL DAILY
Qty: 90 CAPSULE | Refills: 3 | Status: SHIPPED | OUTPATIENT
Start: 2020-11-23 | End: 2021-12-10

## 2020-11-23 RX ORDER — ISOSORBIDE MONONITRATE 20 MG/1
20 TABLET ORAL 2 TIMES DAILY WITH MEALS
Qty: 180 TABLET | Refills: 3 | Status: SHIPPED | OUTPATIENT
Start: 2020-11-23 | End: 2021-02-01 | Stop reason: ALTCHOICE

## 2020-11-23 RX ORDER — METOPROLOL SUCCINATE 50 MG/1
50 TABLET, EXTENDED RELEASE ORAL 2 TIMES DAILY
Qty: 180 TABLET | Refills: 3 | Status: SHIPPED | OUTPATIENT
Start: 2020-11-23 | End: 2021-08-05 | Stop reason: SDUPTHER

## 2020-11-23 RX ORDER — AMLODIPINE AND BENAZEPRIL HYDROCHLORIDE 10; 40 MG/1; MG/1
1 CAPSULE ORAL DAILY
Qty: 90 CAPSULE | Refills: 3 | Status: SHIPPED | OUTPATIENT
Start: 2020-11-23 | End: 2021-08-05 | Stop reason: SDUPTHER

## 2020-11-23 RX ORDER — FENOFIBRATE 134 MG/1
CAPSULE ORAL
Qty: 90 CAPSULE | Refills: 3 | Status: SHIPPED | OUTPATIENT
Start: 2020-11-23 | End: 2021-08-05 | Stop reason: SDUPTHER

## 2020-11-23 RX ORDER — METFORMIN HYDROCHLORIDE 500 MG/1
1000 TABLET ORAL 2 TIMES DAILY WITH MEALS
Qty: 360 TABLET | Refills: 3 | Status: SHIPPED | OUTPATIENT
Start: 2020-11-23 | End: 2021-12-27

## 2020-11-23 RX ORDER — NIACIN 750 MG/1
750 TABLET, EXTENDED RELEASE ORAL NIGHTLY
Qty: 90 TABLET | Refills: 3 | Status: SHIPPED | OUTPATIENT
Start: 2020-11-23 | End: 2023-10-16

## 2020-11-23 RX ORDER — NITROGLYCERIN 0.4 MG/1
TABLET SUBLINGUAL
Qty: 25 TABLET | Refills: 1 | Status: SHIPPED | OUTPATIENT
Start: 2020-11-23 | End: 2021-01-14 | Stop reason: SDUPTHER

## 2020-11-23 RX ORDER — EZETIMIBE 10 MG/1
10 TABLET ORAL DAILY
Qty: 90 TABLET | Refills: 3 | Status: SHIPPED | OUTPATIENT
Start: 2020-11-23 | End: 2021-08-05 | Stop reason: SDUPTHER

## 2020-11-23 RX ORDER — ROSUVASTATIN CALCIUM 40 MG/1
40 TABLET, COATED ORAL NIGHTLY
Qty: 90 TABLET | Refills: 3 | Status: SHIPPED | OUTPATIENT
Start: 2020-11-23 | End: 2021-08-05 | Stop reason: SDUPTHER

## 2020-11-23 NOTE — PROGRESS NOTES
Dulce Boogie presented for a  Medicare AWV and comprehensive Health Risk Assessment today. The following components were reviewed and updated:    · Medical history  · Family History  · Social history  · Allergies and Current Medications  · Health Risk Assessment  · Health Maintenance  · Care Team     ** See Completed Assessments for Annual Wellness Visit within the encounter summary.**         The following assessments were completed:  · Living Situation  · CAGE  · Depression Screening  · Timed Get Up and Go  · Whisper Test  · Cognitive Function Screening  · Nutrition Screening  · ADL Screening  · PAQ Screening        There were no vitals filed for this visit.  There is no height or weight on file to calculate BMI.  Physical Exam  Constitutional:       General: She is not in acute distress.     Appearance: Normal appearance. She is well-developed. She is obese. She is not ill-appearing, toxic-appearing or diaphoretic.   HENT:      Head: Normocephalic and atraumatic.      Right Ear: External ear normal.      Left Ear: External ear normal.      Nose: Nose normal.      Mouth/Throat:      Mouth: Mucous membranes are moist.      Pharynx: No posterior oropharyngeal erythema.   Eyes:      General: No scleral icterus.        Right eye: No discharge.         Left eye: No discharge.      Conjunctiva/sclera: Conjunctivae normal.      Pupils: Pupils are equal, round, and reactive to light.   Neck:      Musculoskeletal: Normal range of motion and neck supple. Normal range of motion. No edema, neck rigidity or muscular tenderness.      Thyroid: No thyromegaly.      Vascular: No carotid bruit.      Trachea: No tracheal deviation.   Cardiovascular:      Rate and Rhythm: Normal rate and regular rhythm.      Pulses: Normal pulses.      Heart sounds: Normal heart sounds. No murmur. No friction rub. No gallop.    Pulmonary:      Effort: Pulmonary effort is normal. No respiratory distress.      Breath sounds: Normal breath sounds. No  wheezing or rales.   Chest:      Chest wall: No tenderness.   Abdominal:      General: Bowel sounds are normal. There is no distension.      Palpations: Abdomen is soft. There is no mass.      Tenderness: There is no abdominal tenderness. There is no guarding or rebound.      Hernia: No hernia is present.   Musculoskeletal: Normal range of motion.         General: No tenderness.   Lymphadenopathy:      Head:      Right side of head: No tonsillar adenopathy.      Left side of head: No tonsillar adenopathy.      Cervical: No cervical adenopathy.      Upper Body:      Right upper body: No supraclavicular adenopathy.      Left upper body: No supraclavicular adenopathy.   Skin:     General: Skin is warm and dry.      Findings: No lesion or rash.   Neurological:      General: No focal deficit present.      Mental Status: She is alert and oriented to person, place, and time.      Deep Tendon Reflexes: Reflexes are normal and symmetric.      Comments: Protective Sensation (w/ 10 gram monofilament):  Right: Intact  Left: Intact    Visual Inspection:  Normal -  Bilateral    Pedal Pulses:   Right: Present  Left: Present    Posterior tibialis:   Right:Present  Left: Present   Psychiatric:         Mood and Affect: Mood normal.         Behavior: Behavior normal.               Diagnoses and health risks identified today and associated recommendations/orders:    1. Severe obesity (BMI 35.0-35.9 with comorbidity)  Monitored/treated on meds, continue the same tx, stable, encouraged healthy diet and exercise    2. Abnormality of gait and mobility  Monitored/treated on meds, continue the same tx, stable    3. Encounter for preventive health examination  Screenings performed, as noted above.  Personal preventative testing needs reviewed.     4. Major depression in remission  Monitored/treated on meds, continue the same tx, stable    5. Coronary artery disease involving native coronary artery of native heart without angina  pectoris  Monitored/treated on meds, continue the same tx, stable, denies chest pain    6. Hypertension associated with diabetes  Monitored/treated on meds, continue the same tx, stable    7. Hyperlipidemia associated with type 2 diabetes mellitus  Monitored/treated on meds, continue the same tx, stable    8. Aortic ectasia  Monitored/treated on meds, continue the same tx, stable    9. Type II diabetes mellitus with complication  Monitored/treated on meds, continue the same tx, stable    10. Microalbuminuria due to type 2 diabetes mellitus  Monitored/treated on meds, continue the same tx, stable, followed by her pcp      Provided Dulce with a 5-10 year written screening schedule and personal prevention plan. Recommendations were developed using the USPSTF age appropriate recommendations. Education, counseling, and referrals were provided as needed. After Visit Summary printed and given to patient which includes a list of additional screenings\tests needed.    No follow-ups on file.    Daniel Wilson NP

## 2020-11-23 NOTE — PATIENT INSTRUCTIONS
Counseling and Referral of Other Preventative  (Italic type indicates deductible and co-insurance are waived)    Patient Name: Dulce Boogie  Today's Date: 11/23/2020    Health Maintenance       Date Due Completion Date    HIV Screening 08/06/1974 ---    Aspirin/Antiplatelet Therapy 08/06/1977 ---    Shingles Vaccine (1 of 2) 08/06/2009 ---    Influenza Vaccine (1) 08/01/2020 10/25/2019    Foot Exam 08/13/2020 8/13/2019 (Done)    Override on 8/13/2019: Done    Override on 10/29/2018: Done (normal)    Override on 7/14/2017: Done    Override on 7/15/2016: Done    Mammogram 02/06/2021 2/6/2020    Urine Microalbumin 02/06/2021 2/6/2020    Eye Exam 03/10/2021 3/10/2020    Hemoglobin A1c 05/16/2021 11/16/2020    Lipid Panel 11/16/2021 11/16/2020    High Dose Statin 11/23/2021 11/23/2020    TETANUS VACCINE 12/07/2021 12/7/2011    Colorectal Cancer Screening 06/10/2022 6/10/2019    Pap Smear 08/13/2022 8/13/2019        Orders Placed This Encounter   Procedures    Influenza - Quadrivalent (Adjuvanted)     The following information is provided to all patients.  This information is to help you find resources for any of the problems found today that may be affecting your health:                Living healthy guide: www.[facility].[place].gov      Understanding Diabetes: www.diabetes.org      Eating healthy: www.cdc.gov/healthyweight      CDC home safety checklist: www.cdc.gov/steadi/patient.html      Agency on Aging: www.goea.[place].[facility]      Alcoholics anonymous (AA): www.aa.org      Physical Activity: www.deo.nih.gov/fd3jfll      Tobacco use: www.quitwithusla.org

## 2020-11-23 NOTE — PROGRESS NOTES
"HPI:  Patient is a 60-year-old female comes today for follow-up of diabetes, hypertension, lipids, coronary artery disease, and for her annual physical exam.  She states she is doing fairly well.  She denies any chest pains or shortness of breath.  She is not a very active person.  She denies any hypoglycemia.  Her blood sugar most the time is 100 and 150.  She denies any new problems or complaints.  She does not check her blood pressure.    Current MEDS: medcard review, verified and update  Allergies: Per the electronic medical record    Past Medical History:   Diagnosis Date    Anticoagulant long-term use     CAD (coronary artery disease)     Depression     History of colon polyps     Hyperlipidemia associated with type 2 diabetes mellitus     Hypertension associated with diabetes     Microalbuminuria due to type 2 diabetes mellitus     NSTEMI (non-ST elevated myocardial infarction) 2018    Obesity     Type II diabetes mellitus with complication        Past Surgical History:   Procedure Laterality Date    CAROTID STENT       SECTION, LOW TRANSVERSE      x 2    COLONOSCOPY N/A 6/10/2019    Procedure: COLONOSCOPY;  Surgeon: Maricarmen Boo MD;  Location: [facility] ENDO;  Service: Endoscopy;  Laterality: N/A;    LEFT HEART CATHETERIZATION Left 2018    Procedure: HEART CATH-LEFT;  Surgeon: Aimee Cooper MD;  Location: [facility] CATH LAB;  Service: Cardiology;  Laterality: Left;       SHx: per the electronic medical record    FHx: recorded in the electronic medical record    ROS:    denies any chest pains or shortness of breath. Denies any nausea, vomiting or diarrhea. Denies any fever, chills or sweats. Denies any change in weight, voice, stool, skin or hair. Denies any dysuria, dyspepsia or dysphagia. Denies any change in vision, hearing or headaches. Denies any swollen lymph nodes or loss of memory.    PE:  BP (!) 146/90   Pulse 77   Temp 97.9 °F (36.6 °C) (Tympanic)   Ht 5' 5" (1.651 m)  "  Wt 96.4 kg (212 lb 8.4 oz)   LMP 06/21/2003   SpO2 98%   BMI 35.37 kg/m²   Gen: Well-developed, well-nourished, female, in no acute distress, oriented x3  HEENT: neck is supple, no adenopathy, carotids 2+ equal without bruits, thyroid exam normal size without nodules.  CHEST: clear to auscultation and percussion  CVS: regular rate and rhythm without significant murmur, gallop, or rubs  ABD: soft, benign, no rebound no guarding, no distention. Bowel sounds are normal.     Nontender,  no palpable masses, no organomegaly and no audible bruits.  BREAST: no masses.  No nipple inversion, retraction, or deviation.  EXT: no clubbing, cyanosis, or edema  LYMPH: no cervical, inguinal, or axillary adenopathy  FEET: no loss of sensation.  No ulcers or pressure sores.  Monofilament testing normal  NEURO: gait normal.  Cranial nerves II- XII intact. No nystagmus.  Speech normal.   Gross motor and sensory unremarkable.  PELVIC: deferred    Lab Results   Component Value Date    WBC 9.88 11/16/2020    HGB 13.3 11/16/2020    HCT 41.8 11/16/2020     11/16/2020    CHOL 285 (H) 11/16/2020    TRIG 200 (H) 11/16/2020    HDL 41 11/16/2020    ALT 28 11/16/2020    AST 23 11/16/2020     11/16/2020    K 3.5 11/16/2020     11/16/2020    CREATININE 0.9 11/16/2020    BUN 16 11/16/2020    CO2 22 (L) 11/16/2020    TSH 2.246 11/16/2020    INR 1.0 05/21/2018    GLUF 106 01/07/2005    HGBA1C 7.2 (H) 11/16/2020       Impression:  Hyperlipidemia, not controlled.  Patient admits he has not been faithful with her Crestor.  Hypertension, not to goal.  Patient needs to start checking her blood pressure at home on a regular basis.  Diabetes, control in acceptable ranges  Coronary artery disease, asymptomatic  Patient Active Problem List   Diagnosis    CAD with far remote PCI of unknown vessel    History of colon polyps    NSTEMI (non-ST elevated myocardial infarction)    Type II diabetes mellitus with complication    Hypertension  associated with diabetes    Hyperlipidemia associated with type 2 diabetes mellitus    Major depression in remission    Aortic ectasia    Microalbuminuria due to type 2 diabetes mellitus       Plan:   Orders Placed This Encounter    Mammo Digital Screening Bilat w/ [place]    Hemoglobin A1C    Lipid Panel    Basic Metabolic Panel    amLODIPine-benazepriL (LOTREL) 10-40 mg per capsule    doxazosin (CARDURA) 4 MG tablet    ezetimibe (ZETIA) 10 mg tablet    fenofibrate micronized (LOFIBRA) 134 MG Cap    metFORMIN (GLUCOPHAGE) 500 MG tablet    rosuvastatin (CRESTOR) 40 MG Tab    metoprolol succinate (TOPROL-XL) 50 MG 24 hr tablet    ticagrelor (BRILINTA) 90 mg tablet    glimepiride (AMARYL) 4 MG tablet    isosorbide mononitrate (ISMO,MONOKET) 20 MG Tab    niacin (NIASPAN) 750 MG CR tablet    nitroGLYCERIN (NITROSTAT) 0.4 MG SL tablet    omeprazole (PRILOSEC) 40 MG capsule     Patient will start taking her Crestor faithfully consistently.  She will see me back in 3 months with lab work.  Patient also needs to be checking her blood pressure 2 to 3 times a week.  She was given high-dose influenza vaccine today    This note is generated with speech recognition software and is subject to transcription error and sound alike phrases that may be missed by proofreading.

## 2020-11-24 ENCOUNTER — TELEPHONE (OUTPATIENT)
Dept: INTERNAL MEDICINE | Facility: CLINIC | Age: 60
End: 2020-11-24

## 2020-11-24 NOTE — TELEPHONE ENCOUNTER
Patient notified per Dr. Hanson insurance will not pay for Niacin 750. She needs to take otc niacin 500 mg twice daily. Patient verbalized understanding.

## 2020-12-02 ENCOUNTER — PATIENT OUTREACH (OUTPATIENT)
Dept: ADMINISTRATIVE | Facility: HOSPITAL | Age: 60
End: 2020-12-02

## 2020-12-02 DIAGNOSIS — E11.59 HYPERTENSION ASSOCIATED WITH DIABETES: Primary | ICD-10-CM

## 2020-12-02 DIAGNOSIS — I15.2 HYPERTENSION ASSOCIATED WITH DIABETES: Primary | ICD-10-CM

## 2020-12-02 NOTE — PROGRESS NOTES
Working HTN Report       Called pt for home BP Reading, No answer, L/M, Portal Message Sent        Lucy ENGLAND LPN Care Coordinator  Care Coordination Department  [facility]  [phone]

## 2020-12-07 ENCOUNTER — PATIENT MESSAGE (OUTPATIENT)
Dept: ADMINISTRATIVE | Facility: HOSPITAL | Age: 60
End: 2020-12-07

## 2020-12-07 ENCOUNTER — TELEPHONE (OUTPATIENT)
Dept: INTERNAL MEDICINE | Facility: CLINIC | Age: 60
End: 2020-12-07

## 2020-12-07 NOTE — TELEPHONE ENCOUNTER
Remote BP Entered       Lucy ENGLAND LPN Care Coordinator  Care Coordination Department  [facility]  [phone]

## 2020-12-11 ENCOUNTER — PATIENT MESSAGE (OUTPATIENT)
Dept: OTHER | Facility: OTHER | Age: 60
End: 2020-12-11

## 2021-01-14 ENCOUNTER — SPECIALTY PHARMACY (OUTPATIENT)
Dept: PHARMACY | Facility: CLINIC | Age: 61
End: 2021-01-14

## 2021-01-14 ENCOUNTER — OFFICE VISIT (OUTPATIENT)
Dept: CARDIOLOGY | Facility: CLINIC | Age: 61
End: 2021-01-14
Payer: MEDICARE

## 2021-01-14 VITALS
HEART RATE: 66 BPM | OXYGEN SATURATION: 98 % | BODY MASS INDEX: 34.6 KG/M2 | SYSTOLIC BLOOD PRESSURE: 146 MMHG | DIASTOLIC BLOOD PRESSURE: 76 MMHG | HEIGHT: 65 IN | WEIGHT: 207.69 LBS

## 2021-01-14 DIAGNOSIS — E66.01 SEVERE OBESITY (BMI 35.0-35.9 WITH COMORBIDITY): ICD-10-CM

## 2021-01-14 DIAGNOSIS — I15.2 HYPERTENSION ASSOCIATED WITH DIABETES: ICD-10-CM

## 2021-01-14 DIAGNOSIS — E11.8 TYPE II DIABETES MELLITUS WITH COMPLICATION: ICD-10-CM

## 2021-01-14 DIAGNOSIS — E78.5 HYPERLIPIDEMIA ASSOCIATED WITH TYPE 2 DIABETES MELLITUS: ICD-10-CM

## 2021-01-14 DIAGNOSIS — E11.59 HYPERTENSION ASSOCIATED WITH DIABETES: ICD-10-CM

## 2021-01-14 DIAGNOSIS — E11.69 HYPERLIPIDEMIA ASSOCIATED WITH TYPE 2 DIABETES MELLITUS: ICD-10-CM

## 2021-01-14 DIAGNOSIS — I25.10 CORONARY ARTERY DISEASE INVOLVING NATIVE CORONARY ARTERY OF NATIVE HEART WITHOUT ANGINA PECTORIS: Primary | Chronic | ICD-10-CM

## 2021-01-14 PROCEDURE — 99999 PR PBB SHADOW E&M-EST. PATIENT-LVL IV: CPT | Mod: PBBFAC,,, | Performed by: NURSE PRACTITIONER

## 2021-01-14 PROCEDURE — 99999 PR PBB SHADOW E&M-EST. PATIENT-LVL IV: ICD-10-PCS | Mod: PBBFAC,,, | Performed by: NURSE PRACTITIONER

## 2021-01-14 PROCEDURE — 99214 OFFICE O/P EST MOD 30 MIN: CPT | Mod: S$PBB,,, | Performed by: NURSE PRACTITIONER

## 2021-01-14 PROCEDURE — 99214 OFFICE O/P EST MOD 30 MIN: CPT | Mod: PBBFAC | Performed by: NURSE PRACTITIONER

## 2021-01-14 PROCEDURE — 99214 PR OFFICE/OUTPT VISIT, EST, LEVL IV, 30-39 MIN: ICD-10-PCS | Mod: S$PBB,,, | Performed by: NURSE PRACTITIONER

## 2021-01-14 RX ORDER — EVOLOCUMAB 140 MG/ML
140 INJECTION, SOLUTION SUBCUTANEOUS
Qty: 2 SYRINGE | Refills: 6 | OUTPATIENT
Start: 2021-01-14 | End: 2021-02-01 | Stop reason: SDUPTHER

## 2021-01-14 RX ORDER — NITROGLYCERIN 0.4 MG/1
TABLET SUBLINGUAL
Qty: 25 TABLET | Refills: 1 | Status: SHIPPED | OUTPATIENT
Start: 2021-01-14 | End: 2021-09-22

## 2021-01-23 ENCOUNTER — HOSPITAL ENCOUNTER (INPATIENT)
Facility: HOSPITAL | Age: 61
LOS: 1 days | Discharge: HOME | DRG: 247 | End: 2021-01-26
Attending: EMERGENCY MEDICINE | Admitting: INTERNAL MEDICINE
Payer: MEDICARE

## 2021-01-23 DIAGNOSIS — Z98.890 STATUS POST LEFT HEART CATHETERIZATION (LHC): ICD-10-CM

## 2021-01-23 DIAGNOSIS — R07.9 CHEST PAIN: ICD-10-CM

## 2021-01-23 DIAGNOSIS — R79.89 ELEVATED TROPONIN: ICD-10-CM

## 2021-01-23 DIAGNOSIS — I21.4 NSTEMI (NON-ST ELEVATED MYOCARDIAL INFARCTION): ICD-10-CM

## 2021-01-23 DIAGNOSIS — I21.4 NSTEMI (NON-ST ELEVATED MYOCARDIAL INFARCTION): Primary | ICD-10-CM

## 2021-01-23 LAB
ALBUMIN SERPL BCP-MCNC: 3.5 G/DL (ref 3.5–5.2)
ALP SERPL-CCNC: 79 U/L (ref 55–135)
ALT SERPL W/O P-5'-P-CCNC: 23 U/L (ref 10–44)
ANION GAP SERPL CALC-SCNC: 10 MMOL/L (ref 8–16)
ANION GAP SERPL CALC-SCNC: 9 MMOL/L (ref 8–16)
AORTIC ROOT ANNULUS: 2.88 CM
APTT BLDCRRT: 25.6 SEC (ref 21–32)
APTT BLDCRRT: 35.3 SEC (ref 21–32)
ASCENDING AORTA: 2.85 CM
AST SERPL-CCNC: 19 U/L (ref 10–40)
AV INDEX (PROSTH): 0.76
AV MEAN GRADIENT: 5 MMHG
AV PEAK GRADIENT: 7 MMHG
AV VALVE AREA: 2.05 CM2
AV VELOCITY RATIO: 0.68
BASOPHILS # BLD AUTO: 0.05 K/UL (ref 0–0.2)
BASOPHILS # BLD AUTO: 0.07 K/UL (ref 0–0.2)
BASOPHILS NFR BLD: 0.5 % (ref 0–1.9)
BASOPHILS NFR BLD: 0.7 % (ref 0–1.9)
BILIRUB SERPL-MCNC: 0.2 MG/DL (ref 0.1–1)
BNP SERPL-MCNC: 14 PG/ML (ref 0–99)
BSA FOR ECHO PROCEDURE: 2.06 M2
BUN SERPL-MCNC: 15 MG/DL (ref 8–23)
BUN SERPL-MCNC: 15 MG/DL (ref 8–23)
CALCIUM SERPL-MCNC: 8.9 MG/DL (ref 8.7–10.5)
CALCIUM SERPL-MCNC: 9.2 MG/DL (ref 8.7–10.5)
CHLORIDE SERPL-SCNC: 105 MMOL/L (ref 95–110)
CHLORIDE SERPL-SCNC: 106 MMOL/L (ref 95–110)
CHOLEST SERPL-MCNC: 157 MG/DL (ref 120–199)
CHOLEST/HDLC SERPL: 4.1 {RATIO} (ref 2–5)
CO2 SERPL-SCNC: 25 MMOL/L (ref 23–29)
CO2 SERPL-SCNC: 27 MMOL/L (ref 23–29)
CREAT SERPL-MCNC: 0.9 MG/DL (ref 0.5–1.4)
CREAT SERPL-MCNC: 1 MG/DL (ref 0.5–1.4)
CTP QC/QA: YES
CV ECHO LV RWT: 0.73 CM
DIFFERENTIAL METHOD: ABNORMAL
DIFFERENTIAL METHOD: ABNORMAL
DOP CALC AO PEAK VEL: 1.36 M/S
DOP CALC AO VTI: 26.36 CM
DOP CALC LVOT AREA: 2.7 CM2
DOP CALC LVOT DIAMETER: 1.86 CM
DOP CALC LVOT PEAK VEL: 0.92 M/S
DOP CALC LVOT STROKE VOLUME: 54.13 CM3
DOP CALC RVOT PEAK VEL: 1.02 M/S
DOP CALC RVOT VTI: 16.93 CM
DOP CALCLVOT PEAK VEL VTI: 19.93 CM
E WAVE DECELERATION TIME: 200.24 MSEC
E/A RATIO: 1.31
E/E' RATIO: 8.93 M/S
ECHO LV POSTERIOR WALL: 1.28 CM (ref 0.6–1.1)
EOSINOPHIL # BLD AUTO: 0.2 K/UL (ref 0–0.5)
EOSINOPHIL # BLD AUTO: 0.2 K/UL (ref 0–0.5)
EOSINOPHIL NFR BLD: 2 % (ref 0–8)
EOSINOPHIL NFR BLD: 2.3 % (ref 0–8)
ERYTHROCYTE [DISTWIDTH] IN BLOOD BY AUTOMATED COUNT: 14.7 % (ref 11.5–14.5)
ERYTHROCYTE [DISTWIDTH] IN BLOOD BY AUTOMATED COUNT: 14.9 % (ref 11.5–14.5)
EST. GFR  (AFRICAN AMERICAN): >60 ML/MIN/1.73 M^2
EST. GFR  (AFRICAN AMERICAN): >60 ML/MIN/1.73 M^2
EST. GFR  (NON AFRICAN AMERICAN): >60 ML/MIN/1.73 M^2
EST. GFR  (NON AFRICAN AMERICAN): >60 ML/MIN/1.73 M^2
FRACTIONAL SHORTENING: 28 % (ref 28–44)
GLUCOSE SERPL-MCNC: 143 MG/DL (ref 70–110)
GLUCOSE SERPL-MCNC: 171 MG/DL (ref 70–110)
HCT VFR BLD AUTO: 38.1 % (ref 37–48.5)
HCT VFR BLD AUTO: 39.4 % (ref 37–48.5)
HCV AB SERPL QL IA: NEGATIVE
HDLC SERPL-MCNC: 38 MG/DL (ref 40–75)
HDLC SERPL: 24.2 % (ref 20–50)
HGB BLD-MCNC: 12 G/DL (ref 12–16)
HGB BLD-MCNC: 12.5 G/DL (ref 12–16)
HIV 1+2 AB+HIV1 P24 AG SERPL QL IA: NEGATIVE
IMM GRANULOCYTES # BLD AUTO: 0.02 K/UL (ref 0–0.04)
IMM GRANULOCYTES # BLD AUTO: 0.02 K/UL (ref 0–0.04)
IMM GRANULOCYTES NFR BLD AUTO: 0.2 % (ref 0–0.5)
IMM GRANULOCYTES NFR BLD AUTO: 0.2 % (ref 0–0.5)
INR PPP: 1 (ref 0.8–1.2)
INR PPP: 1 (ref 0.8–1.2)
INTERVENTRICULAR SEPTUM: 1.37 CM (ref 0.6–1.1)
IVRT: 88.49 MSEC
LA MAJOR: 4.59 CM
LA MINOR: 3.34 CM
LDLC SERPL CALC-MCNC: 93.8 MG/DL (ref 63–159)
LEFT ATRIUM SIZE: 3.26 CM
LEFT INTERNAL DIMENSION IN SYSTOLE: 2.52 CM (ref 2.1–4)
LEFT VENTRICLE DIASTOLIC VOLUME INDEX: 25.79 ML/M2
LEFT VENTRICLE DIASTOLIC VOLUME: 51.57 ML
LEFT VENTRICLE MASS INDEX: 80 G/M2
LEFT VENTRICLE SYSTOLIC VOLUME INDEX: 11.4 ML/M2
LEFT VENTRICLE SYSTOLIC VOLUME: 22.74 ML
LEFT VENTRICULAR INTERNAL DIMENSION IN DIASTOLE: 3.52 CM (ref 3.5–6)
LEFT VENTRICULAR MASS: 159.74 G
LV LATERAL E/E' RATIO: 8.38 M/S
LV SEPTAL E/E' RATIO: 9.57 M/S
LYMPHOCYTES # BLD AUTO: 3.8 K/UL (ref 1–4.8)
LYMPHOCYTES # BLD AUTO: 3.9 K/UL (ref 1–4.8)
LYMPHOCYTES NFR BLD: 38.7 % (ref 18–48)
LYMPHOCYTES NFR BLD: 39.2 % (ref 18–48)
MAGNESIUM SERPL-MCNC: 2 MG/DL (ref 1.6–2.6)
MCH RBC QN AUTO: 26.2 PG (ref 27–31)
MCH RBC QN AUTO: 26.3 PG (ref 27–31)
MCHC RBC AUTO-ENTMCNC: 31.5 G/DL (ref 32–36)
MCHC RBC AUTO-ENTMCNC: 31.7 G/DL (ref 32–36)
MCV RBC AUTO: 83 FL (ref 82–98)
MCV RBC AUTO: 83 FL (ref 82–98)
MONOCYTES # BLD AUTO: 0.8 K/UL (ref 0.3–1)
MONOCYTES # BLD AUTO: 0.8 K/UL (ref 0.3–1)
MONOCYTES NFR BLD: 8.2 % (ref 4–15)
MONOCYTES NFR BLD: 8.6 % (ref 4–15)
MV PEAK A VEL: 0.51 M/S
MV PEAK E VEL: 0.67 M/S
NEUTROPHILS # BLD AUTO: 4.7 K/UL (ref 1.8–7.7)
NEUTROPHILS # BLD AUTO: 5 K/UL (ref 1.8–7.7)
NEUTROPHILS NFR BLD: 49.2 % (ref 38–73)
NEUTROPHILS NFR BLD: 50.2 % (ref 38–73)
NONHDLC SERPL-MCNC: 119 MG/DL
NRBC BLD-RTO: 0 /100 WBC
NRBC BLD-RTO: 0 /100 WBC
PISA TR MAX VEL: 2.26 M/S
PLATELET # BLD AUTO: 207 K/UL (ref 150–350)
PLATELET # BLD AUTO: 214 K/UL (ref 150–350)
PMV BLD AUTO: 12.4 FL (ref 9.2–12.9)
PMV BLD AUTO: 13.1 FL (ref 9.2–12.9)
POCT GLUCOSE: 133 MG/DL (ref 70–110)
POCT GLUCOSE: 174 MG/DL (ref 70–110)
POTASSIUM SERPL-SCNC: 3.5 MMOL/L (ref 3.5–5.1)
POTASSIUM SERPL-SCNC: 3.8 MMOL/L (ref 3.5–5.1)
PROT SERPL-MCNC: 7.5 G/DL (ref 6–8.4)
PROTHROMBIN TIME: 10.7 SEC (ref 9–12.5)
PROTHROMBIN TIME: 11 SEC (ref 9–12.5)
PV MEAN GRADIENT: 3 MMHG
RA MAJOR: 3.47 CM
RA PRESSURE: 3 MMHG
RBC # BLD AUTO: 4.58 M/UL (ref 4–5.4)
RBC # BLD AUTO: 4.75 M/UL (ref 4–5.4)
SARS-COV-2 RDRP RESP QL NAA+PROBE: NEGATIVE
SINUS: 3 CM
SODIUM SERPL-SCNC: 141 MMOL/L (ref 136–145)
SODIUM SERPL-SCNC: 141 MMOL/L (ref 136–145)
STJ: 3.07 CM
TDI LATERAL: 0.08 M/S
TDI SEPTAL: 0.07 M/S
TDI: 0.08 M/S
TR MAX PG: 20 MMHG
TRICUSPID ANNULAR PLANE SYSTOLIC EXCURSION: 1.75 CM
TRIGL SERPL-MCNC: 126 MG/DL (ref 30–150)
TROPONIN I SERPL DL<=0.01 NG/ML-MCNC: 0.1 NG/ML (ref 0–0.03)
TV REST PULMONARY ARTERY PRESSURE: 23 MMHG
WBC # BLD AUTO: 10.03 K/UL (ref 3.9–12.7)
WBC # BLD AUTO: 9.56 K/UL (ref 3.9–12.7)

## 2021-01-23 PROCEDURE — 25500020 PHARM REV CODE 255: Performed by: EMERGENCY MEDICINE

## 2021-01-23 PROCEDURE — 96374 THER/PROPH/DIAG INJ IV PUSH: CPT

## 2021-01-23 PROCEDURE — 25000003 PHARM REV CODE 250: Performed by: NURSE PRACTITIONER

## 2021-01-23 PROCEDURE — 99233 SBSQ HOSP IP/OBS HIGH 50: CPT | Mod: ,,, | Performed by: INTERNAL MEDICINE

## 2021-01-23 PROCEDURE — 80053 COMPREHEN METABOLIC PANEL: CPT

## 2021-01-23 PROCEDURE — 84484 ASSAY OF TROPONIN QUANT: CPT

## 2021-01-23 PROCEDURE — U0002 COVID-19 LAB TEST NON-CDC: HCPCS | Performed by: EMERGENCY MEDICINE

## 2021-01-23 PROCEDURE — G0378 HOSPITAL OBSERVATION PER HR: HCPCS

## 2021-01-23 PROCEDURE — 25000003 PHARM REV CODE 250: Performed by: EMERGENCY MEDICINE

## 2021-01-23 PROCEDURE — 85610 PROTHROMBIN TIME: CPT | Mod: 91

## 2021-01-23 PROCEDURE — 93010 EKG 12-LEAD: ICD-10-PCS | Mod: 59,,, | Performed by: INTERNAL MEDICINE

## 2021-01-23 PROCEDURE — 85730 THROMBOPLASTIN TIME PARTIAL: CPT | Mod: 91

## 2021-01-23 PROCEDURE — 93005 ELECTROCARDIOGRAM TRACING: CPT

## 2021-01-23 PROCEDURE — 96365 THER/PROPH/DIAG IV INF INIT: CPT

## 2021-01-23 PROCEDURE — 85730 THROMBOPLASTIN TIME PARTIAL: CPT

## 2021-01-23 PROCEDURE — 80048 BASIC METABOLIC PNL TOTAL CA: CPT

## 2021-01-23 PROCEDURE — 85610 PROTHROMBIN TIME: CPT

## 2021-01-23 PROCEDURE — 84484 ASSAY OF TROPONIN QUANT: CPT | Mod: 91

## 2021-01-23 PROCEDURE — 99233 PR SUBSEQUENT HOSPITAL CARE,LEVL III: ICD-10-PCS | Mod: ,,, | Performed by: INTERNAL MEDICINE

## 2021-01-23 PROCEDURE — 86803 HEPATITIS C AB TEST: CPT

## 2021-01-23 PROCEDURE — 63600175 PHARM REV CODE 636 W HCPCS: Performed by: EMERGENCY MEDICINE

## 2021-01-23 PROCEDURE — 99291 CRITICAL CARE FIRST HOUR: CPT | Mod: 25

## 2021-01-23 PROCEDURE — 96366 THER/PROPH/DIAG IV INF ADDON: CPT

## 2021-01-23 PROCEDURE — 85025 COMPLETE CBC W/AUTO DIFF WBC: CPT

## 2021-01-23 PROCEDURE — 93010 ELECTROCARDIOGRAM REPORT: CPT | Mod: 59,,, | Performed by: INTERNAL MEDICINE

## 2021-01-23 PROCEDURE — 83735 ASSAY OF MAGNESIUM: CPT

## 2021-01-23 PROCEDURE — 25000242 PHARM REV CODE 250 ALT 637 W/ HCPCS: Performed by: EMERGENCY MEDICINE

## 2021-01-23 PROCEDURE — 80061 LIPID PANEL: CPT

## 2021-01-23 PROCEDURE — 83880 ASSAY OF NATRIURETIC PEPTIDE: CPT

## 2021-01-23 PROCEDURE — 86703 HIV-1/HIV-2 1 RESULT ANTBDY: CPT

## 2021-01-23 RX ORDER — GLUCAGON 1 MG
1 KIT INJECTION
Status: DISCONTINUED | OUTPATIENT
Start: 2021-01-23 | End: 2021-01-26 | Stop reason: HOSPADM

## 2021-01-23 RX ORDER — AMLODIPINE BESYLATE 10 MG/1
10 TABLET ORAL DAILY
Status: DISCONTINUED | OUTPATIENT
Start: 2021-01-23 | End: 2021-01-26 | Stop reason: HOSPADM

## 2021-01-23 RX ORDER — IBUPROFEN 200 MG
16 TABLET ORAL
Status: DISCONTINUED | OUTPATIENT
Start: 2021-01-23 | End: 2021-01-26 | Stop reason: HOSPADM

## 2021-01-23 RX ORDER — INSULIN ASPART 100 [IU]/ML
0-5 INJECTION, SOLUTION INTRAVENOUS; SUBCUTANEOUS
Status: DISCONTINUED | OUTPATIENT
Start: 2021-01-23 | End: 2021-01-26 | Stop reason: HOSPADM

## 2021-01-23 RX ORDER — ASPIRIN 81 MG/1
81 TABLET ORAL DAILY
Status: DISCONTINUED | OUTPATIENT
Start: 2021-01-23 | End: 2021-01-26 | Stop reason: HOSPADM

## 2021-01-23 RX ORDER — AMLODIPINE AND BENAZEPRIL HYDROCHLORIDE 10; 40 MG/1; MG/1
1 CAPSULE ORAL DAILY
Status: DISCONTINUED | OUTPATIENT
Start: 2021-01-23 | End: 2021-01-23 | Stop reason: CLARIF

## 2021-01-23 RX ORDER — ALPRAZOLAM 0.5 MG/1
0.5 TABLET ORAL DAILY PRN
Status: DISCONTINUED | OUTPATIENT
Start: 2021-01-23 | End: 2021-01-26 | Stop reason: HOSPADM

## 2021-01-23 RX ORDER — HYDROCODONE BITARTRATE AND ACETAMINOPHEN 5; 325 MG/1; MG/1
1 TABLET ORAL EVERY 4 HOURS PRN
Status: DISCONTINUED | OUTPATIENT
Start: 2021-01-23 | End: 2021-01-26 | Stop reason: HOSPADM

## 2021-01-23 RX ORDER — HEPARIN SODIUM,PORCINE/D5W 25000/250
20 INTRAVENOUS SOLUTION INTRAVENOUS CONTINUOUS
Status: DISCONTINUED | OUTPATIENT
Start: 2021-01-23 | End: 2021-01-25

## 2021-01-23 RX ORDER — SODIUM CHLORIDE 0.9 % (FLUSH) 0.9 %
10 SYRINGE (ML) INJECTION
Status: DISCONTINUED | OUTPATIENT
Start: 2021-01-23 | End: 2021-01-26 | Stop reason: HOSPADM

## 2021-01-23 RX ORDER — NITROGLYCERIN 0.4 MG/1
0.4 TABLET SUBLINGUAL EVERY 5 MIN PRN
Status: COMPLETED | OUTPATIENT
Start: 2021-01-23 | End: 2021-01-24

## 2021-01-23 RX ORDER — BENAZEPRIL HYDROCHLORIDE 20 MG/1
40 TABLET ORAL DAILY
Status: DISCONTINUED | OUTPATIENT
Start: 2021-01-23 | End: 2021-01-25

## 2021-01-23 RX ORDER — PANTOPRAZOLE SODIUM 40 MG/1
40 TABLET, DELAYED RELEASE ORAL DAILY
Refills: 3 | Status: DISCONTINUED | OUTPATIENT
Start: 2021-01-23 | End: 2021-01-26 | Stop reason: HOSPADM

## 2021-01-23 RX ORDER — MORPHINE SULFATE 2 MG/ML
2 INJECTION, SOLUTION INTRAMUSCULAR; INTRAVENOUS EVERY 4 HOURS PRN
Status: DISCONTINUED | OUTPATIENT
Start: 2021-01-23 | End: 2021-01-26 | Stop reason: HOSPADM

## 2021-01-23 RX ORDER — ASPIRIN 325 MG
325 TABLET ORAL
Status: COMPLETED | OUTPATIENT
Start: 2021-01-23 | End: 2021-01-23

## 2021-01-23 RX ORDER — FENOFIBRATE 145 MG/1
145 TABLET, FILM COATED ORAL
Status: DISCONTINUED | OUTPATIENT
Start: 2021-01-24 | End: 2021-01-26 | Stop reason: HOSPADM

## 2021-01-23 RX ORDER — NITROGLYCERIN 0.4 MG/1
0.4 TABLET SUBLINGUAL EVERY 5 MIN PRN
Status: DISCONTINUED | OUTPATIENT
Start: 2021-01-23 | End: 2021-01-23

## 2021-01-23 RX ORDER — METOPROLOL SUCCINATE 50 MG/1
50 TABLET, EXTENDED RELEASE ORAL 2 TIMES DAILY
Status: DISCONTINUED | OUTPATIENT
Start: 2021-01-23 | End: 2021-01-26 | Stop reason: HOSPADM

## 2021-01-23 RX ORDER — EZETIMIBE 10 MG/1
10 TABLET ORAL DAILY
Status: DISCONTINUED | OUTPATIENT
Start: 2021-01-23 | End: 2021-01-26 | Stop reason: HOSPADM

## 2021-01-23 RX ORDER — ISOSORBIDE MONONITRATE 20 MG/1
20 TABLET ORAL 2 TIMES DAILY WITH MEALS
Status: DISCONTINUED | OUTPATIENT
Start: 2021-01-23 | End: 2021-01-26 | Stop reason: HOSPADM

## 2021-01-23 RX ORDER — ACETAMINOPHEN 325 MG/1
650 TABLET ORAL
Status: COMPLETED | OUTPATIENT
Start: 2021-01-23 | End: 2021-01-23

## 2021-01-23 RX ORDER — METOPROLOL TARTRATE 1 MG/ML
5 INJECTION, SOLUTION INTRAVENOUS
Status: COMPLETED | OUTPATIENT
Start: 2021-01-23 | End: 2021-01-23

## 2021-01-23 RX ORDER — ROSUVASTATIN CALCIUM 10 MG/1
40 TABLET, COATED ORAL NIGHTLY
Status: DISCONTINUED | OUTPATIENT
Start: 2021-01-23 | End: 2021-01-26 | Stop reason: HOSPADM

## 2021-01-23 RX ORDER — IBUPROFEN 200 MG
24 TABLET ORAL
Status: DISCONTINUED | OUTPATIENT
Start: 2021-01-23 | End: 2021-01-26 | Stop reason: HOSPADM

## 2021-01-23 RX ORDER — DOXAZOSIN 2 MG/1
4 TABLET ORAL DAILY
Status: DISCONTINUED | OUTPATIENT
Start: 2021-01-23 | End: 2021-01-26 | Stop reason: HOSPADM

## 2021-01-23 RX ADMIN — METOROPROLOL TARTRATE 5 MG: 5 INJECTION, SOLUTION INTRAVENOUS at 08:01

## 2021-01-23 RX ADMIN — HUMAN ALBUMIN MICROSPHERES AND PERFLUTREN 0.11 MG: 10; .22 INJECTION, SOLUTION INTRAVENOUS at 11:01

## 2021-01-23 RX ADMIN — ROSUVASTATIN 40 MG: 10 TABLET, FILM COATED ORAL at 09:01

## 2021-01-23 RX ADMIN — BENAZEPRIL HYDROCHLORIDE 40 MG: 20 TABLET, COATED ORAL at 12:01

## 2021-01-23 RX ADMIN — METOPROLOL SUCCINATE 50 MG: 50 TABLET, EXTENDED RELEASE ORAL at 09:01

## 2021-01-23 RX ADMIN — DOXAZOSIN 4 MG: 2 TABLET ORAL at 12:01

## 2021-01-23 RX ADMIN — ACETAMINOPHEN 650 MG: 325 TABLET ORAL at 12:01

## 2021-01-23 RX ADMIN — AMLODIPINE BESYLATE 10 MG: 10 TABLET ORAL at 12:01

## 2021-01-23 RX ADMIN — METOPROLOL SUCCINATE 50 MG: 50 TABLET, EXTENDED RELEASE ORAL at 12:01

## 2021-01-23 RX ADMIN — PANTOPRAZOLE SODIUM 40 MG: 40 TABLET, DELAYED RELEASE ORAL at 12:01

## 2021-01-23 RX ADMIN — ASPIRIN 81 MG: 81 TABLET, COATED ORAL at 12:01

## 2021-01-23 RX ADMIN — HEPARIN SODIUM AND DEXTROSE 20 UNITS/KG/HR: 10000; 5 INJECTION INTRAVENOUS at 12:01

## 2021-01-23 RX ADMIN — NITROGLYCERIN 0.4 MG: 0.4 TABLET, ORALLY DISINTEGRATING SUBLINGUAL at 08:01

## 2021-01-23 RX ADMIN — EZETIMIBE 10 MG: 10 TABLET ORAL at 12:01

## 2021-01-23 RX ADMIN — ASPIRIN 325 MG ORAL TABLET 325 MG: 325 PILL ORAL at 07:01

## 2021-01-23 RX ADMIN — NITROGLYCERIN 1 INCH: 20 OINTMENT TOPICAL at 08:01

## 2021-01-24 LAB
APTT BLDCRRT: 29.1 SEC (ref 21–32)
APTT BLDCRRT: 35.8 SEC (ref 21–32)
BASOPHILS # BLD AUTO: 0.06 K/UL (ref 0–0.2)
BASOPHILS NFR BLD: 0.7 % (ref 0–1.9)
DIFFERENTIAL METHOD: ABNORMAL
EOSINOPHIL # BLD AUTO: 0.4 K/UL (ref 0–0.5)
EOSINOPHIL NFR BLD: 3.9 % (ref 0–8)
ERYTHROCYTE [DISTWIDTH] IN BLOOD BY AUTOMATED COUNT: 14.8 % (ref 11.5–14.5)
HCT VFR BLD AUTO: 35.4 % (ref 37–48.5)
HGB BLD-MCNC: 11.4 G/DL (ref 12–16)
IMM GRANULOCYTES # BLD AUTO: 0.02 K/UL (ref 0–0.04)
IMM GRANULOCYTES NFR BLD AUTO: 0.2 % (ref 0–0.5)
LYMPHOCYTES # BLD AUTO: 4.2 K/UL (ref 1–4.8)
LYMPHOCYTES NFR BLD: 45.7 % (ref 18–48)
MCH RBC QN AUTO: 26.6 PG (ref 27–31)
MCHC RBC AUTO-ENTMCNC: 32.2 G/DL (ref 32–36)
MCV RBC AUTO: 83 FL (ref 82–98)
MONOCYTES # BLD AUTO: 0.7 K/UL (ref 0.3–1)
MONOCYTES NFR BLD: 7.2 % (ref 4–15)
NEUTROPHILS # BLD AUTO: 3.9 K/UL (ref 1.8–7.7)
NEUTROPHILS NFR BLD: 42.3 % (ref 38–73)
NRBC BLD-RTO: 0 /100 WBC
PLATELET # BLD AUTO: 207 K/UL (ref 150–350)
PLATELET BLD QL SMEAR: ABNORMAL
PMV BLD AUTO: 13 FL (ref 9.2–12.9)
POCT GLUCOSE: 124 MG/DL (ref 70–110)
POCT GLUCOSE: 130 MG/DL (ref 70–110)
POCT GLUCOSE: 151 MG/DL (ref 70–110)
POCT GLUCOSE: 193 MG/DL (ref 70–110)
RBC # BLD AUTO: 4.28 M/UL (ref 4–5.4)
TROPONIN I SERPL DL<=0.01 NG/ML-MCNC: 0.09 NG/ML (ref 0–0.03)
WBC # BLD AUTO: 9.19 K/UL (ref 3.9–12.7)

## 2021-01-24 PROCEDURE — 25000242 PHARM REV CODE 250 ALT 637 W/ HCPCS: Performed by: EMERGENCY MEDICINE

## 2021-01-24 PROCEDURE — 93005 ELECTROCARDIOGRAM TRACING: CPT

## 2021-01-24 PROCEDURE — 85730 THROMBOPLASTIN TIME PARTIAL: CPT | Mod: ER

## 2021-01-24 PROCEDURE — 93010 ELECTROCARDIOGRAM REPORT: CPT | Mod: ,,, | Performed by: INTERNAL MEDICINE

## 2021-01-24 PROCEDURE — G0378 HOSPITAL OBSERVATION PER HR: HCPCS

## 2021-01-24 PROCEDURE — 25000003 PHARM REV CODE 250: Performed by: NURSE PRACTITIONER

## 2021-01-24 PROCEDURE — 36415 COLL VENOUS BLD VENIPUNCTURE: CPT

## 2021-01-24 PROCEDURE — 85025 COMPLETE CBC W/AUTO DIFF WBC: CPT

## 2021-01-24 PROCEDURE — 63600175 PHARM REV CODE 636 W HCPCS: Performed by: EMERGENCY MEDICINE

## 2021-01-24 PROCEDURE — 63600175 PHARM REV CODE 636 W HCPCS: Performed by: NURSE PRACTITIONER

## 2021-01-24 PROCEDURE — 96366 THER/PROPH/DIAG IV INF ADDON: CPT

## 2021-01-24 PROCEDURE — 99233 PR SUBSEQUENT HOSPITAL CARE,LEVL III: ICD-10-PCS | Mod: ,,, | Performed by: NURSE PRACTITIONER

## 2021-01-24 PROCEDURE — 25000003 PHARM REV CODE 250: Performed by: EMERGENCY MEDICINE

## 2021-01-24 PROCEDURE — 85730 THROMBOPLASTIN TIME PARTIAL: CPT | Mod: 91,ER

## 2021-01-24 PROCEDURE — 96365 THER/PROPH/DIAG IV INF INIT: CPT | Mod: 59

## 2021-01-24 PROCEDURE — 85730 THROMBOPLASTIN TIME PARTIAL: CPT | Mod: 91

## 2021-01-24 PROCEDURE — 93010 EKG 12-LEAD: ICD-10-PCS | Mod: ,,, | Performed by: INTERNAL MEDICINE

## 2021-01-24 PROCEDURE — 84484 ASSAY OF TROPONIN QUANT: CPT

## 2021-01-24 PROCEDURE — 99233 SBSQ HOSP IP/OBS HIGH 50: CPT | Mod: ,,, | Performed by: NURSE PRACTITIONER

## 2021-01-24 RX ADMIN — NITROGLYCERIN 0.4 MG: 0.4 TABLET, ORALLY DISINTEGRATING SUBLINGUAL at 06:01

## 2021-01-24 RX ADMIN — MORPHINE SULFATE 2 MG: 2 INJECTION, SOLUTION INTRAMUSCULAR; INTRAVENOUS at 06:01

## 2021-01-24 RX ADMIN — TICAGRELOR 90 MG: 90 TABLET ORAL at 12:01

## 2021-01-24 RX ADMIN — HEPARIN SODIUM AND DEXTROSE 14 UNITS/KG/HR: 10000; 5 INJECTION INTRAVENOUS at 06:01

## 2021-01-24 RX ADMIN — BENAZEPRIL HYDROCHLORIDE 40 MG: 20 TABLET, COATED ORAL at 08:01

## 2021-01-24 RX ADMIN — PANTOPRAZOLE SODIUM 40 MG: 40 TABLET, DELAYED RELEASE ORAL at 08:01

## 2021-01-24 RX ADMIN — ISOSORBIDE MONONITRATE 20 MG: 20 TABLET ORAL at 05:01

## 2021-01-24 RX ADMIN — HEPARIN SODIUM AND DEXTROSE 16 UNITS/KG/HR: 10000; 5 INJECTION INTRAVENOUS at 07:01

## 2021-01-24 RX ADMIN — TICAGRELOR 90 MG: 90 TABLET ORAL at 08:01

## 2021-01-24 RX ADMIN — FENOFIBRATE 145 MG: 145 TABLET ORAL at 08:01

## 2021-01-24 RX ADMIN — ASPIRIN 81 MG: 81 TABLET, COATED ORAL at 08:01

## 2021-01-24 RX ADMIN — EZETIMIBE 10 MG: 10 TABLET ORAL at 08:01

## 2021-01-24 RX ADMIN — METOPROLOL SUCCINATE 50 MG: 50 TABLET, EXTENDED RELEASE ORAL at 08:01

## 2021-01-24 RX ADMIN — ISOSORBIDE MONONITRATE 20 MG: 20 TABLET ORAL at 08:01

## 2021-01-24 RX ADMIN — AMLODIPINE BESYLATE 10 MG: 10 TABLET ORAL at 08:01

## 2021-01-24 RX ADMIN — DOXAZOSIN 4 MG: 2 TABLET ORAL at 08:01

## 2021-01-24 RX ADMIN — ROSUVASTATIN 40 MG: 10 TABLET, FILM COATED ORAL at 08:01

## 2021-01-25 LAB
ANION GAP SERPL CALC-SCNC: 8 MMOL/L (ref 8–16)
APTT BLDCRRT: 45.6 SEC (ref 21–32)
APTT BLDCRRT: 46.1 SEC (ref 21–32)
APTT BLDCRRT: NORMAL SEC (ref 21–32)
BASOPHILS # BLD AUTO: 0.03 K/UL (ref 0–0.2)
BASOPHILS NFR BLD: 0.3 % (ref 0–1.9)
BUN SERPL-MCNC: 23 MG/DL (ref 8–23)
CALCIUM SERPL-MCNC: 8.6 MG/DL (ref 8.7–10.5)
CATH EF QUANTITATIVE: 60 %
CHLORIDE SERPL-SCNC: 106 MMOL/L (ref 95–110)
CO2 SERPL-SCNC: 27 MMOL/L (ref 23–29)
CREAT SERPL-MCNC: 1.1 MG/DL (ref 0.5–1.4)
DIFFERENTIAL METHOD: ABNORMAL
EOSINOPHIL # BLD AUTO: 0.3 K/UL (ref 0–0.5)
EOSINOPHIL NFR BLD: 3.2 % (ref 0–8)
ERYTHROCYTE [DISTWIDTH] IN BLOOD BY AUTOMATED COUNT: 14.7 % (ref 11.5–14.5)
EST. GFR  (AFRICAN AMERICAN): >60 ML/MIN/1.73 M^2
EST. GFR  (NON AFRICAN AMERICAN): 54 ML/MIN/1.73 M^2
GLUCOSE SERPL-MCNC: 130 MG/DL (ref 70–110)
HCT VFR BLD AUTO: 34.4 % (ref 37–48.5)
HGB BLD-MCNC: 11 G/DL (ref 12–16)
IMM GRANULOCYTES # BLD AUTO: 0.02 K/UL (ref 0–0.04)
IMM GRANULOCYTES NFR BLD AUTO: 0.2 % (ref 0–0.5)
LYMPHOCYTES # BLD AUTO: 3.6 K/UL (ref 1–4.8)
LYMPHOCYTES NFR BLD: 37.6 % (ref 18–48)
MCH RBC QN AUTO: 26.4 PG (ref 27–31)
MCHC RBC AUTO-ENTMCNC: 32 G/DL (ref 32–36)
MCV RBC AUTO: 83 FL (ref 82–98)
MONOCYTES # BLD AUTO: 0.7 K/UL (ref 0.3–1)
MONOCYTES NFR BLD: 7.6 % (ref 4–15)
NEUTROPHILS # BLD AUTO: 4.9 K/UL (ref 1.8–7.7)
NEUTROPHILS NFR BLD: 51.1 % (ref 38–73)
NRBC BLD-RTO: 0 /100 WBC
PLATELET # BLD AUTO: 212 K/UL (ref 150–350)
PMV BLD AUTO: 12.6 FL (ref 9.2–12.9)
POC ACTIVATED CLOTTING TIME K: 230 SEC (ref 74–137)
POC ACTIVATED CLOTTING TIME K: 290 SEC (ref 74–137)
POCT GLUCOSE: 111 MG/DL (ref 70–110)
POCT GLUCOSE: 127 MG/DL (ref 70–110)
POCT GLUCOSE: 164 MG/DL (ref 70–110)
POTASSIUM SERPL-SCNC: 3.2 MMOL/L (ref 3.5–5.1)
RBC # BLD AUTO: 4.16 M/UL (ref 4–5.4)
SAMPLE: ABNORMAL
SAMPLE: ABNORMAL
SODIUM SERPL-SCNC: 141 MMOL/L (ref 136–145)
WBC # BLD AUTO: 9.64 K/UL (ref 3.9–12.7)

## 2021-01-25 PROCEDURE — 99152 PR MOD CONSCIOUS SEDATION, SAME PHYS, 5+ YRS, FIRST 15 MIN: ICD-10-PCS | Mod: ,,, | Performed by: INTERNAL MEDICINE

## 2021-01-25 PROCEDURE — 63600175 PHARM REV CODE 636 W HCPCS: Performed by: EMERGENCY MEDICINE

## 2021-01-25 PROCEDURE — 25000003 PHARM REV CODE 250: Performed by: NURSE PRACTITIONER

## 2021-01-25 PROCEDURE — 85730 THROMBOPLASTIN TIME PARTIAL: CPT | Mod: ER

## 2021-01-25 PROCEDURE — 92928 PRQ TCAT PLMT NTRAC ST 1 LES: CPT | Mod: LD,,, | Performed by: INTERNAL MEDICINE

## 2021-01-25 PROCEDURE — 99153 MOD SED SAME PHYS/QHP EA: CPT | Performed by: INTERNAL MEDICINE

## 2021-01-25 PROCEDURE — C1874 STENT, COATED/COV W/DEL SYS: HCPCS | Performed by: INTERNAL MEDICINE

## 2021-01-25 PROCEDURE — 25000003 PHARM REV CODE 250: Performed by: INTERNAL MEDICINE

## 2021-01-25 PROCEDURE — 93010 ELECTROCARDIOGRAM REPORT: CPT | Mod: ,,, | Performed by: INTERNAL MEDICINE

## 2021-01-25 PROCEDURE — 93458 L HRT ARTERY/VENTRICLE ANGIO: CPT | Mod: 59 | Performed by: INTERNAL MEDICINE

## 2021-01-25 PROCEDURE — 96366 THER/PROPH/DIAG IV INF ADDON: CPT

## 2021-01-25 PROCEDURE — 80048 BASIC METABOLIC PNL TOTAL CA: CPT

## 2021-01-25 PROCEDURE — 27201423 OPTIME MED/SURG SUP & DEVICES STERILE SUPPLY: Performed by: INTERNAL MEDICINE

## 2021-01-25 PROCEDURE — 99152 MOD SED SAME PHYS/QHP 5/>YRS: CPT | Mod: ,,, | Performed by: INTERNAL MEDICINE

## 2021-01-25 PROCEDURE — 85025 COMPLETE CBC W/AUTO DIFF WBC: CPT

## 2021-01-25 PROCEDURE — 93458 L HRT ARTERY/VENTRICLE ANGIO: CPT | Mod: 26,59,, | Performed by: INTERNAL MEDICINE

## 2021-01-25 PROCEDURE — C9600 PERC DRUG-EL COR STENT SING: HCPCS | Mod: LD | Performed by: INTERNAL MEDICINE

## 2021-01-25 PROCEDURE — 25500020 PHARM REV CODE 255: Performed by: INTERNAL MEDICINE

## 2021-01-25 PROCEDURE — 93458 PR CATH PLACE/CORON ANGIO, IMG SUPER/INTERP,W LEFT HEART VENTRICULOGRAPHY: ICD-10-PCS | Mod: 26,59,, | Performed by: INTERNAL MEDICINE

## 2021-01-25 PROCEDURE — 92928 PR STENT: ICD-10-PCS | Mod: LD,,, | Performed by: INTERNAL MEDICINE

## 2021-01-25 PROCEDURE — 11000001 HC ACUTE MED/SURG PRIVATE ROOM

## 2021-01-25 PROCEDURE — C1887 CATHETER, GUIDING: HCPCS | Performed by: INTERNAL MEDICINE

## 2021-01-25 PROCEDURE — 36415 COLL VENOUS BLD VENIPUNCTURE: CPT

## 2021-01-25 PROCEDURE — C1725 CATH, TRANSLUMIN NON-LASER: HCPCS | Performed by: INTERNAL MEDICINE

## 2021-01-25 PROCEDURE — 99152 MOD SED SAME PHYS/QHP 5/>YRS: CPT | Performed by: INTERNAL MEDICINE

## 2021-01-25 PROCEDURE — C1769 GUIDE WIRE: HCPCS | Performed by: INTERNAL MEDICINE

## 2021-01-25 PROCEDURE — 25000003 PHARM REV CODE 250: Performed by: EMERGENCY MEDICINE

## 2021-01-25 PROCEDURE — 93005 ELECTROCARDIOGRAM TRACING: CPT

## 2021-01-25 PROCEDURE — 63600175 PHARM REV CODE 636 W HCPCS: Performed by: INTERNAL MEDICINE

## 2021-01-25 PROCEDURE — C1894 INTRO/SHEATH, NON-LASER: HCPCS | Performed by: INTERNAL MEDICINE

## 2021-01-25 PROCEDURE — 93010 EKG 12-LEAD: ICD-10-PCS | Mod: ,,, | Performed by: INTERNAL MEDICINE

## 2021-01-25 PROCEDURE — 85347 COAGULATION TIME ACTIVATED: CPT | Performed by: INTERNAL MEDICINE

## 2021-01-25 DEVICE — STENT RONYX35012UX RESOLUTE ONYX 3.50X12
Type: IMPLANTABLE DEVICE | Site: HEART | Status: FUNCTIONAL
Brand: RESOLUTE ONYX™

## 2021-01-25 DEVICE — STENT RONYX35038UX RESOLUTE ONYX 3.50X38
Type: IMPLANTABLE DEVICE | Site: HEART | Status: FUNCTIONAL
Brand: RESOLUTE ONYX™

## 2021-01-25 RX ORDER — NITROGLYCERIN 5 MG/ML
INJECTION, SOLUTION INTRAVENOUS
Status: DISCONTINUED | OUTPATIENT
Start: 2021-01-25 | End: 2021-01-25 | Stop reason: HOSPADM

## 2021-01-25 RX ORDER — ONDANSETRON 8 MG/1
8 TABLET, ORALLY DISINTEGRATING ORAL EVERY 8 HOURS PRN
Status: DISCONTINUED | OUTPATIENT
Start: 2021-01-25 | End: 2021-01-26 | Stop reason: HOSPADM

## 2021-01-25 RX ORDER — ACETAMINOPHEN 325 MG/1
650 TABLET ORAL EVERY 4 HOURS PRN
Status: DISCONTINUED | OUTPATIENT
Start: 2021-01-25 | End: 2021-01-26 | Stop reason: HOSPADM

## 2021-01-25 RX ORDER — MIDAZOLAM HYDROCHLORIDE 1 MG/ML
INJECTION, SOLUTION INTRAMUSCULAR; INTRAVENOUS
Status: DISCONTINUED | OUTPATIENT
Start: 2021-01-25 | End: 2021-01-25 | Stop reason: HOSPADM

## 2021-01-25 RX ORDER — FENTANYL CITRATE 50 UG/ML
INJECTION, SOLUTION INTRAMUSCULAR; INTRAVENOUS
Status: DISCONTINUED | OUTPATIENT
Start: 2021-01-25 | End: 2021-01-25 | Stop reason: HOSPADM

## 2021-01-25 RX ORDER — SODIUM CHLORIDE 9 MG/ML
INJECTION, SOLUTION INTRAVENOUS CONTINUOUS
Status: ACTIVE | OUTPATIENT
Start: 2021-01-25 | End: 2021-01-26

## 2021-01-25 RX ORDER — HEPARIN SODIUM 1000 [USP'U]/ML
INJECTION INTRAVENOUS; SUBCUTANEOUS
Status: DISCONTINUED | OUTPATIENT
Start: 2021-01-25 | End: 2021-01-25 | Stop reason: HOSPADM

## 2021-01-25 RX ORDER — SODIUM NITROPRUSSIDE 25 MG/ML
INJECTION INTRAVENOUS
Status: DISCONTINUED | OUTPATIENT
Start: 2021-01-25 | End: 2021-01-25 | Stop reason: HOSPADM

## 2021-01-25 RX ORDER — DIPHENHYDRAMINE HYDROCHLORIDE 50 MG/ML
INJECTION INTRAMUSCULAR; INTRAVENOUS
Status: DISCONTINUED | OUTPATIENT
Start: 2021-01-25 | End: 2021-01-25 | Stop reason: HOSPADM

## 2021-01-25 RX ORDER — VERAPAMIL HYDROCHLORIDE 2.5 MG/ML
INJECTION, SOLUTION INTRAVENOUS
Status: DISCONTINUED | OUTPATIENT
Start: 2021-01-25 | End: 2021-01-25 | Stop reason: HOSPADM

## 2021-01-25 RX ORDER — SODIUM CHLORIDE 9 MG/ML
INJECTION, SOLUTION INTRAVENOUS CONTINUOUS
Status: DISCONTINUED | OUTPATIENT
Start: 2021-01-25 | End: 2021-01-25

## 2021-01-25 RX ORDER — METOPROLOL TARTRATE 1 MG/ML
INJECTION, SOLUTION INTRAVENOUS
Status: DISCONTINUED | OUTPATIENT
Start: 2021-01-25 | End: 2021-01-25 | Stop reason: HOSPADM

## 2021-01-25 RX ADMIN — ISOSORBIDE MONONITRATE 20 MG: 20 TABLET ORAL at 04:01

## 2021-01-25 RX ADMIN — ASPIRIN 81 MG: 81 TABLET, COATED ORAL at 08:01

## 2021-01-25 RX ADMIN — EZETIMIBE 10 MG: 10 TABLET ORAL at 08:01

## 2021-01-25 RX ADMIN — SODIUM CHLORIDE: 0.9 INJECTION, SOLUTION INTRAVENOUS at 08:01

## 2021-01-25 RX ADMIN — SODIUM CHLORIDE: 0.9 INJECTION, SOLUTION INTRAVENOUS at 03:01

## 2021-01-25 RX ADMIN — TICAGRELOR 90 MG: 90 TABLET ORAL at 09:01

## 2021-01-25 RX ADMIN — METOPROLOL SUCCINATE 50 MG: 50 TABLET, EXTENDED RELEASE ORAL at 08:01

## 2021-01-25 RX ADMIN — ISOSORBIDE MONONITRATE 20 MG: 20 TABLET ORAL at 08:01

## 2021-01-25 RX ADMIN — ROSUVASTATIN 40 MG: 10 TABLET, FILM COATED ORAL at 08:01

## 2021-01-25 RX ADMIN — FENOFIBRATE 145 MG: 145 TABLET ORAL at 08:01

## 2021-01-25 RX ADMIN — AMLODIPINE BESYLATE 10 MG: 10 TABLET ORAL at 08:01

## 2021-01-25 RX ADMIN — HYDROCODONE BITARTRATE AND ACETAMINOPHEN 1 TABLET: 5; 325 TABLET ORAL at 05:01

## 2021-01-25 RX ADMIN — HEPARIN SODIUM AND DEXTROSE 16 UNITS/KG/HR: 10000; 5 INJECTION INTRAVENOUS at 02:01

## 2021-01-25 RX ADMIN — DOXAZOSIN 4 MG: 2 TABLET ORAL at 08:01

## 2021-01-25 RX ADMIN — TICAGRELOR 90 MG: 90 TABLET ORAL at 08:01

## 2021-01-25 RX ADMIN — PANTOPRAZOLE SODIUM 40 MG: 40 TABLET, DELAYED RELEASE ORAL at 08:01

## 2021-01-26 VITALS
HEIGHT: 65 IN | BODY MASS INDEX: 34.16 KG/M2 | HEART RATE: 65 BPM | WEIGHT: 205 LBS | SYSTOLIC BLOOD PRESSURE: 137 MMHG | RESPIRATION RATE: 18 BRPM | DIASTOLIC BLOOD PRESSURE: 66 MMHG | OXYGEN SATURATION: 96 % | TEMPERATURE: 99 F

## 2021-01-26 LAB
ANION GAP SERPL CALC-SCNC: 7 MMOL/L (ref 8–16)
BASOPHILS # BLD AUTO: 0.03 K/UL (ref 0–0.2)
BASOPHILS # BLD AUTO: 0.03 K/UL (ref 0–0.2)
BASOPHILS NFR BLD: 0.3 % (ref 0–1.9)
BASOPHILS NFR BLD: 0.3 % (ref 0–1.9)
BUN SERPL-MCNC: 11 MG/DL (ref 8–23)
CALCIUM SERPL-MCNC: 8.6 MG/DL (ref 8.7–10.5)
CHLORIDE SERPL-SCNC: 107 MMOL/L (ref 95–110)
CO2 SERPL-SCNC: 25 MMOL/L (ref 23–29)
CREAT SERPL-MCNC: 0.9 MG/DL (ref 0.5–1.4)
DIFFERENTIAL METHOD: ABNORMAL
DIFFERENTIAL METHOD: ABNORMAL
EOSINOPHIL # BLD AUTO: 0.2 K/UL (ref 0–0.5)
EOSINOPHIL # BLD AUTO: 0.2 K/UL (ref 0–0.5)
EOSINOPHIL NFR BLD: 1.7 % (ref 0–8)
EOSINOPHIL NFR BLD: 1.7 % (ref 0–8)
ERYTHROCYTE [DISTWIDTH] IN BLOOD BY AUTOMATED COUNT: 15 % (ref 11.5–14.5)
ERYTHROCYTE [DISTWIDTH] IN BLOOD BY AUTOMATED COUNT: 15 % (ref 11.5–14.5)
EST. GFR  (AFRICAN AMERICAN): >60 ML/MIN/1.73 M^2
EST. GFR  (NON AFRICAN AMERICAN): >60 ML/MIN/1.73 M^2
GLUCOSE SERPL-MCNC: 140 MG/DL (ref 70–110)
HCT VFR BLD AUTO: 35 % (ref 37–48.5)
HCT VFR BLD AUTO: 35 % (ref 37–48.5)
HGB BLD-MCNC: 11 G/DL (ref 12–16)
HGB BLD-MCNC: 11 G/DL (ref 12–16)
IMM GRANULOCYTES # BLD AUTO: 0.04 K/UL (ref 0–0.04)
IMM GRANULOCYTES # BLD AUTO: 0.04 K/UL (ref 0–0.04)
IMM GRANULOCYTES NFR BLD AUTO: 0.4 % (ref 0–0.5)
IMM GRANULOCYTES NFR BLD AUTO: 0.4 % (ref 0–0.5)
LYMPHOCYTES # BLD AUTO: 3 K/UL (ref 1–4.8)
LYMPHOCYTES # BLD AUTO: 3 K/UL (ref 1–4.8)
LYMPHOCYTES NFR BLD: 28.9 % (ref 18–48)
LYMPHOCYTES NFR BLD: 28.9 % (ref 18–48)
MCH RBC QN AUTO: 26.3 PG (ref 27–31)
MCH RBC QN AUTO: 26.3 PG (ref 27–31)
MCHC RBC AUTO-ENTMCNC: 31.4 G/DL (ref 32–36)
MCHC RBC AUTO-ENTMCNC: 31.4 G/DL (ref 32–36)
MCV RBC AUTO: 84 FL (ref 82–98)
MCV RBC AUTO: 84 FL (ref 82–98)
MONOCYTES # BLD AUTO: 0.8 K/UL (ref 0.3–1)
MONOCYTES # BLD AUTO: 0.8 K/UL (ref 0.3–1)
MONOCYTES NFR BLD: 7.3 % (ref 4–15)
MONOCYTES NFR BLD: 7.3 % (ref 4–15)
NEUTROPHILS # BLD AUTO: 6.3 K/UL (ref 1.8–7.7)
NEUTROPHILS # BLD AUTO: 6.3 K/UL (ref 1.8–7.7)
NEUTROPHILS NFR BLD: 61.4 % (ref 38–73)
NEUTROPHILS NFR BLD: 61.4 % (ref 38–73)
NRBC BLD-RTO: 0 /100 WBC
NRBC BLD-RTO: 0 /100 WBC
PLATELET # BLD AUTO: 210 K/UL (ref 150–350)
PLATELET # BLD AUTO: 210 K/UL (ref 150–350)
PMV BLD AUTO: 12.7 FL (ref 9.2–12.9)
PMV BLD AUTO: 12.7 FL (ref 9.2–12.9)
POCT GLUCOSE: 124 MG/DL (ref 70–110)
POTASSIUM SERPL-SCNC: 3.3 MMOL/L (ref 3.5–5.1)
RBC # BLD AUTO: 4.19 M/UL (ref 4–5.4)
RBC # BLD AUTO: 4.19 M/UL (ref 4–5.4)
SODIUM SERPL-SCNC: 139 MMOL/L (ref 136–145)
WBC # BLD AUTO: 10.22 K/UL (ref 3.9–12.7)
WBC # BLD AUTO: 10.22 K/UL (ref 3.9–12.7)

## 2021-01-26 PROCEDURE — 25000003 PHARM REV CODE 250: Performed by: INTERNAL MEDICINE

## 2021-01-26 PROCEDURE — 99233 PR SUBSEQUENT HOSPITAL CARE,LEVL III: ICD-10-PCS | Mod: ,,, | Performed by: NURSE PRACTITIONER

## 2021-01-26 PROCEDURE — 93005 ELECTROCARDIOGRAM TRACING: CPT

## 2021-01-26 PROCEDURE — 85025 COMPLETE CBC W/AUTO DIFF WBC: CPT

## 2021-01-26 PROCEDURE — 93010 ELECTROCARDIOGRAM REPORT: CPT | Mod: ,,, | Performed by: INTERNAL MEDICINE

## 2021-01-26 PROCEDURE — 25000003 PHARM REV CODE 250: Performed by: NURSE PRACTITIONER

## 2021-01-26 PROCEDURE — 36415 COLL VENOUS BLD VENIPUNCTURE: CPT

## 2021-01-26 PROCEDURE — 80048 BASIC METABOLIC PNL TOTAL CA: CPT

## 2021-01-26 PROCEDURE — 93010 EKG 12-LEAD: ICD-10-PCS | Mod: ,,, | Performed by: INTERNAL MEDICINE

## 2021-01-26 PROCEDURE — 99233 SBSQ HOSP IP/OBS HIGH 50: CPT | Mod: ,,, | Performed by: NURSE PRACTITIONER

## 2021-01-26 RX ADMIN — ISOSORBIDE MONONITRATE 20 MG: 20 TABLET ORAL at 08:01

## 2021-01-26 RX ADMIN — ASPIRIN 81 MG: 81 TABLET, COATED ORAL at 08:01

## 2021-01-26 RX ADMIN — TICAGRELOR 90 MG: 90 TABLET ORAL at 08:01

## 2021-01-26 RX ADMIN — DOXAZOSIN 4 MG: 2 TABLET ORAL at 08:01

## 2021-01-26 RX ADMIN — AMLODIPINE BESYLATE 10 MG: 10 TABLET ORAL at 08:01

## 2021-01-26 RX ADMIN — EZETIMIBE 10 MG: 10 TABLET ORAL at 08:01

## 2021-01-26 RX ADMIN — HYDROCODONE BITARTRATE AND ACETAMINOPHEN 1 TABLET: 5; 325 TABLET ORAL at 12:01

## 2021-01-26 RX ADMIN — HYDROCODONE BITARTRATE AND ACETAMINOPHEN 1 TABLET: 5; 325 TABLET ORAL at 05:01

## 2021-01-26 RX ADMIN — FENOFIBRATE 145 MG: 145 TABLET ORAL at 08:01

## 2021-01-26 RX ADMIN — METOPROLOL SUCCINATE 50 MG: 50 TABLET, EXTENDED RELEASE ORAL at 08:01

## 2021-01-26 RX ADMIN — PANTOPRAZOLE SODIUM 40 MG: 40 TABLET, DELAYED RELEASE ORAL at 08:01

## 2021-01-27 ENCOUNTER — PATIENT OUTREACH (OUTPATIENT)
Dept: ADMINISTRATIVE | Facility: CLINIC | Age: 61
End: 2021-01-27

## 2021-02-01 ENCOUNTER — OFFICE VISIT (OUTPATIENT)
Dept: CARDIOLOGY | Facility: CLINIC | Age: 61
End: 2021-02-01
Payer: MEDICARE

## 2021-02-01 VITALS
OXYGEN SATURATION: 99 % | BODY MASS INDEX: 34.46 KG/M2 | SYSTOLIC BLOOD PRESSURE: 150 MMHG | HEART RATE: 70 BPM | HEIGHT: 65 IN | DIASTOLIC BLOOD PRESSURE: 78 MMHG | WEIGHT: 206.81 LBS

## 2021-02-01 DIAGNOSIS — E11.59 HYPERTENSION ASSOCIATED WITH DIABETES: ICD-10-CM

## 2021-02-01 DIAGNOSIS — I15.2 HYPERTENSION ASSOCIATED WITH DIABETES: ICD-10-CM

## 2021-02-01 DIAGNOSIS — E78.5 HYPERLIPIDEMIA ASSOCIATED WITH TYPE 2 DIABETES MELLITUS: ICD-10-CM

## 2021-02-01 DIAGNOSIS — I21.4 NSTEMI (NON-ST ELEVATED MYOCARDIAL INFARCTION): Primary | ICD-10-CM

## 2021-02-01 DIAGNOSIS — E11.69 HYPERLIPIDEMIA ASSOCIATED WITH TYPE 2 DIABETES MELLITUS: ICD-10-CM

## 2021-02-01 PROCEDURE — 99214 OFFICE O/P EST MOD 30 MIN: CPT | Mod: S$PBB,,, | Performed by: NURSE PRACTITIONER

## 2021-02-01 PROCEDURE — 99999 PR PBB SHADOW E&M-EST. PATIENT-LVL IV: CPT | Mod: PBBFAC,,, | Performed by: NURSE PRACTITIONER

## 2021-02-01 PROCEDURE — 99999 PR PBB SHADOW E&M-EST. PATIENT-LVL IV: ICD-10-PCS | Mod: PBBFAC,,, | Performed by: NURSE PRACTITIONER

## 2021-02-01 PROCEDURE — 99214 OFFICE O/P EST MOD 30 MIN: CPT | Mod: PBBFAC | Performed by: NURSE PRACTITIONER

## 2021-02-01 PROCEDURE — 99214 PR OFFICE/OUTPT VISIT, EST, LEVL IV, 30-39 MIN: ICD-10-PCS | Mod: S$PBB,,, | Performed by: NURSE PRACTITIONER

## 2021-02-01 RX ORDER — EVOLOCUMAB 140 MG/ML
140 INJECTION, SOLUTION SUBCUTANEOUS
Qty: 2 SYRINGE | Refills: 6 | Status: SHIPPED | OUTPATIENT
Start: 2021-02-01 | End: 2021-09-27 | Stop reason: SDUPTHER

## 2021-02-01 RX ORDER — ISOSORBIDE MONONITRATE 60 MG/1
60 TABLET, EXTENDED RELEASE ORAL DAILY
Qty: 30 TABLET | Refills: 11 | Status: SHIPPED | OUTPATIENT
Start: 2021-02-01 | End: 2021-08-05 | Stop reason: SDUPTHER

## 2021-02-01 RX ORDER — EVOLOCUMAB 140 MG/ML
140 INJECTION, SOLUTION SUBCUTANEOUS
Qty: 2 SYRINGE | Refills: 6 | Status: CANCELLED | OUTPATIENT
Start: 2021-02-01 | End: 2021-03-01

## 2021-02-11 ENCOUNTER — SPECIALTY PHARMACY (OUTPATIENT)
Dept: PHARMACY | Facility: CLINIC | Age: 61
End: 2021-02-11

## 2021-02-11 ENCOUNTER — TELEPHONE (OUTPATIENT)
Dept: CARDIOLOGY | Facility: CLINIC | Age: 61
End: 2021-02-11

## 2021-02-15 ENCOUNTER — TELEPHONE (OUTPATIENT)
Dept: PHARMACY | Facility: CLINIC | Age: 61
End: 2021-02-15

## 2021-02-18 ENCOUNTER — TELEPHONE (OUTPATIENT)
Dept: INTERNAL MEDICINE | Facility: CLINIC | Age: 61
End: 2021-02-18

## 2021-03-04 ENCOUNTER — PATIENT MESSAGE (OUTPATIENT)
Dept: PHARMACY | Facility: CLINIC | Age: 61
End: 2021-03-04

## 2021-03-10 ENCOUNTER — HOSPITAL ENCOUNTER (OUTPATIENT)
Dept: RADIOLOGY | Facility: HOSPITAL | Age: 61
Discharge: HOME | End: 2021-03-10
Attending: INTERNAL MEDICINE
Payer: MEDICARE

## 2021-03-10 ENCOUNTER — SPECIALTY PHARMACY (OUTPATIENT)
Dept: PHARMACY | Facility: CLINIC | Age: 61
End: 2021-03-10

## 2021-03-10 VITALS — BODY MASS INDEX: 34.46 KG/M2 | WEIGHT: 206.81 LBS | HEIGHT: 65 IN

## 2021-03-10 DIAGNOSIS — Z12.31 BREAST CANCER SCREENING BY MAMMOGRAM: ICD-10-CM

## 2021-03-10 PROCEDURE — 77067 SCR MAMMO BI INCL CAD: CPT | Mod: 26,,, | Performed by: RADIOLOGY

## 2021-03-10 PROCEDURE — 77063 MAMMO DIGITAL SCREENING BILAT WITH TOMO: ICD-10-PCS | Mod: 26,,, | Performed by: RADIOLOGY

## 2021-03-10 PROCEDURE — 77067 SCR MAMMO BI INCL CAD: CPT | Mod: TC

## 2021-03-10 PROCEDURE — 77067 MAMMO DIGITAL SCREENING BILAT WITH TOMO: ICD-10-PCS | Mod: 26,,, | Performed by: RADIOLOGY

## 2021-03-10 PROCEDURE — 77063 BREAST TOMOSYNTHESIS BI: CPT | Mod: 26,,, | Performed by: RADIOLOGY

## 2021-03-19 ENCOUNTER — OFFICE VISIT (OUTPATIENT)
Dept: INTERNAL MEDICINE | Facility: CLINIC | Age: 61
End: 2021-03-19
Payer: MEDICARE

## 2021-03-19 VITALS
DIASTOLIC BLOOD PRESSURE: 78 MMHG | BODY MASS INDEX: 35.18 KG/M2 | TEMPERATURE: 97 F | SYSTOLIC BLOOD PRESSURE: 134 MMHG | HEART RATE: 94 BPM | OXYGEN SATURATION: 98 % | WEIGHT: 211.19 LBS | HEIGHT: 65 IN

## 2021-03-19 DIAGNOSIS — E11.8 TYPE II DIABETES MELLITUS WITH COMPLICATION: ICD-10-CM

## 2021-03-19 DIAGNOSIS — E78.5 HYPERLIPIDEMIA ASSOCIATED WITH TYPE 2 DIABETES MELLITUS: ICD-10-CM

## 2021-03-19 DIAGNOSIS — E11.69 HYPERLIPIDEMIA ASSOCIATED WITH TYPE 2 DIABETES MELLITUS: ICD-10-CM

## 2021-03-19 DIAGNOSIS — I21.4 NSTEMI (NON-ST ELEVATED MYOCARDIAL INFARCTION): ICD-10-CM

## 2021-03-19 DIAGNOSIS — Z86.010 HISTORY OF COLON POLYPS: ICD-10-CM

## 2021-03-19 DIAGNOSIS — R80.9 MICROALBUMINURIA DUE TO TYPE 2 DIABETES MELLITUS: Primary | ICD-10-CM

## 2021-03-19 DIAGNOSIS — I25.10 CORONARY ARTERY DISEASE INVOLVING NATIVE CORONARY ARTERY OF NATIVE HEART WITHOUT ANGINA PECTORIS: Chronic | ICD-10-CM

## 2021-03-19 DIAGNOSIS — I15.2 HYPERTENSION ASSOCIATED WITH DIABETES: ICD-10-CM

## 2021-03-19 DIAGNOSIS — E11.29 MICROALBUMINURIA DUE TO TYPE 2 DIABETES MELLITUS: Primary | ICD-10-CM

## 2021-03-19 DIAGNOSIS — E11.59 HYPERTENSION ASSOCIATED WITH DIABETES: ICD-10-CM

## 2021-03-19 PROCEDURE — 99999 PR PBB SHADOW E&M-EST. PATIENT-LVL IV: ICD-10-PCS | Mod: PBBFAC,,, | Performed by: INTERNAL MEDICINE

## 2021-03-19 PROCEDURE — 99999 PR PBB SHADOW E&M-EST. PATIENT-LVL IV: CPT | Mod: PBBFAC,,, | Performed by: INTERNAL MEDICINE

## 2021-03-19 PROCEDURE — 99214 PR OFFICE/OUTPT VISIT, EST, LEVL IV, 30-39 MIN: ICD-10-PCS | Mod: S$PBB,,, | Performed by: INTERNAL MEDICINE

## 2021-03-19 PROCEDURE — 99214 OFFICE O/P EST MOD 30 MIN: CPT | Mod: S$PBB,,, | Performed by: INTERNAL MEDICINE

## 2021-03-19 PROCEDURE — 99214 OFFICE O/P EST MOD 30 MIN: CPT | Mod: PBBFAC,PO | Performed by: INTERNAL MEDICINE

## 2021-03-31 ENCOUNTER — SPECIALTY PHARMACY (OUTPATIENT)
Dept: PHARMACY | Facility: CLINIC | Age: 61
End: 2021-03-31

## 2021-05-03 ENCOUNTER — SPECIALTY PHARMACY (OUTPATIENT)
Dept: PHARMACY | Facility: CLINIC | Age: 61
End: 2021-05-03

## 2021-05-26 ENCOUNTER — PATIENT MESSAGE (OUTPATIENT)
Dept: PHARMACY | Facility: CLINIC | Age: 61
End: 2021-05-26

## 2021-06-01 ENCOUNTER — SPECIALTY PHARMACY (OUTPATIENT)
Dept: PHARMACY | Facility: CLINIC | Age: 61
End: 2021-06-01

## 2021-06-01 PROBLEM — I25.2 HISTORY OF NON-ST ELEVATION MYOCARDIAL INFARCTION (NSTEMI): Status: ACTIVE | Noted: 2018-05-21

## 2021-06-28 ENCOUNTER — SPECIALTY PHARMACY (OUTPATIENT)
Dept: PHARMACY | Facility: CLINIC | Age: 61
End: 2021-06-28

## 2021-07-27 ENCOUNTER — PATIENT MESSAGE (OUTPATIENT)
Dept: PHARMACY | Facility: CLINIC | Age: 61
End: 2021-07-27

## 2021-08-02 ENCOUNTER — SPECIALTY PHARMACY (OUTPATIENT)
Dept: PHARMACY | Facility: CLINIC | Age: 61
End: 2021-08-02

## 2021-08-05 ENCOUNTER — OFFICE VISIT (OUTPATIENT)
Dept: CARDIOLOGY | Facility: CLINIC | Age: 61
End: 2021-08-05
Payer: MEDICARE

## 2021-08-05 ENCOUNTER — TELEPHONE (OUTPATIENT)
Dept: CARDIOLOGY | Facility: CLINIC | Age: 61
End: 2021-08-05

## 2021-08-05 ENCOUNTER — PATIENT OUTREACH (OUTPATIENT)
Dept: ADMINISTRATIVE | Facility: OTHER | Age: 61
End: 2021-08-05

## 2021-08-05 VITALS — DIASTOLIC BLOOD PRESSURE: 88 MMHG | HEART RATE: 82 BPM | SYSTOLIC BLOOD PRESSURE: 147 MMHG

## 2021-08-05 DIAGNOSIS — I25.2 HISTORY OF NON-ST ELEVATION MYOCARDIAL INFARCTION (NSTEMI): ICD-10-CM

## 2021-08-05 DIAGNOSIS — I25.10 CORONARY ARTERY DISEASE INVOLVING NATIVE CORONARY ARTERY OF NATIVE HEART WITHOUT ANGINA PECTORIS: Primary | Chronic | ICD-10-CM

## 2021-08-05 DIAGNOSIS — E11.69 HYPERLIPIDEMIA ASSOCIATED WITH TYPE 2 DIABETES MELLITUS: ICD-10-CM

## 2021-08-05 DIAGNOSIS — E78.5 HYPERLIPIDEMIA ASSOCIATED WITH TYPE 2 DIABETES MELLITUS: ICD-10-CM

## 2021-08-05 DIAGNOSIS — I15.2 HYPERTENSION ASSOCIATED WITH DIABETES: ICD-10-CM

## 2021-08-05 DIAGNOSIS — E11.59 HYPERTENSION ASSOCIATED WITH DIABETES: ICD-10-CM

## 2021-08-05 DIAGNOSIS — E11.8 TYPE II DIABETES MELLITUS WITH COMPLICATION: ICD-10-CM

## 2021-08-05 DIAGNOSIS — E78.1 HYPERTRIGLYCERIDEMIA: ICD-10-CM

## 2021-08-05 PROCEDURE — 99213 PR OFFICE/OUTPT VISIT, EST, LEVL III, 20-29 MIN: ICD-10-PCS | Mod: 95,,, | Performed by: NURSE PRACTITIONER

## 2021-08-05 PROCEDURE — 99213 OFFICE O/P EST LOW 20 MIN: CPT | Mod: 95,,, | Performed by: NURSE PRACTITIONER

## 2021-08-05 RX ORDER — FENOFIBRATE 134 MG/1
CAPSULE ORAL
Qty: 90 CAPSULE | Refills: 3 | Status: SHIPPED | OUTPATIENT
Start: 2021-08-05 | End: 2022-09-23 | Stop reason: SDUPTHER

## 2021-08-05 RX ORDER — ROSUVASTATIN CALCIUM 40 MG/1
40 TABLET, COATED ORAL NIGHTLY
Qty: 90 TABLET | Refills: 3 | Status: SHIPPED | OUTPATIENT
Start: 2021-08-05 | End: 2022-10-06 | Stop reason: SDUPTHER

## 2021-08-05 RX ORDER — AMLODIPINE AND BENAZEPRIL HYDROCHLORIDE 10; 40 MG/1; MG/1
1 CAPSULE ORAL DAILY
Qty: 90 CAPSULE | Refills: 3 | Status: SHIPPED | OUTPATIENT
Start: 2021-08-05 | End: 2022-10-06 | Stop reason: SDUPTHER

## 2021-08-05 RX ORDER — ISOSORBIDE MONONITRATE 60 MG/1
60 TABLET, EXTENDED RELEASE ORAL DAILY
Qty: 90 TABLET | Refills: 3 | Status: SHIPPED | OUTPATIENT
Start: 2021-08-05 | End: 2022-10-10 | Stop reason: SDUPTHER

## 2021-08-05 RX ORDER — METOPROLOL SUCCINATE 50 MG/1
50 TABLET, EXTENDED RELEASE ORAL 2 TIMES DAILY
Qty: 180 TABLET | Refills: 3 | Status: SHIPPED | OUTPATIENT
Start: 2021-08-05 | End: 2022-09-10

## 2021-08-05 RX ORDER — EZETIMIBE 10 MG/1
10 TABLET ORAL DAILY
Qty: 90 TABLET | Refills: 3 | Status: SHIPPED | OUTPATIENT
Start: 2021-08-05 | End: 2022-09-10

## 2021-08-05 RX ORDER — DOXAZOSIN 4 MG/1
4 TABLET ORAL DAILY
Qty: 90 TABLET | Refills: 3 | Status: SHIPPED | OUTPATIENT
Start: 2021-08-05 | End: 2023-05-30 | Stop reason: SDUPTHER

## 2021-08-25 ENCOUNTER — SPECIALTY PHARMACY (OUTPATIENT)
Dept: PHARMACY | Facility: CLINIC | Age: 61
End: 2021-08-25

## 2021-09-08 ENCOUNTER — LAB VISIT (OUTPATIENT)
Dept: LAB | Facility: HOSPITAL | Age: 61
End: 2021-09-08
Attending: INTERNAL MEDICINE
Payer: MEDICARE

## 2021-09-08 DIAGNOSIS — E11.8 TYPE II DIABETES MELLITUS WITH COMPLICATION: ICD-10-CM

## 2021-09-08 LAB
ALBUMIN SERPL BCP-MCNC: 3.7 G/DL (ref 3.5–5.2)
ALP SERPL-CCNC: 72 U/L (ref 55–135)
ALT SERPL W/O P-5'-P-CCNC: 31 U/L (ref 10–44)
ANION GAP SERPL CALC-SCNC: 11 MMOL/L (ref 8–16)
AST SERPL-CCNC: 24 U/L (ref 10–40)
BASOPHILS # BLD AUTO: 0.05 K/UL (ref 0–0.2)
BASOPHILS NFR BLD: 0.5 % (ref 0–1.9)
BILIRUB SERPL-MCNC: 0.2 MG/DL (ref 0.1–1)
BUN SERPL-MCNC: 16 MG/DL (ref 8–23)
CALCIUM SERPL-MCNC: 10 MG/DL (ref 8.7–10.5)
CHLORIDE SERPL-SCNC: 108 MMOL/L (ref 95–110)
CHOLEST SERPL-MCNC: 130 MG/DL (ref 120–199)
CHOLEST/HDLC SERPL: 2.8 {RATIO} (ref 2–5)
CO2 SERPL-SCNC: 23 MMOL/L (ref 23–29)
CREAT SERPL-MCNC: 0.8 MG/DL (ref 0.5–1.4)
DIFFERENTIAL METHOD: ABNORMAL
EOSINOPHIL # BLD AUTO: 0.3 K/UL (ref 0–0.5)
EOSINOPHIL NFR BLD: 2.8 % (ref 0–8)
ERYTHROCYTE [DISTWIDTH] IN BLOOD BY AUTOMATED COUNT: 15.8 % (ref 11.5–14.5)
EST. GFR  (AFRICAN AMERICAN): >60 ML/MIN/1.73 M^2
EST. GFR  (NON AFRICAN AMERICAN): >60 ML/MIN/1.73 M^2
ESTIMATED AVG GLUCOSE: 160 MG/DL (ref 68–131)
GLUCOSE SERPL-MCNC: 100 MG/DL (ref 70–110)
HBA1C MFR BLD: 7.2 % (ref 4–5.6)
HCT VFR BLD AUTO: 40.3 % (ref 37–48.5)
HDLC SERPL-MCNC: 46 MG/DL (ref 40–75)
HDLC SERPL: 35.4 % (ref 20–50)
HGB BLD-MCNC: 12.9 G/DL (ref 12–16)
IMM GRANULOCYTES # BLD AUTO: 0.01 K/UL (ref 0–0.04)
IMM GRANULOCYTES NFR BLD AUTO: 0.1 % (ref 0–0.5)
LDLC SERPL CALC-MCNC: 36.8 MG/DL (ref 63–159)
LYMPHOCYTES # BLD AUTO: 4.5 K/UL (ref 1–4.8)
LYMPHOCYTES NFR BLD: 46.2 % (ref 18–48)
MCH RBC QN AUTO: 26.7 PG (ref 27–31)
MCHC RBC AUTO-ENTMCNC: 32 G/DL (ref 32–36)
MCV RBC AUTO: 83 FL (ref 82–98)
MONOCYTES # BLD AUTO: 0.9 K/UL (ref 0.3–1)
MONOCYTES NFR BLD: 8.9 % (ref 4–15)
NEUTROPHILS # BLD AUTO: 4.1 K/UL (ref 1.8–7.7)
NEUTROPHILS NFR BLD: 41.5 % (ref 38–73)
NONHDLC SERPL-MCNC: 84 MG/DL
NRBC BLD-RTO: 0 /100 WBC
PLATELET # BLD AUTO: 217 K/UL (ref 150–450)
PMV BLD AUTO: 13 FL (ref 9.2–12.9)
POTASSIUM SERPL-SCNC: 3.4 MMOL/L (ref 3.5–5.1)
PROT SERPL-MCNC: 7.1 G/DL (ref 6–8.4)
RBC # BLD AUTO: 4.84 M/UL (ref 4–5.4)
SODIUM SERPL-SCNC: 142 MMOL/L (ref 136–145)
TRIGL SERPL-MCNC: 236 MG/DL (ref 30–150)
TSH SERPL DL<=0.005 MIU/L-ACNC: 1.87 UIU/ML (ref 0.4–4)
WBC # BLD AUTO: 9.77 K/UL (ref 3.9–12.7)

## 2021-09-08 PROCEDURE — 36415 COLL VENOUS BLD VENIPUNCTURE: CPT | Mod: PO | Performed by: INTERNAL MEDICINE

## 2021-09-08 PROCEDURE — 83036 HEMOGLOBIN GLYCOSYLATED A1C: CPT | Performed by: INTERNAL MEDICINE

## 2021-09-08 PROCEDURE — 85025 COMPLETE CBC W/AUTO DIFF WBC: CPT | Performed by: INTERNAL MEDICINE

## 2021-09-08 PROCEDURE — 80061 LIPID PANEL: CPT | Performed by: INTERNAL MEDICINE

## 2021-09-08 PROCEDURE — 84443 ASSAY THYROID STIM HORMONE: CPT | Performed by: INTERNAL MEDICINE

## 2021-09-08 PROCEDURE — 80053 COMPREHEN METABOLIC PANEL: CPT | Performed by: INTERNAL MEDICINE

## 2021-09-15 ENCOUNTER — TELEPHONE (OUTPATIENT)
Dept: ADMINISTRATIVE | Facility: HOSPITAL | Age: 61
End: 2021-09-15

## 2021-09-27 ENCOUNTER — SPECIALTY PHARMACY (OUTPATIENT)
Dept: PHARMACY | Facility: CLINIC | Age: 61
End: 2021-09-27

## 2021-09-27 ENCOUNTER — TELEPHONE (OUTPATIENT)
Dept: ADMINISTRATIVE | Facility: HOSPITAL | Age: 61
End: 2021-09-27

## 2021-09-27 DIAGNOSIS — I25.2 HISTORY OF NON-ST ELEVATION MYOCARDIAL INFARCTION (NSTEMI): ICD-10-CM

## 2021-09-27 DIAGNOSIS — E78.1 HYPERTRIGLYCERIDEMIA: ICD-10-CM

## 2021-09-27 DIAGNOSIS — I25.10 CORONARY ARTERY DISEASE INVOLVING NATIVE CORONARY ARTERY OF NATIVE HEART WITHOUT ANGINA PECTORIS: Primary | ICD-10-CM

## 2021-09-27 RX ORDER — EVOLOCUMAB 140 MG/ML
140 INJECTION, SOLUTION SUBCUTANEOUS
Qty: 2 ML | Refills: 6 | Status: SHIPPED | OUTPATIENT
Start: 2021-09-27 | End: 2022-04-28 | Stop reason: SDUPTHER

## 2021-10-06 ENCOUNTER — OFFICE VISIT (OUTPATIENT)
Dept: INTERNAL MEDICINE | Facility: CLINIC | Age: 61
End: 2021-10-06
Payer: MEDICARE

## 2021-10-06 VITALS
WEIGHT: 213.38 LBS | SYSTOLIC BLOOD PRESSURE: 130 MMHG | BODY MASS INDEX: 36.43 KG/M2 | HEART RATE: 74 BPM | DIASTOLIC BLOOD PRESSURE: 76 MMHG | OXYGEN SATURATION: 98 % | HEIGHT: 64 IN

## 2021-10-06 DIAGNOSIS — Z00.00 ROUTINE GENERAL MEDICAL EXAMINATION AT A HEALTH CARE FACILITY: Primary | ICD-10-CM

## 2021-10-06 DIAGNOSIS — I77.819 AORTIC ECTASIA: ICD-10-CM

## 2021-10-06 DIAGNOSIS — Z86.010 HISTORY OF COLON POLYPS: ICD-10-CM

## 2021-10-06 DIAGNOSIS — E11.29 MICROALBUMINURIA DUE TO TYPE 2 DIABETES MELLITUS: ICD-10-CM

## 2021-10-06 DIAGNOSIS — I15.2 HYPERTENSION ASSOCIATED WITH DIABETES: ICD-10-CM

## 2021-10-06 DIAGNOSIS — M81.0 POSTMENOPAUSAL BONE LOSS: ICD-10-CM

## 2021-10-06 DIAGNOSIS — E11.69 HYPERLIPIDEMIA ASSOCIATED WITH TYPE 2 DIABETES MELLITUS: ICD-10-CM

## 2021-10-06 DIAGNOSIS — I25.10 CORONARY ARTERY DISEASE INVOLVING NATIVE CORONARY ARTERY OF NATIVE HEART WITHOUT ANGINA PECTORIS: Chronic | ICD-10-CM

## 2021-10-06 DIAGNOSIS — Z12.31 SCREENING MAMMOGRAM FOR BREAST CANCER: ICD-10-CM

## 2021-10-06 DIAGNOSIS — E66.01 SEVERE OBESITY (BMI 35.0-35.9 WITH COMORBIDITY): ICD-10-CM

## 2021-10-06 DIAGNOSIS — E11.59 HYPERTENSION ASSOCIATED WITH DIABETES: ICD-10-CM

## 2021-10-06 DIAGNOSIS — I25.2 HISTORY OF NON-ST ELEVATION MYOCARDIAL INFARCTION (NSTEMI): ICD-10-CM

## 2021-10-06 DIAGNOSIS — R80.9 MICROALBUMINURIA DUE TO TYPE 2 DIABETES MELLITUS: ICD-10-CM

## 2021-10-06 DIAGNOSIS — F32.5 MAJOR DEPRESSION IN REMISSION: ICD-10-CM

## 2021-10-06 DIAGNOSIS — E78.5 HYPERLIPIDEMIA ASSOCIATED WITH TYPE 2 DIABETES MELLITUS: ICD-10-CM

## 2021-10-06 DIAGNOSIS — E11.8 TYPE II DIABETES MELLITUS WITH COMPLICATION: ICD-10-CM

## 2021-10-06 PROCEDURE — 99999 PR PBB SHADOW E&M-EST. PATIENT-LVL IV: CPT | Mod: PBBFAC,,, | Performed by: INTERNAL MEDICINE

## 2021-10-06 PROCEDURE — 90694 VACC AIIV4 NO PRSRV 0.5ML IM: CPT | Mod: PBBFAC,PO

## 2021-10-06 PROCEDURE — 99214 OFFICE O/P EST MOD 30 MIN: CPT | Mod: PBBFAC,PO | Performed by: INTERNAL MEDICINE

## 2021-10-06 PROCEDURE — G0008 ADMIN INFLUENZA VIRUS VAC: HCPCS | Mod: PBBFAC,PO

## 2021-10-06 PROCEDURE — 99999 PR PBB SHADOW E&M-EST. PATIENT-LVL IV: ICD-10-PCS | Mod: PBBFAC,,, | Performed by: INTERNAL MEDICINE

## 2021-10-06 PROCEDURE — 99214 OFFICE O/P EST MOD 30 MIN: CPT | Mod: S$PBB,,, | Performed by: INTERNAL MEDICINE

## 2021-10-06 PROCEDURE — 99214 PR OFFICE/OUTPT VISIT, EST, LEVL IV, 30-39 MIN: ICD-10-PCS | Mod: S$PBB,,, | Performed by: INTERNAL MEDICINE

## 2021-11-01 ENCOUNTER — SPECIALTY PHARMACY (OUTPATIENT)
Dept: PHARMACY | Facility: CLINIC | Age: 61
End: 2021-11-01
Payer: MEDICARE

## 2021-11-26 ENCOUNTER — TELEPHONE (OUTPATIENT)
Dept: INTERNAL MEDICINE | Facility: CLINIC | Age: 61
End: 2021-11-26
Payer: MEDICARE

## 2021-12-02 ENCOUNTER — SPECIALTY PHARMACY (OUTPATIENT)
Dept: PHARMACY | Facility: CLINIC | Age: 61
End: 2021-12-02
Payer: MEDICARE

## 2021-12-16 ENCOUNTER — TELEPHONE (OUTPATIENT)
Dept: CARDIOLOGY | Facility: CLINIC | Age: 61
End: 2021-12-16
Payer: MEDICARE

## 2021-12-21 LAB
LEFT EYE DM RETINOPATHY: NEGATIVE
RIGHT EYE DM RETINOPATHY: NEGATIVE

## 2021-12-28 ENCOUNTER — SPECIALTY PHARMACY (OUTPATIENT)
Dept: PHARMACY | Facility: CLINIC | Age: 61
End: 2021-12-28
Payer: MEDICARE

## 2021-12-30 ENCOUNTER — PATIENT OUTREACH (OUTPATIENT)
Dept: ADMINISTRATIVE | Facility: HOSPITAL | Age: 61
End: 2021-12-30
Payer: MEDICARE

## 2022-01-12 ENCOUNTER — TELEPHONE (OUTPATIENT)
Dept: INTERNAL MEDICINE | Facility: CLINIC | Age: 62
End: 2022-01-12
Payer: MEDICARE

## 2022-01-18 ENCOUNTER — PES CALL (OUTPATIENT)
Dept: ADMINISTRATIVE | Facility: CLINIC | Age: 62
End: 2022-01-18
Payer: MEDICARE

## 2022-01-21 ENCOUNTER — TELEPHONE (OUTPATIENT)
Dept: ADMINISTRATIVE | Facility: CLINIC | Age: 62
End: 2022-01-21
Payer: MEDICARE

## 2022-01-24 ENCOUNTER — TELEPHONE (OUTPATIENT)
Dept: ADMINISTRATIVE | Facility: CLINIC | Age: 62
End: 2022-01-24
Payer: MEDICARE

## 2022-01-24 ENCOUNTER — OFFICE VISIT (OUTPATIENT)
Dept: HOME HEALTH SERVICES | Facility: CLINIC | Age: 62
End: 2022-01-24
Payer: MEDICARE

## 2022-01-24 VITALS — HEIGHT: 64 IN | BODY MASS INDEX: 35.17 KG/M2 | WEIGHT: 206 LBS

## 2022-01-24 DIAGNOSIS — E11.8 TYPE II DIABETES MELLITUS WITH COMPLICATION: ICD-10-CM

## 2022-01-24 DIAGNOSIS — I15.2 HYPERTENSION ASSOCIATED WITH DIABETES: ICD-10-CM

## 2022-01-24 DIAGNOSIS — E11.69 HYPERLIPIDEMIA ASSOCIATED WITH TYPE 2 DIABETES MELLITUS: ICD-10-CM

## 2022-01-24 DIAGNOSIS — E11.29 MICROALBUMINURIA DUE TO TYPE 2 DIABETES MELLITUS: ICD-10-CM

## 2022-01-24 DIAGNOSIS — E66.01 SEVERE OBESITY (BMI 35.0-35.9 WITH COMORBIDITY): ICD-10-CM

## 2022-01-24 DIAGNOSIS — R80.9 MICROALBUMINURIA DUE TO TYPE 2 DIABETES MELLITUS: ICD-10-CM

## 2022-01-24 DIAGNOSIS — E78.5 HYPERLIPIDEMIA ASSOCIATED WITH TYPE 2 DIABETES MELLITUS: ICD-10-CM

## 2022-01-24 DIAGNOSIS — I77.819 AORTIC ECTASIA: ICD-10-CM

## 2022-01-24 DIAGNOSIS — E11.59 HYPERTENSION ASSOCIATED WITH DIABETES: ICD-10-CM

## 2022-01-24 DIAGNOSIS — Z00.00 ENCOUNTER FOR PREVENTIVE HEALTH EXAMINATION: Primary | ICD-10-CM

## 2022-01-24 PROCEDURE — G0439 PR MEDICARE ANNUAL WELLNESS SUBSEQUENT VISIT: ICD-10-PCS | Mod: 95,,, | Performed by: NURSE PRACTITIONER

## 2022-01-24 PROCEDURE — G0439 PPPS, SUBSEQ VISIT: HCPCS | Mod: 95,,, | Performed by: NURSE PRACTITIONER

## 2022-01-24 NOTE — PROGRESS NOTES
"The patient location is: [place]  The chief complaint leading to consultation is: Health Risk Assessment    Visit type: audiovisual  Face to Face time with patient: 20  35 minutes of total time spent on the encounter, which includes face to face time and non-face to face time preparing to see the patient (eg, review of tests), Obtaining and/or reviewing separately obtained history, Documenting clinical information in the electronic or other health record, Independently interpreting results (not separately reported) and communicating results to the patient/family/caregiver, or Care coordination (not separately reported).         Each patient to whom he or she provides medical services by telemedicine is:  (1) informed of the relationship between the physician and patient and the respective role of any other health care provider with respect to management of the patient; and (2) notified that he or she may decline to receive medical services by telemedicine and may withdraw from such care at any time.    Notes:       Dulce Boogie presented for a  Medicare AWV and comprehensive Health Risk Assessment today. The following components were reviewed and updated:    · Medical history  · Family History  · Social history  · Allergies and Current Medications  · Health Risk Assessment  · Health Maintenance  · Care Team         ** See Completed Assessments for Annual Wellness Visit within the encounter summary.**         The following assessments were completed:  · Living Situation  · CAGE  · Depression Screening  · Fall Risk Assessment (MACH 10)  · Hearing Assessment(HHI)  · Cognitive Function Screening  · Nutrition Screening  · ADL Screening  · PAQ Screening      Vitals:    01/24/22 0954   Weight: 93.4 kg (206 lb)   Height: 5' 4" (1.626 m)     Body mass index is 35.36 kg/m².  Physical Exam  Constitutional:       Appearance: She is obese.   Neurological:      General: No focal deficit present.      Mental Status: She is alert " and oriented to person, place, and time.   Psychiatric:         Mood and Affect: Mood normal.               Diagnoses and health risks identified today and associated recommendations/orders:    1. Encounter for preventive health examination  Assessments completed. Preventive measures and health maintenance reviewed with patient.    2. Severe obesity (BMI 35.0-35.9 with comorbidity)  Stable, followed by PCP. Diet and exercise encouraged.    3. Hyperlipidemia associated with type 2 diabetes mellitus  Stable, followed by PCP.     4. Aortic ectasia  Stable, followed by Cardiology.    5. Type II diabetes mellitus with complication  Stable, followed by PCP.     6. Microalbuminuria due to type 2 diabetes mellitus  Stable, followed by PCP.     7. Hypertension associated with diabetes  Stable, followed by PCP.     Provided Dulce with a 5-10 year written screening schedule and personal prevention plan. Recommendations were developed using the USPSTF age appropriate recommendations. Education, counseling, and referrals were provided as needed. After Visit Summary printed and given to patient which includes a list of additional screenings\tests needed.    Follow up in about 1 year (around 1/24/2023) for your next annual wellness visit.    Sarah Hedrick, KRISTIE  I offered to discuss advanced care planning, including how to pick a person who would make decisions for you if you were unable to make them for yourself, called a health care power of , and what kind of decisions you might make such as use of life sustaining treatments such as ventilators and tube feeding when faced with a life limiting illness recorded on a living will that they will need to know. (How you want to be cared for as you near the end of your natural life)     X Patient is interested in learning more about how to make advanced directives.  I provided them paperwork and offered to discuss this with them.

## 2022-01-24 NOTE — PATIENT INSTRUCTIONS
Counseling and Referral of Other Preventative  (Italic type indicates deductible and co-insurance are waived)    Patient Name: Dulce Boogie  Today's Date: 1/24/2022    Health Maintenance       Date Due Completion Date    COVID-19 Vaccine (1) Never done ---    Diabetes Urine Screening 02/14/2008 2/14/2007    Shingles Vaccine (1 of 2) Never done ---    Foot Exam 11/23/2021 11/23/2020    Override on 8/13/2019: Done    Override on 10/29/2018: Done (normal)    Override on 7/14/2017: Done    Override on 7/15/2016: Done    TETANUS VACCINE 12/07/2021 12/7/2011    Mammogram 03/10/2022 3/10/2021    Hemoglobin A1c 03/08/2022 9/8/2021    Colorectal Cancer Screening 06/10/2022 6/10/2019    Pap Smear 08/13/2022 8/13/2019    Lipid Panel 09/08/2022 9/8/2021    High Dose Statin 10/06/2022 10/6/2021    Eye Exam 12/21/2022 12/21/2021    Pneumococcal Vaccines (Age 0-64) (2 of 2 - PPSV23) 08/06/2024 1/17/2017        No orders of the defined types were placed in this encounter.    The following information is provided to all patients.  This information is to help you find resources for any of the problems found today that may be affecting your health:                Living healthy guide: www.[facility].[place].gov      Understanding Diabetes: www.diabetes.org      Eating healthy: www.cdc.gov/healthyweight      CDC home safety checklist: www.cdc.gov/steadi/patient.html      Agency on Aging: www.goea.[place].[facility]      Alcoholics anonymous (AA): www.aa.org      Physical Activity: www.deo.nih.gov/zw9gtoh      Tobacco use: www.quitwithusla.org

## 2022-02-04 ENCOUNTER — SPECIALTY PHARMACY (OUTPATIENT)
Dept: PHARMACY | Facility: CLINIC | Age: 62
End: 2022-02-04
Payer: MEDICARE

## 2022-02-04 NOTE — TELEPHONE ENCOUNTER
Specialty Pharmacy - Refill Coordination    Specialty Medication Orders Linked to Encounter    Flowsheet Row Most Recent Value   Medication #1 evolocumab (REPATHA SURECLICK) 140 mg/mL PnIj (Order#[id number], Rx#[id number])          Refill Questions - Documented Responses    Flowsheet Row Most Recent Value   Patient Availability and HIPAA Verification    Does patient want to proceed with activity? Yes   HIPAA/medical authority confirmed? Yes   Relationship to patient of person spoken to? Self   Refill Screening Questions    Would patient like to speak to a pharmacist? No   When does the patient need to receive the medication? 02/08/22   Refill Delivery Questions    How will the patient receive the medication? Delivery Almita   When does the patient need to receive the medication? 02/08/22   Shipping Address Home   Address in [facility] confirmed and updated if neccessary? Yes   Expected Copay ($) 4   Is the patient able to afford the medication copay? Yes   Payment Method CC on file   Days supply of Refill 28   Supplies needed? No supplies needed   Refill activity completed? Yes   Refill activity plan Refill scheduled   Shipment/Pickup Date: 02/08/22          Current Outpatient Medications   Medication Sig    ALPRAZolam (XANAX) 0.5 MG tablet Take 1 tablet (0.5 mg total) by mouth daily as needed.    amLODIPine-benazepriL (LOTREL) 10-40 mg per capsule Take 1 capsule by mouth once daily.    aspirin (ECOTRIN) 81 MG EC tablet Take 1 tablet (81 mg total) by mouth once daily.    cyclobenzaprine (FLEXERIL) 10 MG tablet TAKE 1 TABLET BY MOUTH THREE TIMES A DAY AS NEEDED FOR MUSCLE SPASMS    diclofenac (VOLTAREN) 50 MG EC tablet TAKE 1 TABLET BY MOUTH TWICE A DAY    diclofenac sodium (VOLTAREN) 1 % Gel Apply 2 g topically 3 (three) times daily as needed.    doxazosin (CARDURA) 4 MG tablet Take 1 tablet (4 mg total) by mouth once daily.    evolocumab (REPATHA SURECLICK) 140 mg/mL PnIj Inject 1 mL (140 mg total)  into the skin every 14 (fourteen) days.    ezetimibe (ZETIA) 10 mg tablet Take 1 tablet (10 mg total) by mouth once daily.    fenofibrate micronized (LOFIBRA) 134 MG Cap TAKE ONE CAPSULE BY MOUTH EVERY DAY WITH BREAKFAST.    glimepiride (AMARYL) 4 MG tablet TAKE 1 TABLET (4 MG TOTAL) BY MOUTH BEFORE BREAKFAST.    isosorbide mononitrate (IMDUR) 60 MG 24 hr tablet Take 1 tablet (60 mg total) by mouth once daily.    metFORMIN (GLUCOPHAGE) 500 MG tablet TAKE 2 TABLETS BY MOUTH TWICE A DAY WITH MEALS    methocarbamol (ROBAXIN) 500 MG Tab TAKE 1 TABLET BY MOUTH THREE TIMES A DAY AS NEEDED MUSCLE SPASM    metoprolol succinate (TOPROL-XL) 50 MG 24 hr tablet Take 1 tablet (50 mg total) by mouth 2 (two) times daily.    niacin (NIASPAN) 750 MG CR tablet Take 1 tablet (750 mg total) by mouth nightly.    nitroGLYCERIN (NITROSTAT) 0.4 MG SL tablet PLACE 1 TAB UNDER TONGUE FIRST SIGN OF HEART PAIN-REPEAT EVERY 5 MINS UP TO 3 DOSES-CALL 911    omeprazole (PRILOSEC) 40 MG capsule TAKE 1 CAPSULE BY MOUTH EVERY DAY    ondansetron (ZOFRAN) 4 MG tablet Take 1 tablet (4 mg total) by mouth every 8 (eight) hours as needed for Nausea.    rosuvastatin (CRESTOR) 40 MG Tab Take 1 tablet (40 mg total) by mouth every evening.    ticagrelor (BRILINTA) 90 mg tablet Take 1 tablet (90 mg total) by mouth 2 (two) times daily.   Last reviewed on 1/24/2022  9:57 AM by Sarah Hedrick NP    Review of patient's allergies indicates:   Allergen Reactions    No known drug allergies     Last reviewed on  1/24/2022 9:54 AM by Sarah Hedrick      Tasks added this encounter   No tasks added.   Tasks due within next 3 months   1/25/2022 - Refill Call (Auto Added)     Irma ChangD  [facility]  [address] [place] [id number]  Phone: [phone]  Fax: [phone]

## 2022-02-11 DIAGNOSIS — I25.10 CORONARY ARTERY DISEASE INVOLVING NATIVE CORONARY ARTERY OF NATIVE HEART WITHOUT ANGINA PECTORIS: Primary | ICD-10-CM

## 2022-02-17 ENCOUNTER — OFFICE VISIT (OUTPATIENT)
Dept: CARDIOLOGY | Facility: CLINIC | Age: 62
End: 2022-02-17
Payer: MEDICARE

## 2022-02-17 ENCOUNTER — HOSPITAL ENCOUNTER (OUTPATIENT)
Dept: CARDIOLOGY | Facility: HOSPITAL | Age: 62
Discharge: HOME | End: 2022-02-17
Attending: INTERNAL MEDICINE
Payer: MEDICARE

## 2022-02-17 VITALS
HEIGHT: 64 IN | WEIGHT: 210.13 LBS | OXYGEN SATURATION: 99 % | HEART RATE: 66 BPM | BODY MASS INDEX: 35.87 KG/M2 | DIASTOLIC BLOOD PRESSURE: 78 MMHG | SYSTOLIC BLOOD PRESSURE: 136 MMHG

## 2022-02-17 DIAGNOSIS — I25.2 HISTORY OF NON-ST ELEVATION MYOCARDIAL INFARCTION (NSTEMI): ICD-10-CM

## 2022-02-17 DIAGNOSIS — E11.69 HYPERLIPIDEMIA ASSOCIATED WITH TYPE 2 DIABETES MELLITUS: ICD-10-CM

## 2022-02-17 DIAGNOSIS — I15.2 HYPERTENSION ASSOCIATED WITH DIABETES: ICD-10-CM

## 2022-02-17 DIAGNOSIS — E78.5 HYPERLIPIDEMIA ASSOCIATED WITH TYPE 2 DIABETES MELLITUS: ICD-10-CM

## 2022-02-17 DIAGNOSIS — I77.819 AORTIC ECTASIA: ICD-10-CM

## 2022-02-17 DIAGNOSIS — E11.8 TYPE II DIABETES MELLITUS WITH COMPLICATION: ICD-10-CM

## 2022-02-17 DIAGNOSIS — I25.10 CORONARY ARTERY DISEASE INVOLVING NATIVE CORONARY ARTERY OF NATIVE HEART WITHOUT ANGINA PECTORIS: Primary | Chronic | ICD-10-CM

## 2022-02-17 DIAGNOSIS — I25.10 CORONARY ARTERY DISEASE INVOLVING NATIVE CORONARY ARTERY OF NATIVE HEART WITHOUT ANGINA PECTORIS: ICD-10-CM

## 2022-02-17 DIAGNOSIS — E11.59 HYPERTENSION ASSOCIATED WITH DIABETES: ICD-10-CM

## 2022-02-17 DIAGNOSIS — E11.29 MICROALBUMINURIA DUE TO TYPE 2 DIABETES MELLITUS: ICD-10-CM

## 2022-02-17 DIAGNOSIS — R80.9 MICROALBUMINURIA DUE TO TYPE 2 DIABETES MELLITUS: ICD-10-CM

## 2022-02-17 DIAGNOSIS — E66.01 SEVERE OBESITY (BMI 35.0-35.9 WITH COMORBIDITY): ICD-10-CM

## 2022-02-17 PROCEDURE — 99214 OFFICE O/P EST MOD 30 MIN: CPT | Mod: S$PBB,25,, | Performed by: INTERNAL MEDICINE

## 2022-02-17 PROCEDURE — 93005 ELECTROCARDIOGRAM TRACING: CPT

## 2022-02-17 PROCEDURE — 99214 OFFICE O/P EST MOD 30 MIN: CPT | Mod: PBBFAC | Performed by: INTERNAL MEDICINE

## 2022-02-17 PROCEDURE — 93010 EKG 12-LEAD: ICD-10-PCS | Mod: ,,, | Performed by: INTERNAL MEDICINE

## 2022-02-17 PROCEDURE — 99999 PR PBB SHADOW E&M-EST. PATIENT-LVL IV: ICD-10-PCS | Mod: PBBFAC,,, | Performed by: INTERNAL MEDICINE

## 2022-02-17 PROCEDURE — 99999 PR PBB SHADOW E&M-EST. PATIENT-LVL IV: CPT | Mod: PBBFAC,,, | Performed by: INTERNAL MEDICINE

## 2022-02-17 PROCEDURE — 93010 ELECTROCARDIOGRAM REPORT: CPT | Mod: ,,, | Performed by: INTERNAL MEDICINE

## 2022-02-17 PROCEDURE — 99214 PR OFFICE/OUTPT VISIT, EST, LEVL IV, 30-39 MIN: ICD-10-PCS | Mod: S$PBB,25,, | Performed by: INTERNAL MEDICINE

## 2022-02-17 NOTE — PROGRESS NOTES
Subjective:   Patient ID:  Dulce Boogie is a 61 y.o. female who presents for follow up of Coronary Artery Disease      HPI   2021 HAVEN RODARTE NP  Ms. Boogie's current conditions include CAD with remote PCI in  (previously followed by Dr. Valle at  Cardiology), HTN, HLD, DM II. Admitted May 2018 with unstable angina. Underwent LHC on 18 and noted to have LAD 50% mid long lesion ffr 0.81. Also noted LCX non obs CAD and patent stent, RCA 90% prox treated with solange x2 and PDA distal 80% treated with SOLANGE x 2.      Admitted in 2021 with NSTEMI  Underwent successful PCI of mid LAD 80% lesion.   Has no abnormal bleeding on ASA and Brilinta  Started on Imdur following cath   Wasn't taking Repatha as prescribed.      Denies any chest pain, SOB, COOPER,  orthopnea, PND, dizziness, palpitations,  near syncope, syncope or edema . Has no symptoms concerning for angina or equivalent. No CNS Complaints to suggest TIA or CVA. Does well with limiting sodium intake.  Has not required any SL nitro.   BP log  Ranging 130's systolic/80's   Is grieving the loss of her son     2022  No new complaints she is trying to be compliant with diet not consistent on exercise. Compliant with diet asymptomatic cardiovascular wise.tolerating emdical therapy   Past Medical History:   Diagnosis Date    Anticoagulant long-term use     CAD (coronary artery disease)     Depression     History of colon polyps     Hyperlipidemia associated with type 2 diabetes mellitus     Hypertension associated with diabetes     Microalbuminuria due to type 2 diabetes mellitus     NSTEMI (non-ST elevated myocardial infarction) 2018    Obesity     Type II diabetes mellitus with complication        Past Surgical History:   Procedure Laterality Date    CAROTID STENT       SECTION, LOW TRANSVERSE      x 2    COLONOSCOPY N/A 6/10/2019    Procedure: COLONOSCOPY;  Surgeon: Maricarmen Boo MD;  Location: [facility] ENDO;  Service:  Endoscopy;  Laterality: N/A;    CORONARY STENT PLACEMENT N/A 1/25/2021    Procedure: INSERTION, STENT, CORONARY ARTERY;  Surgeon: Aimee Cooper MD;  Location: [facility] CATH LAB;  Service: Cardiology;  Laterality: N/A;    LEFT HEART CATHETERIZATION Left 5/22/2018    Procedure: HEART CATH-LEFT;  Surgeon: Aimee Cooper MD;  Location: [facility] CATH LAB;  Service: Cardiology;  Laterality: Left;    LEFT HEART CATHETERIZATION Left 1/25/2021    Procedure: CATHETERIZATION, HEART, LEFT;  Surgeon: Aimee Cooper MD;  Location: [facility] CATH LAB;  Service: Cardiology;  Laterality: Left;    PERCUTANEOUS TRANSLUMINAL BALLOON ANGIOPLASTY OF CORONARY ARTERY  1/25/2021    Procedure: Angioplasty-coronary;  Surgeon: Aimee Cooper MD;  Location: [facility] CATH LAB;  Service: Cardiology;;       Social History     Tobacco Use    Smoking status: Never Smoker    Smokeless tobacco: Never Used   Substance Use Topics    Alcohol use: No    Drug use: No       Family History   Problem Relation Age of Onset    Hypertension Mother     Hyperlipidemia Mother     Hypertension Father     Diabetes Father     Hyperlipidemia Father     Colon cancer Father     Hypertension Brother     Breast cancer Neg Hx     Ovarian cancer Neg Hx     Uterine cancer Neg Hx        Current Outpatient Medications   Medication Sig    ALPRAZolam (XANAX) 0.5 MG tablet Take 1 tablet (0.5 mg total) by mouth daily as needed.    amLODIPine-benazepriL (LOTREL) 10-40 mg per capsule Take 1 capsule by mouth once daily.    aspirin (ECOTRIN) 81 MG EC tablet Take 1 tablet (81 mg total) by mouth once daily.    cyclobenzaprine (FLEXERIL) 10 MG tablet TAKE 1 TABLET BY MOUTH THREE TIMES A DAY AS NEEDED FOR MUSCLE SPASMS    diclofenac (VOLTAREN) 50 MG EC tablet TAKE 1 TABLET BY MOUTH TWICE A DAY    diclofenac sodium (VOLTAREN) 1 % Gel Apply 2 g topically 3 (three) times daily as needed.    doxazosin (CARDURA) 4 MG tablet Take 1 tablet (4 mg total) by mouth once daily.     evolocumab (REPATHA SURECLICK) 140 mg/mL PnIj Inject 1 mL (140 mg total) into the skin every 14 (fourteen) days.    ezetimibe (ZETIA) 10 mg tablet Take 1 tablet (10 mg total) by mouth once daily.    fenofibrate micronized (LOFIBRA) 134 MG Cap TAKE ONE CAPSULE BY MOUTH EVERY DAY WITH BREAKFAST.    glimepiride (AMARYL) 4 MG tablet TAKE 1 TABLET (4 MG TOTAL) BY MOUTH BEFORE BREAKFAST.    isosorbide mononitrate (IMDUR) 60 MG 24 hr tablet Take 1 tablet (60 mg total) by mouth once daily.    metFORMIN (GLUCOPHAGE) 500 MG tablet TAKE 2 TABLETS BY MOUTH TWICE A DAY WITH MEALS    methocarbamol (ROBAXIN) 500 MG Tab TAKE 1 TABLET BY MOUTH THREE TIMES A DAY AS NEEDED MUSCLE SPASM    metoprolol succinate (TOPROL-XL) 50 MG 24 hr tablet Take 1 tablet (50 mg total) by mouth 2 (two) times daily.    niacin (NIASPAN) 750 MG CR tablet Take 1 tablet (750 mg total) by mouth nightly.    nitroGLYCERIN (NITROSTAT) 0.4 MG SL tablet PLACE 1 TAB UNDER TONGUE FIRST SIGN OF HEART PAIN-REPEAT EVERY 5 MINS UP TO 3 DOSES-CALL 911    omeprazole (PRILOSEC) 40 MG capsule TAKE 1 CAPSULE BY MOUTH EVERY DAY    ondansetron (ZOFRAN) 4 MG tablet Take 1 tablet (4 mg total) by mouth every 8 (eight) hours as needed for Nausea.    rosuvastatin (CRESTOR) 40 MG Tab Take 1 tablet (40 mg total) by mouth every evening.    ticagrelor (BRILINTA) 90 mg tablet Take 1 tablet (90 mg total) by mouth 2 (two) times daily.     No current facility-administered medications for this visit.     Current Outpatient Medications on File Prior to Visit   Medication Sig    ALPRAZolam (XANAX) 0.5 MG tablet Take 1 tablet (0.5 mg total) by mouth daily as needed.    amLODIPine-benazepriL (LOTREL) 10-40 mg per capsule Take 1 capsule by mouth once daily.    aspirin (ECOTRIN) 81 MG EC tablet Take 1 tablet (81 mg total) by mouth once daily.    cyclobenzaprine (FLEXERIL) 10 MG tablet TAKE 1 TABLET BY MOUTH THREE TIMES A DAY AS NEEDED FOR MUSCLE SPASMS    diclofenac (VOLTAREN)  50 MG EC tablet TAKE 1 TABLET BY MOUTH TWICE A DAY    diclofenac sodium (VOLTAREN) 1 % Gel Apply 2 g topically 3 (three) times daily as needed.    doxazosin (CARDURA) 4 MG tablet Take 1 tablet (4 mg total) by mouth once daily.    evolocumab (REPATHA SURECLICK) 140 mg/mL PnIj Inject 1 mL (140 mg total) into the skin every 14 (fourteen) days.    ezetimibe (ZETIA) 10 mg tablet Take 1 tablet (10 mg total) by mouth once daily.    fenofibrate micronized (LOFIBRA) 134 MG Cap TAKE ONE CAPSULE BY MOUTH EVERY DAY WITH BREAKFAST.    glimepiride (AMARYL) 4 MG tablet TAKE 1 TABLET (4 MG TOTAL) BY MOUTH BEFORE BREAKFAST.    isosorbide mononitrate (IMDUR) 60 MG 24 hr tablet Take 1 tablet (60 mg total) by mouth once daily.    metFORMIN (GLUCOPHAGE) 500 MG tablet TAKE 2 TABLETS BY MOUTH TWICE A DAY WITH MEALS    methocarbamol (ROBAXIN) 500 MG Tab TAKE 1 TABLET BY MOUTH THREE TIMES A DAY AS NEEDED MUSCLE SPASM    metoprolol succinate (TOPROL-XL) 50 MG 24 hr tablet Take 1 tablet (50 mg total) by mouth 2 (two) times daily.    niacin (NIASPAN) 750 MG CR tablet Take 1 tablet (750 mg total) by mouth nightly.    nitroGLYCERIN (NITROSTAT) 0.4 MG SL tablet PLACE 1 TAB UNDER TONGUE FIRST SIGN OF HEART PAIN-REPEAT EVERY 5 MINS UP TO 3 DOSES-CALL 911    omeprazole (PRILOSEC) 40 MG capsule TAKE 1 CAPSULE BY MOUTH EVERY DAY    ondansetron (ZOFRAN) 4 MG tablet Take 1 tablet (4 mg total) by mouth every 8 (eight) hours as needed for Nausea.    rosuvastatin (CRESTOR) 40 MG Tab Take 1 tablet (40 mg total) by mouth every evening.    ticagrelor (BRILINTA) 90 mg tablet Take 1 tablet (90 mg total) by mouth 2 (two) times daily.     No current facility-administered medications on file prior to visit.     Review of patient's allergies indicates:   Allergen Reactions    No known drug allergies      Review of Systems   Constitutional: Negative for diaphoresis, malaise/fatigue and weight gain.   HENT: Negative for hoarse voice.    Eyes:  Negative for double vision and visual disturbance.   Cardiovascular: Negative for chest pain, claudication, cyanosis, dyspnea on exertion, irregular heartbeat, leg swelling, near-syncope, orthopnea, palpitations, paroxysmal nocturnal dyspnea and syncope.   Respiratory: Negative for cough, hemoptysis, shortness of breath and snoring.    Hematologic/Lymphatic: Negative for bleeding problem. Does not bruise/bleed easily.   Skin: Negative for color change and poor wound healing.   Musculoskeletal: Negative for muscle cramps, muscle weakness and myalgias.   Gastrointestinal: Negative for bloating, abdominal pain, change in bowel habit, diarrhea, heartburn, hematemesis, hematochezia, melena and nausea.   Neurological: Negative for excessive daytime sleepiness, dizziness, headaches, light-headedness, loss of balance, numbness and weakness.   Psychiatric/Behavioral: Negative for memory loss. The patient does not have insomnia.    Allergic/Immunologic: Negative for hives.       Objective:   Physical Exam  Constitutional:       General: She is not in acute distress.     Appearance: Normal appearance. She is well-developed and well-nourished. She is not ill-appearing.   HENT:      Head: Normocephalic and atraumatic.   Eyes:      General: No scleral icterus.     Extraocular Movements: EOM normal.      Pupils: Pupils are equal, round, and reactive to light.   Neck:      Thyroid: No thyromegaly.      Vascular: Normal carotid pulses. No carotid bruit, hepatojugular reflux or JVD.      Trachea: No tracheal deviation.   Cardiovascular:      Rate and Rhythm: Normal rate and regular rhythm.      Pulses: Normal pulses.      Heart sounds: Normal heart sounds. No murmur heard.  No friction rub. No gallop.    Pulmonary:      Effort: Pulmonary effort is normal. No respiratory distress.      Breath sounds: Normal breath sounds. No wheezing, rhonchi or rales.   Chest:      Chest wall: No tenderness.   Abdominal:      General: Bowel sounds  "are normal. There is no ascites or abdominal bruit. Aorta is normal.      Palpations: Abdomen is soft. There is no hepatomegaly or pulsatile mass.      Tenderness: There is no abdominal tenderness.   Musculoskeletal:         General: No edema.      Right shoulder: No deformity.      Cervical back: Normal range of motion and neck supple.      Right lower leg: No edema.      Left lower leg: No edema.   Skin:     General: Skin is warm and dry.      Findings: No erythema or rash.      Nails: There is no clubbing or cyanosis.   Neurological:      Mental Status: She is alert and oriented to person, place, and time.      Cranial Nerves: No cranial nerve deficit.      Coordination: Coordination normal.      Deep Tendon Reflexes: Strength normal.   Psychiatric:         Mood and Affect: Mood and affect normal.         Speech: Speech normal.         Behavior: Behavior normal.         Judgment: Judgment normal.       Vitals:    02/17/22 0900 02/17/22 0902   BP: (!) 136/94 (!) 144/88   BP Location: Left arm Right arm   Patient Position: Sitting Sitting   BP Method: Medium (Manual) Medium (Manual)   Pulse: 66    SpO2: 99%    Weight: 95.3 kg (210 lb 1.6 oz)    Height: 5' 4" (1.626 m)      Lab Results   Component Value Date    CHOL 130 09/08/2021    CHOL 216 (H) 03/10/2021    CHOL 157 01/23/2021     Lab Results   Component Value Date    HDL 46 09/08/2021    HDL 41 03/10/2021    HDL 38 (L) 01/23/2021     Lab Results   Component Value Date    LDLCALC 36.8 (L) 09/08/2021    LDLCALC 137.2 03/10/2021    LDLCALC 93.8 01/23/2021     Lab Results   Component Value Date    TRIG 236 (H) 09/08/2021    TRIG 189 (H) 03/10/2021    TRIG 126 01/23/2021     Lab Results   Component Value Date    CHOLHDL 35.4 09/08/2021    CHOLHDL 19.0 (L) 03/10/2021    CHOLHDL 24.2 01/23/2021       Chemistry        Component Value Date/Time     09/08/2021 0821    K 3.4 (L) 09/08/2021 0821     09/08/2021 0821    CO2 23 09/08/2021 0821    BUN 16 09/08/2021 " 0821    CREATININE 0.8 09/08/2021 0821     09/08/2021 0821        Component Value Date/Time    CALCIUM 10.0 09/08/2021 0821    ALKPHOS 72 09/08/2021 0821    AST 24 09/08/2021 0821    ALT 31 09/08/2021 0821    BILITOT 0.2 09/08/2021 0821    ESTGFRAFRICA >60.0 09/08/2021 0821    EGFRNONAA >60.0 09/08/2021 0821        Lab Results   Component Value Date    HGBA1C 7.2 (H) 09/08/2021       Lab Results   Component Value Date    TSH 1.871 09/08/2021     Lab Results   Component Value Date    INR 1.0 01/23/2021    INR 1.0 01/23/2021    INR 1.0 05/21/2018     Lab Results   Component Value Date    WBC 9.77 09/08/2021    HGB 12.9 09/08/2021    HCT 40.3 09/08/2021    MCV 83 09/08/2021     09/08/2021     BMP  Sodium   Date Value Ref Range Status   09/08/2021 142 136 - 145 mmol/L Final     Potassium   Date Value Ref Range Status   09/08/2021 3.4 (L) 3.5 - 5.1 mmol/L Final     Chloride   Date Value Ref Range Status   09/08/2021 108 95 - 110 mmol/L Final     CO2   Date Value Ref Range Status   09/08/2021 23 23 - 29 mmol/L Final     BUN   Date Value Ref Range Status   09/08/2021 16 8 - 23 mg/dL Final     Creatinine   Date Value Ref Range Status   09/08/2021 0.8 0.5 - 1.4 mg/dL Final     Calcium   Date Value Ref Range Status   09/08/2021 10.0 8.7 - 10.5 mg/dL Final     Anion Gap   Date Value Ref Range Status   09/08/2021 11 8 - 16 mmol/L Final     eGFR if    Date Value Ref Range Status   09/08/2021 >60.0 >60 mL/min/1.73 m^2 Final     eGFR if non    Date Value Ref Range Status   09/08/2021 >60.0 >60 mL/min/1.73 m^2 Final     Comment:     Calculation used to obtain the estimated glomerular filtration  rate (eGFR) is the CKD-EPI equation.        CrCl cannot be calculated (Patient's most recent lab result is older than the maximum 7 days allowed.).    Assessment:     1. Coronary artery disease involving native coronary artery of native heart without angina pectoris    2. Aortic ectasia    3.  History of non-ST elevation myocardial infarction (NSTEMI)    4. Hyperlipidemia associated with type 2 diabetes mellitus    5. Hypertension associated with diabetes    6. Microalbuminuria due to type 2 diabetes mellitus    7. Severe obesity (BMI 35.0-35.9 with comorbidity)    8. Type II diabetes mellitus with complication    htn seems controlled on repeat bp. Her bp readings at home are in therapeutic range counseled about weight loss compliance diet exercise..  Cad s/p multivessel stenting last lad in 1/2021 she  Can stop her brilinta will continue asa counseled about rf modification weight loss consistent exercise.   hlp on target tolerating repatha will continue same needs repetha labs.   Diabetes a1c elevated counseled about compliance diet exercise weight loss    Plan:   Continue current therapy  Cardiac low salt diet.  Risk factor modification and excercise program./weight loss   F/u in 6 months with lipid cmp a1c

## 2022-02-25 ENCOUNTER — PES CALL (OUTPATIENT)
Dept: ADMINISTRATIVE | Facility: CLINIC | Age: 62
End: 2022-02-25
Payer: MEDICARE

## 2022-03-08 ENCOUNTER — SPECIALTY PHARMACY (OUTPATIENT)
Dept: PHARMACY | Facility: CLINIC | Age: 62
End: 2022-03-08
Payer: MEDICARE

## 2022-03-08 NOTE — TELEPHONE ENCOUNTER
Specialty Pharmacy - Refill Coordination    Specialty Medication Orders Linked to Encounter    Flowsheet Row Most Recent Value   Medication #1 evolocumab (REPATHA SURECLICK) 140 mg/mL PnIj (Order#[id number], Rx#[id number])          Refill Questions - Documented Responses    Flowsheet Row Most Recent Value   Patient Availability and HIPAA Verification    Does patient want to proceed with activity? Yes   HIPAA/medical authority confirmed? Yes   Relationship to patient of person spoken to? Self   Refill Screening Questions    Would patient like to speak to a pharmacist? No   When does the patient need to receive the medication? 03/09/22   Refill Delivery Questions    How will the patient receive the medication? Delivery Almita   When does the patient need to receive the medication? 03/09/22   Shipping Address Home   Address in [facility] confirmed and updated if neccessary? Yes   Expected Copay ($) 0   Is the patient able to afford the medication copay? Yes   Payment Method zero copay   Days supply of Refill 28   Supplies needed? No supplies needed   Refill activity completed? Yes   Refill activity plan Refill scheduled   Shipment/Pickup Date: 03/09/22          Current Outpatient Medications   Medication Sig    ALPRAZolam (XANAX) 0.5 MG tablet Take 1 tablet (0.5 mg total) by mouth daily as needed.    amLODIPine-benazepriL (LOTREL) 10-40 mg per capsule Take 1 capsule by mouth once daily.    aspirin (ECOTRIN) 81 MG EC tablet Take 1 tablet (81 mg total) by mouth once daily.    cyclobenzaprine (FLEXERIL) 10 MG tablet TAKE 1 TABLET BY MOUTH THREE TIMES A DAY AS NEEDED FOR MUSCLE SPASMS    diclofenac (VOLTAREN) 50 MG EC tablet TAKE 1 TABLET BY MOUTH TWICE A DAY    diclofenac sodium (VOLTAREN) 1 % Gel Apply 2 g topically 3 (three) times daily as needed.    doxazosin (CARDURA) 4 MG tablet Take 1 tablet (4 mg total) by mouth once daily.    evolocumab (REPATHA SURECLICK) 140 mg/mL PnIj Inject 1 mL (140 mg total)  into the skin every 14 (fourteen) days.    ezetimibe (ZETIA) 10 mg tablet Take 1 tablet (10 mg total) by mouth once daily.    fenofibrate micronized (LOFIBRA) 134 MG Cap TAKE ONE CAPSULE BY MOUTH EVERY DAY WITH BREAKFAST.    glimepiride (AMARYL) 4 MG tablet TAKE 1 TABLET (4 MG TOTAL) BY MOUTH BEFORE BREAKFAST.    isosorbide mononitrate (IMDUR) 60 MG 24 hr tablet Take 1 tablet (60 mg total) by mouth once daily.    metFORMIN (GLUCOPHAGE) 500 MG tablet TAKE 2 TABLETS BY MOUTH TWICE A DAY WITH MEALS    methocarbamol (ROBAXIN) 500 MG Tab TAKE 1 TABLET BY MOUTH THREE TIMES A DAY AS NEEDED MUSCLE SPASM    metoprolol succinate (TOPROL-XL) 50 MG 24 hr tablet Take 1 tablet (50 mg total) by mouth 2 (two) times daily.    niacin (NIASPAN) 750 MG CR tablet Take 1 tablet (750 mg total) by mouth nightly.    nitroGLYCERIN (NITROSTAT) 0.4 MG SL tablet PLACE 1 TAB UNDER TONGUE FIRST SIGN OF HEART PAIN-REPEAT EVERY 5 MINS UP TO 3 DOSES-CALL 911    omeprazole (PRILOSEC) 40 MG capsule TAKE 1 CAPSULE BY MOUTH EVERY DAY    ondansetron (ZOFRAN) 4 MG tablet Take 1 tablet (4 mg total) by mouth every 8 (eight) hours as needed for Nausea.    rosuvastatin (CRESTOR) 40 MG Tab Take 1 tablet (40 mg total) by mouth every evening.   Last reviewed on 2/17/2022  9:18 AM by Aimee Cooper MD    Review of patient's allergies indicates:   Allergen Reactions    No known drug allergies     Last reviewed on  2/18/2022 7:11 AM by Alma Clark      Tasks added this encounter   3/30/2022 - Refill Call (Auto Added)   Tasks due within next 3 months   No tasks due.     Igor Rojas, PharmD  [facility]  [facility] [facility] [address]  Phone: [phone]  Fax: [phone]

## 2022-03-30 ENCOUNTER — HOSPITAL ENCOUNTER (OUTPATIENT)
Dept: RADIOLOGY | Facility: HOSPITAL | Age: 62
Discharge: HOME | End: 2022-03-30
Attending: INTERNAL MEDICINE
Payer: MEDICARE

## 2022-03-30 DIAGNOSIS — Z12.31 SCREENING MAMMOGRAM FOR BREAST CANCER: ICD-10-CM

## 2022-03-30 PROCEDURE — 77067 SCR MAMMO BI INCL CAD: CPT | Mod: TC

## 2022-03-30 PROCEDURE — 77063 MAMMO DIGITAL SCREENING BILAT WITH TOMO: ICD-10-PCS | Mod: 26,,, | Performed by: RADIOLOGY

## 2022-03-30 PROCEDURE — 77067 MAMMO DIGITAL SCREENING BILAT WITH TOMO: ICD-10-PCS | Mod: 26,,, | Performed by: RADIOLOGY

## 2022-03-30 PROCEDURE — 77067 SCR MAMMO BI INCL CAD: CPT | Mod: 26,,, | Performed by: RADIOLOGY

## 2022-03-30 PROCEDURE — 77063 BREAST TOMOSYNTHESIS BI: CPT | Mod: 26,,, | Performed by: RADIOLOGY

## 2022-03-30 PROCEDURE — 77063 BREAST TOMOSYNTHESIS BI: CPT | Mod: TC

## 2022-04-05 ENCOUNTER — SPECIALTY PHARMACY (OUTPATIENT)
Dept: PHARMACY | Facility: CLINIC | Age: 62
End: 2022-04-05
Payer: MEDICARE

## 2022-04-05 NOTE — TELEPHONE ENCOUNTER
Specialty Pharmacy - Refill Coordination    Specialty Medication Orders Linked to Encounter    Flowsheet Row Most Recent Value   Medication #1 evolocumab (REPATHA SURECLICK) 140 mg/mL PnIj (Order#[id number], Rx#[id number])          Refill Questions - Documented Responses    Flowsheet Row Most Recent Value   Patient Availability and HIPAA Verification    Does patient want to proceed with activity? Yes   HIPAA/medical authority confirmed? Yes   Relationship to patient of person spoken to? Self   Refill Screening Questions    Would patient like to speak to a pharmacist? No   When does the patient need to receive the medication? 04/07/22   Refill Delivery Questions    How will the patient receive the medication? Delivery Almita   When does the patient need to receive the medication? 04/07/22   Shipping Address Home   Address in [facility] confirmed and updated if neccessary? Yes   Expected Copay ($) 4   Is the patient able to afford the medication copay? Yes   Payment Method CC on file   Days supply of Refill 28   Supplies needed? No supplies needed   Refill activity completed? Yes   Refill activity plan Refill scheduled   Shipment/Pickup Date: 04/07/22          Current Outpatient Medications   Medication Sig    ALPRAZolam (XANAX) 0.5 MG tablet Take 1 tablet (0.5 mg total) by mouth daily as needed.    amLODIPine-benazepriL (LOTREL) 10-40 mg per capsule Take 1 capsule by mouth once daily.    aspirin (ECOTRIN) 81 MG EC tablet Take 1 tablet (81 mg total) by mouth once daily.    cyclobenzaprine (FLEXERIL) 10 MG tablet TAKE 1 TABLET BY MOUTH THREE TIMES A DAY AS NEEDED FOR MUSCLE SPASMS    diclofenac (VOLTAREN) 50 MG EC tablet TAKE 1 TABLET BY MOUTH TWICE A DAY    diclofenac sodium (VOLTAREN) 1 % Gel Apply 2 g topically 3 (three) times daily as needed.    doxazosin (CARDURA) 4 MG tablet Take 1 tablet (4 mg total) by mouth once daily.    evolocumab (REPATHA SURECLICK) 140 mg/mL PnIj Inject 1 mL (140 mg total)  into the skin every 14 (fourteen) days.    ezetimibe (ZETIA) 10 mg tablet Take 1 tablet (10 mg total) by mouth once daily.    fenofibrate micronized (LOFIBRA) 134 MG Cap TAKE ONE CAPSULE BY MOUTH EVERY DAY WITH BREAKFAST.    glimepiride (AMARYL) 4 MG tablet TAKE 1 TABLET (4 MG TOTAL) BY MOUTH BEFORE BREAKFAST.    isosorbide mononitrate (IMDUR) 60 MG 24 hr tablet Take 1 tablet (60 mg total) by mouth once daily.    metFORMIN (GLUCOPHAGE) 500 MG tablet TAKE 2 TABLETS BY MOUTH TWICE A DAY WITH MEALS    methocarbamol (ROBAXIN) 500 MG Tab TAKE 1 TABLET BY MOUTH THREE TIMES A DAY AS NEEDED MUSCLE SPASM    metoprolol succinate (TOPROL-XL) 50 MG 24 hr tablet Take 1 tablet (50 mg total) by mouth 2 (two) times daily.    niacin (NIASPAN) 750 MG CR tablet Take 1 tablet (750 mg total) by mouth nightly.    nitroGLYCERIN (NITROSTAT) 0.4 MG SL tablet PLACE 1 TAB UNDER TONGUE FIRST SIGN OF HEART PAIN-REPEAT EVERY 5 MINS UP TO 3 DOSES-CALL 911    omeprazole (PRILOSEC) 40 MG capsule TAKE 1 CAPSULE BY MOUTH EVERY DAY    ondansetron (ZOFRAN) 4 MG tablet Take 1 tablet (4 mg total) by mouth every 8 (eight) hours as needed for Nausea.    rosuvastatin (CRESTOR) 40 MG Tab Take 1 tablet (40 mg total) by mouth every evening.   Last reviewed on 2/17/2022  9:18 AM by Aimee Cooper MD    Review of patient's allergies indicates:   Allergen Reactions    No known drug allergies     Last reviewed on  3/23/2022 4:44 PM by Alma Clark      Tasks added this encounter   4/28/2022 - Refill Call (Auto Added)   Tasks due within next 3 months   No tasks due.     Emelyn Villalba, Patient Care Assistant  [facility]  [address] [place], [place] [address]  [facility] [address]  Phone: [phone]  Fax: [phone]

## 2022-04-06 ENCOUNTER — OFFICE VISIT (OUTPATIENT)
Dept: INTERNAL MEDICINE | Facility: CLINIC | Age: 62
End: 2022-04-06
Payer: MEDICARE

## 2022-04-06 VITALS
SYSTOLIC BLOOD PRESSURE: 136 MMHG | OXYGEN SATURATION: 99 % | HEIGHT: 64 IN | WEIGHT: 214.31 LBS | TEMPERATURE: 98 F | DIASTOLIC BLOOD PRESSURE: 84 MMHG | HEART RATE: 74 BPM | BODY MASS INDEX: 36.59 KG/M2

## 2022-04-06 DIAGNOSIS — Z86.010 HISTORY OF COLON POLYPS: ICD-10-CM

## 2022-04-06 DIAGNOSIS — E66.01 SEVERE OBESITY (BMI 35.0-35.9 WITH COMORBIDITY): ICD-10-CM

## 2022-04-06 DIAGNOSIS — I15.2 HYPERTENSION ASSOCIATED WITH DIABETES: ICD-10-CM

## 2022-04-06 DIAGNOSIS — E78.5 HYPERLIPIDEMIA ASSOCIATED WITH TYPE 2 DIABETES MELLITUS: ICD-10-CM

## 2022-04-06 DIAGNOSIS — F32.5 MAJOR DEPRESSION IN REMISSION: ICD-10-CM

## 2022-04-06 DIAGNOSIS — E11.69 HYPERLIPIDEMIA ASSOCIATED WITH TYPE 2 DIABETES MELLITUS: ICD-10-CM

## 2022-04-06 DIAGNOSIS — R80.9 MICROALBUMINURIA DUE TO TYPE 2 DIABETES MELLITUS: Primary | ICD-10-CM

## 2022-04-06 DIAGNOSIS — E11.59 HYPERTENSION ASSOCIATED WITH DIABETES: ICD-10-CM

## 2022-04-06 DIAGNOSIS — E11.8 TYPE II DIABETES MELLITUS WITH COMPLICATION: ICD-10-CM

## 2022-04-06 DIAGNOSIS — I77.819 AORTIC ECTASIA: ICD-10-CM

## 2022-04-06 DIAGNOSIS — I25.2 HISTORY OF NON-ST ELEVATION MYOCARDIAL INFARCTION (NSTEMI): ICD-10-CM

## 2022-04-06 DIAGNOSIS — I25.10 CORONARY ARTERY DISEASE INVOLVING NATIVE CORONARY ARTERY OF NATIVE HEART WITHOUT ANGINA PECTORIS: Chronic | ICD-10-CM

## 2022-04-06 DIAGNOSIS — E11.29 MICROALBUMINURIA DUE TO TYPE 2 DIABETES MELLITUS: Primary | ICD-10-CM

## 2022-04-06 PROCEDURE — 99214 OFFICE O/P EST MOD 30 MIN: CPT | Mod: S$PBB,,, | Performed by: INTERNAL MEDICINE

## 2022-04-06 PROCEDURE — 99999 PR PBB SHADOW E&M-EST. PATIENT-LVL V: CPT | Mod: PBBFAC,,, | Performed by: INTERNAL MEDICINE

## 2022-04-06 PROCEDURE — 99215 OFFICE O/P EST HI 40 MIN: CPT | Mod: PBBFAC,PO | Performed by: INTERNAL MEDICINE

## 2022-04-06 PROCEDURE — 99214 PR OFFICE/OUTPT VISIT, EST, LEVL IV, 30-39 MIN: ICD-10-PCS | Mod: S$PBB,,, | Performed by: INTERNAL MEDICINE

## 2022-04-06 PROCEDURE — 99999 PR PBB SHADOW E&M-EST. PATIENT-LVL V: ICD-10-PCS | Mod: PBBFAC,,, | Performed by: INTERNAL MEDICINE

## 2022-04-06 RX ORDER — NITROGLYCERIN 0.4 MG/1
TABLET SUBLINGUAL
Qty: 25 TABLET | Refills: 1 | Status: SHIPPED | OUTPATIENT
Start: 2022-04-06 | End: 2022-06-22

## 2022-04-06 RX ORDER — EMPAGLIFLOZIN 10 MG/1
10 TABLET, FILM COATED ORAL DAILY
Qty: 90 TABLET | Refills: 3 | Status: SHIPPED | OUTPATIENT
Start: 2022-04-06 | End: 2022-10-06

## 2022-04-06 NOTE — PROGRESS NOTES
"HPI:  Patient is 61-year-old female who comes today for follow-up of her hypertension, lipids, diabetes and coronary disease.  Patient states her sugars been running higher.  Often times in 200 range.  She states her blood pressures usually in the 130s over 70-80 at home.  She denies any hypoglycemia.  She denies any angina.    Current meds have been verified and updated per the EMR  Exam:/84   Pulse 74   Temp 97.9 °F (36.6 °C) (Temporal)   Ht 5' 4" (1.626 m)   Wt 97.2 kg (214 lb 4.6 oz)   LMP 06/21/2003 (Exact Date)   SpO2 99%   BMI 36.78 kg/m²   Carotids 2+ equal without bruits  Chest clear  Cardiovascular regular rate and rhythm without murmur gallop or rub    Lab Results   Component Value Date    WBC 8.67 03/30/2022    HGB 12.5 03/30/2022    HCT 40.2 03/30/2022     (L) 03/30/2022    CHOL 130 03/30/2022    TRIG 158 (H) 03/30/2022    HDL 42 03/30/2022    ALT 28 03/30/2022    AST 31 03/30/2022     03/30/2022    K 3.3 (L) 03/30/2022     03/30/2022    CREATININE 0.9 03/30/2022    BUN 10 03/30/2022    CO2 23 03/30/2022    TSH 1.890 03/30/2022    INR 1.0 01/23/2021    GLUF 106 01/07/2005    HGBA1C 8.0 (H) 03/30/2022       Impression:  Diabetes, not to goal  Other problems below, stable  Patient Active Problem List   Diagnosis    CAD with far remote PCI of unknown vessel    History of colon polyps    History of non-ST elevation myocardial infarction (NSTEMI)    Type II diabetes mellitus with complication    Hypertension associated with diabetes    Hyperlipidemia associated with type 2 diabetes mellitus    Major depression in remission    Aortic ectasia    Microalbuminuria due to type 2 diabetes mellitus    Severe obesity (BMI 35.0-35.9 with comorbidity)       Plan:  Orders Placed This Encounter    Hemoglobin A1C    Comprehensive Metabolic Panel    Lipid Panel    empagliflozin (JARDIANCE) 10 mg tablet    nitroGLYCERIN (NITROSTAT) 0.4 MG SL tablet     She was started on " Jardiance 10 mg today.  Patient will be seen again in 6 months with above lab work.  I reminded her that she needs to try to avoid the simple carbohydrates.  She needs to eat more vegetarian diet.  She needs to exercise.    This note is generated with speech recognition software and is subject to transcription error and sound alike phrases that may be missed by proofreading.

## 2022-04-18 ENCOUNTER — PATIENT MESSAGE (OUTPATIENT)
Dept: ADMINISTRATIVE | Facility: OTHER | Age: 62
End: 2022-04-18
Payer: MEDICARE

## 2022-04-28 DIAGNOSIS — E78.1 HYPERTRIGLYCERIDEMIA: ICD-10-CM

## 2022-04-28 DIAGNOSIS — I25.2 HISTORY OF NON-ST ELEVATION MYOCARDIAL INFARCTION (NSTEMI): ICD-10-CM

## 2022-04-28 DIAGNOSIS — I25.10 CORONARY ARTERY DISEASE INVOLVING NATIVE CORONARY ARTERY OF NATIVE HEART WITHOUT ANGINA PECTORIS: ICD-10-CM

## 2022-04-28 RX ORDER — EVOLOCUMAB 140 MG/ML
140 INJECTION, SOLUTION SUBCUTANEOUS
Qty: 2 ML | Refills: 6 | Status: SHIPPED | OUTPATIENT
Start: 2022-04-28 | End: 2022-11-23 | Stop reason: SDUPTHER

## 2022-05-02 ENCOUNTER — SPECIALTY PHARMACY (OUTPATIENT)
Dept: PHARMACY | Facility: CLINIC | Age: 62
End: 2022-05-02
Payer: MEDICARE

## 2022-05-02 NOTE — TELEPHONE ENCOUNTER
Specialty Pharmacy - Refill Coordination    Specialty Medication Orders Linked to Encounter    Flowsheet Row Most Recent Value   Medication #1 evolocumab (REPATHA SURECLICK) 140 mg/mL PnIj (Order#[id number], Rx#[id number])          Refill Questions - Documented Responses    Flowsheet Row Most Recent Value   Patient Availability and HIPAA Verification    Does patient want to proceed with activity? Yes   HIPAA/medical authority confirmed? Yes   Relationship to patient of person spoken to? Self   Refill Screening Questions    Would patient like to speak to a pharmacist? No   When does the patient need to receive the medication? 05/07/22   Refill Delivery Questions    How will the patient receive the medication? Delivery Almita   When does the patient need to receive the medication? 05/07/22   Shipping Address Home   Address in [facility] confirmed and updated if neccessary? Yes   Expected Copay ($) 4   Is the patient able to afford the medication copay? Yes   Payment Method CC on file   Days supply of Refill 28   Refill activity completed? Yes   Refill activity plan Refill scheduled   Shipment/Pickup Date: 05/05/22          Current Outpatient Medications   Medication Sig    ALPRAZolam (XANAX) 0.5 MG tablet Take 1 tablet (0.5 mg total) by mouth daily as needed.    amLODIPine-benazepriL (LOTREL) 10-40 mg per capsule Take 1 capsule by mouth once daily.    aspirin (ECOTRIN) 81 MG EC tablet Take 1 tablet (81 mg total) by mouth once daily.    cyclobenzaprine (FLEXERIL) 10 MG tablet TAKE 1 TABLET BY MOUTH THREE TIMES A DAY AS NEEDED FOR MUSCLE SPASMS    diclofenac (VOLTAREN) 50 MG EC tablet TAKE 1 TABLET BY MOUTH TWICE A DAY    diclofenac sodium (VOLTAREN) 1 % Gel Apply 2 g topically 3 (three) times daily as needed.    doxazosin (CARDURA) 4 MG tablet Take 1 tablet (4 mg total) by mouth once daily.    empagliflozin (JARDIANCE) 10 mg tablet Take 1 tablet (10 mg total) by mouth once daily.    evolocumab (REPATHA  SURECLICK) 140 mg/mL PnIj Inject 1 mL (140 mg total) into the skin every 14 (fourteen) days.    ezetimibe (ZETIA) 10 mg tablet Take 1 tablet (10 mg total) by mouth once daily.    fenofibrate micronized (LOFIBRA) 134 MG Cap TAKE ONE CAPSULE BY MOUTH EVERY DAY WITH BREAKFAST.    glimepiride (AMARYL) 4 MG tablet TAKE 1 TABLET (4 MG TOTAL) BY MOUTH BEFORE BREAKFAST.    isosorbide mononitrate (IMDUR) 60 MG 24 hr tablet Take 1 tablet (60 mg total) by mouth once daily.    metFORMIN (GLUCOPHAGE) 500 MG tablet TAKE 2 TABLETS BY MOUTH TWICE A DAY WITH MEALS    methocarbamol (ROBAXIN) 500 MG Tab TAKE 1 TABLET BY MOUTH THREE TIMES A DAY AS NEEDED MUSCLE SPASM    metoprolol succinate (TOPROL-XL) 50 MG 24 hr tablet Take 1 tablet (50 mg total) by mouth 2 (two) times daily.    niacin (NIASPAN) 750 MG CR tablet Take 1 tablet (750 mg total) by mouth nightly.    nitroGLYCERIN (NITROSTAT) 0.4 MG SL tablet Take one for onset of chest pain and then every five minutes if CP continues    omeprazole (PRILOSEC) 40 MG capsule TAKE 1 CAPSULE BY MOUTH EVERY DAY    ondansetron (ZOFRAN) 4 MG tablet Take 1 tablet (4 mg total) by mouth every 8 (eight) hours as needed for Nausea.    rosuvastatin (CRESTOR) 40 MG Tab Take 1 tablet (40 mg total) by mouth every evening.   Last reviewed on 4/6/2022 10:13 AM by Alma Clark LPN    Review of patient's allergies indicates:   Allergen Reactions    No known drug allergies     Last reviewed on  4/6/2022 10:12 AM by Alma Clark      Tasks added this encounter   5/27/2022 - Refill Call (Auto Added)   Tasks due within next 3 months   No tasks due.     Zara Schofield, PharmD  [facility] - [facility] Pharm[facility]  [address] [place] [id number]  Phone: [phone]  Fax: [phone]

## 2022-05-27 ENCOUNTER — SPECIALTY PHARMACY (OUTPATIENT)
Dept: PHARMACY | Facility: CLINIC | Age: 62
End: 2022-05-27
Payer: MEDICARE

## 2022-05-27 NOTE — TELEPHONE ENCOUNTER
Specialty Pharmacy - Refill Coordination    Specialty Medication Orders Linked to Encounter    Flowsheet Row Most Recent Value   Medication #1 evolocumab (REPATHA SURECLICK) 140 mg/mL PnIj (Order#[id number], Rx#[id number])          Refill Questions - Documented Responses    Flowsheet Row Most Recent Value   Patient Availability and HIPAA Verification    Does patient want to proceed with activity? Yes   HIPAA/medical authority confirmed? Yes   Relationship to patient of person spoken to? Self   Refill Screening Questions    Would patient like to speak to a pharmacist? No   When does the patient need to receive the medication? 06/06/22   Refill Delivery Questions    How will the patient receive the medication? Delivery Almita   When does the patient need to receive the medication? 06/06/22   Shipping Address Home   Address in [facility] confirmed and updated if neccessary? Yes   Expected Copay ($) 4   Is the patient able to afford the medication copay? Yes   Payment Method CC on file   Days supply of Refill 28   Supplies needed? No supplies needed   Refill activity completed? Yes   Refill activity plan Refill scheduled   Shipment/Pickup Date: 06/03/22          Current Outpatient Medications   Medication Sig    ALPRAZolam (XANAX) 0.5 MG tablet Take 1 tablet (0.5 mg total) by mouth daily as needed.    amLODIPine-benazepriL (LOTREL) 10-40 mg per capsule Take 1 capsule by mouth once daily.    aspirin (ECOTRIN) 81 MG EC tablet Take 1 tablet (81 mg total) by mouth once daily.    cyclobenzaprine (FLEXERIL) 10 MG tablet TAKE 1 TABLET BY MOUTH THREE TIMES A DAY AS NEEDED FOR MUSCLE SPASMS    diclofenac (VOLTAREN) 50 MG EC tablet TAKE 1 TABLET BY MOUTH TWICE A DAY    diclofenac sodium (VOLTAREN) 1 % Gel Apply 2 g topically 3 (three) times daily as needed.    doxazosin (CARDURA) 4 MG tablet Take 1 tablet (4 mg total) by mouth once daily.    empagliflozin (JARDIANCE) 10 mg tablet Take 1 tablet (10 mg total) by mouth  once daily.    evolocumab (REPATHA SURECLICK) 140 mg/mL PnIj Inject 1 mL (140 mg total) into the skin every 14 (fourteen) days.    ezetimibe (ZETIA) 10 mg tablet Take 1 tablet (10 mg total) by mouth once daily.    fenofibrate micronized (LOFIBRA) 134 MG Cap TAKE ONE CAPSULE BY MOUTH EVERY DAY WITH BREAKFAST.    glimepiride (AMARYL) 4 MG tablet TAKE 1 TABLET (4 MG TOTAL) BY MOUTH BEFORE BREAKFAST.    isosorbide mononitrate (IMDUR) 60 MG 24 hr tablet Take 1 tablet (60 mg total) by mouth once daily.    metFORMIN (GLUCOPHAGE) 500 MG tablet TAKE 2 TABLETS BY MOUTH TWICE A DAY WITH MEALS    methocarbamol (ROBAXIN) 500 MG Tab TAKE 1 TABLET BY MOUTH THREE TIMES A DAY AS NEEDED MUSCLE SPASM    metoprolol succinate (TOPROL-XL) 50 MG 24 hr tablet Take 1 tablet (50 mg total) by mouth 2 (two) times daily.    niacin (NIASPAN) 750 MG CR tablet Take 1 tablet (750 mg total) by mouth nightly.    nitroGLYCERIN (NITROSTAT) 0.4 MG SL tablet Take one for onset of chest pain and then every five minutes if CP continues    omeprazole (PRILOSEC) 40 MG capsule TAKE 1 CAPSULE BY MOUTH EVERY DAY    ondansetron (ZOFRAN) 4 MG tablet Take 1 tablet (4 mg total) by mouth every 8 (eight) hours as needed for Nausea.    rosuvastatin (CRESTOR) 40 MG Tab Take 1 tablet (40 mg total) by mouth every evening.   Last reviewed on 4/6/2022 10:13 AM by Alma Clark LPN    Review of patient's allergies indicates:   Allergen Reactions    No known drug allergies     Last reviewed on  4/6/2022 10:12 AM by Alma Clark      Tasks added this encounter   6/27/2022 - Refill Call (Auto Added)   Tasks due within next 3 months   No tasks due.     Dione Padua Esguerra [facility] - Specialty Pharmacy  [address] [facility] [address]  Phone: [phone]  Fax: [phone]

## 2022-06-22 RX ORDER — NITROGLYCERIN 0.4 MG/1
TABLET SUBLINGUAL
Qty: 25 TABLET | Refills: 11 | Status: SHIPPED | OUTPATIENT
Start: 2022-06-22 | End: 2023-01-21 | Stop reason: SDUPTHER

## 2022-06-22 NOTE — TELEPHONE ENCOUNTER
Refill Authorization Note   Dulce Boogie  is requesting a refill authorization.  Brief Assessment and Rationale for Refill:  Approve     Medication Therapy Plan:       Medication Reconciliation Completed: No   Comments:     No Care Gaps recommended.     Note composed:2:43 PM 06/22/2022

## 2022-06-22 NOTE — TELEPHONE ENCOUNTER
No new care gaps identified.  [facility] Embedded Care Gaps. Reference number: [id number]. 6/22/2022   12:58:43 PM CDT

## 2022-06-30 ENCOUNTER — PATIENT MESSAGE (OUTPATIENT)
Dept: PHARMACY | Facility: CLINIC | Age: 62
End: 2022-06-30
Payer: MEDICARE

## 2022-07-11 ENCOUNTER — SPECIALTY PHARMACY (OUTPATIENT)
Dept: PHARMACY | Facility: CLINIC | Age: 62
End: 2022-07-11
Payer: MEDICARE

## 2022-07-11 NOTE — TELEPHONE ENCOUNTER
Specialty Pharmacy - Refill Coordination    Specialty Medication Orders Linked to Encounter    Flowsheet Row Most Recent Value   Medication #1 evolocumab (REPATHA SURECLICK) 140 mg/mL PnIj (Order#[id number], Rx#[id number])          Refill Questions - Documented Responses    Flowsheet Row Most Recent Value   Patient Availability and HIPAA Verification    Does patient want to proceed with activity? Yes   HIPAA/medical authority confirmed? Yes   Relationship to patient of person spoken to? Self   Refill Screening Questions    Would patient like to speak to a pharmacist? No   When does the patient need to receive the medication? 07/13/22   Refill Delivery Questions    How will the patient receive the medication? Delivery Almita   When does the patient need to receive the medication? 07/13/22   Shipping Address Home   Address in [facility] confirmed and updated if neccessary? Yes   Expected Copay ($) 4   Is the patient able to afford the medication copay? Yes   Payment Method CC on file   Days supply of Refill 28   Supplies needed? No supplies needed   Refill activity completed? Yes   Refill activity plan Refill scheduled   Shipment/Pickup Date: 07/12/22          Current Outpatient Medications   Medication Sig    ALPRAZolam (XANAX) 0.5 MG tablet Take 1 tablet (0.5 mg total) by mouth daily as needed.    amLODIPine-benazepriL (LOTREL) 10-40 mg per capsule Take 1 capsule by mouth once daily.    aspirin (ECOTRIN) 81 MG EC tablet Take 1 tablet (81 mg total) by mouth once daily.    cyclobenzaprine (FLEXERIL) 10 MG tablet TAKE 1 TABLET BY MOUTH THREE TIMES A DAY AS NEEDED FOR MUSCLE SPASMS    diclofenac (VOLTAREN) 50 MG EC tablet TAKE 1 TABLET BY MOUTH TWICE A DAY    diclofenac sodium (VOLTAREN) 1 % Gel Apply 2 g topically 3 (three) times daily as needed.    doxazosin (CARDURA) 4 MG tablet Take 1 tablet (4 mg total) by mouth once daily.    empagliflozin (JARDIANCE) 10 mg tablet Take 1 tablet (10 mg total) by mouth  once daily.    evolocumab (REPATHA SURECLICK) 140 mg/mL PnIj Inject 1 mL (140 mg total) into the skin every 14 (fourteen) days.    ezetimibe (ZETIA) 10 mg tablet Take 1 tablet (10 mg total) by mouth once daily.    fenofibrate micronized (LOFIBRA) 134 MG Cap TAKE ONE CAPSULE BY MOUTH EVERY DAY WITH BREAKFAST.    glimepiride (AMARYL) 4 MG tablet TAKE 1 TABLET (4 MG TOTAL) BY MOUTH BEFORE BREAKFAST.    isosorbide mononitrate (IMDUR) 60 MG 24 hr tablet Take 1 tablet (60 mg total) by mouth once daily.    metFORMIN (GLUCOPHAGE) 500 MG tablet TAKE 2 TABLETS BY MOUTH TWICE A DAY WITH MEALS    methocarbamol (ROBAXIN) 500 MG Tab TAKE 1 TABLET BY MOUTH THREE TIMES A DAY AS NEEDED MUSCLE SPASM    metoprolol succinate (TOPROL-XL) 50 MG 24 hr tablet Take 1 tablet (50 mg total) by mouth 2 (two) times daily.    niacin (NIASPAN) 750 MG CR tablet Take 1 tablet (750 mg total) by mouth nightly.    nitroGLYCERIN (NITROSTAT) 0.4 MG SL tablet TAKE ONE FOR ONSET OF CHEST PAIN / THEN EVERY 5 MINUTES IF CP CONTINUES UP TO 3 DOSES..CALL 911    omeprazole (PRILOSEC) 40 MG capsule TAKE 1 CAPSULE BY MOUTH EVERY DAY    ondansetron (ZOFRAN) 4 MG tablet Take 1 tablet (4 mg total) by mouth every 8 (eight) hours as needed for Nausea.    rosuvastatin (CRESTOR) 40 MG Tab Take 1 tablet (40 mg total) by mouth every evening.   Last reviewed on 4/6/2022 10:13 AM by Alma Clark LPN    Review of patient's allergies indicates:   Allergen Reactions    No known drug allergies     Last reviewed on  4/6/2022 10:12 AM by Alma Clark      Tasks added this encounter   No tasks added.   Tasks due within next 3 months   6/27/2022 - Refill Call (Auto Added)     Jaquelin Coelho, PharmD  [facility] - Specialty Pharmacy  [address] [facility] [address]  Phone: [phone]  Fax: [phone]

## 2022-07-15 ENCOUNTER — OFFICE VISIT (OUTPATIENT)
Dept: INTERNAL MEDICINE | Facility: CLINIC | Age: 62
End: 2022-07-15
Payer: MEDICARE

## 2022-07-15 VITALS
DIASTOLIC BLOOD PRESSURE: 74 MMHG | HEIGHT: 64 IN | OXYGEN SATURATION: 98 % | WEIGHT: 207.25 LBS | HEART RATE: 70 BPM | TEMPERATURE: 98 F | BODY MASS INDEX: 35.38 KG/M2 | SYSTOLIC BLOOD PRESSURE: 140 MMHG

## 2022-07-15 DIAGNOSIS — R30.0 DYSURIA: Primary | ICD-10-CM

## 2022-07-15 LAB
BILIRUB SERPL-MCNC: NEGATIVE MG/DL
BLOOD URINE, POC: NORMAL
CLARITY, POC UA: CLEAR
COLOR, POC UA: YELLOW
GLUCOSE UR QL STRIP: 1000
KETONES UR QL STRIP: NEGATIVE
LEUKOCYTE ESTERASE URINE, POC: NEGATIVE
NITRITE, POC UA: NEGATIVE
PH, POC UA: 5
PROTEIN, POC: 30
SPECIFIC GRAVITY, POC UA: 1.02
UROBILINOGEN, POC UA: NORMAL

## 2022-07-15 PROCEDURE — 81002 URINALYSIS NONAUTO W/O SCOPE: CPT | Mod: PBBFAC,PO | Performed by: FAMILY MEDICINE

## 2022-07-15 PROCEDURE — 99213 PR OFFICE/OUTPT VISIT, EST, LEVL III, 20-29 MIN: ICD-10-PCS | Mod: S$PBB,,, | Performed by: FAMILY MEDICINE

## 2022-07-15 PROCEDURE — 99999 PR PBB SHADOW E&M-EST. PATIENT-LVL V: CPT | Mod: PBBFAC,,, | Performed by: FAMILY MEDICINE

## 2022-07-15 PROCEDURE — 87086 URINE CULTURE/COLONY COUNT: CPT | Performed by: FAMILY MEDICINE

## 2022-07-15 PROCEDURE — 99215 OFFICE O/P EST HI 40 MIN: CPT | Mod: PBBFAC,PO | Performed by: FAMILY MEDICINE

## 2022-07-15 PROCEDURE — 99213 OFFICE O/P EST LOW 20 MIN: CPT | Mod: S$PBB,,, | Performed by: FAMILY MEDICINE

## 2022-07-15 PROCEDURE — 99999 PR PBB SHADOW E&M-EST. PATIENT-LVL V: ICD-10-PCS | Mod: PBBFAC,,, | Performed by: FAMILY MEDICINE

## 2022-07-15 RX ORDER — NITROFURANTOIN 25; 75 MG/1; MG/1
100 CAPSULE ORAL 2 TIMES DAILY
Qty: 14 CAPSULE | Refills: 0 | Status: SHIPPED | OUTPATIENT
Start: 2022-07-15 | End: 2023-10-11 | Stop reason: ALTCHOICE

## 2022-07-16 LAB — BACTERIA UR CULT: NORMAL

## 2022-07-17 NOTE — PROGRESS NOTES
Subjective:      Patient ID: Dulce Boogie is a 61 y.o. female.    Chief Complaint: Urinary Frequency and painful urinating      Patient reports dysuria, darker urine color for over a week now.  Discomfort worsened significantly yesterday.    Dysuria   This is a new problem. The current episode started today. The problem occurs every urination. The problem has been waxing and waning. The quality of the pain is described as aching. The pain is moderate. There has been no fever. There is no history of pyelonephritis. Associated symptoms include frequency, hesitancy and urgency. Pertinent negatives include no behavior changes, chills, discharge, flank pain, hematuria, nausea, possible pregnancy, sweats, vomiting, weight loss, constipation or rash. The treatment provided no relief.     Review of Systems   Constitutional: Negative for chills and weight loss.   Gastrointestinal: Negative for constipation, nausea and vomiting.   Genitourinary: Positive for dysuria, frequency, hesitancy and urgency. Negative for flank pain and hematuria.   Skin: Negative for rash.     Past Medical History:   Diagnosis Date    Anticoagulant long-term use     CAD (coronary artery disease)     Depression     History of colon polyps     Hyperlipidemia associated with type 2 diabetes mellitus     Hypertension associated with diabetes     Microalbuminuria due to type 2 diabetes mellitus     NSTEMI (non-ST elevated myocardial infarction) 2018    Obesity     Type II diabetes mellitus with complication           Past Surgical History:   Procedure Laterality Date    CAROTID STENT       SECTION, LOW TRANSVERSE      x 2    COLONOSCOPY N/A 6/10/2019    Procedure: COLONOSCOPY;  Surgeon: Maricarmen Boo MD;  Location: [facility] ENDO;  Service: Endoscopy;  Laterality: N/A;    CORONARY STENT PLACEMENT N/A 2021    Procedure: INSERTION, STENT, CORONARY ARTERY;  Surgeon: Aimee Cooper MD;  Location: [facility] CATH LAB;  Service:  Cardiology;  Laterality: N/A;    LEFT HEART CATHETERIZATION Left 5/22/2018    Procedure: HEART CATH-LEFT;  Surgeon: Aimee Cooper MD;  Location: [facility] CATH LAB;  Service: Cardiology;  Laterality: Left;    LEFT HEART CATHETERIZATION Left 1/25/2021    Procedure: CATHETERIZATION, HEART, LEFT;  Surgeon: Aimee Cooper MD;  Location: [facility] CATH LAB;  Service: Cardiology;  Laterality: Left;    PERCUTANEOUS TRANSLUMINAL BALLOON ANGIOPLASTY OF CORONARY ARTERY  1/25/2021    Procedure: Angioplasty-coronary;  Surgeon: Aimee Cooper MD;  Location: [facility] CATH LAB;  Service: Cardiology;;     Family History   Problem Relation Age of Onset    Hypertension Mother     Hyperlipidemia Mother     Hypertension Father     Diabetes Father     Hyperlipidemia Father     Colon cancer Father     Hypertension Brother     Breast cancer Neg Hx     Ovarian cancer Neg Hx     Uterine cancer Neg Hx      Social History     Socioeconomic History    Marital status:    Tobacco Use    Smoking status: Never Smoker    Smokeless tobacco: Never Used   Substance and Sexual Activity    Alcohol use: No    Drug use: No    Sexual activity: Not Currently     Social Determinants of Health     Financial Resource Strain: Low Risk     Difficulty of Paying Living Expenses: Not hard at all   Food Insecurity: Food Insecurity Present    Worried About Running Out of Food in the Last Year: Patient refused    Ran Out of Food in the Last Year: Sometimes true   Transportation Needs: No Transportation Needs    Lack of Transportation (Medical): No    Lack of Transportation (Non-Medical): No   Physical Activity: Insufficiently Active    Days of Exercise per Week: 1 day    Minutes of Exercise per Session: 10 min   Stress: Stress Concern Present    Feeling of Stress : To some extent   Social Connections: Moderately Integrated    Frequency of Communication with Friends and Family: More than three times a week    Frequency of Social Gatherings  "with Friends and Family: Once a week    Attends [employer] Services: 1 to 4 times per year    Active Member of Clubs or Organizations: No    Attends Club or Organization Meetings: Never    Marital Status:    Housing Stability: High Risk    Unable to Pay for Housing in the Last Year: Yes    Number of Places Lived in the Last Year: 1    Unstable Housing in the Last Year: No     Review of patient's allergies indicates:   Allergen Reactions    No known drug allergies        Objective:       BP (!) 140/74 (BP Location: Right arm, Patient Position: Sitting, BP Method: Large (Manual))   Pulse 70   Temp 97.5 °F (36.4 °C) (Tympanic)   Ht 5' 4" (1.626 m)   Wt 94 kg (207 lb 3.7 oz)   LMP 06/21/2003 (Exact Date)   SpO2 98%   BMI 35.57 kg/m²   Physical Exam  Vitals and nursing note reviewed.   Constitutional:       General: She is not in acute distress.     Appearance: Normal appearance. She is well-developed. She is not diaphoretic.   Cardiovascular:      Rate and Rhythm: Normal rate and regular rhythm.      Heart sounds: Normal heart sounds.   Pulmonary:      Effort: Pulmonary effort is normal. No respiratory distress.      Breath sounds: Normal breath sounds.   Abdominal:      General: Bowel sounds are normal.      Palpations: Abdomen is soft.      Tenderness: There is no abdominal tenderness. There is no right CVA tenderness or left CVA tenderness.   Neurological:      Mental Status: She is alert.   Psychiatric:         Mood and Affect: Mood normal.         Behavior: Behavior normal.         Thought Content: Thought content normal.         Judgment: Judgment normal.       Assessment:     1. Dysuria      Plan:   Dysuria  -     POCT urine dipstick without microscope  -     Urine culture; Future; Expected date: 07/15/2022    Other orders  -     nitrofurantoin, macrocrystal-monohydrate, (MACROBID) 100 MG capsule; Take 1 capsule (100 mg total) by mouth 2 (two) times daily.  Dispense: 14 capsule; Refill: " 0    Urine did today does indicate infection, will follow culture results  Medication List with Changes/Refills   New Medications    NITROFURANTOIN, MACROCRYSTAL-MONOHYDRATE, (MACROBID) 100 MG CAPSULE    Take 1 capsule (100 mg total) by mouth 2 (two) times daily.   Current Medications    ALPRAZOLAM (XANAX) 0.5 MG TABLET    Take 1 tablet (0.5 mg total) by mouth daily as needed.    AMLODIPINE-BENAZEPRIL (LOTREL) 10-40 MG PER CAPSULE    Take 1 capsule by mouth once daily.    ASPIRIN (ECOTRIN) 81 MG EC TABLET    Take 1 tablet (81 mg total) by mouth once daily.    CYCLOBENZAPRINE (FLEXERIL) 10 MG TABLET    TAKE 1 TABLET BY MOUTH THREE TIMES A DAY AS NEEDED FOR MUSCLE SPASMS    DICLOFENAC (VOLTAREN) 50 MG EC TABLET    TAKE 1 TABLET BY MOUTH TWICE A DAY    DICLOFENAC SODIUM (VOLTAREN) 1 % GEL    Apply 2 g topically 3 (three) times daily as needed.    DOXAZOSIN (CARDURA) 4 MG TABLET    Take 1 tablet (4 mg total) by mouth once daily.    EMPAGLIFLOZIN (JARDIANCE) 10 MG TABLET    Take 1 tablet (10 mg total) by mouth once daily.    EVOLOCUMAB (REPATHA SURECLICK) 140 MG/ML PNIJ    Inject 1 mL (140 mg total) into the skin every 14 (fourteen) days.    EZETIMIBE (ZETIA) 10 MG TABLET    Take 1 tablet (10 mg total) by mouth once daily.    FENOFIBRATE MICRONIZED (LOFIBRA) 134 MG CAP    TAKE ONE CAPSULE BY MOUTH EVERY DAY WITH BREAKFAST.    GLIMEPIRIDE (AMARYL) 4 MG TABLET    TAKE 1 TABLET (4 MG TOTAL) BY MOUTH BEFORE BREAKFAST.    ISOSORBIDE MONONITRATE (IMDUR) 60 MG 24 HR TABLET    Take 1 tablet (60 mg total) by mouth once daily.    METFORMIN (GLUCOPHAGE) 500 MG TABLET    TAKE 2 TABLETS BY MOUTH TWICE A DAY WITH MEALS    METHOCARBAMOL (ROBAXIN) 500 MG TAB    TAKE 1 TABLET BY MOUTH THREE TIMES A DAY AS NEEDED MUSCLE SPASM    METOPROLOL SUCCINATE (TOPROL-XL) 50 MG 24 HR TABLET    Take 1 tablet (50 mg total) by mouth 2 (two) times daily.    NIACIN (NIASPAN) 750 MG CR TABLET    Take 1 tablet (750 mg total) by mouth nightly.     NITROGLYCERIN (NITROSTAT) 0.4 MG SL TABLET    TAKE ONE FOR ONSET OF CHEST PAIN / THEN EVERY 5 MINUTES IF CP CONTINUES UP TO 3 DOSES..CALL 911    OMEPRAZOLE (PRILOSEC) 40 MG CAPSULE    TAKE 1 CAPSULE BY MOUTH EVERY DAY    ONDANSETRON (ZOFRAN) 4 MG TABLET    Take 1 tablet (4 mg total) by mouth every 8 (eight) hours as needed for Nausea.    ROSUVASTATIN (CRESTOR) 40 MG TAB    Take 1 tablet (40 mg total) by mouth every evening.

## 2022-07-18 ENCOUNTER — TELEPHONE (OUTPATIENT)
Dept: INTERNAL MEDICINE | Facility: CLINIC | Age: 62
End: 2022-07-18
Payer: MEDICARE

## 2022-07-18 NOTE — TELEPHONE ENCOUNTER
----- Message from Abner Iraheta MD sent at 7/17/2022 11:19 AM CDT -----  Please notify urine culture did not grow out bacteria-would recommend she complete the antibiotic I have started her on on Friday

## 2022-07-18 NOTE — TELEPHONE ENCOUNTER
Patient notified urine did not grow any bacteria. Would recommend continue to stay on the antibiotics started on.  Patient verbally understands.

## 2022-08-03 ENCOUNTER — PATIENT MESSAGE (OUTPATIENT)
Dept: PHARMACY | Facility: CLINIC | Age: 62
End: 2022-08-03
Payer: MEDICARE

## 2022-08-04 ENCOUNTER — PATIENT MESSAGE (OUTPATIENT)
Dept: ADMINISTRATIVE | Facility: HOSPITAL | Age: 62
End: 2022-08-04
Payer: MEDICARE

## 2022-08-08 ENCOUNTER — PATIENT MESSAGE (OUTPATIENT)
Dept: PHARMACY | Facility: CLINIC | Age: 62
End: 2022-08-08
Payer: MEDICARE

## 2022-08-11 ENCOUNTER — LAB VISIT (OUTPATIENT)
Dept: LAB | Facility: HOSPITAL | Age: 62
End: 2022-08-11
Attending: INTERNAL MEDICINE
Payer: MEDICARE

## 2022-08-11 ENCOUNTER — SPECIALTY PHARMACY (OUTPATIENT)
Dept: PHARMACY | Facility: CLINIC | Age: 62
End: 2022-08-11
Payer: MEDICARE

## 2022-08-11 DIAGNOSIS — E11.8 TYPE II DIABETES MELLITUS WITH COMPLICATION: ICD-10-CM

## 2022-08-11 DIAGNOSIS — I25.10 CORONARY ARTERY DISEASE INVOLVING NATIVE CORONARY ARTERY OF NATIVE HEART WITHOUT ANGINA PECTORIS: Chronic | ICD-10-CM

## 2022-08-11 LAB
ALBUMIN SERPL BCP-MCNC: 3.5 G/DL (ref 3.5–5.2)
ALP SERPL-CCNC: 65 U/L (ref 55–135)
ALT SERPL W/O P-5'-P-CCNC: 34 U/L (ref 10–44)
ANION GAP SERPL CALC-SCNC: 8 MMOL/L (ref 8–16)
AST SERPL-CCNC: 25 U/L (ref 10–40)
BILIRUB SERPL-MCNC: 0.3 MG/DL (ref 0.1–1)
BUN SERPL-MCNC: 15 MG/DL (ref 8–23)
CALCIUM SERPL-MCNC: 8.8 MG/DL (ref 8.7–10.5)
CHLORIDE SERPL-SCNC: 109 MMOL/L (ref 95–110)
CHOLEST SERPL-MCNC: 115 MG/DL (ref 120–199)
CHOLEST/HDLC SERPL: 2.7 {RATIO} (ref 2–5)
CO2 SERPL-SCNC: 26 MMOL/L (ref 23–29)
CREAT SERPL-MCNC: 0.9 MG/DL (ref 0.5–1.4)
EST. GFR  (NO RACE VARIABLE): >60 ML/MIN/1.73 M^2
ESTIMATED AVG GLUCOSE: 169 MG/DL (ref 68–131)
GLUCOSE SERPL-MCNC: 149 MG/DL (ref 70–110)
HBA1C MFR BLD: 7.5 % (ref 4–5.6)
HDLC SERPL-MCNC: 43 MG/DL (ref 40–75)
HDLC SERPL: 37.4 % (ref 20–50)
LDLC SERPL CALC-MCNC: 44.4 MG/DL (ref 63–159)
NONHDLC SERPL-MCNC: 72 MG/DL
POTASSIUM SERPL-SCNC: 3.4 MMOL/L (ref 3.5–5.1)
PROT SERPL-MCNC: 6.6 G/DL (ref 6–8.4)
SODIUM SERPL-SCNC: 143 MMOL/L (ref 136–145)
TRIGL SERPL-MCNC: 138 MG/DL (ref 30–150)

## 2022-08-11 PROCEDURE — 80053 COMPREHEN METABOLIC PANEL: CPT | Performed by: INTERNAL MEDICINE

## 2022-08-11 PROCEDURE — 36415 COLL VENOUS BLD VENIPUNCTURE: CPT | Performed by: INTERNAL MEDICINE

## 2022-08-11 PROCEDURE — 83036 HEMOGLOBIN GLYCOSYLATED A1C: CPT | Performed by: INTERNAL MEDICINE

## 2022-08-11 PROCEDURE — 80061 LIPID PANEL: CPT | Performed by: INTERNAL MEDICINE

## 2022-08-11 NOTE — TELEPHONE ENCOUNTER
Incoming call from patient regarding refill of Repatha.  Rejection notification stated that Repatha Rx needed a PA.  Informed patient [facility] would complete PA and call her back for refill of Repatha.      Routing assigned [facility].

## 2022-08-11 NOTE — TELEPHONE ENCOUNTER
Repatha PA renewal submitted to [facility] for review. Key: [id number]    Ric MAGANA approved. PA Case: [id number], Status: Approved, Coverage Starts on: 1/1/2022 12:00:00 AM, Coverage Ends on: 12/31/2022 12:00:00 AM. Questions? Contact [phone].    Specialty Pharmacy - Medication/Referral Authorization  Specialty Pharmacy - Refill Coordination    Specialty Medication Orders Linked to Encounter    Flowsheet Row Most Recent Value   Medication #1 evolocumab (REPATHA SURECLICK) 140 mg/mL PnIj (Order#[id number], Rx#[id number])          Refill Questions - Documented Responses    Flowsheet Row Most Recent Value   Patient Availability and HIPAA Verification    Does patient want to proceed with activity? Yes   HIPAA/medical authority confirmed? Yes   Relationship to patient of person spoken to? Self   Refill Screening Questions    Would patient like to speak to a pharmacist? No   When does the patient need to receive the medication? 08/15/22   Refill Delivery Questions    How will the patient receive the medication? Delivery Almita   When does the patient need to receive the medication? 08/15/22   Shipping Address Home   Address in [facility] confirmed and updated if neccessary? Yes   Expected Copay ($) 4   Is the patient able to afford the medication copay? Yes   Payment Method CC on file   Days supply of Refill 28   Supplies needed? No supplies needed   Refill activity completed? Yes   Refill activity plan Refill scheduled   Shipment/Pickup Date: 08/12/22          Current Outpatient Medications   Medication Sig    ALPRAZolam (XANAX) 0.5 MG tablet Take 1 tablet (0.5 mg total) by mouth daily as needed.    amLODIPine-benazepriL (LOTREL) 10-40 mg per capsule Take 1 capsule by mouth once daily.    aspirin (ECOTRIN) 81 MG EC tablet Take 1 tablet (81 mg total) by mouth once daily.    cyclobenzaprine (FLEXERIL) 10 MG tablet TAKE 1 TABLET BY MOUTH THREE TIMES A DAY AS NEEDED FOR MUSCLE SPASMS    diclofenac (VOLTAREN) 50 MG  EC tablet TAKE 1 TABLET BY MOUTH TWICE A DAY    diclofenac sodium (VOLTAREN) 1 % Gel Apply 2 g topically 3 (three) times daily as needed.    doxazosin (CARDURA) 4 MG tablet Take 1 tablet (4 mg total) by mouth once daily.    empagliflozin (JARDIANCE) 10 mg tablet Take 1 tablet (10 mg total) by mouth once daily.    evolocumab (REPATHA SURECLICK) 140 mg/mL PnIj Inject 1 mL (140 mg total) into the skin every 14 (fourteen) days.    ezetimibe (ZETIA) 10 mg tablet Take 1 tablet (10 mg total) by mouth once daily.    fenofibrate micronized (LOFIBRA) 134 MG Cap TAKE ONE CAPSULE BY MOUTH EVERY DAY WITH BREAKFAST.    glimepiride (AMARYL) 4 MG tablet TAKE 1 TABLET (4 MG TOTAL) BY MOUTH BEFORE BREAKFAST.    isosorbide mononitrate (IMDUR) 60 MG 24 hr tablet Take 1 tablet (60 mg total) by mouth once daily.    metFORMIN (GLUCOPHAGE) 500 MG tablet TAKE 2 TABLETS BY MOUTH TWICE A DAY WITH MEALS    methocarbamol (ROBAXIN) 500 MG Tab TAKE 1 TABLET BY MOUTH THREE TIMES A DAY AS NEEDED MUSCLE SPASM    metoprolol succinate (TOPROL-XL) 50 MG 24 hr tablet Take 1 tablet (50 mg total) by mouth 2 (two) times daily.    niacin (NIASPAN) 750 MG CR tablet Take 1 tablet (750 mg total) by mouth nightly.    nitrofurantoin, macrocrystal-monohydrate, (MACROBID) 100 MG capsule Take 1 capsule (100 mg total) by mouth 2 (two) times daily.    nitroGLYCERIN (NITROSTAT) 0.4 MG SL tablet TAKE ONE FOR ONSET OF CHEST PAIN / THEN EVERY 5 MINUTES IF CP CONTINUES UP TO 3 DOSES..CALL 911    omeprazole (PRILOSEC) 40 MG capsule TAKE 1 CAPSULE BY MOUTH EVERY DAY    ondansetron (ZOFRAN) 4 MG tablet Take 1 tablet (4 mg total) by mouth every 8 (eight) hours as needed for Nausea.    rosuvastatin (CRESTOR) 40 MG Tab Take 1 tablet (40 mg total) by mouth every evening.   Last reviewed on 7/15/2022  4:00 PM by Lakshmi Phillip MA    Review of patient's allergies indicates:   Allergen Reactions    No known drug allergies     Last reviewed on  7/15/2022 3:58  PM by Lakshmi Phillip      Tasks added this encounter   8/11/2022 - Welcome Call  8/11/2022 - Referral Authorization   Tasks due within next 3 months   8/3/2022 - Refill Call (Auto Added)     Bernardo PharmD  [facility]  [address]  [facility] [address]  Phone: [phone]  Fax: [phone]

## 2022-08-18 ENCOUNTER — TELEPHONE (OUTPATIENT)
Dept: CARDIOLOGY | Facility: CLINIC | Age: 62
End: 2022-08-18
Payer: MEDICARE

## 2022-08-18 NOTE — TELEPHONE ENCOUNTER
Spoke to patient and she has been r/s to 10/6/22 at [place]----- Message from Shell Cortez sent at 8/18/2022  8:40 AM CDT -----  Contact: Dulce  .Type:  Patient Returning Call    Who Called:Dulce  Who Left Message for Patient: Marina  Does the patient know what this is regarding?: rescheduling   Would the patient rather a call back or a response via [facility]? call  Best Call Back Number: [phone]  Additional Information:  The patient returned the call and would like a callback from Marina

## 2022-09-12 ENCOUNTER — SPECIALTY PHARMACY (OUTPATIENT)
Dept: PHARMACY | Facility: CLINIC | Age: 62
End: 2022-09-12
Payer: MEDICARE

## 2022-09-12 NOTE — TELEPHONE ENCOUNTER
Specialty Pharmacy - Refill Coordination    Specialty Medication Orders Linked to Encounter      Flowsheet Row Most Recent Value   Medication #1 evolocumab (REPATHA SURECLICK) 140 mg/mL PnIj (Order#[id number], Rx#[id number])            Refill Questions - Documented Responses      Flowsheet Row Most Recent Value   Patient Availability and HIPAA Verification    Does patient want to proceed with activity? Yes   HIPAA/medical authority confirmed? Yes   Relationship to patient of person spoken to? Self   Refill Screening Questions    Would patient like to speak to a pharmacist? No   When does the patient need to receive the medication? 09/16/22   Refill Delivery Questions    How will the patient receive the medication? Delivery Almita   When does the patient need to receive the medication? 09/16/22   Shipping Address Home   Address in [facility] confirmed and updated if neccessary? Yes   Expected Copay ($) 4   Is the patient able to afford the medication copay? Yes   Payment Method CC on file   Days supply of Refill 28   Supplies needed? No supplies needed   Refill activity completed? Yes   Refill activity plan Refill scheduled   Shipment/Pickup Date: 09/13/22            Current Outpatient Medications   Medication Sig    cyclobenzaprine (FLEXERIL) 10 MG tablet TAKE 1 TABLET BY MOUTH THREE TIMES A DAY AS NEEDED FOR MUSCLE SPASMS    ALPRAZolam (XANAX) 0.5 MG tablet Take 1 tablet (0.5 mg total) by mouth daily as needed.    amLODIPine-benazepriL (LOTREL) 10-40 mg per capsule Take 1 capsule by mouth once daily.    aspirin (ECOTRIN) 81 MG EC tablet Take 1 tablet (81 mg total) by mouth once daily.    diclofenac (VOLTAREN) 50 MG EC tablet TAKE 1 TABLET BY MOUTH TWICE A DAY    diclofenac sodium (VOLTAREN) 1 % Gel Apply 2 g topically 3 (three) times daily as needed.    doxazosin (CARDURA) 4 MG tablet Take 1 tablet (4 mg total) by mouth once daily.    empagliflozin (JARDIANCE) 10 mg tablet Take 1 tablet (10 mg total) by mouth  once daily.    evolocumab (REPATHA SURECLICK) 140 mg/mL PnIj Inject 1 mL (140 mg total) into the skin every 14 (fourteen) days.    ezetimibe (ZETIA) 10 mg tablet Take 1 tablet (10 mg total) by mouth once daily.    fenofibrate micronized (LOFIBRA) 134 MG Cap TAKE ONE CAPSULE BY MOUTH EVERY DAY WITH BREAKFAST.    glimepiride (AMARYL) 4 MG tablet TAKE 1 TABLET (4 MG TOTAL) BY MOUTH BEFORE BREAKFAST.    isosorbide mononitrate (IMDUR) 60 MG 24 hr tablet Take 1 tablet (60 mg total) by mouth once daily.    metFORMIN (GLUCOPHAGE) 500 MG tablet TAKE 2 TABLETS BY MOUTH TWICE A DAY WITH MEALS    methocarbamol (ROBAXIN) 500 MG Tab TAKE 1 TABLET BY MOUTH THREE TIMES A DAY AS NEEDED MUSCLE SPASM    metoprolol succinate (TOPROL-XL) 50 MG 24 hr tablet TAKE 1 TABLET BY MOUTH TWICE A DAY    niacin (NIASPAN) 750 MG CR tablet Take 1 tablet (750 mg total) by mouth nightly.    nitrofurantoin, macrocrystal-monohydrate, (MACROBID) 100 MG capsule Take 1 capsule (100 mg total) by mouth 2 (two) times daily.    nitroGLYCERIN (NITROSTAT) 0.4 MG SL tablet TAKE ONE FOR ONSET OF CHEST PAIN / THEN EVERY 5 MINUTES IF CP CONTINUES UP TO 3 DOSES..CALL 911    omeprazole (PRILOSEC) 40 MG capsule TAKE 1 CAPSULE BY MOUTH EVERY DAY    ondansetron (ZOFRAN) 4 MG tablet Take 1 tablet (4 mg total) by mouth every 8 (eight) hours as needed for Nausea.    rosuvastatin (CRESTOR) 40 MG Tab Take 1 tablet (40 mg total) by mouth every evening.   Last reviewed on 7/15/2022  4:00 PM by Lakshmi Phillip MA    Review of patient's allergies indicates:   Allergen Reactions    No known drug allergies     Last reviewed on  7/15/2022 3:58 PM by Lakshmi Phillip      Tasks added this encounter   10/7/2022 - Refill Call (Auto Added)   Tasks due within next 3 months   No tasks due.     Britany Perez [facility]  [address] [facility] [address]  Phone: [phone]  Fax: [phone]

## 2022-09-23 DIAGNOSIS — E78.1 HYPERTRIGLYCERIDEMIA: ICD-10-CM

## 2022-09-25 RX ORDER — FENOFIBRATE 134 MG/1
CAPSULE ORAL
Qty: 90 CAPSULE | Refills: 3 | Status: SHIPPED | OUTPATIENT
Start: 2022-09-25 | End: 2023-10-16

## 2022-09-28 ENCOUNTER — LAB VISIT (OUTPATIENT)
Dept: LAB | Facility: HOSPITAL | Age: 62
End: 2022-09-28
Attending: INTERNAL MEDICINE
Payer: MEDICARE

## 2022-09-28 DIAGNOSIS — E11.8 TYPE II DIABETES MELLITUS WITH COMPLICATION: ICD-10-CM

## 2022-09-28 LAB
ALBUMIN SERPL BCP-MCNC: 3.7 G/DL (ref 3.5–5.2)
ALP SERPL-CCNC: 75 U/L (ref 55–135)
ALT SERPL W/O P-5'-P-CCNC: 39 U/L (ref 10–44)
ANION GAP SERPL CALC-SCNC: 10 MMOL/L (ref 8–16)
AST SERPL-CCNC: 34 U/L (ref 10–40)
BILIRUB SERPL-MCNC: 0.3 MG/DL (ref 0.1–1)
BUN SERPL-MCNC: 18 MG/DL (ref 8–23)
CALCIUM SERPL-MCNC: 9.3 MG/DL (ref 8.7–10.5)
CHLORIDE SERPL-SCNC: 107 MMOL/L (ref 95–110)
CHOLEST SERPL-MCNC: 190 MG/DL (ref 120–199)
CHOLEST/HDLC SERPL: 4.6 {RATIO} (ref 2–5)
CO2 SERPL-SCNC: 24 MMOL/L (ref 23–29)
CREAT SERPL-MCNC: 0.9 MG/DL (ref 0.5–1.4)
EST. GFR  (NO RACE VARIABLE): >60 ML/MIN/1.73 M^2
ESTIMATED AVG GLUCOSE: 171 MG/DL (ref 68–131)
GLUCOSE SERPL-MCNC: 157 MG/DL (ref 70–110)
HBA1C MFR BLD: 7.6 % (ref 4–5.6)
HDLC SERPL-MCNC: 41 MG/DL (ref 40–75)
HDLC SERPL: 21.6 % (ref 20–50)
LDLC SERPL CALC-MCNC: 88.6 MG/DL (ref 63–159)
NONHDLC SERPL-MCNC: 149 MG/DL
POTASSIUM SERPL-SCNC: 3.4 MMOL/L (ref 3.5–5.1)
PROT SERPL-MCNC: 7.3 G/DL (ref 6–8.4)
SODIUM SERPL-SCNC: 141 MMOL/L (ref 136–145)
TRIGL SERPL-MCNC: 302 MG/DL (ref 30–150)

## 2022-09-28 PROCEDURE — 80061 LIPID PANEL: CPT | Performed by: INTERNAL MEDICINE

## 2022-09-28 PROCEDURE — 36415 COLL VENOUS BLD VENIPUNCTURE: CPT | Mod: PO | Performed by: INTERNAL MEDICINE

## 2022-09-28 PROCEDURE — 83036 HEMOGLOBIN GLYCOSYLATED A1C: CPT | Performed by: INTERNAL MEDICINE

## 2022-09-28 PROCEDURE — 80053 COMPREHEN METABOLIC PANEL: CPT | Performed by: INTERNAL MEDICINE

## 2022-10-06 ENCOUNTER — OFFICE VISIT (OUTPATIENT)
Dept: INTERNAL MEDICINE | Facility: CLINIC | Age: 62
End: 2022-10-06
Payer: MEDICARE

## 2022-10-06 VITALS
OXYGEN SATURATION: 98 % | WEIGHT: 210.56 LBS | SYSTOLIC BLOOD PRESSURE: 118 MMHG | HEART RATE: 72 BPM | DIASTOLIC BLOOD PRESSURE: 64 MMHG | BODY MASS INDEX: 35.95 KG/M2 | HEIGHT: 64 IN

## 2022-10-06 DIAGNOSIS — I77.819 AORTIC ECTASIA: ICD-10-CM

## 2022-10-06 DIAGNOSIS — E11.69 HYPERLIPIDEMIA ASSOCIATED WITH TYPE 2 DIABETES MELLITUS: ICD-10-CM

## 2022-10-06 DIAGNOSIS — E11.59 HYPERTENSION ASSOCIATED WITH DIABETES: ICD-10-CM

## 2022-10-06 DIAGNOSIS — M75.42 IMPINGEMENT SYNDROME OF LEFT SHOULDER: ICD-10-CM

## 2022-10-06 DIAGNOSIS — E11.8 TYPE II DIABETES MELLITUS WITH COMPLICATION: Primary | ICD-10-CM

## 2022-10-06 DIAGNOSIS — Z12.31 SCREENING MAMMOGRAM FOR BREAST CANCER: ICD-10-CM

## 2022-10-06 DIAGNOSIS — M25.512 CHRONIC LEFT SHOULDER PAIN: ICD-10-CM

## 2022-10-06 DIAGNOSIS — E11.29 MICROALBUMINURIA DUE TO TYPE 2 DIABETES MELLITUS: ICD-10-CM

## 2022-10-06 DIAGNOSIS — R80.9 MICROALBUMINURIA DUE TO TYPE 2 DIABETES MELLITUS: ICD-10-CM

## 2022-10-06 DIAGNOSIS — F32.5 MAJOR DEPRESSION IN REMISSION: ICD-10-CM

## 2022-10-06 DIAGNOSIS — I15.2 HYPERTENSION ASSOCIATED WITH DIABETES: ICD-10-CM

## 2022-10-06 DIAGNOSIS — Z86.010 HISTORY OF COLON POLYPS: ICD-10-CM

## 2022-10-06 DIAGNOSIS — I25.2 HISTORY OF NON-ST ELEVATION MYOCARDIAL INFARCTION (NSTEMI): ICD-10-CM

## 2022-10-06 DIAGNOSIS — E66.01 SEVERE OBESITY (BMI 35.0-35.9 WITH COMORBIDITY): ICD-10-CM

## 2022-10-06 DIAGNOSIS — M75.82 ROTATOR CUFF TENDINITIS, LEFT: ICD-10-CM

## 2022-10-06 DIAGNOSIS — I25.10 CORONARY ARTERY DISEASE INVOLVING NATIVE CORONARY ARTERY OF NATIVE HEART WITHOUT ANGINA PECTORIS: Chronic | ICD-10-CM

## 2022-10-06 DIAGNOSIS — G89.29 CHRONIC LEFT SHOULDER PAIN: ICD-10-CM

## 2022-10-06 DIAGNOSIS — E78.5 HYPERLIPIDEMIA ASSOCIATED WITH TYPE 2 DIABETES MELLITUS: ICD-10-CM

## 2022-10-06 PROCEDURE — 99999 PR PBB SHADOW E&M-EST. PATIENT-LVL V: CPT | Mod: PBBFAC,,, | Performed by: INTERNAL MEDICINE

## 2022-10-06 PROCEDURE — 99215 OFFICE O/P EST HI 40 MIN: CPT | Mod: S$PBB,,, | Performed by: INTERNAL MEDICINE

## 2022-10-06 PROCEDURE — 99999 PR PBB SHADOW E&M-EST. PATIENT-LVL V: ICD-10-PCS | Mod: PBBFAC,,, | Performed by: INTERNAL MEDICINE

## 2022-10-06 PROCEDURE — 99215 OFFICE O/P EST HI 40 MIN: CPT | Mod: PBBFAC,PO | Performed by: INTERNAL MEDICINE

## 2022-10-06 PROCEDURE — 99215 PR OFFICE/OUTPT VISIT, EST, LEVL V, 40-54 MIN: ICD-10-PCS | Mod: S$PBB,,, | Performed by: INTERNAL MEDICINE

## 2022-10-06 RX ORDER — AMLODIPINE AND BENAZEPRIL HYDROCHLORIDE 10; 40 MG/1; MG/1
1 CAPSULE ORAL DAILY
Qty: 90 CAPSULE | Refills: 3 | Status: SHIPPED | OUTPATIENT
Start: 2022-10-06 | End: 2023-10-06 | Stop reason: SDUPTHER

## 2022-10-06 RX ORDER — ROSUVASTATIN CALCIUM 40 MG/1
40 TABLET, COATED ORAL NIGHTLY
Qty: 90 TABLET | Refills: 3 | Status: SHIPPED | OUTPATIENT
Start: 2022-10-06 | End: 2023-09-29

## 2022-10-06 RX ORDER — METHOCARBAMOL 500 MG/1
TABLET, FILM COATED ORAL
Qty: 60 TABLET | Refills: 5 | Status: SHIPPED | OUTPATIENT
Start: 2022-10-06 | End: 2023-02-28

## 2022-10-06 NOTE — PROGRESS NOTES
HPI:  Patient is a 62-year-old female who comes today for follow-up of diabetes, hypertension, lipids, coronary artery disease and for her annual physical.  Patient states her blood sugars have been doing well.  She denies any hypoglycemia.  She denies any chest pains or shortness of breath.  Her blood pressures been well controlled at home.  She denies any other new problems or complaints.    Current MEDS: medcard review, verified and update  Allergies: Per the electronic medical record    Past Medical History:   Diagnosis Date    Anticoagulant long-term use     CAD (coronary artery disease)     Depression     History of colon polyps     Hyperlipidemia associated with type 2 diabetes mellitus     Hypertension associated with diabetes     Microalbuminuria due to type 2 diabetes mellitus     NSTEMI (non-ST elevated myocardial infarction) 2018    Obesity     Type II diabetes mellitus with complication        Past Surgical History:   Procedure Laterality Date    CAROTID STENT       SECTION, LOW TRANSVERSE      x 2    COLONOSCOPY N/A 6/10/2019    Procedure: COLONOSCOPY;  Surgeon: Maricarmen Boo MD;  Location: [facility] ENDO;  Service: Endoscopy;  Laterality: N/A;    CORONARY STENT PLACEMENT N/A 2021    Procedure: INSERTION, STENT, CORONARY ARTERY;  Surgeon: Aimee Cooper MD;  Location: [facility] CATH LAB;  Service: Cardiology;  Laterality: N/A;    LEFT HEART CATHETERIZATION Left 2018    Procedure: HEART CATH-LEFT;  Surgeon: Aimee Cooper MD;  Location: [facility] CATH LAB;  Service: Cardiology;  Laterality: Left;    LEFT HEART CATHETERIZATION Left 2021    Procedure: CATHETERIZATION, HEART, LEFT;  Surgeon: Aimee Cooper MD;  Location: [facility] CATH LAB;  Service: Cardiology;  Laterality: Left;    PERCUTANEOUS TRANSLUMINAL BALLOON ANGIOPLASTY OF CORONARY ARTERY  2021    Procedure: Angioplasty-coronary;  Surgeon: Aimee Cooper MD;  Location: [facility] CATH LAB;  Service: Cardiology;;       SHx: per the  "electronic medical record    FHx: recorded in the electronic medical record    ROS:    denies any chest pains or shortness of breath. Denies any nausea, vomiting or diarrhea. Denies any fever, chills or sweats. Denies any change in weight, voice, stool, skin or hair. Denies any dysuria, dyspepsia or dysphagia. Denies any change in vision, hearing or headaches. Denies any swollen lymph nodes or loss of memory.    PE:  /64 (BP Location: Right arm)   Pulse 72   Ht 5' 4" (1.626 m)   Wt 95.5 kg (210 lb 8.6 oz)   LMP 06/21/2003 (Exact Date)   SpO2 98%   BMI 36.14 kg/m²   Gen: Well-developed, well-nourished, female, in no acute distress, oriented x3  HEENT: neck is supple, no adenopathy, carotids 2+ equal without bruits, thyroid exam normal size without nodules.  CHEST: clear to auscultation and percussion  CVS: regular rate and rhythm without significant murmur, gallop, or rubs  ABD: soft, benign, no rebound no guarding, no distention. Bowel sounds are normal.     Nontender,  no palpable masses, no organomegaly and no audible bruits.  BREAST: no masses.  No nipple inversion, retraction, or deviation.  EXT: no clubbing, cyanosis, or edema  LYMPH: no cervical, inguinal, or axillary adenopathy  FEET: no loss of sensation.  No ulcers or pressure sores.  Monofilament testing normal  NEURO: gait normal.  Cranial nerves II- XII intact. No nystagmus.  Speech normal.   Gross motor and sensory unremarkable.  PELVIC: deferred    Lab Results   Component Value Date    WBC 8.67 03/30/2022    HGB 12.5 03/30/2022    HCT 40.2 03/30/2022     (L) 03/30/2022    CHOL 190 09/28/2022    TRIG 302 (H) 09/28/2022    HDL 41 09/28/2022    ALT 39 09/28/2022    AST 34 09/28/2022     09/28/2022    K 3.4 (L) 09/28/2022     09/28/2022    CREATININE 0.9 09/28/2022    BUN 18 09/28/2022    CO2 24 09/28/2022    TSH 1.890 03/30/2022    INR 1.0 01/23/2021    GLUF 106 01/07/2005    HGBA1C 7.6 (H) 09/28/2022 "       Impression:  Diabetes, not quite to goal  Hyperlipidemia, not to goal despite being on statin, Zetia and a biological drugs.  Hypertension, well controlled  Coronary artery disease, asymptomatic  Patient Active Problem List   Diagnosis    CAD with far remote PCI of unknown vessel    History of colon polyps    History of non-ST elevation myocardial infarction (NSTEMI)    Type II diabetes mellitus with complication    Hypertension associated with diabetes    Hyperlipidemia associated with type 2 diabetes mellitus    Major depression in remission    Aortic ectasia    Microalbuminuria due to type 2 diabetes mellitus    Severe obesity (BMI 35.0-35.9 with comorbidity)       Plan:   Orders Placed This Encounter    Mammo Digital Screening Bilat w/ [place]    Hemoglobin A1C    Comprehensive Metabolic Panel    Lipid Panel    TSH    CBC Auto Differential    Microalbumin/Creatinine Ratio, Urine    Ambulatory referral/consult to Endo Procedure     empagliflozin (JARDIANCE) 25 mg tablet    rosuvastatin (CRESTOR) 40 MG Tab    methocarbamoL (ROBAXIN) 500 MG Tab    amLODIPine-benazepriL (LOTREL) 10-40 mg per capsule     She will increase the Jardiance to 25 mg today.  Patient will continue all her other meds.  She is due for colonoscopy now.  She will see me again in 6 months with lab work and mammogram.    This note is generated with speech recognition software and is subject to transcription error and sound alike phrases that may be missed by proofreading.

## 2022-10-07 ENCOUNTER — SPECIALTY PHARMACY (OUTPATIENT)
Dept: PHARMACY | Facility: CLINIC | Age: 62
End: 2022-10-07
Payer: MEDICARE

## 2022-10-07 NOTE — TELEPHONE ENCOUNTER
Specialty Pharmacy - Refill Coordination    Specialty Medication Orders Linked to Encounter      Flowsheet Row Most Recent Value   Medication #1 evolocumab (REPATHA SURECLICK) 140 mg/mL PnIj (Order#[id number], Rx#[id number])            Refill Questions - Documented Responses      Flowsheet Row Most Recent Value   Patient Availability and HIPAA Verification    Does patient want to proceed with activity? Yes   HIPAA/medical authority confirmed? Yes   Relationship to patient of person spoken to? Self   Refill Screening Questions    Would patient like to speak to a pharmacist? No   When does the patient need to receive the medication? 10/10/22   Refill Delivery Questions    How will the patient receive the medication? MEDRx   When does the patient need to receive the medication? 10/10/22   Shipping Address Home   Address in [facility] confirmed and updated if neccessary? Yes   Expected Copay ($) 0   Is the patient able to afford the medication copay? Yes   Payment Method zero copay   Days supply of Refill 28   Refill activity completed? Yes   Refill activity plan Refill scheduled   Shipment/Pickup Date: 10/07/22            Current Outpatient Medications   Medication Sig    ALPRAZolam (XANAX) 0.5 MG tablet Take 1 tablet (0.5 mg total) by mouth daily as needed.    amLODIPine-benazepriL (LOTREL) 10-40 mg per capsule Take 1 capsule by mouth once daily.    aspirin (ECOTRIN) 81 MG EC tablet Take 1 tablet (81 mg total) by mouth once daily.    cyclobenzaprine (FLEXERIL) 10 MG tablet TAKE 1 TABLET BY MOUTH THREE TIMES A DAY AS NEEDED FOR MUSCLE SPASMS    diclofenac (VOLTAREN) 50 MG EC tablet TAKE 1 TABLET BY MOUTH TWICE A DAY    diclofenac sodium (VOLTAREN) 1 % Gel Apply 2 g topically 3 (three) times daily as needed.    doxazosin (CARDURA) 4 MG tablet Take 1 tablet (4 mg total) by mouth once daily.    empagliflozin (JARDIANCE) 25 mg tablet Take 1 tablet (25 mg total) by mouth once daily.    evolocumab (REPATHA SURECLICK)  140 mg/mL PnIj Inject 1 mL (140 mg total) into the skin every 14 (fourteen) days.    ezetimibe (ZETIA) 10 mg tablet Take 1 tablet (10 mg total) by mouth once daily.    fenofibrate micronized (LOFIBRA) 134 MG Cap TAKE ONE CAPSULE BY MOUTH EVERY DAY WITH BREAKFAST.    glimepiride (AMARYL) 4 MG tablet TAKE 1 TABLET (4 MG TOTAL) BY MOUTH BEFORE BREAKFAST.    isosorbide mononitrate (IMDUR) 60 MG 24 hr tablet Take 1 tablet (60 mg total) by mouth once daily.    metFORMIN (GLUCOPHAGE) 500 MG tablet TAKE 2 TABLETS BY MOUTH TWICE A DAY WITH MEALS    methocarbamoL (ROBAXIN) 500 MG Tab TAKE 1 TABLET BY MOUTH THREE TIMES A DAY AS NEEDED MUSCLE SPASM Strength: 500 mg    metoprolol succinate (TOPROL-XL) 50 MG 24 hr tablet TAKE 1 TABLET BY MOUTH TWICE A DAY    niacin (NIASPAN) 750 MG CR tablet Take 1 tablet (750 mg total) by mouth nightly.    nitrofurantoin, macrocrystal-monohydrate, (MACROBID) 100 MG capsule Take 1 capsule (100 mg total) by mouth 2 (two) times daily.    nitroGLYCERIN (NITROSTAT) 0.4 MG SL tablet TAKE ONE FOR ONSET OF CHEST PAIN / THEN EVERY 5 MINUTES IF CP CONTINUES UP TO 3 DOSES..CALL 911    omeprazole (PRILOSEC) 40 MG capsule TAKE 1 CAPSULE BY MOUTH EVERY DAY    ondansetron (ZOFRAN) 4 MG tablet Take 1 tablet (4 mg total) by mouth every 8 (eight) hours as needed for Nausea.    rosuvastatin (CRESTOR) 40 MG Tab Take 1 tablet (40 mg total) by mouth every evening.   Last reviewed on 10/6/2022  7:35 AM by Malika Handy MA    Review of patient's allergies indicates:   Allergen Reactions    No known drug allergies     Last reviewed on  10/6/2022 7:35 AM by Malika Handy      Tasks added this encounter   10/31/2022 - Refill Call (Auto Added)   Tasks due within next 3 months   No tasks due.     Lucie Schaefer, PharmD  [facility]  [address] [facility] [address]  Phone: [phone]  Fax: [phone]

## 2022-10-10 DIAGNOSIS — E11.59 HYPERTENSION ASSOCIATED WITH DIABETES: Primary | ICD-10-CM

## 2022-10-10 DIAGNOSIS — I15.2 HYPERTENSION ASSOCIATED WITH DIABETES: Primary | ICD-10-CM

## 2022-10-10 RX ORDER — ISOSORBIDE MONONITRATE 60 MG/1
60 TABLET, EXTENDED RELEASE ORAL DAILY
Qty: 90 TABLET | Refills: 3 | Status: SHIPPED | OUTPATIENT
Start: 2022-10-10 | End: 2023-10-18

## 2022-10-11 ENCOUNTER — HOSPITAL ENCOUNTER (OUTPATIENT)
Dept: PREADMISSION TESTING | Facility: HOSPITAL | Age: 62
Discharge: HOME | End: 2022-10-11
Attending: INTERNAL MEDICINE
Payer: MEDICARE

## 2022-10-11 ENCOUNTER — TELEPHONE (OUTPATIENT)
Dept: PREADMISSION TESTING | Facility: HOSPITAL | Age: 62
End: 2022-10-11
Payer: MEDICARE

## 2022-10-11 DIAGNOSIS — Z86.010 HISTORY OF COLON POLYPS: Primary | ICD-10-CM

## 2022-10-11 RX ORDER — POLYETHYLENE GLYCOL 3350, SODIUM SULFATE ANHYDROUS, SODIUM BICARBONATE, SODIUM CHLORIDE, POTASSIUM CHLORIDE 236; 22.74; 6.74; 5.86; 2.97 G/4L; G/4L; G/4L; G/4L; G/4L
4 POWDER, FOR SOLUTION ORAL ONCE
Qty: 4000 ML | Refills: 0 | Status: SHIPPED | OUTPATIENT
Start: 2022-10-11 | End: 2022-10-11

## 2022-10-11 NOTE — TELEPHONE ENCOUNTER
This patient is being scheduled for one of the following procedures: Colonoscopy    Please indicate if the patient is cleared for surgery from a cardiac standpoint.    Patient gave verbal consent for e-consult Yes    Cardiac PMHx: CAD with far remote PCI of unknown vessel  History of non-ST elevation myocardial infarction (NSTEMI)  Hypertension associated with diabetes  Hyperlipidemia associated with type 2 diabetes mellitus  Aortic ectasia       Last Echo: Results for orders placed during the hospital encounter of 01/23/21    Echo Color Flow Doppler? Yes    Interpretation Summary  · The left ventricle is normal in size with moderate concentric hypertrophy and normal systolic function. The estimated ejection fraction is 60%  · Normal left ventricular diastolic function.  · Normal right ventricular size with normal right ventricular systolic function.  · Mild tricuspid regurgitation.  · Normal central venous pressure (3 mmHg).  · The estimated PA systolic pressure is 23 mmHg.      Anticoagulants: ls anticoag list: Brilinta (TICAGRELOR)

## 2022-10-12 ENCOUNTER — E-CONSULT (OUTPATIENT)
Dept: CARDIOLOGY | Facility: HOSPITAL | Age: 62
End: 2022-10-12
Payer: MEDICARE

## 2022-10-12 DIAGNOSIS — E11.69 HYPERLIPIDEMIA ASSOCIATED WITH TYPE 2 DIABETES MELLITUS: ICD-10-CM

## 2022-10-12 DIAGNOSIS — I25.10 CORONARY ARTERY DISEASE INVOLVING NATIVE CORONARY ARTERY OF NATIVE HEART WITHOUT ANGINA PECTORIS: Chronic | ICD-10-CM

## 2022-10-12 DIAGNOSIS — I15.2 HYPERTENSION ASSOCIATED WITH DIABETES: ICD-10-CM

## 2022-10-12 DIAGNOSIS — E78.5 HYPERLIPIDEMIA ASSOCIATED WITH TYPE 2 DIABETES MELLITUS: ICD-10-CM

## 2022-10-12 DIAGNOSIS — E11.8 TYPE II DIABETES MELLITUS WITH COMPLICATION: ICD-10-CM

## 2022-10-12 DIAGNOSIS — E11.59 HYPERTENSION ASSOCIATED WITH DIABETES: ICD-10-CM

## 2022-10-12 DIAGNOSIS — I25.2 HISTORY OF NON-ST ELEVATION MYOCARDIAL INFARCTION (NSTEMI): Primary | ICD-10-CM

## 2022-10-12 PROCEDURE — 99451 PR INTERPROF, PHONE/INTERNET/EHR, CONSULT, >= 5MINS: ICD-10-PCS | Mod: ,,, | Performed by: INTERNAL MEDICINE

## 2022-10-12 PROCEDURE — 99451 NTRPROF PH1/NTRNET/EHR 5/>: CPT | Mod: ,,, | Performed by: INTERNAL MEDICINE

## 2022-10-12 NOTE — PROGRESS NOTES
[facility]  Response for E-Consult     Patient Name: Dulce Boogie  MRN: [id number]  Primary Care Provider: Zeus Hanson MD   Requesting Provider: Devi Herrera L*      Findings: 61 y/o female with PMHx for CAD/hx of PCI, HTN, HLP referred for CV clerance for C scope. Last clinic visit 2-22 without symptoms.   If no further Cv symptoms, pt cleared for procedure at moderate Cv risk.    I did not speak to the requesting provider verbally about this.     Total time of Consultation: 5 minute    Percentage of time spent on verbal/written discussion: 50%     Thank you for your consult.     Feroz Taylor MD  [facility]

## 2022-10-31 ENCOUNTER — SPECIALTY PHARMACY (OUTPATIENT)
Dept: PHARMACY | Facility: CLINIC | Age: 62
End: 2022-10-31
Payer: MEDICARE

## 2022-10-31 NOTE — TELEPHONE ENCOUNTER
Specialty Pharmacy - Refill Coordination    Specialty Medication Orders Linked to Encounter      Flowsheet Row Most Recent Value   Medication #1 evolocumab (REPATHA SURECLICK) 140 mg/mL PnIj (Order#[id number], Rx#[id number])            Refill Questions - Documented Responses      Flowsheet Row Most Recent Value   Patient Availability and HIPAA Verification    Does patient want to proceed with activity? Yes   HIPAA/medical authority confirmed? Yes   Relationship to patient of person spoken to? Self   Refill Screening Questions    Would patient like to speak to a pharmacist? No   When does the patient need to receive the medication? 11/02/22   Refill Delivery Questions    How will the patient receive the medication? MEDRx   When does the patient need to receive the medication? 11/02/22   Shipping Address Home   Address in [facility] confirmed and updated if neccessary? Yes   Expected Copay ($) 0   Is the patient able to afford the medication copay? Yes   Payment Method zero copay   Days supply of Refill 28   Supplies needed? No supplies needed   Refill activity completed? Yes   Refill activity plan Refill scheduled   Shipment/Pickup Date: 10/31/22            Current Outpatient Medications   Medication Sig    cyclobenzaprine (FLEXERIL) 10 MG tablet TAKE 1 TABLET BY MOUTH THREE TIMES A DAY AS NEEDED FOR MUSCLE SPASMS    ALPRAZolam (XANAX) 0.5 MG tablet Take 1 tablet (0.5 mg total) by mouth daily as needed.    amLODIPine-benazepriL (LOTREL) 10-40 mg per capsule Take 1 capsule by mouth once daily.    aspirin (ECOTRIN) 81 MG EC tablet Take 1 tablet (81 mg total) by mouth once daily.    diclofenac (VOLTAREN) 50 MG EC tablet TAKE 1 TABLET BY MOUTH TWICE A DAY    diclofenac sodium (VOLTAREN) 1 % Gel Apply 2 g topically 3 (three) times daily as needed.    doxazosin (CARDURA) 4 MG tablet Take 1 tablet (4 mg total) by mouth once daily.    empagliflozin (JARDIANCE) 25 mg tablet Take 1 tablet (25 mg total) by mouth once  daily.    evolocumab (REPATHA SURECLICK) 140 mg/mL PnIj Inject 1 mL (140 mg total) into the skin every 14 (fourteen) days.    ezetimibe (ZETIA) 10 mg tablet Take 1 tablet (10 mg total) by mouth once daily.    fenofibrate micronized (LOFIBRA) 134 MG Cap TAKE ONE CAPSULE BY MOUTH EVERY DAY WITH BREAKFAST.    glimepiride (AMARYL) 4 MG tablet TAKE 1 TABLET (4 MG TOTAL) BY MOUTH BEFORE BREAKFAST.    isosorbide mononitrate (IMDUR) 60 MG 24 hr tablet Take 1 tablet (60 mg total) by mouth once daily.    metFORMIN (GLUCOPHAGE) 500 MG tablet TAKE 2 TABLETS BY MOUTH TWICE A DAY WITH MEALS    methocarbamoL (ROBAXIN) 500 MG Tab TAKE 1 TABLET BY MOUTH THREE TIMES A DAY AS NEEDED MUSCLE SPASM Strength: 500 mg    metoprolol succinate (TOPROL-XL) 50 MG 24 hr tablet TAKE 1 TABLET BY MOUTH TWICE A DAY    niacin (NIASPAN) 750 MG CR tablet Take 1 tablet (750 mg total) by mouth nightly.    nitrofurantoin, macrocrystal-monohydrate, (MACROBID) 100 MG capsule Take 1 capsule (100 mg total) by mouth 2 (two) times daily.    nitroGLYCERIN (NITROSTAT) 0.4 MG SL tablet TAKE ONE FOR ONSET OF CHEST PAIN / THEN EVERY 5 MINUTES IF CP CONTINUES UP TO 3 DOSES..CALL 911    omeprazole (PRILOSEC) 40 MG capsule TAKE 1 CAPSULE BY MOUTH EVERY DAY    ondansetron (ZOFRAN) 4 MG tablet Take 1 tablet (4 mg total) by mouth every 8 (eight) hours as needed for Nausea.    rosuvastatin (CRESTOR) 40 MG Tab Take 1 tablet (40 mg total) by mouth every evening.   Last reviewed on 10/6/2022  7:35 AM by Malika Handy MA    Review of patient's allergies indicates:   Allergen Reactions    No known drug allergies     Last reviewed on  10/12/2022 4:50 AM by Feroz Taylor      Tasks added this encounter   11/23/2022 - Refill Call (Auto Added)   Tasks due within next 3 months   No tasks due.     Gloria Bejarano, PharmD  [facility]  [address] [address]  Phone: [phone]  Fax: [phone]

## 2022-11-23 ENCOUNTER — SPECIALTY PHARMACY (OUTPATIENT)
Dept: PHARMACY | Facility: CLINIC | Age: 62
End: 2022-11-23
Payer: MEDICARE

## 2022-11-23 DIAGNOSIS — I25.10 CORONARY ARTERY DISEASE INVOLVING NATIVE CORONARY ARTERY OF NATIVE HEART WITHOUT ANGINA PECTORIS: ICD-10-CM

## 2022-11-23 DIAGNOSIS — E78.1 HYPERTRIGLYCERIDEMIA: ICD-10-CM

## 2022-11-23 DIAGNOSIS — I25.2 HISTORY OF NON-ST ELEVATION MYOCARDIAL INFARCTION (NSTEMI): ICD-10-CM

## 2022-11-23 RX ORDER — EVOLOCUMAB 140 MG/ML
140 INJECTION, SOLUTION SUBCUTANEOUS
Qty: 2 ML | Refills: 6 | Status: SHIPPED | OUTPATIENT
Start: 2022-11-23 | End: 2023-06-22 | Stop reason: SDUPTHER

## 2022-11-23 NOTE — TELEPHONE ENCOUNTER
Specialty Pharmacy - Refill Coordination    Specialty Medication Orders Linked to Encounter      Flowsheet Row Most Recent Value   Medication #1 evolocumab (REPATHA SURECLICK) 140 mg/mL PnIj (Order#[id number], Rx#[id number])            Refill Questions - Documented Responses      Flowsheet Row Most Recent Value   Patient Availability and HIPAA Verification    Does patient want to proceed with activity? Yes   HIPAA/medical authority confirmed? Yes   Relationship to patient of person spoken to? Self   Refill Screening Questions    Would patient like to speak to a pharmacist? No   When does the patient need to receive the medication? 12/03/22   Refill Delivery Questions    How will the patient receive the medication? [facility]   When does the patient need to receive the medication? 12/03/22   Shipping Address Home   Address in [facility] confirmed and updated if neccessary? Yes   Expected Copay ($) 0   Is the patient able to afford the medication copay? Yes   Payment Method zero copay   Days supply of Refill 28   Supplies needed? No supplies needed   Refill activity completed? Yes   Refill activity plan Refill scheduled   Shipment/Pickup Date: 11/28/22            Current Outpatient Medications   Medication Sig    cyclobenzaprine (FLEXERIL) 10 MG tablet TAKE 1 TABLET BY MOUTH THREE TIMES A DAY AS NEEDED FOR MUSCLE SPASMS    ALPRAZolam (XANAX) 0.5 MG tablet Take 1 tablet (0.5 mg total) by mouth daily as needed.    amLODIPine-benazepriL (LOTREL) 10-40 mg per capsule Take 1 capsule by mouth once daily.    aspirin (ECOTRIN) 81 MG EC tablet Take 1 tablet (81 mg total) by mouth once daily.    diclofenac (VOLTAREN) 50 MG EC tablet TAKE 1 TABLET BY MOUTH TWICE A DAY    diclofenac sodium (VOLTAREN) 1 % Gel Apply 2 g topically 3 (three) times daily as needed.    doxazosin (CARDURA) 4 MG tablet Take 1 tablet (4 mg total) by mouth once daily.    empagliflozin (JARDIANCE) 25 mg tablet Take 1 tablet (25 mg total) by mouth once  daily.    evolocumab (REPATHA SURECLICK) 140 mg/mL PnIj Inject 1 mL (140 mg total) into the skin every 14 (fourteen) days.    ezetimibe (ZETIA) 10 mg tablet Take 1 tablet (10 mg total) by mouth once daily.    fenofibrate micronized (LOFIBRA) 134 MG Cap TAKE ONE CAPSULE BY MOUTH EVERY DAY WITH BREAKFAST.    glimepiride (AMARYL) 4 MG tablet TAKE 1 TABLET (4 MG TOTAL) BY MOUTH BEFORE BREAKFAST.    isosorbide mononitrate (IMDUR) 60 MG 24 hr tablet Take 1 tablet (60 mg total) by mouth once daily.    metFORMIN (GLUCOPHAGE) 500 MG tablet TAKE 2 TABLETS BY MOUTH TWICE A DAY WITH MEALS    methocarbamoL (ROBAXIN) 500 MG Tab TAKE 1 TABLET BY MOUTH THREE TIMES A DAY AS NEEDED MUSCLE SPASM Strength: 500 mg    metoprolol succinate (TOPROL-XL) 50 MG 24 hr tablet TAKE 1 TABLET BY MOUTH TWICE A DAY    niacin (NIASPAN) 750 MG CR tablet Take 1 tablet (750 mg total) by mouth nightly.    nitrofurantoin, macrocrystal-monohydrate, (MACROBID) 100 MG capsule Take 1 capsule (100 mg total) by mouth 2 (two) times daily.    nitroGLYCERIN (NITROSTAT) 0.4 MG SL tablet TAKE ONE FOR ONSET OF CHEST PAIN / THEN EVERY 5 MINUTES IF CP CONTINUES UP TO 3 DOSES..CALL 911    omeprazole (PRILOSEC) 40 MG capsule TAKE 1 CAPSULE BY MOUTH EVERY DAY    ondansetron (ZOFRAN) 4 MG tablet Take 1 tablet (4 mg total) by mouth every 8 (eight) hours as needed for Nausea.    rosuvastatin (CRESTOR) 40 MG Tab Take 1 tablet (40 mg total) by mouth every evening.   Last reviewed on 10/6/2022  7:35 AM by Malika Handy MA    Review of patient's allergies indicates:   Allergen Reactions    No known drug allergies     Last reviewed on  10/12/2022 4:50 AM by Feroz Taylor      Tasks added this encounter   12/24/2022 - Refill Call (Auto Added)   Tasks due within next 3 months   No tasks due.     Dory Perez [facility]  [address] [address]  Phone: [phone]  Fax: [phone]

## 2022-11-23 NOTE — TELEPHONE ENCOUNTER
Outgoing call: Spoke with patient regarding refill, due to inject on 12/3. I informed her that a refill request was sent to her doctor and once approved [facility] will follow back up.

## 2022-12-01 ENCOUNTER — HOSPITAL ENCOUNTER (OUTPATIENT)
Facility: HOSPITAL | Age: 62
Discharge: HOME | End: 2022-12-01
Attending: INTERNAL MEDICINE | Admitting: INTERNAL MEDICINE
Payer: MEDICARE

## 2022-12-01 ENCOUNTER — ANESTHESIA (OUTPATIENT)
Dept: ENDOSCOPY | Facility: HOSPITAL | Age: 62
End: 2022-12-01
Payer: MEDICARE

## 2022-12-01 ENCOUNTER — ANESTHESIA EVENT (OUTPATIENT)
Dept: ENDOSCOPY | Facility: HOSPITAL | Age: 62
End: 2022-12-01
Payer: MEDICARE

## 2022-12-01 DIAGNOSIS — Z12.11 COLON CANCER SCREENING: ICD-10-CM

## 2022-12-01 LAB — GLUCOSE SERPL-MCNC: 159 MG/DL (ref 70–110)

## 2022-12-01 PROCEDURE — 27201089 HC SNARE, DISP (ANY): Performed by: INTERNAL MEDICINE

## 2022-12-01 PROCEDURE — 88305 TISSUE EXAM BY PATHOLOGIST: CPT | Mod: 26,,, | Performed by: PATHOLOGY

## 2022-12-01 PROCEDURE — 37000008 HC ANESTHESIA 1ST 15 MINUTES: Performed by: INTERNAL MEDICINE

## 2022-12-01 PROCEDURE — 45385 COLONOSCOPY W/LESION REMOVAL: CPT | Mod: PT,,, | Performed by: INTERNAL MEDICINE

## 2022-12-01 PROCEDURE — 88305 TISSUE EXAM BY PATHOLOGIST: ICD-10-PCS | Mod: 26,,, | Performed by: PATHOLOGY

## 2022-12-01 PROCEDURE — 63600175 PHARM REV CODE 636 W HCPCS: Performed by: NURSE ANESTHETIST, CERTIFIED REGISTERED

## 2022-12-01 PROCEDURE — 37000009 HC ANESTHESIA EA ADD 15 MINS: Performed by: INTERNAL MEDICINE

## 2022-12-01 PROCEDURE — 25000003 PHARM REV CODE 250: Performed by: NURSE ANESTHETIST, CERTIFIED REGISTERED

## 2022-12-01 PROCEDURE — 45385 COLONOSCOPY W/LESION REMOVAL: CPT | Mod: PT | Performed by: INTERNAL MEDICINE

## 2022-12-01 PROCEDURE — 88305 TISSUE EXAM BY PATHOLOGIST: CPT | Mod: 59 | Performed by: PATHOLOGY

## 2022-12-01 PROCEDURE — 45385 PR COLONOSCOPY,REMV LESN,SNARE: ICD-10-PCS | Mod: PT,,, | Performed by: INTERNAL MEDICINE

## 2022-12-01 RX ORDER — PROPOFOL 10 MG/ML
VIAL (ML) INTRAVENOUS
Status: DISCONTINUED | OUTPATIENT
Start: 2022-12-01 | End: 2022-12-01

## 2022-12-01 RX ORDER — LIDOCAINE HYDROCHLORIDE 10 MG/ML
INJECTION, SOLUTION EPIDURAL; INFILTRATION; INTRACAUDAL; PERINEURAL
Status: DISCONTINUED | OUTPATIENT
Start: 2022-12-01 | End: 2022-12-01

## 2022-12-01 RX ORDER — SODIUM CHLORIDE, SODIUM LACTATE, POTASSIUM CHLORIDE, CALCIUM CHLORIDE 600; 310; 30; 20 MG/100ML; MG/100ML; MG/100ML; MG/100ML
INJECTION, SOLUTION INTRAVENOUS CONTINUOUS
Status: DISCONTINUED | OUTPATIENT
Start: 2022-12-01 | End: 2022-12-01 | Stop reason: HOSPADM

## 2022-12-01 RX ADMIN — LIDOCAINE HYDROCHLORIDE 50 MG: 10 INJECTION, SOLUTION EPIDURAL; INFILTRATION; INTRACAUDAL; PERINEURAL at 07:12

## 2022-12-01 RX ADMIN — SODIUM CHLORIDE, SODIUM LACTATE, POTASSIUM CHLORIDE, AND CALCIUM CHLORIDE: .6; .31; .03; .02 INJECTION, SOLUTION INTRAVENOUS at 06:12

## 2022-12-01 RX ADMIN — PROPOFOL 30 MG: 10 INJECTION, EMULSION INTRAVENOUS at 07:12

## 2022-12-01 RX ADMIN — PROPOFOL 80 MG: 10 INJECTION, EMULSION INTRAVENOUS at 07:12

## 2022-12-01 NOTE — PLAN OF CARE
Dr schmidt came to bedside to discuss findings. Vital signs stable, no patient c/o nausea/vomitting, no abdominal pain, no gi bleeding. Patient to be discharged from unit.

## 2022-12-01 NOTE — ANESTHESIA RELEASE NOTE
"Anesthesia Release from PACU Note    Patient: Dulce Boogie    Procedure(s) Performed: Procedure(s) (LRB):  COLONOSCOPY (N/A)    Anesthesia type: MAC    Post pain: Adequate analgesia    Post assessment: no apparent anesthetic complications    Last Vitals:   Visit Vitals  BP (!) 169/80 (BP Location: Left arm, Patient Position: Lying)   Pulse 72   Temp 36.9 °C (98.4 °F)   Resp 20   Ht 5' 4" (1.626 m)   Wt 94.3 kg (208 lb)   LMP 06/21/2003 (Exact Date)   SpO2 97%   Breastfeeding No   BMI 35.70 kg/m²       Post vital signs: stable    Level of consciousness: awake    Nausea/Vomiting: no nausea/no vomiting    Complications: none    Airway Patency: patent    Respiratory: unassisted    Cardiovascular: stable and blood pressure at baseline    Hydration: euvolemic  "

## 2022-12-01 NOTE — H&P
Short Stay Endoscopy History and Physical    PCP - Zeus Hanson MD  Referring Physician - Zeus Hanson MD  [address]  [address]  [place] [address]    Procedure - Colonoscopy  ASA - per anesthesia  Mallampati - per anesthesia  History of Anesthesia problems - no  Family history Anesthesia problems -  no   Plan of anesthesia - General    HPI  62 y.o. female  Reason for procedure: colon cancer screen    H/o Polyps      ROS:  Constitutional: No fevers, chills, No weight loss  CV: No chest pain  Pulm: No cough, No shortness of breath  GI: see HPI    Medical History:  has a past medical history of Anticoagulant long-term use, CAD (coronary artery disease), Depression, History of colon polyps, Hyperlipidemia associated with type 2 diabetes mellitus, Hypertension associated with diabetes, Microalbuminuria due to type 2 diabetes mellitus, NSTEMI (non-ST elevated myocardial infarction) (2018), Obesity, and Type II diabetes mellitus with complication.    Surgical History:  has a past surgical history that includes  section, low transverse; Carotid stent; Left heart catheterization (Left, 2018); Colonoscopy (N/A, 6/10/2019); Left heart catheterization (Left, 2021); Percutaneous transluminal balloon angioplasty of coronary artery (2021); and Coronary stent placement (N/A, 2021).    Family History: family history includes Colon cancer in her father; Diabetes in her father; Hyperlipidemia in her father and mother; Hypertension in her brother, father, and mother..    Social History:  reports that she has never smoked. She has never used smokeless tobacco. She reports that she does not drink alcohol and does not use drugs.    Review of patient's allergies indicates:   Allergen Reactions    No known drug allergies        Medications:   Medications Prior to Admission   Medication Sig Dispense Refill Last Dose    ALPRAZolam (XANAX) 0.5 MG tablet Take 1 tablet (0.5 mg total) by mouth  daily as needed. 90 tablet 5 11/30/2022    amLODIPine-benazepriL (LOTREL) 10-40 mg per capsule Take 1 capsule by mouth once daily. 90 capsule 3 12/1/2022    aspirin (ECOTRIN) 81 MG EC tablet Take 1 tablet (81 mg total) by mouth once daily.  0 Past Week    cyclobenzaprine (FLEXERIL) 10 MG tablet TAKE 1 TABLET BY MOUTH THREE TIMES A DAY AS NEEDED FOR MUSCLE SPASMS 90 tablet 5 Past Week    doxazosin (CARDURA) 4 MG tablet Take 1 tablet (4 mg total) by mouth once daily. 90 tablet 3 11/30/2022    empagliflozin (JARDIANCE) 25 mg tablet Take 1 tablet (25 mg total) by mouth once daily. 90 tablet 3 11/30/2022    evolocumab (REPATHA SURECLICK) 140 mg/mL PnIj Inject 1 mL (140 mg total) into the skin every 14 (fourteen) days. 2 mL 6 Past Month    ezetimibe (ZETIA) 10 mg tablet Take 1 tablet (10 mg total) by mouth once daily. 90 tablet 1 11/30/2022    fenofibrate micronized (LOFIBRA) 134 MG Cap TAKE ONE CAPSULE BY MOUTH EVERY DAY WITH BREAKFAST. 90 capsule 3 Past Month    glimepiride (AMARYL) 4 MG tablet TAKE 1 TABLET (4 MG TOTAL) BY MOUTH BEFORE BREAKFAST. 90 tablet 3 11/30/2022    isosorbide mononitrate (IMDUR) 60 MG 24 hr tablet Take 1 tablet (60 mg total) by mouth once daily. 90 tablet 3 11/30/2022    metFORMIN (GLUCOPHAGE) 500 MG tablet TAKE 2 TABLETS BY MOUTH TWICE A DAY WITH MEALS 360 tablet 3 Past Week    methocarbamoL (ROBAXIN) 500 MG Tab TAKE 1 TABLET BY MOUTH THREE TIMES A DAY AS NEEDED MUSCLE SPASM Strength: 500 mg 60 tablet 5 Past Month    metoprolol succinate (TOPROL-XL) 50 MG 24 hr tablet TAKE 1 TABLET BY MOUTH TWICE A  tablet 3 12/1/2022    niacin (NIASPAN) 750 MG CR tablet Take 1 tablet (750 mg total) by mouth nightly. 90 tablet 3 Past Month    omeprazole (PRILOSEC) 40 MG capsule TAKE 1 CAPSULE BY MOUTH EVERY DAY 90 capsule 3 Past Month    rosuvastatin (CRESTOR) 40 MG Tab Take 1 tablet (40 mg total) by mouth every evening. 90 tablet 3 11/30/2022    diclofenac (VOLTAREN) 50 MG EC tablet TAKE 1 TABLET BY  MOUTH TWICE A DAY 60 tablet 5 More than a month    diclofenac sodium (VOLTAREN) 1 % Gel Apply 2 g topically 3 (three) times daily as needed. 100 g 0 More than a month    nitrofurantoin, macrocrystal-monohydrate, (MACROBID) 100 MG capsule Take 1 capsule (100 mg total) by mouth 2 (two) times daily. 14 capsule 0 More than a month    nitroGLYCERIN (NITROSTAT) 0.4 MG SL tablet TAKE ONE FOR ONSET OF CHEST PAIN / THEN EVERY 5 MINUTES IF CP CONTINUES UP TO 3 DOSES..CALL 911 25 tablet 11 More than a month    ondansetron (ZOFRAN) 4 MG tablet Take 1 tablet (4 mg total) by mouth every 8 (eight) hours as needed for Nausea. 12 tablet 0 More than a month       Physical Exam:    Vital Signs:   Vitals:    12/01/22 0653   BP: (!) 169/80   Pulse: 72   Resp: 20   Temp: 98.4 °F (36.9 °C)       General Appearance: Well appearing in no acute distress  Abdomen: Soft, non tender, non distended with normal bowel sounds, no masses    Labs:  Lab Results   Component Value Date    WBC 8.67 03/30/2022    HGB 12.5 03/30/2022    HCT 40.2 03/30/2022     (L) 03/30/2022    CHOL 190 09/28/2022    TRIG 302 (H) 09/28/2022    HDL 41 09/28/2022    ALT 39 09/28/2022    AST 34 09/28/2022     09/28/2022    K 3.4 (L) 09/28/2022     09/28/2022    CREATININE 0.9 09/28/2022    BUN 18 09/28/2022    CO2 24 09/28/2022    TSH 1.890 03/30/2022    INR 1.0 01/23/2021    GLUF 106 01/07/2005    HGBA1C 7.6 (H) 09/28/2022       I have explained the risks and benefits of this endoscopic procedure to the patient including but not limited to bleeding, inflammation, infection, perforation, and death.    SEDATION PLAN: per anesthesia      History reviewed, vital signs satisfactory, cardiopulmonary status satisfactory, sedation options, risks and plans have been discussed with the patient  All their questions were answered and the patient agrees to the sedation procedures as planned and the patient is deemed an appropriate candidate for the sedation as  planned.    Procedure explained to patient, informed consent obtained and placed in chart.        Ryan Lau MD

## 2022-12-01 NOTE — ANESTHESIA PREPROCEDURE EVALUATION
12/01/2022  Dulce Boogie is a 62 y.o., female.      Pre-op Assessment    I have reviewed the Patient Summary Reports.     I have reviewed the Nursing Notes. I have reviewed the NPO Status.   I have reviewed the Medications.     Review of Systems  Anesthesia Hx:  No problems with previous Anesthesia    Social:  Non-Smoker    Hematology/Oncology:  Hematology Normal   Oncology Normal     EENT/Dental:EENT/Dental Normal   Cardiovascular:   Exercise tolerance: poor Hypertension, well controlled Past MI CAD asymptomatic CABG/stent  Angina, with exertion hyperlipidemia COOPER    Pulmonary:  Pulmonary Normal    Renal/:   Chronic Renal Disease, CKD    Hepatic/GI:   Bowel Prep.  Bowel Conditions:  Bowel Neoplasm:, Adenomatous Polyp    Musculoskeletal:  Musculoskeletal Normal    Neurological:  Neurology Normal    Endocrine:   Diabetes, well controlled, type 2  Obesity / BMI > 30  Dermatological:  Skin Normal    Psych:   Psychiatric History anxiety depression          Physical Exam  General: Well nourished    Airway:  Mallampati: II   Mouth Opening: Normal  TM Distance: Normal  Tongue: Normal  Neck ROM: Normal ROM    Dental:  Intact    Chest/Lungs:  Clear to auscultation    Heart:  Rate: Normal        Anesthesia Plan  Type of Anesthesia, risks & benefits discussed:    Anesthesia Type: MAC  Intra-op Monitoring Plan: Standard ASA Monitors  Induction:  IV  Informed Consent: Informed consent signed with the Patient and all parties understand the risks and agree with anesthesia plan.  All questions answered. Patient consented to blood products? Yes  ASA Score: 3    Ready For Surgery From Anesthesia Perspective.     .

## 2022-12-01 NOTE — TRANSFER OF CARE
"Anesthesia Transfer of Care Note    Patient: Dulce Boogie    Procedure(s) Performed: Procedure(s) (LRB):  COLONOSCOPY (N/A)    Patient location: PACU    Anesthesia Type: MAC    Transport from OR: Transported from OR on room air with adequate spontaneous ventilation    Post pain: adequate analgesia    Post assessment: no apparent anesthetic complications    Post vital signs: stable    Level of consciousness: awake    Nausea/Vomiting: no nausea/vomiting    Complications: none    Transfer of care protocol was followed      Last vitals:   Visit Vitals  BP (!) 169/80 (BP Location: Left arm, Patient Position: Lying)   Pulse 72   Temp 36.9 °C (98.4 °F)   Resp 20   Ht 5' 4" (1.626 m)   Wt 94.3 kg (208 lb)   LMP 06/21/2003 (Exact Date)   SpO2 97%   Breastfeeding No   BMI 35.70 kg/m²     "

## 2022-12-01 NOTE — ANESTHESIA POSTPROCEDURE EVALUATION
Anesthesia Post Evaluation    Patient: Dulce Boogie    Procedure(s) Performed: Procedure(s) (LRB):  COLONOSCOPY (N/A)    Final Anesthesia Type: MAC      Patient location during evaluation: PACU  Patient participation: Yes- Able to Participate  Level of consciousness: awake and alert  Post-procedure vital signs: reviewed and stable  Pain management: adequate  Airway patency: patent    PONV status at discharge: No PONV  Anesthetic complications: no      Cardiovascular status: blood pressure returned to baseline  Respiratory status: unassisted  Hydration status: euvolemic  Follow-up not needed.          Vitals Value Taken Time   /80 12/01/22 0653   Temp 36.9 °C (98.4 °F) 12/01/22 0653   Pulse 72 12/01/22 0653   Resp 20 12/01/22 0653   SpO2 97 % 12/01/22 0653         No case tracking events are documented in the log.      Pain/María Score: No data recorded

## 2022-12-01 NOTE — PROVATION PATIENT INSTRUCTIONS
Discharge Summary/Instructions after an Endoscopic Procedure  Patient Name: Dulce Boogie  Patient MRN: [id number]  Patient YOB: 1960 Thursday, December 1, 2022 Ryan Lau MD  Dear patient,  As a result of recent federal legislation (The Federal Cures Act), you may   receive lab or pathology results from your procedure in your [facility]   account before your physician is able to contact you. Your physician or   their representative will relay the results to you with their   recommendations at their soonest availability.  Thank you,  RESTRICTIONS:  During your procedure today, you received medications for sedation.  These   medications may affect your judgment, balance and coordination.  Therefore,   for 24 hours, you have the following restrictions:   - DO NOT drive a car, operate machinery, make legal/financial decisions,   sign important papers or drink alcohol.    ACTIVITY:  Today: no heavy lifting, straining or running due to procedural   sedation/anesthesia.  The following day: return to full activity including work.  DIET:  Eat and drink normally unless instructed otherwise.     TREATMENT FOR COMMON SIDE EFFECTS:  - Mild abdominal pain, nausea, belching, bloating or excessive gas:  rest,   eat lightly and use a heating pad.  - Sore Throat: treat with throat lozenges and/or gargle with warm salt   water.  - Because air was used during the procedure, expelling large amounts of air   from your rectum or belching is normal.  - If a bowel prep was taken, you may not have a bowel movement for 1-3 days.    This is normal.  SYMPTOMS TO WATCH FOR AND REPORT TO YOUR PHYSICIAN:  1. Abdominal pain or bloating, other than gas cramps.  2. Chest pain.  3. Back pain.  4. Signs of infection such as: chills or fever occurring within 24 hours   after the procedure.  5. Rectal bleeding, which would show as bright red, maroon, or black stools.   (A tablespoon of blood from the rectum is not serious, especially  if   hemorrhoids are present.)  6. Vomiting.  7. Weakness or dizziness.  GO DIRECTLY TO THE NEAREST EMERGENCY ROOM IF YOU HAVE ANY OF THE FOLLOWING:      Difficulty breathing              Chills and/or fever over 101 F   Persistent vomiting and/or vomiting blood   Severe abdominal pain   Severe chest pain   Black, tarry stools   Bleeding- more than one tablespoon   Any other symptom or condition that you feel may need urgent attention  Your doctor recommends these additional instructions:  If any biopsies were taken, your doctors clinic will contact you in 1 to 2   weeks with any results.  - Discharge patient to home (via cart).   - Resume previous diet.   - Continue present medications.   - Await pathology results.   - Repeat colonoscopy in 3 - 5 years for surveillance.   - Return to referring physician.  For questions, problems or results please call your physician Ryan Lau MD at Work:  [phone]  If you have any questions about the above instructions, call the GI   department at [phone] or call the endoscopy unit at [phone]   from 7am until 3 pm.  [facility] [place], EMERGENCY ROOM PHONE NUMBER:   [phone]  IF A COMPLICATION OR EMERGENCY SITUATION ARISES AND YOU ARE UNABLE TO REACH   YOUR PHYSICIAN - GO DIRECTLY TO THE EMERGENCY ROOM.  I have read or have had read to me these discharge instructions for my   procedure and have received a written copy.  I understand these   instructions and will follow-up with my physician if I have any questions.     __________________________________       _____________________________________  Nurse Signature                                          Patient/Designated   Responsible Party Signature  MD Ryan Huerta MD  12/1/2022 7:56:49 AM  This report has been verified and signed electronically.  Dear patient,  As a result of recent federal legislation (The Federal Cures Act), you may   receive  lab or pathology results from your procedure in your [facility]   account before your physician is able to contact you. Your physician or   their representative will relay the results to you with their   recommendations at their soonest availability.  Thank you,  PROVATION

## 2022-12-02 VITALS
DIASTOLIC BLOOD PRESSURE: 83 MMHG | OXYGEN SATURATION: 99 % | RESPIRATION RATE: 15 BRPM | HEART RATE: 63 BPM | TEMPERATURE: 98 F | SYSTOLIC BLOOD PRESSURE: 146 MMHG | WEIGHT: 208 LBS | HEIGHT: 64 IN | BODY MASS INDEX: 35.51 KG/M2

## 2022-12-02 LAB — POCT GLUCOSE: 159 MG/DL (ref 70–110)

## 2022-12-08 ENCOUNTER — OFFICE VISIT (OUTPATIENT)
Dept: CARDIOLOGY | Facility: CLINIC | Age: 62
End: 2022-12-08
Payer: MEDICARE

## 2022-12-08 ENCOUNTER — HOSPITAL ENCOUNTER (OUTPATIENT)
Dept: CARDIOLOGY | Facility: HOSPITAL | Age: 62
Discharge: HOME | End: 2022-12-08
Attending: INTERNAL MEDICINE
Payer: MEDICARE

## 2022-12-08 VITALS
SYSTOLIC BLOOD PRESSURE: 140 MMHG | BODY MASS INDEX: 35.98 KG/M2 | HEIGHT: 64 IN | HEART RATE: 79 BPM | OXYGEN SATURATION: 98 % | WEIGHT: 210.75 LBS | DIASTOLIC BLOOD PRESSURE: 84 MMHG

## 2022-12-08 DIAGNOSIS — E11.69 HYPERLIPIDEMIA ASSOCIATED WITH TYPE 2 DIABETES MELLITUS: ICD-10-CM

## 2022-12-08 DIAGNOSIS — I25.10 CORONARY ARTERY DISEASE INVOLVING NATIVE CORONARY ARTERY OF NATIVE HEART WITHOUT ANGINA PECTORIS: Primary | Chronic | ICD-10-CM

## 2022-12-08 DIAGNOSIS — E11.8 TYPE II DIABETES MELLITUS WITH COMPLICATION: ICD-10-CM

## 2022-12-08 DIAGNOSIS — E78.5 HYPERLIPIDEMIA ASSOCIATED WITH TYPE 2 DIABETES MELLITUS: ICD-10-CM

## 2022-12-08 DIAGNOSIS — I77.819 AORTIC ECTASIA: ICD-10-CM

## 2022-12-08 DIAGNOSIS — I25.2 HISTORY OF NON-ST ELEVATION MYOCARDIAL INFARCTION (NSTEMI): ICD-10-CM

## 2022-12-08 DIAGNOSIS — E11.59 HYPERTENSION ASSOCIATED WITH DIABETES: ICD-10-CM

## 2022-12-08 DIAGNOSIS — I25.10 CORONARY ARTERY DISEASE INVOLVING NATIVE CORONARY ARTERY OF NATIVE HEART WITHOUT ANGINA PECTORIS: ICD-10-CM

## 2022-12-08 DIAGNOSIS — I25.10 CORONARY ARTERY DISEASE INVOLVING NATIVE CORONARY ARTERY OF NATIVE HEART WITHOUT ANGINA PECTORIS: Primary | ICD-10-CM

## 2022-12-08 DIAGNOSIS — E66.01 SEVERE OBESITY (BMI 35.0-35.9 WITH COMORBIDITY): ICD-10-CM

## 2022-12-08 DIAGNOSIS — I15.2 HYPERTENSION ASSOCIATED WITH DIABETES: ICD-10-CM

## 2022-12-08 DIAGNOSIS — R80.9 MICROALBUMINURIA DUE TO TYPE 2 DIABETES MELLITUS: ICD-10-CM

## 2022-12-08 DIAGNOSIS — R94.31 ABNORMAL EKG: ICD-10-CM

## 2022-12-08 DIAGNOSIS — E11.29 MICROALBUMINURIA DUE TO TYPE 2 DIABETES MELLITUS: ICD-10-CM

## 2022-12-08 PROCEDURE — 93005 ELECTROCARDIOGRAM TRACING: CPT

## 2022-12-08 PROCEDURE — 99214 PR OFFICE/OUTPT VISIT, EST, LEVL IV, 30-39 MIN: ICD-10-PCS | Mod: S$PBB,,, | Performed by: INTERNAL MEDICINE

## 2022-12-08 PROCEDURE — 93010 ELECTROCARDIOGRAM REPORT: CPT | Mod: ,,, | Performed by: INTERNAL MEDICINE

## 2022-12-08 PROCEDURE — 93010 EKG 12-LEAD: ICD-10-PCS | Mod: ,,, | Performed by: INTERNAL MEDICINE

## 2022-12-08 PROCEDURE — 99999 PR PBB SHADOW E&M-EST. PATIENT-LVL V: ICD-10-PCS | Mod: PBBFAC,,, | Performed by: INTERNAL MEDICINE

## 2022-12-08 PROCEDURE — 99215 OFFICE O/P EST HI 40 MIN: CPT | Mod: PBBFAC | Performed by: INTERNAL MEDICINE

## 2022-12-08 PROCEDURE — 99214 OFFICE O/P EST MOD 30 MIN: CPT | Mod: S$PBB,,, | Performed by: INTERNAL MEDICINE

## 2022-12-08 PROCEDURE — 99999 PR PBB SHADOW E&M-EST. PATIENT-LVL V: CPT | Mod: PBBFAC,,, | Performed by: INTERNAL MEDICINE

## 2022-12-08 RX ORDER — HYDROCHLOROTHIAZIDE 12.5 MG/1
12.5 TABLET ORAL DAILY
Qty: 30 TABLET | Refills: 6 | Status: SHIPPED | OUTPATIENT
Start: 2022-12-08 | End: 2023-06-12

## 2022-12-08 NOTE — PROGRESS NOTES
Subjective:   Patient ID:  Dulce Boogie is a 62 y.o. female who presents for follow up of No chief complaint on file.      HPI  8/5/2021 HAVEN RODARTE NP  Ms. Boogie's current conditions include CAD with remote PCI in 2010 (previously followed by Dr. Valle at  Cardiology), HTN, HLD, DM II. Admitted May 2018 with unstable angina. Underwent LHC on 5/22/18 and noted to have LAD 50% mid long lesion ffr 0.81. Also noted LCX non obs CAD and patent stent, RCA 90% prox treated with solange x2 and PDA distal 80% treated with SOLANGE x 2.      Admitted in Feb 2021 with NSTEMI  Underwent successful PCI of mid LAD 80% lesion.   Has no abnormal bleeding on ASA and Brilinta  Started on Imdur following cath   Wasn't taking Repatha as prescribed.      Denies any chest pain, SOB, COOPER,  orthopnea, PND, dizziness, palpitations,  near syncope, syncope or edema . Has no symptoms concerning for angina or equivalent. No CNS Complaints to suggest TIA or CVA. Does well with limiting sodium intake.  Has not required any SL nitro.   BP log  Ranging 130's systolic/80's   Is grieving the loss of her son      2/17/2022  No new complaints she is trying to be compliant with diet not consistent on exercise. Compliant with diet asymptomatic cardiovascular wise.tolerating emdical therapy     12/8/2022   Had colonoscopy 2 polyps removed. Has been having difficulty controlling her blood pressure and her lipids and diabetes all her markers are increased. She ahs dietary indiscretion. Has no other issues clinically except having heart burn she took tums. Denies any other chest pain. Has been compliant with medical therapy.she denies any exertional symptoms but has not been very active.   Past Medical History:   Diagnosis Date    Anticoagulant long-term use     CAD (coronary artery disease)     Depression     History of colon polyps     Hyperlipidemia associated with type 2 diabetes mellitus     Hypertension associated with diabetes     Microalbuminuria due  to type 2 diabetes mellitus     NSTEMI (non-ST elevated myocardial infarction) 2018    Obesity     Type II diabetes mellitus with complication        Past Surgical History:   Procedure Laterality Date    CAROTID STENT       SECTION, LOW TRANSVERSE      x 2    COLONOSCOPY N/A 6/10/2019    Procedure: COLONOSCOPY;  Surgeon: Maricarmen Boo MD;  Location: [facility] ENDO;  Service: Endoscopy;  Laterality: N/A;    COLONOSCOPY N/A 2022    Procedure: COLONOSCOPY;  Surgeon: Ryan Lau MD;  Location: [facility] ENDO;  Service: Endoscopy;  Laterality: N/A;    CORONARY STENT PLACEMENT N/A 2021    Procedure: INSERTION, STENT, CORONARY ARTERY;  Surgeon: Aimee Cooper MD;  Location: [facility] CATH LAB;  Service: Cardiology;  Laterality: N/A;    LEFT HEART CATHETERIZATION Left 2018    Procedure: HEART CATH-LEFT;  Surgeon: Aimee Cooper MD;  Location: [facility] CATH LAB;  Service: Cardiology;  Laterality: Left;    LEFT HEART CATHETERIZATION Left 2021    Procedure: CATHETERIZATION, HEART, LEFT;  Surgeon: Aimee Cooper MD;  Location: [facility] CATH LAB;  Service: Cardiology;  Laterality: Left;    PERCUTANEOUS TRANSLUMINAL BALLOON ANGIOPLASTY OF CORONARY ARTERY  2021    Procedure: Angioplasty-coronary;  Surgeon: Aimee Cooper MD;  Location: [facility] CATH LAB;  Service: Cardiology;;       Social History     Tobacco Use    Smoking status: Never    Smokeless tobacco: Never   Substance Use Topics    Alcohol use: No    Drug use: No       Family History   Problem Relation Age of Onset    Hypertension Mother     Hyperlipidemia Mother     Hypertension Father     Diabetes Father     Hyperlipidemia Father     Colon cancer Father     Hypertension Brother     Breast cancer Neg Hx     Ovarian cancer Neg Hx     Uterine cancer Neg Hx        Current Outpatient Medications   Medication Sig    ALPRAZolam (XANAX) 0.5 MG tablet Take 1 tablet (0.5 mg total) by mouth daily as needed.    amLODIPine-benazepriL (LOTREL) 10-40 mg per  capsule Take 1 capsule by mouth once daily.    aspirin (ECOTRIN) 81 MG EC tablet Take 1 tablet (81 mg total) by mouth once daily.    cyclobenzaprine (FLEXERIL) 10 MG tablet TAKE 1 TABLET BY MOUTH THREE TIMES A DAY AS NEEDED FOR MUSCLE SPASMS    diclofenac (VOLTAREN) 50 MG EC tablet TAKE 1 TABLET BY MOUTH TWICE A DAY    diclofenac sodium (VOLTAREN) 1 % Gel Apply 2 g topically 3 (three) times daily as needed.    doxazosin (CARDURA) 4 MG tablet Take 1 tablet (4 mg total) by mouth once daily.    empagliflozin (JARDIANCE) 25 mg tablet Take 1 tablet (25 mg total) by mouth once daily.    evolocumab (REPATHA SURECLICK) 140 mg/mL PnIj Inject 1 mL (140 mg total) into the skin every 14 (fourteen) days.    ezetimibe (ZETIA) 10 mg tablet Take 1 tablet (10 mg total) by mouth once daily.    fenofibrate micronized (LOFIBRA) 134 MG Cap TAKE ONE CAPSULE BY MOUTH EVERY DAY WITH BREAKFAST.    glimepiride (AMARYL) 4 MG tablet TAKE 1 TABLET (4 MG TOTAL) BY MOUTH BEFORE BREAKFAST.    isosorbide mononitrate (IMDUR) 60 MG 24 hr tablet Take 1 tablet (60 mg total) by mouth once daily.    metFORMIN (GLUCOPHAGE) 500 MG tablet TAKE 2 TABLETS BY MOUTH TWICE A DAY WITH MEALS    methocarbamoL (ROBAXIN) 500 MG Tab TAKE 1 TABLET BY MOUTH THREE TIMES A DAY AS NEEDED MUSCLE SPASM Strength: 500 mg    metoprolol succinate (TOPROL-XL) 50 MG 24 hr tablet TAKE 1 TABLET BY MOUTH TWICE A DAY    niacin (NIASPAN) 750 MG CR tablet Take 1 tablet (750 mg total) by mouth nightly.    nitrofurantoin, macrocrystal-monohydrate, (MACROBID) 100 MG capsule Take 1 capsule (100 mg total) by mouth 2 (two) times daily.    nitroGLYCERIN (NITROSTAT) 0.4 MG SL tablet TAKE ONE FOR ONSET OF CHEST PAIN / THEN EVERY 5 MINUTES IF CP CONTINUES UP TO 3 DOSES..CALL 911    omeprazole (PRILOSEC) 40 MG capsule TAKE 1 CAPSULE BY MOUTH EVERY DAY    ondansetron (ZOFRAN) 4 MG tablet Take 1 tablet (4 mg total) by mouth every 8 (eight) hours as needed for Nausea.    rosuvastatin (CRESTOR) 40 MG  Tab Take 1 tablet (40 mg total) by mouth every evening.     No current facility-administered medications for this visit.     Current Outpatient Medications on File Prior to Visit   Medication Sig    ALPRAZolam (XANAX) 0.5 MG tablet Take 1 tablet (0.5 mg total) by mouth daily as needed.    amLODIPine-benazepriL (LOTREL) 10-40 mg per capsule Take 1 capsule by mouth once daily.    aspirin (ECOTRIN) 81 MG EC tablet Take 1 tablet (81 mg total) by mouth once daily.    cyclobenzaprine (FLEXERIL) 10 MG tablet TAKE 1 TABLET BY MOUTH THREE TIMES A DAY AS NEEDED FOR MUSCLE SPASMS    diclofenac (VOLTAREN) 50 MG EC tablet TAKE 1 TABLET BY MOUTH TWICE A DAY    diclofenac sodium (VOLTAREN) 1 % Gel Apply 2 g topically 3 (three) times daily as needed.    doxazosin (CARDURA) 4 MG tablet Take 1 tablet (4 mg total) by mouth once daily.    empagliflozin (JARDIANCE) 25 mg tablet Take 1 tablet (25 mg total) by mouth once daily.    evolocumab (REPATHA SURECLICK) 140 mg/mL PnIj Inject 1 mL (140 mg total) into the skin every 14 (fourteen) days.    ezetimibe (ZETIA) 10 mg tablet Take 1 tablet (10 mg total) by mouth once daily.    fenofibrate micronized (LOFIBRA) 134 MG Cap TAKE ONE CAPSULE BY MOUTH EVERY DAY WITH BREAKFAST.    glimepiride (AMARYL) 4 MG tablet TAKE 1 TABLET (4 MG TOTAL) BY MOUTH BEFORE BREAKFAST.    isosorbide mononitrate (IMDUR) 60 MG 24 hr tablet Take 1 tablet (60 mg total) by mouth once daily.    metFORMIN (GLUCOPHAGE) 500 MG tablet TAKE 2 TABLETS BY MOUTH TWICE A DAY WITH MEALS    methocarbamoL (ROBAXIN) 500 MG Tab TAKE 1 TABLET BY MOUTH THREE TIMES A DAY AS NEEDED MUSCLE SPASM Strength: 500 mg    metoprolol succinate (TOPROL-XL) 50 MG 24 hr tablet TAKE 1 TABLET BY MOUTH TWICE A DAY    niacin (NIASPAN) 750 MG CR tablet Take 1 tablet (750 mg total) by mouth nightly.    nitrofurantoin, macrocrystal-monohydrate, (MACROBID) 100 MG capsule Take 1 capsule (100 mg total) by mouth 2 (two) times daily.    nitroGLYCERIN (NITROSTAT)  0.4 MG SL tablet TAKE ONE FOR ONSET OF CHEST PAIN / THEN EVERY 5 MINUTES IF CP CONTINUES UP TO 3 DOSES..CALL 911    omeprazole (PRILOSEC) 40 MG capsule TAKE 1 CAPSULE BY MOUTH EVERY DAY    ondansetron (ZOFRAN) 4 MG tablet Take 1 tablet (4 mg total) by mouth every 8 (eight) hours as needed for Nausea.    rosuvastatin (CRESTOR) 40 MG Tab Take 1 tablet (40 mg total) by mouth every evening.     No current facility-administered medications on file prior to visit.     Review of patient's allergies indicates:   Allergen Reactions    No known drug allergies       Review of Systems   Constitutional: Negative for diaphoresis, malaise/fatigue and weight gain.   HENT:  Negative for hoarse voice.    Eyes:  Negative for double vision and visual disturbance.   Cardiovascular:  Negative for chest pain, claudication, cyanosis, dyspnea on exertion, irregular heartbeat, leg swelling, near-syncope, orthopnea, palpitations, paroxysmal nocturnal dyspnea and syncope.   Respiratory:  Negative for cough, hemoptysis, shortness of breath and snoring.    Hematologic/Lymphatic: Negative for bleeding problem. Does not bruise/bleed easily.   Skin:  Negative for color change and poor wound healing.   Musculoskeletal:  Negative for muscle cramps, muscle weakness and myalgias.   Gastrointestinal:  Positive for heartburn. Negative for bloating, abdominal pain, change in bowel habit, diarrhea, hematemesis, hematochezia, melena and nausea.   Neurological:  Negative for excessive daytime sleepiness, dizziness, headaches, light-headedness, loss of balance, numbness and weakness.   Psychiatric/Behavioral:  Negative for memory loss. The patient does not have insomnia.    Allergic/Immunologic: Negative for hives.     Objective:   Physical Exam  Vitals and nursing note reviewed.   Constitutional:       General: She is not in acute distress.     Appearance: Normal appearance. She is well-developed. She is not ill-appearing.   HENT:      Head: Normocephalic  "and atraumatic.   Eyes:      General: No scleral icterus.     Pupils: Pupils are equal, round, and reactive to light.   Neck:      Thyroid: No thyromegaly.      Vascular: Normal carotid pulses. No carotid bruit, hepatojugular reflux or JVD.      Trachea: No tracheal deviation.   Cardiovascular:      Rate and Rhythm: Normal rate and regular rhythm.      Pulses: Normal pulses.      Heart sounds: Normal heart sounds. No murmur heard.    No friction rub. No gallop.   Pulmonary:      Effort: Pulmonary effort is normal. No respiratory distress.      Breath sounds: Normal breath sounds. No wheezing, rhonchi or rales.   Chest:      Chest wall: No tenderness.   Abdominal:      General: Bowel sounds are normal. There is no abdominal bruit.      Palpations: Abdomen is soft. There is no hepatomegaly or pulsatile mass.      Tenderness: There is no abdominal tenderness.   Musculoskeletal:      Right shoulder: No deformity.      Cervical back: Normal range of motion and neck supple.      Right lower leg: No edema.      Left lower leg: No edema.   Skin:     General: Skin is warm and dry.      Capillary Refill: Capillary refill takes less than 2 seconds.      Findings: No erythema or rash.      Nails: There is no clubbing.   Neurological:      Mental Status: She is alert and oriented to person, place, and time.      Cranial Nerves: No cranial nerve deficit.      Coordination: Coordination normal.   Psychiatric:         Speech: Speech normal.         Behavior: Behavior normal.         Thought Content: Thought content normal.     Vitals:    12/08/22 0822 12/08/22 0825   BP: (!) 142/80 (!) 140/84   BP Location: Left arm Right arm   Patient Position: Sitting Sitting   BP Method: Large (Manual) Large (Manual)   Pulse: 79    SpO2: 98%    Weight: 95.6 kg (210 lb 12.2 oz)    Height: 5' 4" (1.626 m)      Lab Results   Component Value Date    CHOL 190 09/28/2022    CHOL 115 (L) 08/11/2022    CHOL 130 03/30/2022     Lab Results   Component " Value Date    HDL 41 09/28/2022    HDL 43 08/11/2022    HDL 42 03/30/2022     Lab Results   Component Value Date    LDLCALC 88.6 09/28/2022    LDLCALC 44.4 (L) 08/11/2022    LDLCALC 56.4 (L) 03/30/2022     Lab Results   Component Value Date    TRIG 302 (H) 09/28/2022    TRIG 138 08/11/2022    TRIG 158 (H) 03/30/2022     Lab Results   Component Value Date    CHOLHDL 21.6 09/28/2022    CHOLHDL 37.4 08/11/2022    CHOLHDL 32.3 03/30/2022       Chemistry        Component Value Date/Time     09/28/2022 0740    K 3.4 (L) 09/28/2022 0740     09/28/2022 0740    CO2 24 09/28/2022 0740    BUN 18 09/28/2022 0740    CREATININE 0.9 09/28/2022 0740     (H) 09/28/2022 0740        Component Value Date/Time    CALCIUM 9.3 09/28/2022 0740    ALKPHOS 75 09/28/2022 0740    AST 34 09/28/2022 0740    ALT 39 09/28/2022 0740    BILITOT 0.3 09/28/2022 0740    ESTGFRAFRICA >60.0 03/30/2022 0900    EGFRNONAA >60.0 03/30/2022 0900        Lab Results   Component Value Date    HGBA1C 7.6 (H) 09/28/2022         Lab Results   Component Value Date    TSH 1.890 03/30/2022     Lab Results   Component Value Date    INR 1.0 01/23/2021    INR 1.0 01/23/2021    INR 1.0 05/21/2018     Lab Results   Component Value Date    WBC 8.67 03/30/2022    HGB 12.5 03/30/2022    HCT 40.2 03/30/2022    MCV 87 03/30/2022     (L) 03/30/2022     BMP  Sodium   Date Value Ref Range Status   09/28/2022 141 136 - 145 mmol/L Final     Potassium   Date Value Ref Range Status   09/28/2022 3.4 (L) 3.5 - 5.1 mmol/L Final     Chloride   Date Value Ref Range Status   09/28/2022 107 95 - 110 mmol/L Final     CO2   Date Value Ref Range Status   09/28/2022 24 23 - 29 mmol/L Final     BUN   Date Value Ref Range Status   09/28/2022 18 8 - 23 mg/dL Final     Creatinine   Date Value Ref Range Status   09/28/2022 0.9 0.5 - 1.4 mg/dL Final     Calcium   Date Value Ref Range Status   09/28/2022 9.3 8.7 - 10.5 mg/dL Final     Anion Gap   Date Value Ref Range Status    09/28/2022 10 8 - 16 mmol/L Final     eGFR if    Date Value Ref Range Status   03/30/2022 >60.0 >60 mL/min/1.73 m^2 Final     eGFR if non    Date Value Ref Range Status   03/30/2022 >60.0 >60 mL/min/1.73 m^2 Final     Comment:     Calculation used to obtain the estimated glomerular filtration  rate (eGFR) is the CKD-EPI equation.        CrCl cannot be calculated (Patient's most recent lab result is older than the maximum 7 days allowed.).    Assessment:     1. Coronary artery disease involving native coronary artery of native heart without angina pectoris    2. History of non-ST elevation myocardial infarction (NSTEMI)    3. Type II diabetes mellitus with complication    4. Hypertension associated with diabetes    5. Hyperlipidemia associated with type 2 diabetes mellitus    6. Aortic ectasia    7. Microalbuminuria due to type 2 diabetes mellitus    8. Severe obesity (BMI 35.0-35.9 with comorbidity)      Has atypical symptoms that may be gi related however she has been non compliant with diet exercise affecting her htn control diabetes control and lipids control. Has an abnormal ekg she was counseled about compliance with diet exercise weight loss and will plan on obtaining a cardiolite make sure no ischemia  since she is not being very physically active and need to make sure no silent ischemia  Plan:   Cardiolite    Continue current therapy  Cardiac low salt diet.  Risk factor modification and excercise program./weight loss  F/u in 6 months with lipid cmp a1c   Hctz 12.5 mg po daily    F/u in 2 weeks with bmp and visit for bp check  Encouraged high potassium containing foods.

## 2022-12-09 RX ORDER — GLIMEPIRIDE 4 MG/1
TABLET ORAL
Qty: 90 TABLET | Refills: 1 | Status: SHIPPED | OUTPATIENT
Start: 2022-12-09 | End: 2022-12-14

## 2022-12-09 RX ORDER — OMEPRAZOLE 40 MG/1
CAPSULE, DELAYED RELEASE ORAL
Qty: 90 CAPSULE | Refills: 3 | Status: SHIPPED | OUTPATIENT
Start: 2022-12-09 | End: 2022-12-14

## 2022-12-09 NOTE — TELEPHONE ENCOUNTER
No new care gaps identified.  [facility] Embedded Care Gaps. Reference number: [id number]. 12/09/2022   12:43:04 AM CST

## 2022-12-09 NOTE — TELEPHONE ENCOUNTER
Refill Decision Note   Dulce Boogie  is requesting a refill authorization.  Brief Assessment and Rationale for Refill:  Approve     Medication Therapy Plan:       Medication Reconciliation Completed: No   Comments:     No Care Gaps recommended.     Note composed:5:58 PM 12/09/2022

## 2022-12-12 LAB
FINAL PATHOLOGIC DIAGNOSIS: NORMAL
GROSS: NORMAL
Lab: NORMAL

## 2022-12-13 ENCOUNTER — PATIENT MESSAGE (OUTPATIENT)
Dept: GASTROENTEROLOGY | Facility: CLINIC | Age: 62
End: 2022-12-13
Payer: MEDICARE

## 2022-12-14 ENCOUNTER — PATIENT OUTREACH (OUTPATIENT)
Dept: ADMINISTRATIVE | Facility: HOSPITAL | Age: 62
End: 2022-12-14
Payer: MEDICARE

## 2022-12-14 RX ORDER — GLIMEPIRIDE 4 MG/1
4 TABLET ORAL
COMMUNITY
End: 2023-05-30

## 2022-12-14 RX ORDER — OMEPRAZOLE 40 MG/1
40 CAPSULE, DELAYED RELEASE ORAL DAILY
COMMUNITY
End: 2023-05-30 | Stop reason: SDUPTHER

## 2022-12-14 NOTE — TELEPHONE ENCOUNTER
Pharmacy Call Documentation    Pharmacy Called:  [facility] Call Time: 4:35 PM   Spoke with: Amilcar 12/14/2022      Call to confirm cancellation of Glimepiride and Omeprazole due to declined cosign by Dr. Zeus Hanson       Current Medication Requested: (refill encounter)   Requested Prescriptions      No prescriptions requested or ordered in this encounter      [facility] [place]     Note composed: 12/14/2022 4:28 PM

## 2022-12-14 NOTE — TELEPHONE ENCOUNTER
Glimepiride and Omeprazole order discontinued due to cosign declined by Zeus Hanson MD. Entered patient-reported order on med list for placeholder.      Pended Medication(s)   Requested Prescriptions     Signed Prescriptions Disp Refills    glimepiride (AMARYL) 4 MG tablet 90 tablet 1     Sig: TAKE 1 TABLET BY MOUTH BEFORE BREAKFAST.     Authorizing Provider: ZEUS HANSON     Ordering User: TIFFANY AG    omeprazole (PRILOSEC) 40 MG capsule 90 capsule 3     Sig: TAKE 1 CAPSULE BY MOUTH EVERY DAY     Authorizing Provider: ZEUS HANSON     Ordering User: TIFFANY AG

## 2022-12-14 NOTE — PROGRESS NOTES
Pap Smear Report: Spoke with Patient, ok to schedule pap, accepts appt 12/28/2022. Pt requesting flu shot also.

## 2022-12-22 ENCOUNTER — TELEPHONE (OUTPATIENT)
Dept: CARDIOLOGY | Facility: CLINIC | Age: 62
End: 2022-12-22
Payer: MEDICARE

## 2022-12-22 ENCOUNTER — CLINICAL SUPPORT (OUTPATIENT)
Dept: CARDIOLOGY | Facility: CLINIC | Age: 62
End: 2022-12-22
Payer: MEDICARE

## 2022-12-22 VITALS
DIASTOLIC BLOOD PRESSURE: 90 MMHG | SYSTOLIC BLOOD PRESSURE: 152 MMHG | OXYGEN SATURATION: 97 % | WEIGHT: 210.75 LBS | BODY MASS INDEX: 35.98 KG/M2 | HEIGHT: 64 IN | HEART RATE: 60 BPM

## 2022-12-22 DIAGNOSIS — I15.2 HYPERTENSION ASSOCIATED WITH DIABETES: Primary | ICD-10-CM

## 2022-12-22 DIAGNOSIS — E11.59 HYPERTENSION ASSOCIATED WITH DIABETES: Primary | ICD-10-CM

## 2022-12-22 PROCEDURE — 99214 OFFICE O/P EST MOD 30 MIN: CPT | Mod: PBBFAC

## 2022-12-22 PROCEDURE — 99999 PR PBB SHADOW E&M-EST. PATIENT-LVL IV: CPT | Mod: PBBFAC,,,

## 2022-12-22 PROCEDURE — 99999 PR PBB SHADOW E&M-EST. PATIENT-LVL IV: ICD-10-PCS | Mod: PBBFAC,,,

## 2022-12-22 NOTE — TELEPHONE ENCOUNTER
Patient came in for a nurse visit. The clinic manual Readings were:  146/90 pulse 60 left arm  152/90 pulse 60 right arm    Bp monitor not brought in during nurse visit. The medications were updated.The patient would like to know if there are any recommendations.

## 2022-12-22 NOTE — TELEPHONE ENCOUNTER
Patient came in for a nurse visit. The clinic manual Readings were:  146/90 pulse 60 left arm  152/90 pulse 60 right arm     Bp monitor not brought in during nurse visit. The medications were updated.The patient would like to know if there are any recommendations.      Patient contacted and was advised that the provider recommendations were:    Increase hctz to 25 mg po daily   F/u in 2 weeks nurse visit and bp machine check. The patient stated understanding with no questions or concern. Patient is scheduled to do nurse visit on 01/05/2023

## 2022-12-27 ENCOUNTER — SPECIALTY PHARMACY (OUTPATIENT)
Dept: PHARMACY | Facility: CLINIC | Age: 62
End: 2022-12-27
Payer: MEDICARE

## 2022-12-27 NOTE — TELEPHONE ENCOUNTER
Specialty Pharmacy - Refill Coordination    Specialty Medication Orders Linked to Encounter      Flowsheet Row Most Recent Value   Medication #1 evolocumab (REPATHA SURECLICK) 140 mg/mL PnIj (Order#[id number], Rx#[id number])            Refill Questions - Documented Responses      Flowsheet Row Most Recent Value   Patient Availability and HIPAA Verification    Does patient want to proceed with activity? Yes   HIPAA/medical authority confirmed? Yes   Relationship to patient of person spoken to? Self   Refill Screening Questions    Would patient like to speak to a pharmacist? No   When does the patient need to receive the medication? 01/03/23   Refill Delivery Questions    How will the patient receive the medication? [facility]   When does the patient need to receive the medication? 01/03/23   Shipping Address Home   Address in [facility] confirmed and updated if neccessary? Yes   Expected Copay ($) 0   Is the patient able to afford the medication copay? Yes   Payment Method zero copay   Days supply of Refill 28   Supplies needed? No supplies needed   Refill activity completed? Yes   Refill activity plan Refill scheduled   Shipment/Pickup Date: 12/29/22            Current Outpatient Medications   Medication Sig    ALPRAZolam (XANAX) 0.5 MG tablet Take 1 tablet (0.5 mg total) by mouth daily as needed.    amLODIPine-benazepriL (LOTREL) 10-40 mg per capsule Take 1 capsule by mouth once daily.    aspirin (ECOTRIN) 81 MG EC tablet Take 1 tablet (81 mg total) by mouth once daily.    cyclobenzaprine (FLEXERIL) 10 MG tablet TAKE 1 TABLET BY MOUTH THREE TIMES A DAY AS NEEDED FOR MUSCLE SPASMS    diclofenac (VOLTAREN) 50 MG EC tablet TAKE 1 TABLET BY MOUTH TWICE A DAY    diclofenac sodium (VOLTAREN) 1 % Gel Apply 2 g topically 3 (three) times daily as needed.    doxazosin (CARDURA) 4 MG tablet Take 1 tablet (4 mg total) by mouth once daily.    empagliflozin (JARDIANCE) 25 mg tablet Take 1 tablet (25 mg total) by mouth once  daily.    evolocumab (REPATHA SURECLICK) 140 mg/mL PnIj Inject 1 mL (140 mg total) into the skin every 14 (fourteen) days.    ezetimibe (ZETIA) 10 mg tablet Take 1 tablet (10 mg total) by mouth once daily.    fenofibrate micronized (LOFIBRA) 134 MG Cap TAKE ONE CAPSULE BY MOUTH EVERY DAY WITH BREAKFAST.    glimepiride (AMARYL) 4 MG tablet Take 4 mg by mouth before breakfast.    hydroCHLOROthiazide (HYDRODIURIL) 12.5 MG Tab Take 1 tablet (12.5 mg total) by mouth once daily.    isosorbide mononitrate (IMDUR) 60 MG 24 hr tablet Take 1 tablet (60 mg total) by mouth once daily.    metFORMIN (GLUCOPHAGE) 500 MG tablet TAKE 2 TABLETS BY MOUTH TWICE A DAY WITH MEALS    methocarbamoL (ROBAXIN) 500 MG Tab TAKE 1 TABLET BY MOUTH THREE TIMES A DAY AS NEEDED MUSCLE SPASM Strength: 500 mg    metoprolol succinate (TOPROL-XL) 50 MG 24 hr tablet TAKE 1 TABLET BY MOUTH TWICE A DAY    niacin (NIASPAN) 750 MG CR tablet Take 1 tablet (750 mg total) by mouth nightly.    nitrofurantoin, macrocrystal-monohydrate, (MACROBID) 100 MG capsule Take 1 capsule (100 mg total) by mouth 2 (two) times daily.    nitroGLYCERIN (NITROSTAT) 0.4 MG SL tablet TAKE ONE FOR ONSET OF CHEST PAIN / THEN EVERY 5 MINUTES IF CP CONTINUES UP TO 3 DOSES..CALL 911    omeprazole (PRILOSEC) 40 MG capsule Take 40 mg by mouth once daily.    ondansetron (ZOFRAN) 4 MG tablet Take 1 tablet (4 mg total) by mouth every 8 (eight) hours as needed for Nausea.    rosuvastatin (CRESTOR) 40 MG Tab Take 1 tablet (40 mg total) by mouth every evening.   Last reviewed on 12/22/2022  9:34 AM by Rich Baez MA    Review of patient's allergies indicates:   Allergen Reactions    No known drug allergies     Last reviewed on  12/22/2022 9:34 AM by Rich Baez      Tasks added this encounter   1/24/2023 - Refill Call (Auto Added)   Tasks due within next 3 months   No tasks due.     Ander Navas  [facility]  [address] [facility]  [address]  Phone: [phone]  Fax: [phone]

## 2023-01-04 ENCOUNTER — HOSPITAL ENCOUNTER (OUTPATIENT)
Dept: RADIOLOGY | Facility: HOSPITAL | Age: 63
Discharge: HOME | End: 2023-01-04
Attending: INTERNAL MEDICINE
Payer: MEDICARE

## 2023-01-04 ENCOUNTER — HOSPITAL ENCOUNTER (OUTPATIENT)
Dept: PULMONOLOGY | Facility: HOSPITAL | Age: 63
Discharge: HOME | End: 2023-01-04
Attending: INTERNAL MEDICINE
Payer: MEDICARE

## 2023-01-04 VITALS
WEIGHT: 210 LBS | HEIGHT: 64 IN | BODY MASS INDEX: 35.85 KG/M2 | HEART RATE: 58 BPM | DIASTOLIC BLOOD PRESSURE: 86 MMHG | SYSTOLIC BLOOD PRESSURE: 171 MMHG

## 2023-01-04 DIAGNOSIS — R94.31 ABNORMAL EKG: ICD-10-CM

## 2023-01-04 DIAGNOSIS — I25.10 CORONARY ARTERY DISEASE INVOLVING NATIVE CORONARY ARTERY OF NATIVE HEART WITHOUT ANGINA PECTORIS: Chronic | ICD-10-CM

## 2023-01-04 LAB
CV STRESS BASE HR: 58 BPM
DIASTOLIC BLOOD PRESSURE: 86 MMHG
EJECTION FRACTION- HIGH: 73 %
END DIASTOLIC INDEX-HIGH: 165 ML/M2
END DIASTOLIC INDEX-LOW: 101 ML/M2
END SYSTOLIC INDEX-HIGH: 64 ML/M2
END SYSTOLIC INDEX-LOW: 28 ML/M2
NUC REST EJECTION FRACTION: 72
NUC STRESS EJECTION FRACTION: 71 %
OHS CV CPX 85 PERCENT MAX PREDICTED HEART RATE MALE: 128
OHS CV CPX MAX PREDICTED HEART RATE: 151
OHS CV CPX PATIENT IS FEMALE: 1
OHS CV CPX PATIENT IS MALE: 0
OHS CV CPX PEAK DIASTOLIC BLOOD PRESSURE: 90 MMHG
OHS CV CPX PEAK HEAR RATE: 103 BPM
OHS CV CPX PEAK RATE PRESSURE PRODUCT: NORMAL
OHS CV CPX PEAK SYSTOLIC BLOOD PRESSURE: 215 MMHG
OHS CV CPX PERCENT MAX PREDICTED HEART RATE ACHIEVED: 68
OHS CV CPX RATE PRESSURE PRODUCT PRESENTING: 9918
RETIRED EF AND QEF - SEE NOTES: 59 %
SYSTOLIC BLOOD PRESSURE: 171 MMHG

## 2023-01-04 PROCEDURE — 78452 HT MUSCLE IMAGE SPECT MULT: CPT

## 2023-01-04 PROCEDURE — 63600175 PHARM REV CODE 636 W HCPCS: Performed by: INTERNAL MEDICINE

## 2023-01-04 PROCEDURE — A9502 TC99M TETROFOSMIN: HCPCS

## 2023-01-04 RX ORDER — REGADENOSON 0.08 MG/ML
0.4 INJECTION, SOLUTION INTRAVENOUS ONCE
Status: COMPLETED | OUTPATIENT
Start: 2023-01-04 | End: 2023-01-04

## 2023-01-04 RX ADMIN — REGADENOSON 0.4 MG: 0.08 INJECTION, SOLUTION INTRAVENOUS at 10:01

## 2023-01-05 ENCOUNTER — PATIENT MESSAGE (OUTPATIENT)
Dept: ADMINISTRATIVE | Facility: OTHER | Age: 63
End: 2023-01-05
Payer: MEDICARE

## 2023-01-05 ENCOUNTER — TELEPHONE (OUTPATIENT)
Dept: CARDIOLOGY | Facility: CLINIC | Age: 63
End: 2023-01-05
Payer: MEDICARE

## 2023-01-05 NOTE — TELEPHONE ENCOUNTER
Patient was notified of results. All questions were answered. Pt verbalized understanding. Pt will call back with any other questions or concerns.      ----- Message from Aimee Cooper MD sent at 1/4/2023  5:40 PM CST -----  STRESS TEST NORMAL

## 2023-01-07 RX ORDER — METFORMIN HYDROCHLORIDE 500 MG/1
TABLET ORAL
Qty: 360 TABLET | Refills: 0 | Status: SHIPPED | OUTPATIENT
Start: 2023-01-07 | End: 2023-04-07

## 2023-01-07 NOTE — TELEPHONE ENCOUNTER
Refill Decision Note   Dulce Boogie  is requesting a refill authorization.  Brief Assessment and Rationale for Refill:  Approve    -Medication-Related Problems Identified: Requires labs  Medication Therapy Plan:       Medication Reconciliation Completed: No   Comments:     Provider Staff:     Action is required for this patient.   Please see care gap opportunities below in Care Due Message.     Thanks!  [facility]     Appointments      Date Provider   Last Visit   10/6/2022 Zeus Hanson MD   Next Visit   1/9/2023 Zeus Hanson MD     Note composed:1:42 PM 01/07/2023           Note composed:1:42 PM 01/07/2023

## 2023-01-07 NOTE — TELEPHONE ENCOUNTER
Care Due:                  Date            Visit Type   Department     Provider  --------------------------------------------------------------------------------                                EP -                              PRIMARY      [facility] INTERNAL  Last Visit: 10-      CARE ([facility])   EDUARDA Hanson                              [facility]                              PRIMARY      [facility] INTERNAL  Next Visit: 01-      CARE ([facility])   [facility]       Zeus Hanson                                                            Last  Test          Frequency    Reason                     Performed    Due Date  --------------------------------------------------------------------------------    HBA1C.......  6 months...  empagliflozin, metFORMIN.  09- 03-    [facility] Embedded Care Gaps. Reference number: [id number]. 1/07/2023   7:07:32 AM CST

## 2023-01-18 ENCOUNTER — PES CALL (OUTPATIENT)
Dept: ADMINISTRATIVE | Facility: CLINIC | Age: 63
End: 2023-01-18
Payer: MEDICARE

## 2023-01-24 ENCOUNTER — SPECIALTY PHARMACY (OUTPATIENT)
Dept: PHARMACY | Facility: CLINIC | Age: 63
End: 2023-01-24
Payer: MEDICARE

## 2023-01-24 NOTE — TELEPHONE ENCOUNTER
Specialty Pharmacy - Refill Coordination    Specialty Medication Orders Linked to Encounter      Flowsheet Row Most Recent Value   Medication #1 evolocumab (REPATHA SURECLICK) 140 mg/mL PnIj (Order#[id number], Rx#[id number])            Refill Questions - Documented Responses      Flowsheet Row Most Recent Value   Patient Availability and HIPAA Verification    Does patient want to proceed with activity? Yes   HIPAA/medical authority confirmed? Yes   Relationship to patient of person spoken to? Self   Refill Screening Questions    Would patient like to speak to a pharmacist? No   When does the patient need to receive the medication? 02/01/23   Refill Delivery Questions    How will the patient receive the medication? MEDRx   When does the patient need to receive the medication? 02/01/23   Shipping Address Home   Address in [facility] confirmed and updated if neccessary? Yes   Expected Copay ($) 4.3   Is the patient able to afford the medication copay? Yes   Payment Method CC on file   Days supply of Refill 28   Supplies needed? No supplies needed   Refill activity completed? Yes   Refill activity plan Refill scheduled   Shipment/Pickup Date: 01/26/23            Current Outpatient Medications   Medication Sig    ALPRAZolam (XANAX) 0.5 MG tablet Take 1 tablet (0.5 mg total) by mouth daily as needed.    amLODIPine-benazepriL (LOTREL) 10-40 mg per capsule Take 1 capsule by mouth once daily.    aspirin (ECOTRIN) 81 MG EC tablet Take 1 tablet (81 mg total) by mouth once daily.    cyclobenzaprine (FLEXERIL) 10 MG tablet TAKE 1 TABLET BY MOUTH THREE TIMES A DAY AS NEEDED FOR MUSCLE SPASMS    diclofenac (VOLTAREN) 50 MG EC tablet TAKE 1 TABLET BY MOUTH TWICE A DAY    diclofenac sodium (VOLTAREN) 1 % Gel Apply 2 g topically 3 (three) times daily as needed.    doxazosin (CARDURA) 4 MG tablet Take 1 tablet (4 mg total) by mouth once daily.    empagliflozin (JARDIANCE) 25 mg tablet Take 1 tablet (25 mg total) by mouth once  daily.    evolocumab (REPATHA SURECLICK) 140 mg/mL PnIj Inject 1 mL (140 mg total) into the skin every 14 (fourteen) days.    ezetimibe (ZETIA) 10 mg tablet Take 1 tablet (10 mg total) by mouth once daily.    fenofibrate micronized (LOFIBRA) 134 MG Cap TAKE ONE CAPSULE BY MOUTH EVERY DAY WITH BREAKFAST.    glimepiride (AMARYL) 4 MG tablet Take 4 mg by mouth before breakfast.    hydroCHLOROthiazide (HYDRODIURIL) 12.5 MG Tab Take 1 tablet (12.5 mg total) by mouth once daily.    isosorbide mononitrate (IMDUR) 60 MG 24 hr tablet Take 1 tablet (60 mg total) by mouth once daily.    metFORMIN (GLUCOPHAGE) 500 MG tablet TAKE 2 TABLETS BY MOUTH TWICE A DAY WITH MEALS    methocarbamoL (ROBAXIN) 500 MG Tab TAKE 1 TABLET BY MOUTH THREE TIMES A DAY AS NEEDED MUSCLE SPASM Strength: 500 mg    metoprolol succinate (TOPROL-XL) 50 MG 24 hr tablet TAKE 1 TABLET BY MOUTH TWICE A DAY    niacin (NIASPAN) 750 MG CR tablet Take 1 tablet (750 mg total) by mouth nightly.    nitrofurantoin, macrocrystal-monohydrate, (MACROBID) 100 MG capsule Take 1 capsule (100 mg total) by mouth 2 (two) times daily.    nitroGLYCERIN (NITROSTAT) 0.4 MG SL tablet TAKE ONE FOR ONSET OF CHEST PAIN / THEN EVERY 5 MINUTES IF CP CONTINUES UP TO 3 DOSES..CALL 911    nitroGLYCERIN (NITROSTAT) 0.4 MG SL tablet TAKE ONE FOR ONSET OF CHEST PAIN / THEN EVERY 5 MINUTES IF CP CONTINUES UP TO 3 DOSES..CALL 911    omeprazole (PRILOSEC) 40 MG capsule Take 40 mg by mouth once daily.    ondansetron (ZOFRAN) 4 MG tablet Take 1 tablet (4 mg total) by mouth every 8 (eight) hours as needed for Nausea.    rosuvastatin (CRESTOR) 40 MG Tab Take 1 tablet (40 mg total) by mouth every evening.   Last reviewed on 12/22/2022  9:34 AM by Rich Baez MA    Review of patient's allergies indicates:   Allergen Reactions    No known drug allergies     Last reviewed on  1/4/2023 9:43 AM by Vin Jacob      Tasks added this encounter   2/22/2023 - Refill Call (Auto Added)   Tasks due  within next 3 months   No tasks due.     [facility]  [address]  [facility] [address]  Phone: [phone]  Fax: [phone]

## 2023-01-25 ENCOUNTER — PATIENT MESSAGE (OUTPATIENT)
Dept: ADMINISTRATIVE | Facility: HOSPITAL | Age: 63
End: 2023-01-25
Payer: MEDICARE

## 2023-02-07 NOTE — TELEPHONE ENCOUNTER
Spoke with patient, confirmed appt and epre checkin for Edwin, shade.     Quinolones Counseling:  I discussed with the patient the risks of fluoroquinolones including but not limited to GI upset, allergic reaction, drug rash, diarrhea, dizziness, photosensitivity, yeast infections, liver function test abnormalities, tendonitis/tendon rupture.

## 2023-02-28 ENCOUNTER — PATIENT MESSAGE (OUTPATIENT)
Dept: PHARMACY | Facility: CLINIC | Age: 63
End: 2023-02-28
Payer: MEDICARE

## 2023-02-28 ENCOUNTER — SPECIALTY PHARMACY (OUTPATIENT)
Dept: PHARMACY | Facility: CLINIC | Age: 63
End: 2023-02-28
Payer: MEDICARE

## 2023-02-28 NOTE — TELEPHONE ENCOUNTER
Specialty Pharmacy - Refill Coordination    Specialty Medication Orders Linked to Encounter      Flowsheet Row Most Recent Value   Medication #1 evolocumab (REPATHA SURECLICK) 140 mg/mL PnIj (Order#[id number], Rx#[id number])            Refill Questions - Documented Responses      Flowsheet Row Most Recent Value   Patient Availability and HIPAA Verification    Does patient want to proceed with activity? Yes   HIPAA/medical authority confirmed? Yes   Relationship to patient of person spoken to? Self   Refill Screening Questions    Would patient like to speak to a pharmacist? No   When does the patient need to receive the medication? 03/06/23   Refill Delivery Questions    How will the patient receive the medication? MEDRx   When does the patient need to receive the medication? 03/06/23   Shipping Address Home   Address in [facility] confirmed and updated if neccessary? Yes   Expected Copay ($) 4.3   Is the patient able to afford the medication copay? Yes   Payment Method CC on file   Days supply of Refill 28   Supplies needed? No supplies needed   Refill activity completed? Yes   Refill activity plan Refill scheduled   Shipment/Pickup Date: 03/01/23            Current Outpatient Medications   Medication Sig    ALPRAZolam (XANAX) 0.5 MG tablet Take 1 tablet (0.5 mg total) by mouth daily as needed.    amLODIPine-benazepriL (LOTREL) 10-40 mg per capsule Take 1 capsule by mouth once daily.    aspirin (ECOTRIN) 81 MG EC tablet Take 1 tablet (81 mg total) by mouth once daily.    cyclobenzaprine (FLEXERIL) 10 MG tablet TAKE 1 TABLET BY MOUTH THREE TIMES A DAY AS NEEDED FOR MUSCLE SPASMS    diclofenac (VOLTAREN) 50 MG EC tablet TAKE 1 TABLET BY MOUTH TWICE A DAY    diclofenac sodium (VOLTAREN) 1 % Gel Apply 2 g topically 3 (three) times daily as needed.    doxazosin (CARDURA) 4 MG tablet Take 1 tablet (4 mg total) by mouth once daily.    empagliflozin (JARDIANCE) 25 mg tablet Take 1 tablet (25 mg total) by mouth once  daily.    evolocumab (REPATHA SURECLICK) 140 mg/mL PnIj Inject 1 mL (140 mg total) into the skin every 14 (fourteen) days.    ezetimibe (ZETIA) 10 mg tablet Take 1 tablet (10 mg total) by mouth once daily.    fenofibrate micronized (LOFIBRA) 134 MG Cap TAKE ONE CAPSULE BY MOUTH EVERY DAY WITH BREAKFAST.    glimepiride (AMARYL) 4 MG tablet Take 4 mg by mouth before breakfast.    hydroCHLOROthiazide (HYDRODIURIL) 12.5 MG Tab Take 1 tablet (12.5 mg total) by mouth once daily.    isosorbide mononitrate (IMDUR) 60 MG 24 hr tablet Take 1 tablet (60 mg total) by mouth once daily.    metFORMIN (GLUCOPHAGE) 500 MG tablet TAKE 2 TABLETS BY MOUTH TWICE A DAY WITH MEALS    methocarbamoL (ROBAXIN) 500 MG Tab TAKE 1 TABLET BY MOUTH THREE TIMES A DAY AS NEEDED MUSCLE SPASM STRENGTH: 500 MG    metoprolol succinate (TOPROL-XL) 50 MG 24 hr tablet TAKE 1 TABLET BY MOUTH TWICE A DAY    niacin (NIASPAN) 750 MG CR tablet Take 1 tablet (750 mg total) by mouth nightly.    nitrofurantoin, macrocrystal-monohydrate, (MACROBID) 100 MG capsule Take 1 capsule (100 mg total) by mouth 2 (two) times daily.    nitroGLYCERIN (NITROSTAT) 0.4 MG SL tablet TAKE ONE FOR ONSET OF CHEST PAIN / THEN EVERY 5 MINUTES IF CP CONTINUES UP TO 3 DOSES..CALL 911    nitroGLYCERIN (NITROSTAT) 0.4 MG SL tablet TAKE ONE FOR ONSET OF CHEST PAIN / THEN EVERY 5 MINUTES IF CP CONTINUES UP TO 3 DOSES..CALL 911    omeprazole (PRILOSEC) 40 MG capsule Take 40 mg by mouth once daily.    ondansetron (ZOFRAN) 4 MG tablet Take 1 tablet (4 mg total) by mouth every 8 (eight) hours as needed for Nausea.    rosuvastatin (CRESTOR) 40 MG Tab Take 1 tablet (40 mg total) by mouth every evening.   Last reviewed on 12/22/2022  9:34 AM by Rich Baez MA    Review of patient's allergies indicates:   Allergen Reactions    No known drug allergies     Last reviewed on  1/4/2023 9:43 AM by Vin Jacob      Tasks added this encounter   3/27/2023 - Refill Call (Auto Added)   Tasks due  within next 3 months   No tasks due.     Mita Palomares, PharmD  [facility]  [address]  [facility] [address]  Phone: [phone]  Fax: [phone]

## 2023-04-03 ENCOUNTER — SPECIALTY PHARMACY (OUTPATIENT)
Dept: PHARMACY | Facility: CLINIC | Age: 63
End: 2023-04-03
Payer: MEDICARE

## 2023-04-03 NOTE — TELEPHONE ENCOUNTER
Specialty Pharmacy - Refill Coordination    Specialty Medication Orders Linked to Encounter      Flowsheet Row Most Recent Value   Medication #1 evolocumab (REPATHA SURECLICK) 140 mg/mL PnIj (Order#[id number], Rx#[id number])            Refill Questions - Documented Responses      Flowsheet Row Most Recent Value   Patient Availability and HIPAA Verification    Does patient want to proceed with activity? Yes   HIPAA/medical authority confirmed? Yes   Relationship to patient of person spoken to? Self   Refill Screening Questions    Would patient like to speak to a pharmacist? No   When does the patient need to receive the medication? 04/06/23   Refill Delivery Questions    How will the patient receive the medication? MEDRx   When does the patient need to receive the medication? 04/06/23   Shipping Address Home   Address in [facility] confirmed and updated if neccessary? Yes   Expected Copay ($) 4.3   Is the patient able to afford the medication copay? Yes   Payment Method CC on file   Days supply of Refill 28   Supplies needed? No supplies needed   Refill activity completed? Yes   Refill activity plan Refill scheduled   Shipment/Pickup Date: 04/04/23            Current Outpatient Medications   Medication Sig    ALPRAZolam (XANAX) 0.5 MG tablet Take 1 tablet (0.5 mg total) by mouth daily as needed.    amLODIPine-benazepriL (LOTREL) 10-40 mg per capsule Take 1 capsule by mouth once daily.    aspirin (ECOTRIN) 81 MG EC tablet Take 1 tablet (81 mg total) by mouth once daily.    cyclobenzaprine (FLEXERIL) 10 MG tablet TAKE 1 TABLET BY MOUTH THREE TIMES A DAY AS NEEDED FOR MUSCLE SPASMS    diclofenac (VOLTAREN) 50 MG EC tablet TAKE 1 TABLET BY MOUTH TWICE A DAY    diclofenac sodium (VOLTAREN) 1 % Gel Apply 2 g topically 3 (three) times daily as needed.    doxazosin (CARDURA) 4 MG tablet Take 1 tablet (4 mg total) by mouth once daily.    empagliflozin (JARDIANCE) 25 mg tablet Take 1 tablet (25 mg total) by mouth once  daily.    evolocumab (REPATHA SURECLICK) 140 mg/mL PnIj Inject 1 mL (140 mg total) into the skin every 14 (fourteen) days.    ezetimibe (ZETIA) 10 mg tablet TAKE 1 TABLET BY MOUTH EVERY DAY    fenofibrate micronized (LOFIBRA) 134 MG Cap TAKE ONE CAPSULE BY MOUTH EVERY DAY WITH BREAKFAST.    glimepiride (AMARYL) 4 MG tablet Take 4 mg by mouth before breakfast.    hydroCHLOROthiazide (HYDRODIURIL) 12.5 MG Tab Take 1 tablet (12.5 mg total) by mouth once daily.    isosorbide mononitrate (IMDUR) 60 MG 24 hr tablet Take 1 tablet (60 mg total) by mouth once daily.    metFORMIN (GLUCOPHAGE) 500 MG tablet TAKE 2 TABLETS BY MOUTH TWICE A DAY WITH MEALS    methocarbamoL (ROBAXIN) 500 MG Tab TAKE 1 TABLET BY MOUTH THREE TIMES A DAY AS NEEDED MUSCLE SPASM STRENGTH: 500 MG    metoprolol succinate (TOPROL-XL) 50 MG 24 hr tablet TAKE 1 TABLET BY MOUTH TWICE A DAY    niacin (NIASPAN) 750 MG CR tablet Take 1 tablet (750 mg total) by mouth nightly.    nitrofurantoin, macrocrystal-monohydrate, (MACROBID) 100 MG capsule Take 1 capsule (100 mg total) by mouth 2 (two) times daily.    nitroGLYCERIN (NITROSTAT) 0.4 MG SL tablet TAKE ONE FOR ONSET OF CHEST PAIN / THEN EVERY 5 MINUTES IF CP CONTINUES UP TO 3 DOSES..CALL 911    nitroGLYCERIN (NITROSTAT) 0.4 MG SL tablet TAKE ONE FOR ONSET OF CHEST PAIN / THEN EVERY 5 MINUTES IF CP CONTINUES UP TO 3 DOSES..CALL 911    omeprazole (PRILOSEC) 40 MG capsule Take 40 mg by mouth once daily.    ondansetron (ZOFRAN) 4 MG tablet Take 1 tablet (4 mg total) by mouth every 8 (eight) hours as needed for Nausea.    rosuvastatin (CRESTOR) 40 MG Tab Take 1 tablet (40 mg total) by mouth every evening.   Last reviewed on 12/22/2022  9:34 AM by Rich Baez MA    Review of patient's allergies indicates:   Allergen Reactions    No known drug allergies     Last reviewed on  1/4/2023 9:43 AM by Vin Jacob      Tasks added this encounter   4/27/2023 - Refill Call (Auto Added)   Tasks due within next 3  months   No tasks due.     Kacy Zimmerman, PharmD  [facility]  [address]  [facility] [address]  Phone: [phone]  Fax: [phone]

## 2023-04-13 ENCOUNTER — PATIENT MESSAGE (OUTPATIENT)
Dept: SPORTS MEDICINE | Facility: CLINIC | Age: 63
End: 2023-04-13
Payer: MEDICARE

## 2023-04-13 ENCOUNTER — HOSPITAL ENCOUNTER (OUTPATIENT)
Dept: RADIOLOGY | Facility: HOSPITAL | Age: 63
Discharge: HOME | End: 2023-04-13
Attending: INTERNAL MEDICINE
Payer: MEDICARE

## 2023-04-13 ENCOUNTER — OFFICE VISIT (OUTPATIENT)
Dept: INTERNAL MEDICINE | Facility: CLINIC | Age: 63
End: 2023-04-13
Payer: MEDICARE

## 2023-04-13 VITALS
OXYGEN SATURATION: 98 % | DIASTOLIC BLOOD PRESSURE: 90 MMHG | HEART RATE: 62 BPM | BODY MASS INDEX: 34.12 KG/M2 | SYSTOLIC BLOOD PRESSURE: 158 MMHG | HEIGHT: 65 IN | WEIGHT: 204.81 LBS

## 2023-04-13 DIAGNOSIS — Z86.010 HISTORY OF COLON POLYPS: ICD-10-CM

## 2023-04-13 DIAGNOSIS — E11.29 MICROALBUMINURIA DUE TO TYPE 2 DIABETES MELLITUS: Primary | ICD-10-CM

## 2023-04-13 DIAGNOSIS — M75.102 ROTATOR CUFF SYNDROME OF LEFT SHOULDER: ICD-10-CM

## 2023-04-13 DIAGNOSIS — E11.59 HYPERTENSION ASSOCIATED WITH DIABETES: ICD-10-CM

## 2023-04-13 DIAGNOSIS — I15.2 HYPERTENSION ASSOCIATED WITH DIABETES: ICD-10-CM

## 2023-04-13 DIAGNOSIS — I25.10 CORONARY ARTERY DISEASE INVOLVING NATIVE CORONARY ARTERY OF NATIVE HEART WITHOUT ANGINA PECTORIS: Chronic | ICD-10-CM

## 2023-04-13 DIAGNOSIS — E11.8 TYPE II DIABETES MELLITUS WITH COMPLICATION: ICD-10-CM

## 2023-04-13 DIAGNOSIS — I25.2 HISTORY OF NON-ST ELEVATION MYOCARDIAL INFARCTION (NSTEMI): ICD-10-CM

## 2023-04-13 DIAGNOSIS — E78.5 HYPERLIPIDEMIA ASSOCIATED WITH TYPE 2 DIABETES MELLITUS: ICD-10-CM

## 2023-04-13 DIAGNOSIS — E11.69 HYPERLIPIDEMIA ASSOCIATED WITH TYPE 2 DIABETES MELLITUS: ICD-10-CM

## 2023-04-13 DIAGNOSIS — R80.9 MICROALBUMINURIA DUE TO TYPE 2 DIABETES MELLITUS: Primary | ICD-10-CM

## 2023-04-13 DIAGNOSIS — E66.01 SEVERE OBESITY (BMI 35.0-35.9 WITH COMORBIDITY): ICD-10-CM

## 2023-04-13 DIAGNOSIS — I77.819 AORTIC ECTASIA: ICD-10-CM

## 2023-04-13 PROCEDURE — 73030 X-RAY EXAM OF SHOULDER: CPT | Mod: TC,PO,LT

## 2023-04-13 PROCEDURE — 73030 XR SHOULDER COMPLETE 2 OR MORE VIEWS LEFT: ICD-10-PCS | Mod: 26,LT,, | Performed by: RADIOLOGY

## 2023-04-13 PROCEDURE — 99999 PR PBB SHADOW E&M-EST. PATIENT-LVL V: CPT | Mod: PBBFAC,,, | Performed by: INTERNAL MEDICINE

## 2023-04-13 PROCEDURE — 99214 OFFICE O/P EST MOD 30 MIN: CPT | Mod: S$PBB,,, | Performed by: INTERNAL MEDICINE

## 2023-04-13 PROCEDURE — 73030 X-RAY EXAM OF SHOULDER: CPT | Mod: 26,LT,, | Performed by: RADIOLOGY

## 2023-04-13 PROCEDURE — 99214 PR OFFICE/OUTPT VISIT, EST, LEVL IV, 30-39 MIN: ICD-10-PCS | Mod: S$PBB,,, | Performed by: INTERNAL MEDICINE

## 2023-04-13 PROCEDURE — 99999 PR PBB SHADOW E&M-EST. PATIENT-LVL V: ICD-10-PCS | Mod: PBBFAC,,, | Performed by: INTERNAL MEDICINE

## 2023-04-13 PROCEDURE — 99215 OFFICE O/P EST HI 40 MIN: CPT | Mod: PBBFAC,PO | Performed by: INTERNAL MEDICINE

## 2023-04-13 RX ORDER — EMPAGLIFLOZIN 10 MG/1
10 TABLET, FILM COATED ORAL DAILY
COMMUNITY
Start: 2023-01-02 | End: 2023-10-16

## 2023-04-14 ENCOUNTER — PATIENT MESSAGE (OUTPATIENT)
Dept: INTERNAL MEDICINE | Facility: CLINIC | Age: 63
End: 2023-04-14
Payer: MEDICARE

## 2023-04-19 ENCOUNTER — OFFICE VISIT (OUTPATIENT)
Dept: ORTHOPEDICS | Facility: CLINIC | Age: 63
End: 2023-04-19
Payer: MEDICARE

## 2023-04-19 ENCOUNTER — PATIENT MESSAGE (OUTPATIENT)
Dept: ADMINISTRATIVE | Facility: HOSPITAL | Age: 63
End: 2023-04-19
Payer: MEDICARE

## 2023-04-19 ENCOUNTER — TELEPHONE (OUTPATIENT)
Dept: INTERNAL MEDICINE | Facility: CLINIC | Age: 63
End: 2023-04-19
Payer: MEDICARE

## 2023-04-19 VITALS — BODY MASS INDEX: 33.99 KG/M2 | HEIGHT: 65 IN | WEIGHT: 204 LBS

## 2023-04-19 DIAGNOSIS — M77.12 LEFT LATERAL EPICONDYLITIS: ICD-10-CM

## 2023-04-19 DIAGNOSIS — M75.102 NON-TRAUMATIC ROTATOR CUFF TEAR, LEFT: Primary | ICD-10-CM

## 2023-04-19 DIAGNOSIS — M75.102 ROTATOR CUFF SYNDROME OF LEFT SHOULDER: ICD-10-CM

## 2023-04-19 PROCEDURE — 99999 PR PBB SHADOW E&M-EST. PATIENT-LVL IV: ICD-10-PCS | Mod: PBBFAC,,, | Performed by: PHYSICIAN ASSISTANT

## 2023-04-19 PROCEDURE — 99999 PR PBB SHADOW E&M-EST. PATIENT-LVL IV: CPT | Mod: PBBFAC,,, | Performed by: PHYSICIAN ASSISTANT

## 2023-04-19 PROCEDURE — 99214 OFFICE O/P EST MOD 30 MIN: CPT | Mod: PBBFAC,25 | Performed by: PHYSICIAN ASSISTANT

## 2023-04-19 PROCEDURE — 20610 DRAIN/INJ JOINT/BURSA W/O US: CPT | Mod: PBBFAC | Performed by: PHYSICIAN ASSISTANT

## 2023-04-19 PROCEDURE — 20610 LARGE JOINT ASPIRATION/INJECTION: L SUBACROMIAL BURSA: ICD-10-PCS | Mod: S$PBB,LT,, | Performed by: PHYSICIAN ASSISTANT

## 2023-04-19 PROCEDURE — 99214 OFFICE O/P EST MOD 30 MIN: CPT | Mod: S$PBB,25,, | Performed by: PHYSICIAN ASSISTANT

## 2023-04-19 PROCEDURE — 20610 DRAIN/INJ JOINT/BURSA W/O US: CPT | Mod: S$PBB,LT,, | Performed by: PHYSICIAN ASSISTANT

## 2023-04-19 PROCEDURE — 99214 PR OFFICE/OUTPT VISIT, EST, LEVL IV, 30-39 MIN: ICD-10-PCS | Mod: S$PBB,25,, | Performed by: PHYSICIAN ASSISTANT

## 2023-04-19 RX ORDER — TRIAMCINOLONE ACETONIDE 40 MG/ML
40 INJECTION, SUSPENSION INTRA-ARTICULAR; INTRAMUSCULAR
Status: DISCONTINUED | OUTPATIENT
Start: 2023-04-19 | End: 2023-04-19 | Stop reason: HOSPADM

## 2023-04-19 RX ORDER — NAPROXEN 500 MG/1
500 TABLET ORAL 2 TIMES DAILY
Qty: 60 TABLET | Refills: 0 | Status: SHIPPED | OUTPATIENT
Start: 2023-04-19 | End: 2023-05-18

## 2023-04-19 RX ORDER — DICLOFENAC SODIUM 10 MG/G
2 GEL TOPICAL 4 TIMES DAILY
Qty: 1 EACH | Refills: 2 | Status: SHIPPED | OUTPATIENT
Start: 2023-04-19 | End: 2023-10-06 | Stop reason: SDUPTHER

## 2023-04-19 RX ADMIN — TRIAMCINOLONE ACETONIDE 40 MG: 200 INJECTION, SUSPENSION INTRA-ARTICULAR; INTRAMUSCULAR at 09:04

## 2023-04-19 NOTE — PROGRESS NOTES
Orthopaedics Sports Medicine     Shoulder Initial Visit         4/19/2023    Referring MD: Zeus Hanson MD    Chief Complaint   Patient presents with    Left Shoulder - Pain         History of Present Illness:   Dulce Boogie is a 62 y.o. right-hand dominant female who presents with LEFT shoulder pain and dysfunction. This patient is new to me.  She was previously seen by Clemencia Clayton PA-C in 2019. She was initially treating her for left shoulder rotator cuff impingement. She received 2 SAS CSIs, but ultimately she failed to improve with conservative treatment and they ended up getting an MRI of the left shoulder. MRI of the left shoulder from January 2020 revealed a full-thickness rotator cuff tear.    Onset of the symptoms was several years ago.     Inciting event: No specific injury or trauma.      Current symptoms include constant aching, throbbing pain of the left shoulder, limited ROM, night pain    Pain is aggravated by raising the arm, overhead activities, repetitive movements      Evaluation to date: X-Ray, MRI     Treatment to date: Rest, activity modification, anti-inflammatories, robaxin, physical therapy, GH CSI, SAS CSI x2 with Cathryn Gutierrez      Of note, she is also complaining of left elbow pain. The left upper pain began a couple of months ago, has been worsening. The pain is located on the outside part of her elbow.     Past Medical History:   Past Medical History:   Diagnosis Date    Abnormal EKG 12/8/2022    Anticoagulant long-term use     CAD (coronary artery disease)     Depression     History of colon polyps     Hyperlipidemia associated with type 2 diabetes mellitus     Hyperlipidemia associated with type 2 diabetes mellitus     Hypertension associated with diabetes     Microalbuminuria due to type 2 diabetes mellitus     NSTEMI (non-ST elevated myocardial infarction) 5/21/2018    Obesity     Type II diabetes mellitus with complication        Past Surgical History:   Past  Surgical History:   Procedure Laterality Date    CAROTID STENT       SECTION, LOW TRANSVERSE      x 2    COLONOSCOPY N/A 6/10/2019    Procedure: COLONOSCOPY;  Surgeon: Maricarmen Boo MD;  Location: [facility] ENDO;  Service: Endoscopy;  Laterality: N/A;    COLONOSCOPY N/A 2022    Procedure: COLONOSCOPY;  Surgeon: Ryan Lau MD;  Location: [facility] ENDO;  Service: Endoscopy;  Laterality: N/A;    CORONARY STENT PLACEMENT N/A 2021    Procedure: INSERTION, STENT, CORONARY ARTERY;  Surgeon: Aimee Cooper MD;  Location: [facility] CATH LAB;  Service: Cardiology;  Laterality: N/A;    LEFT HEART CATHETERIZATION Left 2018    Procedure: HEART CATH-LEFT;  Surgeon: Aimee Cooper MD;  Location: [facility] CATH LAB;  Service: Cardiology;  Laterality: Left;    LEFT HEART CATHETERIZATION Left 2021    Procedure: CATHETERIZATION, HEART, LEFT;  Surgeon: Aimee Cooper MD;  Location: [facility] CATH LAB;  Service: Cardiology;  Laterality: Left;    PERCUTANEOUS TRANSLUMINAL BALLOON ANGIOPLASTY OF CORONARY ARTERY  2021    Procedure: Angioplasty-coronary;  Surgeon: Aimee Cooper MD;  Location: [facility] CATH LAB;  Service: Cardiology;;       Medications:  Patient's Medications   New Prescriptions    No medications on file   Previous Medications    ALPRAZOLAM (XANAX) 0.5 MG TABLET    Take 1 tablet (0.5 mg total) by mouth daily as needed.    AMLODIPINE-BENAZEPRIL (LOTREL) 10-40 MG PER CAPSULE    Take 1 capsule by mouth once daily.    ASPIRIN (ECOTRIN) 81 MG EC TABLET    Take 1 tablet (81 mg total) by mouth once daily.    CYCLOBENZAPRINE (FLEXERIL) 10 MG TABLET    TAKE 1 TABLET BY MOUTH THREE TIMES A DAY AS NEEDED FOR MUSCLE SPASMS    DICLOFENAC (VOLTAREN) 50 MG EC TABLET    TAKE 1 TABLET BY MOUTH TWICE A DAY    DICLOFENAC SODIUM (VOLTAREN) 1 % GEL    Apply 2 g topically 3 (three) times daily as needed.    DOXAZOSIN (CARDURA) 4 MG TABLET    Take 1 tablet (4 mg total) by mouth once daily.    EVOLOCUMAB (REPATHA SURECLICK)  140 MG/ML PNIJ    Inject 1 mL (140 mg total) into the skin every 14 (fourteen) days.    EZETIMIBE (ZETIA) 10 MG TABLET    TAKE 1 TABLET BY MOUTH EVERY DAY    FENOFIBRATE MICRONIZED (LOFIBRA) 134 MG CAP    TAKE ONE CAPSULE BY MOUTH EVERY DAY WITH BREAKFAST.    GLIMEPIRIDE (AMARYL) 4 MG TABLET    Take 4 mg by mouth before breakfast.    HYDROCHLOROTHIAZIDE (HYDRODIURIL) 12.5 MG TAB    Take 1 tablet (12.5 mg total) by mouth once daily.    ISOSORBIDE MONONITRATE (IMDUR) 60 MG 24 HR TABLET    Take 1 tablet (60 mg total) by mouth once daily.    JARDIANCE 10 MG TABLET        METFORMIN (GLUCOPHAGE) 500 MG TABLET    TAKE 2 TABLETS BY MOUTH TWICE A DAY WITH MEALS    METHOCARBAMOL (ROBAXIN) 500 MG TAB    TAKE 1 TABLET BY MOUTH THREE TIMES A DAY AS NEEDED MUSCLE SPASM STRENGTH: 500 MG    METOPROLOL SUCCINATE (TOPROL-XL) 50 MG 24 HR TABLET    TAKE 1 TABLET BY MOUTH TWICE A DAY    NIACIN (NIASPAN) 750 MG CR TABLET    Take 1 tablet (750 mg total) by mouth nightly.    NITROFURANTOIN, MACROCRYSTAL-MONOHYDRATE, (MACROBID) 100 MG CAPSULE    Take 1 capsule (100 mg total) by mouth 2 (two) times daily.    NITROGLYCERIN (NITROSTAT) 0.4 MG SL TABLET    TAKE ONE FOR ONSET OF CHEST PAIN / THEN EVERY 5 MINUTES IF CP CONTINUES UP TO 3 DOSES..CALL 911    NITROGLYCERIN (NITROSTAT) 0.4 MG SL TABLET    TAKE ONE FOR ONSET OF CHEST PAIN / THEN EVERY 5 MINUTES IF CP CONTINUES UP TO 3 DOSES..CALL 911    OMEPRAZOLE (PRILOSEC) 40 MG CAPSULE    Take 40 mg by mouth once daily.    ONDANSETRON (ZOFRAN) 4 MG TABLET    Take 1 tablet (4 mg total) by mouth every 8 (eight) hours as needed for Nausea.    ROSUVASTATIN (CRESTOR) 40 MG TAB    Take 1 tablet (40 mg total) by mouth every evening.   Modified Medications    No medications on file   Discontinued Medications    No medications on file       Allergies:   Review of patient's allergies indicates:   Allergen Reactions    No known drug allergies        Social History:   Home town: [place], [place]  Occupation:  "Retired  Alcohol use: She reports no history of alcohol use.  Tobacco use: She reports that she has never smoked. She has never used smokeless tobacco.    Review of systems:  History of recent illness, fevers, shakes, or chills: no  History of cardiac problems or chest pain: yes, HTN, HLD, hx of NSTEMI  History of pulmonary problems or asthma: no  History of diabetes: yes, A1C 7.7 (4/13/23)  History of prior dvt or clotting problems: no  History of sleep apnea: no      Physical Examination:  Estimated body mass index is 33.95 kg/m² as calculated from the following:    Height as of this encounter: 5' 5" (1.651 m).    Weight as of this encounter: 92.5 kg (204 lb).    General  Healthy appearing female in no acute distress  Alert and oriented, normal mood, appropriate affect    Shoulder Examination:  Patient is alert and oriented, no distress. Skin is intact. Neuro is normal with no focal motor or sensory findings.    Cervical exam is unremarkable. Intact cervical ROM. Negative Spurling's test    Physical Exam:  RIGHT    LEFT    Scap Dyskinesis/Winging (-)    (-)    Tenderness:          Greater Tuberosity             (-)    +  Bicipital Groove  (-)    (-)  AC joint   (-)    (-)  Other:     ROM:  Forward Elevation 160    150  Abduction  100    90  ER (at side)  80    60  IR   T10    deferred    Strength:   Supraspinatus  5/5    4/5  Infraspinatus  5/5    5/5  Subscap / IR  5/5    5/5     Special Tests:   Neer:    (-)    +   Jiménez:   (-)    +   SS Stress:   (-)    +   Bear Hug:   (-)    (-)   Cleveland's:   (-)    +   Resisted Thrower's:   (-)    (-)   Cross Arm Abduction:  (-)    (-)    Neurovascular examination  - Motor grossly intact bilaterally to shoulder abduction, elbow flexion and extension, wrist flexion and extension, and intrinsic hand musculature  - Sensation intact to light touch bilaterally in axillary, median, radial, and ulnar distributions  - Symmetrical radial pulses      Imaging:  XR Results:  Results " for orders placed during the hospital encounter of 04/13/23    X-Ray Shoulder 2 or More Views Left    Narrative  EXAMINATION:  XR SHOULDER COMPLETE 2 OR MORE VIEWS LEFT    CLINICAL HISTORY:  Unspecified rotator cuff tear or rupture of left shoulder, not specified as traumatic    TECHNIQUE:  Two or three views of the left shoulder were performed.    COMPARISON:  None    FINDINGS:  There is no radiographic evidence of acute osseous, articular, or soft tissue abnormality.  There is mild AC joint arthrosis with prominent bony spurring noted along the undersurface of the acromion.  Glenohumeral joint space appears preserved.    Impression  As Above      Electronically signed by: Gino Ho MD  Date:    04/13/2023  Time:    10:09      MRI Results:  Results for orders placed during the hospital encounter of 01/31/20    MRI Shoulder Without Contrast Left    Narrative  EXAMINATION:  MRI SHOULDER WITHOUT CONTRAST LEFT    CLINICAL HISTORY:  Shoulder pain, prior xray, rotator cuff tear / impingement suspected;  Pain in left shoulder    TECHNIQUE:  Multisequence, multiplanar MR imaging of the shoulder performed without contrast.    COMPARISON:  Prior left shoulder radiographs dating back to 04/01/2019.    FINDINGS:  Rotator cuff:    Supraspinatus: Full-thickness tear involving the supraspinatus tendon with some intact fibers seen posteriorly.    Infraspinatus: Intact    Subscapularis: Intact    Teres minor: Intact    Muscle bulk is maintained.    Labrum: Evaluation limited by lack of joint distention/intra-articular contrast.  Degenerative changes of the labrum are noted.    Biceps: Long head biceps tendon is intact.    Bone: No fracture present.  Bone marrow signal is unremarkable.  Type 3 acromion with some lateral downsloping of the acromion is noted.    Acromioclavicular joint:Degenerative changes are noted with subchondral cystic change and mild spurring.    Cartilage: Articular cartilage of the glenohumeral joint is  preserved    Miscellaneous: No significant joint effusion.  Mild subdeltoid fluid is present.    Impression  Full-thickness supraspinatus tendon tear as above.  Mild subdeltoid bursitis, degenerative changes, and other findings as described.      Electronically signed by: Reynaldo Miller MD  Date:    01/31/2020  Time:    10:41    Physician Read: I agree with the above impression.      Impression:  62 y.o. female with left shoulder full-thickness rotator cuff tear, lateral epicondylitis      Plan:  Discussed diagnosis and treatment options with patient today.  She has a known left shoulder rotator cuff tear  We discussed operative versus nonoperative treatment options today.  Operative treatment to include a rotator cuff repair versus rTSA pending repeat MRI.  Non-operative treatment to include rest, activity modification, anti-inflammatories, physical therapy versus home exercise program, intermittent CSI  She has already tried rest, activity modification, anti-inflammatories, PT, CSI.  Her last CSI was greater than 3 years ago. She got good relief in the past with CSI. She is not interested in proceeding with surgical intervention at this time  I recommend we proceed with a left shoulder CSI, patient tolerated the procedure well with no immediate complications  I did discuss that in the future if she does want to go forward with surgical intervention, we would need to obtain a new MRI for surgical planning. She would like to hold off on this until she is ready to have surgery  Follow up with me in 3 months       Lateral epicondylitis  She also has symptoms today that is consistent with lateral epicondylitis.  We reviewed the risks and benefits of a lateral epicondyle injection, after reviewing the risks she would like to defer that injection.  Instead we will proceed with more conservative treatment  We got her fitted today for a tennis elbow strap to offload some of the pressure of her extensor tendons  Rx for  Voltaren gel provided to rub on her elbow  Rx for naproxen 500 BID provided as well          Anjana Carvajal PA-C  Sports Medicine Physician Assistant       Disclaimer: This note was prepared using a voice recognition system and is likely to have sound alike errors within the text.

## 2023-04-19 NOTE — PROCEDURES
Large Joint Aspiration/Injection: L subacromial bursa    Date/Time: 4/19/2023 9:30 AM  Performed by: Anjana Carvajal PA-C  Authorized by: Anjana Carvajal PA-C     Consent Done?:  Yes (Verbal)  Indications:  Pain  Site marked: the procedure site was marked    Timeout: prior to procedure the correct patient, procedure, and site was verified    Prep: patient was prepped and draped in usual sterile fashion      Local anesthesia used?: Yes    Anesthesia:  Local infiltration  Local anesthetic:  Lidocaine 1% without epinephrine    Details:  Needle Size:  22 G  Ultrasonic Guidance for needle placement?: No    Approach:  Posterior  Location:  Shoulder  Site:  L subacromial bursa  Medications:  40 mg triamcinolone acetonide 40 mg/mL  Patient tolerance:  Patient tolerated the procedure well with no immediate complications    Procedure Note:  We discussed the risk and benefits of injections, including pain, infection, bleeding, damage to adjacent structures, risk of reaction to injection. We discussed the steroid/cortisone injections will not heal the problem but mat help decrease inflammation and help with symptoms. We discussed the risk of repeated injections. The patient expressed understanding and wanted to proceed with the injection. We performed a timeout to verify the proper patient, proper procedure, and the proper site. The injection site was prepared in a sterile fashion. The patient tolerated it well and there were no complication. We did discuss with the patient that steroid injections can cause some increase in blood sugar and blood pressure for up to a week after the injection.

## 2023-04-27 ENCOUNTER — PATIENT MESSAGE (OUTPATIENT)
Dept: PHARMACY | Facility: CLINIC | Age: 63
End: 2023-04-27
Payer: MEDICARE

## 2023-04-27 RX ORDER — DICLOFENAC SODIUM 50 MG/1
TABLET, DELAYED RELEASE ORAL
Qty: 60 TABLET | Refills: 5 | Status: SHIPPED | OUTPATIENT
Start: 2023-04-27 | End: 2023-10-24 | Stop reason: ALTCHOICE

## 2023-05-01 ENCOUNTER — SPECIALTY PHARMACY (OUTPATIENT)
Dept: PHARMACY | Facility: CLINIC | Age: 63
End: 2023-05-01
Payer: MEDICARE

## 2023-05-01 ENCOUNTER — TELEPHONE (OUTPATIENT)
Dept: CARDIOLOGY | Facility: CLINIC | Age: 63
End: 2023-05-01
Payer: MEDICARE

## 2023-05-01 NOTE — TELEPHONE ENCOUNTER
Spoke to patient and scheduled f/u with Dr. Cooper at [facility]----- Message from Holley Baez sent at 5/1/2023  3:15 PM CDT -----  Contact: self [phone]  Patient called in this afternoon to  verify her upcoming appointments she states she should have on with Dr. Cooper but I don't see one. Please call back [phone]. Thanks tpw

## 2023-05-01 NOTE — TELEPHONE ENCOUNTER
Specialty Pharmacy - Refill Coordination    Specialty Medication Orders Linked to Encounter      Flowsheet Row Most Recent Value   Medication #1 evolocumab (REPATHA SURECLICK) 140 mg/mL PnIj (Order#[id number], Rx#[id number])            Refill Questions - Documented Responses      Flowsheet Row Most Recent Value   Refill Screening Questions    Would patient like to speak to a pharmacist? No   When does the patient need to receive the medication? 05/05/23   Refill Delivery Questions    How will the patient receive the medication? [facility]   When does the patient need to receive the medication? 05/05/23   Shipping Address Home   Address in [facility] confirmed and updated if neccessary? Yes   Expected Copay ($) 4.3   Is the patient able to afford the medication copay? Yes   Payment Method CC on file   Days supply of Refill 28   Supplies needed? No supplies needed   Refill activity completed? Yes   Refill activity plan Refill scheduled   Shipment/Pickup Date: 05/03/23            Current Outpatient Medications   Medication Sig    ALPRAZolam (XANAX) 0.5 MG tablet Take 1 tablet (0.5 mg total) by mouth daily as needed.    amLODIPine-benazepriL (LOTREL) 10-40 mg per capsule Take 1 capsule by mouth once daily.    aspirin (ECOTRIN) 81 MG EC tablet Take 1 tablet (81 mg total) by mouth once daily.    cyclobenzaprine (FLEXERIL) 10 MG tablet TAKE 1 TABLET BY MOUTH THREE TIMES A DAY AS NEEDED FOR MUSCLE SPASMS    diclofenac (VOLTAREN) 50 MG EC tablet TAKE 1 TABLET BY MOUTH TWICE A DAY WITH FOOD AS NEEDED FOR PAIN    diclofenac sodium (VOLTAREN) 1 % Gel Apply 2 g topically 4 (four) times daily.    doxazosin (CARDURA) 4 MG tablet Take 1 tablet (4 mg total) by mouth once daily.    evolocumab (REPATHA SURECLICK) 140 mg/mL PnIj Inject 1 mL (140 mg total) into the skin every 14 (fourteen) days.    ezetimibe (ZETIA) 10 mg tablet TAKE 1 TABLET BY MOUTH EVERY DAY    fenofibrate micronized (LOFIBRA) 134 MG Cap TAKE ONE CAPSULE BY MOUTH  EVERY DAY WITH BREAKFAST.    glimepiride (AMARYL) 4 MG tablet Take 4 mg by mouth before breakfast.    hydroCHLOROthiazide (HYDRODIURIL) 12.5 MG Tab Take 1 tablet (12.5 mg total) by mouth once daily.    isosorbide mononitrate (IMDUR) 60 MG 24 hr tablet Take 1 tablet (60 mg total) by mouth once daily.    JARDIANCE 10 mg tablet     metFORMIN (GLUCOPHAGE) 500 MG tablet TAKE 2 TABLETS BY MOUTH TWICE A DAY WITH MEALS    methocarbamoL (ROBAXIN) 500 MG Tab TAKE 1 TABLET BY MOUTH THREE TIMES A DAY AS NEEDED MUSCLE SPASM STRENGTH: 500 MG    metoprolol succinate (TOPROL-XL) 50 MG 24 hr tablet TAKE 1 TABLET BY MOUTH TWICE A DAY    naproxen (NAPROSYN) 500 MG tablet Take 1 tablet (500 mg total) by mouth 2 (two) times daily.    niacin (NIASPAN) 750 MG CR tablet Take 1 tablet (750 mg total) by mouth nightly.    nitrofurantoin, macrocrystal-monohydrate, (MACROBID) 100 MG capsule Take 1 capsule (100 mg total) by mouth 2 (two) times daily.    nitroGLYCERIN (NITROSTAT) 0.4 MG SL tablet TAKE ONE FOR ONSET OF CHEST PAIN / THEN EVERY 5 MINUTES IF CP CONTINUES UP TO 3 DOSES..CALL 911    nitroGLYCERIN (NITROSTAT) 0.4 MG SL tablet TAKE ONE FOR ONSET OF CHEST PAIN / THEN EVERY 5 MINUTES IF CP CONTINUES UP TO 3 DOSES..CALL 911    omeprazole (PRILOSEC) 40 MG capsule Take 40 mg by mouth once daily.    ondansetron (ZOFRAN) 4 MG tablet Take 1 tablet (4 mg total) by mouth every 8 (eight) hours as needed for Nausea.    rosuvastatin (CRESTOR) 40 MG Tab Take 1 tablet (40 mg total) by mouth every evening.   Last reviewed on 4/19/2023 10:11 AM by Anjana Carvajal PA-C    Review of patient's allergies indicates:   Allergen Reactions    No known drug allergies     Last reviewed on  4/19/2023 10:11 AM by Anjana Carvajal      Tasks added this encounter   No tasks added.   Tasks due within next 3 months   4/30/2023 - Refill Coordination Outreach (1 time occurrence)     Timothy De Leon, PharmD  [facility]  [address]  [address]  [facility] [address]  Phone: [phone]  Fax: [phone]

## 2023-05-05 ENCOUNTER — TELEPHONE (OUTPATIENT)
Dept: ADMINISTRATIVE | Facility: HOSPITAL | Age: 63
End: 2023-05-05
Payer: MEDICARE

## 2023-05-17 DIAGNOSIS — M77.12 LEFT LATERAL EPICONDYLITIS: ICD-10-CM

## 2023-05-17 DIAGNOSIS — M75.102 ROTATOR CUFF SYNDROME OF LEFT SHOULDER: ICD-10-CM

## 2023-05-17 DIAGNOSIS — M75.102 NON-TRAUMATIC ROTATOR CUFF TEAR, LEFT: ICD-10-CM

## 2023-05-18 RX ORDER — NAPROXEN 500 MG/1
TABLET ORAL
Qty: 60 TABLET | Refills: 0 | Status: SHIPPED | OUTPATIENT
Start: 2023-05-18 | End: 2023-06-28

## 2023-05-24 ENCOUNTER — HOSPITAL ENCOUNTER (OUTPATIENT)
Dept: RADIOLOGY | Facility: HOSPITAL | Age: 63
Discharge: HOME | End: 2023-05-24
Attending: INTERNAL MEDICINE
Payer: MEDICARE

## 2023-05-24 ENCOUNTER — SPECIALTY PHARMACY (OUTPATIENT)
Dept: PHARMACY | Facility: CLINIC | Age: 63
End: 2023-05-24
Payer: MEDICARE

## 2023-05-24 DIAGNOSIS — Z12.31 SCREENING MAMMOGRAM FOR BREAST CANCER: ICD-10-CM

## 2023-05-24 PROCEDURE — 77063 BREAST TOMOSYNTHESIS BI: CPT | Mod: 26,,, | Performed by: RADIOLOGY

## 2023-05-24 PROCEDURE — 77067 SCR MAMMO BI INCL CAD: CPT | Mod: TC

## 2023-05-24 PROCEDURE — 77063 MAMMO DIGITAL SCREENING BILAT WITH TOMO: ICD-10-PCS | Mod: 26,,, | Performed by: RADIOLOGY

## 2023-05-24 PROCEDURE — 77067 MAMMO DIGITAL SCREENING BILAT WITH TOMO: ICD-10-PCS | Mod: 26,,, | Performed by: RADIOLOGY

## 2023-05-24 PROCEDURE — 77067 SCR MAMMO BI INCL CAD: CPT | Mod: 26,,, | Performed by: RADIOLOGY

## 2023-05-24 NOTE — TELEPHONE ENCOUNTER
OUTGOING CALL: PT IS DUE TO INJECT ON 6/6, I INFORMED HER THAT [facility] WILL GIVE HER A CALL BACK NEXT WEEK TO SCHEDULE REFILL.

## 2023-05-29 ENCOUNTER — PES CALL (OUTPATIENT)
Dept: ADMINISTRATIVE | Facility: CLINIC | Age: 63
End: 2023-05-29
Payer: MEDICARE

## 2023-05-30 ENCOUNTER — OFFICE VISIT (OUTPATIENT)
Dept: INTERNAL MEDICINE | Facility: CLINIC | Age: 63
End: 2023-05-30
Payer: MEDICARE

## 2023-05-30 VITALS
WEIGHT: 202.81 LBS | HEART RATE: 62 BPM | OXYGEN SATURATION: 98 % | BODY MASS INDEX: 33.79 KG/M2 | DIASTOLIC BLOOD PRESSURE: 76 MMHG | HEIGHT: 65 IN | SYSTOLIC BLOOD PRESSURE: 122 MMHG | TEMPERATURE: 98 F

## 2023-05-30 DIAGNOSIS — I15.2 HYPERTENSION ASSOCIATED WITH DIABETES: Primary | ICD-10-CM

## 2023-05-30 DIAGNOSIS — I25.10 CORONARY ARTERY DISEASE INVOLVING NATIVE CORONARY ARTERY OF NATIVE HEART WITHOUT ANGINA PECTORIS: Chronic | ICD-10-CM

## 2023-05-30 DIAGNOSIS — E11.59 HYPERTENSION ASSOCIATED WITH DIABETES: Primary | ICD-10-CM

## 2023-05-30 DIAGNOSIS — E78.5 HYPERLIPIDEMIA ASSOCIATED WITH TYPE 2 DIABETES MELLITUS: ICD-10-CM

## 2023-05-30 DIAGNOSIS — E11.8 TYPE II DIABETES MELLITUS WITH COMPLICATION: ICD-10-CM

## 2023-05-30 DIAGNOSIS — E11.69 HYPERLIPIDEMIA ASSOCIATED WITH TYPE 2 DIABETES MELLITUS: ICD-10-CM

## 2023-05-30 PROCEDURE — 99999 PR PBB SHADOW E&M-EST. PATIENT-LVL III: ICD-10-PCS | Mod: PBBFAC,,, | Performed by: INTERNAL MEDICINE

## 2023-05-30 PROCEDURE — 99213 OFFICE O/P EST LOW 20 MIN: CPT | Mod: S$PBB,,, | Performed by: INTERNAL MEDICINE

## 2023-05-30 PROCEDURE — 99213 PR OFFICE/OUTPT VISIT, EST, LEVL III, 20-29 MIN: ICD-10-PCS | Mod: S$PBB,,, | Performed by: INTERNAL MEDICINE

## 2023-05-30 PROCEDURE — 99999 PR PBB SHADOW E&M-EST. PATIENT-LVL III: CPT | Mod: PBBFAC,,, | Performed by: INTERNAL MEDICINE

## 2023-05-30 PROCEDURE — 99213 OFFICE O/P EST LOW 20 MIN: CPT | Mod: PBBFAC,PO | Performed by: INTERNAL MEDICINE

## 2023-05-30 RX ORDER — DOXAZOSIN 4 MG/1
4 TABLET ORAL DAILY
Qty: 90 TABLET | Refills: 3 | Status: SHIPPED | OUTPATIENT
Start: 2023-05-30

## 2023-05-30 RX ORDER — OMEPRAZOLE 40 MG/1
40 CAPSULE, DELAYED RELEASE ORAL DAILY
Qty: 90 CAPSULE | Refills: 3 | Status: SHIPPED | OUTPATIENT
Start: 2023-05-30 | End: 2023-12-14 | Stop reason: SDUPTHER

## 2023-05-30 NOTE — PROGRESS NOTES
"HPI:  Patient is a 62-year-old female comes today for follow-up of her hypertension and diabetes.  Patient has now joined the [facility] hypertension program.  She states her blood pressure readings at home have been excellent.  She denies any hypotension problems.  There have been no other new complaints or problems    Current meds have been verified and updated per the EMR  Exam:/76 (BP Location: Left arm, Patient Position: Sitting, BP Method: Large (Manual))   Pulse 62   Temp 98.1 °F (36.7 °C) (Tympanic)   Ht 5' 5" (1.651 m)   Wt 92 kg (202 lb 13.2 oz)   LMP 06/21/2003 (Exact Date)   SpO2 98%   BMI 33.75 kg/m²   Exam deferred    Lab Results   Component Value Date    WBC 8.24 04/13/2023    HGB 13.9 04/13/2023    HCT 43.9 04/13/2023     04/13/2023    CHOL 155 04/13/2023    TRIG 187 (H) 04/13/2023    HDL 42 04/13/2023    ALT 34 04/13/2023    AST 35 04/13/2023     04/13/2023    K 3.6 04/13/2023     04/13/2023    CREATININE 0.9 04/13/2023    BUN 9 04/13/2023    CO2 24 04/13/2023    TSH 1.645 04/13/2023    INR 1.0 01/23/2021    GLUF 106 01/07/2005    HGBA1C 7.7 (H) 04/13/2023       Impression:  Diabetes, not to goal.  Would like to see the A1c less than 7.  Hypertension controlled by home blood pressure readings  Patient Active Problem List   Diagnosis    CAD with far remote PCI of unknown vessel    History of colon polyps    History of non-ST elevation myocardial infarction (NSTEMI)    Type II diabetes mellitus with complication    Hypertension associated with diabetes    Hyperlipidemia associated with type 2 diabetes mellitus    Major depression in remission    Aortic ectasia    Microalbuminuria due to type 2 diabetes mellitus    Severe obesity (BMI 35.0-35.9 with comorbidity)       Plan:  Orders Placed This Encounter    omeprazole (PRILOSEC) 40 MG capsule    doxazosin (CARDURA) 4 MG tablet    semaglutide (OZEMPIC) 0.25 mg or 0.5 mg (2 mg/3 mL) pen injector     She will start on Ozempic.  " She will titrate from 0.25-0.5 mg once a week.  She will see me back in October with lab work.  If she is tolerating the Ozempic and would like to increase the doses she should send me a message the the [employer].    This note is generated with speech recognition software and is subject to transcription error and sound alike phrases that may be missed by proofreading.

## 2023-05-30 NOTE — TELEPHONE ENCOUNTER
Specialty Pharmacy - Refill Coordination    Specialty Medication Orders Linked to Encounter      Flowsheet Row Most Recent Value   Medication #1 evolocumab (REPATHA SURECLICK) 140 mg/mL PnIj (Order#[id number], Rx#[id number])            Refill Questions - Documented Responses      Flowsheet Row Most Recent Value   Patient Availability and HIPAA Verification    Does patient want to proceed with activity? Yes   HIPAA/medical authority confirmed? Yes   Relationship to patient of person spoken to? Self   Refill Screening Questions    Would patient like to speak to a pharmacist? No   When does the patient need to receive the medication? 06/06/23   Refill Delivery Questions    How will the patient receive the medication? [facility]   When does the patient need to receive the medication? 06/06/23   Shipping Address Home   Address in [facility] confirmed and updated if neccessary? Yes   Expected Copay ($) 4.3   Is the patient able to afford the medication copay? Yes   Payment Method CC on file   Days supply of Refill 28   Supplies needed? No supplies needed   Refill activity completed? Yes   Refill activity plan Refill scheduled   Shipment/Pickup Date: 06/01/23            Current Outpatient Medications   Medication Sig    ALPRAZolam (XANAX) 0.5 MG tablet Take 1 tablet (0.5 mg total) by mouth daily as needed.    amLODIPine-benazepriL (LOTREL) 10-40 mg per capsule Take 1 capsule by mouth once daily.    aspirin (ECOTRIN) 81 MG EC tablet Take 1 tablet (81 mg total) by mouth once daily.    cyclobenzaprine (FLEXERIL) 10 MG tablet TAKE 1 TABLET BY MOUTH THREE TIMES A DAY AS NEEDED FOR MUSCLE SPASMS    diclofenac (VOLTAREN) 50 MG EC tablet TAKE 1 TABLET BY MOUTH TWICE A DAY WITH FOOD AS NEEDED FOR PAIN    diclofenac sodium (VOLTAREN) 1 % Gel Apply 2 g topically 4 (four) times daily.    doxazosin (CARDURA) 4 MG tablet Take 1 tablet (4 mg total) by mouth once daily.    evolocumab (REPATHA SURECLICK) 140 mg/mL PnIj Inject 1 mL (140  mg total) into the skin every 14 (fourteen) days.    ezetimibe (ZETIA) 10 mg tablet TAKE 1 TABLET BY MOUTH EVERY DAY    fenofibrate micronized (LOFIBRA) 134 MG Cap TAKE ONE CAPSULE BY MOUTH EVERY DAY WITH BREAKFAST.    glimepiride (AMARYL) 4 MG tablet Take 4 mg by mouth before breakfast.    hydroCHLOROthiazide (HYDRODIURIL) 12.5 MG Tab Take 1 tablet (12.5 mg total) by mouth once daily.    isosorbide mononitrate (IMDUR) 60 MG 24 hr tablet Take 1 tablet (60 mg total) by mouth once daily.    JARDIANCE 10 mg tablet     metFORMIN (GLUCOPHAGE) 500 MG tablet TAKE 2 TABLETS BY MOUTH TWICE A DAY WITH MEALS    methocarbamoL (ROBAXIN) 500 MG Tab TAKE 1 TABLET BY MOUTH THREE TIMES A DAY AS NEEDED MUSCLE SPASM STRENGTH: 500 MG    metoprolol succinate (TOPROL-XL) 50 MG 24 hr tablet TAKE 1 TABLET BY MOUTH TWICE A DAY    naproxen (NAPROSYN) 500 MG tablet TAKE 1 TABLET BY MOUTH TWICE A DAY    niacin (NIASPAN) 750 MG CR tablet Take 1 tablet (750 mg total) by mouth nightly.    nitrofurantoin, macrocrystal-monohydrate, (MACROBID) 100 MG capsule Take 1 capsule (100 mg total) by mouth 2 (two) times daily.    nitroGLYCERIN (NITROSTAT) 0.4 MG SL tablet TAKE ONE FOR ONSET OF CHEST PAIN / THEN EVERY 5 MINUTES IF CP CONTINUES UP TO 3 DOSES..CALL 911    nitroGLYCERIN (NITROSTAT) 0.4 MG SL tablet TAKE ONE FOR ONSET OF CHEST PAIN / THEN EVERY 5 MINUTES IF CP CONTINUES UP TO 3 DOSES..CALL 911    omeprazole (PRILOSEC) 40 MG capsule Take 40 mg by mouth once daily.    ondansetron (ZOFRAN) 4 MG tablet Take 1 tablet (4 mg total) by mouth every 8 (eight) hours as needed for Nausea.    rosuvastatin (CRESTOR) 40 MG Tab Take 1 tablet (40 mg total) by mouth every evening.   Last reviewed on 4/19/2023 10:11 AM by Anjana Carvajal PA-C    Review of patient's allergies indicates:   Allergen Reactions    No known drug allergies     Last reviewed on  4/19/2023 10:11 AM by Anjana Carvajal      Tasks added this encounter   No tasks added.   Tasks due within  next 3 months   5/30/2023 - Refill Coordination Outreach (1 time occurrence)     Vickie Villalba, Patient Care Assistant  [facility]  [address]  [facility] [address]  Phone: [phone]  Fax: [phone]

## 2023-06-07 DIAGNOSIS — I25.10 CORONARY ARTERY DISEASE INVOLVING NATIVE CORONARY ARTERY OF NATIVE HEART WITHOUT ANGINA PECTORIS: Primary | ICD-10-CM

## 2023-06-12 DIAGNOSIS — I25.10 CORONARY ARTERY DISEASE INVOLVING NATIVE CORONARY ARTERY OF NATIVE HEART WITHOUT ANGINA PECTORIS: Chronic | ICD-10-CM

## 2023-06-12 DIAGNOSIS — E11.59 HYPERTENSION ASSOCIATED WITH DIABETES: ICD-10-CM

## 2023-06-12 DIAGNOSIS — I15.2 HYPERTENSION ASSOCIATED WITH DIABETES: ICD-10-CM

## 2023-06-12 RX ORDER — HYDROCHLOROTHIAZIDE 12.5 MG/1
TABLET ORAL
Qty: 90 TABLET | Refills: 3 | Status: SHIPPED | OUTPATIENT
Start: 2023-06-12

## 2023-06-12 NOTE — TELEPHONE ENCOUNTER
Refill Routing Note   Medication(s) are not appropriate for processing by [facility] for the following reason(s):      No active prescription written by PCP    [facility] action(s):  Defer None identified          Appointments  past 12m or future 3m with PCP    Date Provider   Last Visit   5/30/2023 Zeus Hanson MD   Next Visit   10/16/2023 Zeus Hanson MD   ED visits in past 90 days: 0        Note composed:11:10 AM 06/12/2023

## 2023-06-12 NOTE — TELEPHONE ENCOUNTER
No care due was identified.  [facility] Embedded Care Due Messages. Reference number: [id number].   6/12/2023 11:09:53 AM CDT

## 2023-06-20 ENCOUNTER — OFFICE VISIT (OUTPATIENT)
Dept: CARDIOLOGY | Facility: CLINIC | Age: 63
End: 2023-06-20
Payer: MEDICARE

## 2023-06-20 ENCOUNTER — HOSPITAL ENCOUNTER (OUTPATIENT)
Dept: CARDIOLOGY | Facility: HOSPITAL | Age: 63
Discharge: HOME | End: 2023-06-20
Attending: INTERNAL MEDICINE
Payer: MEDICARE

## 2023-06-20 VITALS
HEART RATE: 78 BPM | WEIGHT: 202.38 LBS | HEIGHT: 65 IN | OXYGEN SATURATION: 97 % | SYSTOLIC BLOOD PRESSURE: 120 MMHG | DIASTOLIC BLOOD PRESSURE: 70 MMHG | BODY MASS INDEX: 33.72 KG/M2

## 2023-06-20 DIAGNOSIS — E11.59 HYPERTENSION ASSOCIATED WITH DIABETES: ICD-10-CM

## 2023-06-20 DIAGNOSIS — I25.2 HISTORY OF NON-ST ELEVATION MYOCARDIAL INFARCTION (NSTEMI): ICD-10-CM

## 2023-06-20 DIAGNOSIS — E78.5 HYPERLIPIDEMIA ASSOCIATED WITH TYPE 2 DIABETES MELLITUS: ICD-10-CM

## 2023-06-20 DIAGNOSIS — I25.10 CORONARY ARTERY DISEASE INVOLVING NATIVE CORONARY ARTERY OF NATIVE HEART WITHOUT ANGINA PECTORIS: Primary | Chronic | ICD-10-CM

## 2023-06-20 DIAGNOSIS — E11.8 TYPE II DIABETES MELLITUS WITH COMPLICATION: ICD-10-CM

## 2023-06-20 DIAGNOSIS — E11.69 HYPERLIPIDEMIA ASSOCIATED WITH TYPE 2 DIABETES MELLITUS: ICD-10-CM

## 2023-06-20 DIAGNOSIS — E66.01 SEVERE OBESITY (BMI 35.0-35.9 WITH COMORBIDITY): ICD-10-CM

## 2023-06-20 DIAGNOSIS — I77.819 AORTIC ECTASIA: ICD-10-CM

## 2023-06-20 DIAGNOSIS — I25.10 CORONARY ARTERY DISEASE INVOLVING NATIVE CORONARY ARTERY OF NATIVE HEART WITHOUT ANGINA PECTORIS: ICD-10-CM

## 2023-06-20 DIAGNOSIS — R80.9 MICROALBUMINURIA DUE TO TYPE 2 DIABETES MELLITUS: ICD-10-CM

## 2023-06-20 DIAGNOSIS — I15.2 HYPERTENSION ASSOCIATED WITH DIABETES: ICD-10-CM

## 2023-06-20 DIAGNOSIS — E11.29 MICROALBUMINURIA DUE TO TYPE 2 DIABETES MELLITUS: ICD-10-CM

## 2023-06-20 PROCEDURE — 93005 ELECTROCARDIOGRAM TRACING: CPT

## 2023-06-20 PROCEDURE — 99214 OFFICE O/P EST MOD 30 MIN: CPT | Mod: S$PBB,,, | Performed by: INTERNAL MEDICINE

## 2023-06-20 PROCEDURE — 99999 PR PBB SHADOW E&M-EST. PATIENT-LVL V: ICD-10-PCS | Mod: PBBFAC,,, | Performed by: INTERNAL MEDICINE

## 2023-06-20 PROCEDURE — 99215 OFFICE O/P EST HI 40 MIN: CPT | Mod: PBBFAC | Performed by: INTERNAL MEDICINE

## 2023-06-20 PROCEDURE — 99214 PR OFFICE/OUTPT VISIT, EST, LEVL IV, 30-39 MIN: ICD-10-PCS | Mod: S$PBB,,, | Performed by: INTERNAL MEDICINE

## 2023-06-20 PROCEDURE — 99999 PR PBB SHADOW E&M-EST. PATIENT-LVL V: CPT | Mod: PBBFAC,,, | Performed by: INTERNAL MEDICINE

## 2023-06-20 PROCEDURE — 93010 EKG 12-LEAD: ICD-10-PCS | Mod: ,,, | Performed by: INTERNAL MEDICINE

## 2023-06-20 PROCEDURE — 93010 ELECTROCARDIOGRAM REPORT: CPT | Mod: ,,, | Performed by: INTERNAL MEDICINE

## 2023-06-20 NOTE — PROGRESS NOTES
Subjective:   Patient ID:  Dulce Boogie is a 62 y.o. female who presents for follow up of No chief complaint on file.      HPI  8/5/2021 HAVEN RODARTE NP  Ms. Boogie's current conditions include CAD with remote PCI in 2010 (previously followed by Dr. Valle at  Cardiology), HTN, HLD, DM II. Admitted May 2018 with unstable angina. Underwent LHC on 5/22/18 and noted to have LAD 50% mid long lesion ffr 0.81. Also noted LCX non obs CAD and patent stent, RCA 90% prox treated with solange x2 and PDA distal 80% treated with SOLANGE x 2.      Admitted in Feb 2021 with NSTEMI  Underwent successful PCI of mid LAD 80% lesion.   Has no abnormal bleeding on ASA and Brilinta  Started on Imdur following cath   Wasn't taking Repatha as prescribed.      Denies any chest pain, SOB, COOPER,  orthopnea, PND, dizziness, palpitations,  near syncope, syncope or edema . Has no symptoms concerning for angina or equivalent. No CNS Complaints to suggest TIA or CVA. Does well with limiting sodium intake.  Has not required any SL nitro.   BP log  Ranging 130's systolic/80's   Is grieving the loss of her son      2/17/2022  No new complaints she is trying to be compliant with diet not consistent on exercise. Compliant with diet asymptomatic cardiovascular wise.tolerating emdical therapy      12/8/2022   Had colonoscopy 2 polyps removed. Has been having difficulty controlling her blood pressure and her lipids and diabetes all her markers are increased. She ahs dietary indiscretion. Has no other issues clinically except having heart burn she took tums. Denies any other chest pain. Has been compliant with medical therapy.she denies any exertional symptoms but has not been very active.     6/202/2023   Here for f/u has not been exercising has been started on ozempic 2 weeks ago. Ha sno cardiac symptoms needs better diet compliance made improvement on lipids diabetes still the issue   Past Medical History:   Diagnosis Date    Abnormal EKG 12/8/2022     Anticoagulant long-term use     CAD (coronary artery disease)     Depression     History of colon polyps     Hyperlipidemia associated with type 2 diabetes mellitus     Hyperlipidemia associated with type 2 diabetes mellitus     Hypertension associated with diabetes     Microalbuminuria due to type 2 diabetes mellitus     NSTEMI (non-ST elevated myocardial infarction) 2018    Obesity     Type II diabetes mellitus with complication        Past Surgical History:   Procedure Laterality Date    CAROTID STENT       SECTION, LOW TRANSVERSE      x 2    COLONOSCOPY N/A 06/10/2019    Procedure: COLONOSCOPY;  Surgeon: Maricarmen Boo MD;  Location: [facility] ENDO;  Service: Endoscopy;  Laterality: N/A;    COLONOSCOPY N/A 2022    Procedure: COLONOSCOPY;  Surgeon: Ryan Lau MD;  Location: [facility] ENDO;  Service: Endoscopy;  Laterality: N/A;    CORONARY STENT PLACEMENT N/A 2021    Procedure: INSERTION, STENT, CORONARY ARTERY;  Surgeon: Aimee Cooper MD;  Location: [facility] CATH LAB;  Service: Cardiology;  Laterality: N/A;    LEFT HEART CATHETERIZATION Left 2018    Procedure: HEART CATH-LEFT;  Surgeon: Aimee Cooper MD;  Location: [facility] CATH LAB;  Service: Cardiology;  Laterality: Left;    LEFT HEART CATHETERIZATION Left 2021    Procedure: CATHETERIZATION, HEART, LEFT;  Surgeon: Aimee Cooper MD;  Location: [facility] CATH LAB;  Service: Cardiology;  Laterality: Left;    PERCUTANEOUS TRANSLUMINAL BALLOON ANGIOPLASTY OF CORONARY ARTERY  2021    Procedure: Angioplasty-coronary;  Surgeon: Aimee Cooper MD;  Location: [facility] CATH LAB;  Service: Cardiology;;       Social History     Tobacco Use    Smoking status: Never    Smokeless tobacco: Never   Substance Use Topics    Alcohol use: No    Drug use: No       Family History   Problem Relation Age of Onset    Hypertension Mother     Hyperlipidemia Mother     Hypertension Father     Diabetes Father     Hyperlipidemia Father     Colon cancer  Father     Hypertension Brother     Breast cancer Neg Hx     Ovarian cancer Neg Hx     Uterine cancer Neg Hx        Current Outpatient Medications   Medication Sig    ALPRAZolam (XANAX) 0.5 MG tablet Take 1 tablet (0.5 mg total) by mouth daily as needed.    amLODIPine-benazepriL (LOTREL) 10-40 mg per capsule Take 1 capsule by mouth once daily.    aspirin (ECOTRIN) 81 MG EC tablet Take 1 tablet (81 mg total) by mouth once daily.    cyclobenzaprine (FLEXERIL) 10 MG tablet TAKE 1 TABLET BY MOUTH THREE TIMES A DAY AS NEEDED FOR MUSCLE SPASMS    diclofenac (VOLTAREN) 50 MG EC tablet TAKE 1 TABLET BY MOUTH TWICE A DAY WITH FOOD AS NEEDED FOR PAIN    diclofenac sodium (VOLTAREN) 1 % Gel Apply 2 g topically 4 (four) times daily.    doxazosin (CARDURA) 4 MG tablet Take 1 tablet (4 mg total) by mouth once daily.    evolocumab (REPATHA SURECLICK) 140 mg/mL PnIj Inject 1 mL (140 mg total) into the skin every 14 (fourteen) days.    ezetimibe (ZETIA) 10 mg tablet TAKE 1 TABLET BY MOUTH EVERY DAY    fenofibrate micronized (LOFIBRA) 134 MG Cap TAKE ONE CAPSULE BY MOUTH EVERY DAY WITH BREAKFAST.    hydroCHLOROthiazide (HYDRODIURIL) 12.5 MG Tab TAKE 1 TABLET BY MOUTH EVERY DAY    isosorbide mononitrate (IMDUR) 60 MG 24 hr tablet Take 1 tablet (60 mg total) by mouth once daily.    JARDIANCE 10 mg tablet     metFORMIN (GLUCOPHAGE) 500 MG tablet TAKE 2 TABLETS BY MOUTH TWICE A DAY WITH MEALS    methocarbamoL (ROBAXIN) 500 MG Tab TAKE 1 TABLET BY MOUTH THREE TIMES A DAY AS NEEDED MUSCLE SPASM STRENGTH: 500 MG    metoprolol succinate (TOPROL-XL) 50 MG 24 hr tablet TAKE 1 TABLET BY MOUTH TWICE A DAY    omeprazole (PRILOSEC) 40 MG capsule Take 1 capsule (40 mg total) by mouth once daily.    rosuvastatin (CRESTOR) 40 MG Tab Take 1 tablet (40 mg total) by mouth every evening.    semaglutide (OZEMPIC) 0.25 mg or 0.5 mg (2 mg/3 mL) pen injector Inject 0.5 mg into the skin every 7 days.    naproxen (NAPROSYN) 500 MG tablet TAKE 1 TABLET BY MOUTH  TWICE A DAY (Patient not taking: Reported on 5/30/2023)    niacin (NIASPAN) 750 MG CR tablet Take 1 tablet (750 mg total) by mouth nightly. (Patient not taking: Reported on 5/30/2023)    nitrofurantoin, macrocrystal-monohydrate, (MACROBID) 100 MG capsule Take 1 capsule (100 mg total) by mouth 2 (two) times daily. (Patient not taking: Reported on 5/30/2023)    nitroGLYCERIN (NITROSTAT) 0.4 MG SL tablet TAKE ONE FOR ONSET OF CHEST PAIN / THEN EVERY 5 MINUTES IF CP CONTINUES UP TO 3 DOSES..CALL 911 (Patient not taking: Reported on 5/30/2023)    nitroGLYCERIN (NITROSTAT) 0.4 MG SL tablet TAKE ONE FOR ONSET OF CHEST PAIN / THEN EVERY 5 MINUTES IF CP CONTINUES UP TO 3 DOSES..CALL 911 (Patient not taking: Reported on 5/30/2023)    ondansetron (ZOFRAN) 4 MG tablet Take 1 tablet (4 mg total) by mouth every 8 (eight) hours as needed for Nausea. (Patient not taking: Reported on 5/30/2023)     No current facility-administered medications for this visit.     Current Outpatient Medications on File Prior to Visit   Medication Sig    ALPRAZolam (XANAX) 0.5 MG tablet Take 1 tablet (0.5 mg total) by mouth daily as needed.    amLODIPine-benazepriL (LOTREL) 10-40 mg per capsule Take 1 capsule by mouth once daily.    aspirin (ECOTRIN) 81 MG EC tablet Take 1 tablet (81 mg total) by mouth once daily.    cyclobenzaprine (FLEXERIL) 10 MG tablet TAKE 1 TABLET BY MOUTH THREE TIMES A DAY AS NEEDED FOR MUSCLE SPASMS    diclofenac (VOLTAREN) 50 MG EC tablet TAKE 1 TABLET BY MOUTH TWICE A DAY WITH FOOD AS NEEDED FOR PAIN    diclofenac sodium (VOLTAREN) 1 % Gel Apply 2 g topically 4 (four) times daily.    doxazosin (CARDURA) 4 MG tablet Take 1 tablet (4 mg total) by mouth once daily.    evolocumab (REPATHA SURECLICK) 140 mg/mL PnIj Inject 1 mL (140 mg total) into the skin every 14 (fourteen) days.    ezetimibe (ZETIA) 10 mg tablet TAKE 1 TABLET BY MOUTH EVERY DAY    fenofibrate micronized (LOFIBRA) 134 MG Cap TAKE ONE CAPSULE BY MOUTH EVERY DAY  WITH BREAKFAST.    hydroCHLOROthiazide (HYDRODIURIL) 12.5 MG Tab TAKE 1 TABLET BY MOUTH EVERY DAY    isosorbide mononitrate (IMDUR) 60 MG 24 hr tablet Take 1 tablet (60 mg total) by mouth once daily.    JARDIANCE 10 mg tablet     metFORMIN (GLUCOPHAGE) 500 MG tablet TAKE 2 TABLETS BY MOUTH TWICE A DAY WITH MEALS    methocarbamoL (ROBAXIN) 500 MG Tab TAKE 1 TABLET BY MOUTH THREE TIMES A DAY AS NEEDED MUSCLE SPASM STRENGTH: 500 MG    metoprolol succinate (TOPROL-XL) 50 MG 24 hr tablet TAKE 1 TABLET BY MOUTH TWICE A DAY    omeprazole (PRILOSEC) 40 MG capsule Take 1 capsule (40 mg total) by mouth once daily.    rosuvastatin (CRESTOR) 40 MG Tab Take 1 tablet (40 mg total) by mouth every evening.    semaglutide (OZEMPIC) 0.25 mg or 0.5 mg (2 mg/3 mL) pen injector Inject 0.5 mg into the skin every 7 days.    naproxen (NAPROSYN) 500 MG tablet TAKE 1 TABLET BY MOUTH TWICE A DAY (Patient not taking: Reported on 5/30/2023)    niacin (NIASPAN) 750 MG CR tablet Take 1 tablet (750 mg total) by mouth nightly. (Patient not taking: Reported on 5/30/2023)    nitrofurantoin, macrocrystal-monohydrate, (MACROBID) 100 MG capsule Take 1 capsule (100 mg total) by mouth 2 (two) times daily. (Patient not taking: Reported on 5/30/2023)    nitroGLYCERIN (NITROSTAT) 0.4 MG SL tablet TAKE ONE FOR ONSET OF CHEST PAIN / THEN EVERY 5 MINUTES IF CP CONTINUES UP TO 3 DOSES..CALL 911 (Patient not taking: Reported on 5/30/2023)    nitroGLYCERIN (NITROSTAT) 0.4 MG SL tablet TAKE ONE FOR ONSET OF CHEST PAIN / THEN EVERY 5 MINUTES IF CP CONTINUES UP TO 3 DOSES..CALL 911 (Patient not taking: Reported on 5/30/2023)    ondansetron (ZOFRAN) 4 MG tablet Take 1 tablet (4 mg total) by mouth every 8 (eight) hours as needed for Nausea. (Patient not taking: Reported on 5/30/2023)     No current facility-administered medications on file prior to visit.     Review of patient's allergies indicates:   Allergen Reactions    No known drug allergies       Review of  Systems   Constitutional: Negative for diaphoresis, malaise/fatigue and weight gain.   HENT:  Negative for hoarse voice.    Eyes:  Negative for double vision and visual disturbance.   Cardiovascular:  Negative for chest pain, claudication, cyanosis, dyspnea on exertion, irregular heartbeat, leg swelling, near-syncope, orthopnea, palpitations, paroxysmal nocturnal dyspnea and syncope.   Respiratory:  Negative for cough, hemoptysis, shortness of breath and snoring.    Hematologic/Lymphatic: Negative for bleeding problem. Does not bruise/bleed easily.   Skin:  Negative for color change and poor wound healing.   Musculoskeletal:  Negative for muscle cramps, muscle weakness and myalgias.   Gastrointestinal:  Negative for bloating, abdominal pain, change in bowel habit, diarrhea, heartburn, hematemesis, hematochezia, melena and nausea.   Neurological:  Negative for excessive daytime sleepiness, dizziness, headaches, light-headedness, loss of balance, numbness and weakness.   Psychiatric/Behavioral:  Negative for memory loss. The patient does not have insomnia.    Allergic/Immunologic: Negative for hives.     Objective:   Physical Exam  Constitutional:       General: She is not in acute distress.     Appearance: Normal appearance. She is well-developed. She is obese. She is not ill-appearing.   HENT:      Head: Normocephalic and atraumatic.   Eyes:      General: No scleral icterus.     Pupils: Pupils are equal, round, and reactive to light.   Neck:      Thyroid: No thyromegaly.      Vascular: Normal carotid pulses. No carotid bruit, hepatojugular reflux or JVD.      Trachea: No tracheal deviation.   Cardiovascular:      Rate and Rhythm: Normal rate and regular rhythm.      Pulses: Normal pulses.      Heart sounds: Normal heart sounds. No murmur heard.    No friction rub. No gallop.   Pulmonary:      Effort: Pulmonary effort is normal. No respiratory distress.      Breath sounds: Normal breath sounds. No wheezing, rhonchi  "or rales.   Chest:      Chest wall: No tenderness.   Abdominal:      General: Bowel sounds are normal. There is no abdominal bruit.      Palpations: Abdomen is soft. There is no hepatomegaly or pulsatile mass.      Tenderness: There is no abdominal tenderness.   Musculoskeletal:      Right shoulder: No deformity.      Cervical back: Normal range of motion and neck supple.   Skin:     General: Skin is warm and dry.      Findings: No erythema or rash.      Nails: There is no clubbing.   Neurological:      Mental Status: She is alert and oriented to person, place, and time.      Cranial Nerves: No cranial nerve deficit.      Coordination: Coordination normal.   Psychiatric:         Speech: Speech normal.         Behavior: Behavior normal.     Vitals:    06/20/23 1349 06/20/23 1351   BP: 122/66 120/70   BP Location: Left arm Right arm   Patient Position: Sitting Sitting   BP Method: Large (Manual) Large (Manual)   Pulse: 78    SpO2: 97%    Weight: 91.8 kg (202 lb 6.1 oz)    Height: 5' 5" (1.651 m)      Lab Results   Component Value Date    CHOL 155 04/13/2023    CHOL 190 09/28/2022    CHOL 115 (L) 08/11/2022      Body mass index is 33.68 kg/m².   Lab Results   Component Value Date    HGBA1C 7.7 (H) 04/13/2023      BMP  Lab Results   Component Value Date     04/13/2023    K 3.6 04/13/2023     04/13/2023    CO2 24 04/13/2023    BUN 9 04/13/2023    CREATININE 0.9 04/13/2023    CALCIUM 9.1 04/13/2023    ANIONGAP 9 04/13/2023    EGFRNORACEVR >60.0 04/13/2023      Lab Results   Component Value Date    HDL 42 04/13/2023    HDL 41 09/28/2022    HDL 43 08/11/2022     Lab Results   Component Value Date    LDLCALC 75.6 04/13/2023    LDLCALC 88.6 09/28/2022    LDLCALC 44.4 (L) 08/11/2022     Lab Results   Component Value Date    TRIG 187 (H) 04/13/2023    TRIG 302 (H) 09/28/2022    TRIG 138 08/11/2022     Lab Results   Component Value Date    CHOLHDL 27.1 04/13/2023    CHOLHDL 21.6 09/28/2022    CHOLHDL 37.4 08/11/2022 "       Chemistry        Component Value Date/Time     04/13/2023 0946    K 3.6 04/13/2023 0946     04/13/2023 0946    CO2 24 04/13/2023 0946    BUN 9 04/13/2023 0946    CREATININE 0.9 04/13/2023 0946     (H) 04/13/2023 0946        Component Value Date/Time    CALCIUM 9.1 04/13/2023 0946    ALKPHOS 78 04/13/2023 0946    AST 35 04/13/2023 0946    ALT 34 04/13/2023 0946    BILITOT 0.3 04/13/2023 0946    ESTGFRAFRICA >60.0 03/30/2022 0900    EGFRNONAA >60.0 03/30/2022 0900          Lab Results   Component Value Date    TSH 1.645 04/13/2023     Lab Results   Component Value Date    INR 1.0 01/23/2021    INR 1.0 01/23/2021    INR 1.0 05/21/2018     Lab Results   Component Value Date    WBC 8.24 04/13/2023    HGB 13.9 04/13/2023    HCT 43.9 04/13/2023    MCV 84 04/13/2023     04/13/2023     BMP  Sodium   Date Value Ref Range Status   04/13/2023 139 136 - 145 mmol/L Final     Potassium   Date Value Ref Range Status   04/13/2023 3.6 3.5 - 5.1 mmol/L Final     Chloride   Date Value Ref Range Status   04/13/2023 106 95 - 110 mmol/L Final     CO2   Date Value Ref Range Status   04/13/2023 24 23 - 29 mmol/L Final     BUN   Date Value Ref Range Status   04/13/2023 9 8 - 23 mg/dL Final     Creatinine   Date Value Ref Range Status   04/13/2023 0.9 0.5 - 1.4 mg/dL Final     Calcium   Date Value Ref Range Status   04/13/2023 9.1 8.7 - 10.5 mg/dL Final     Anion Gap   Date Value Ref Range Status   04/13/2023 9 8 - 16 mmol/L Final     eGFR if    Date Value Ref Range Status   03/30/2022 >60.0 >60 mL/min/1.73 m^2 Final     eGFR if non    Date Value Ref Range Status   03/30/2022 >60.0 >60 mL/min/1.73 m^2 Final     Comment:     Calculation used to obtain the estimated glomerular filtration  rate (eGFR) is the CKD-EPI equation.        CrCl cannot be calculated (Patient's most recent lab result is older than the maximum 7 days allowed.).    Assessment:     1. Coronary artery disease  involving native coronary artery of native heart without angina pectoris    2. Hyperlipidemia associated with type 2 diabetes mellitus    3. History of non-ST elevation myocardial infarction (NSTEMI)    4. Aortic ectasia    5. Hypertension associated with diabetes    6. Type II diabetes mellitus with complication    7. Severe obesity (BMI 35.0-35.9 with comorbidity)    8. Microalbuminuria due to type 2 diabetes mellitus      Asymptomatic needs better diabetes control specially on ozempic that will address weight loss diabetes and lipid control. Encouraged her to exercise regularily to help address weight and cardiovascular healthy./  Her lipids are improved her a1c is stable discussed htn control low salt diet. And aggressive rf modification   Cad wise she is asymptomatic will continue current plan.  Plan:   Continue current therapy  Cardiac low salt diet.  Risk factor modification and excercise program./weight loss  F/u in 6 months with lipid cmp a1c

## 2023-06-22 ENCOUNTER — SPECIALTY PHARMACY (OUTPATIENT)
Dept: PHARMACY | Facility: CLINIC | Age: 63
End: 2023-06-22
Payer: MEDICARE

## 2023-06-22 ENCOUNTER — PATIENT MESSAGE (OUTPATIENT)
Dept: ADMINISTRATIVE | Facility: OTHER | Age: 63
End: 2023-06-22
Payer: MEDICARE

## 2023-06-22 DIAGNOSIS — E78.1 HYPERTRIGLYCERIDEMIA: ICD-10-CM

## 2023-06-22 DIAGNOSIS — I25.10 CORONARY ARTERY DISEASE INVOLVING NATIVE CORONARY ARTERY OF NATIVE HEART WITHOUT ANGINA PECTORIS: ICD-10-CM

## 2023-06-22 DIAGNOSIS — I25.2 HISTORY OF NON-ST ELEVATION MYOCARDIAL INFARCTION (NSTEMI): ICD-10-CM

## 2023-06-22 RX ORDER — EVOLOCUMAB 140 MG/ML
140 INJECTION, SOLUTION SUBCUTANEOUS
Qty: 2 ML | Refills: 11 | Status: ACTIVE | OUTPATIENT
Start: 2023-06-22

## 2023-06-22 NOTE — TELEPHONE ENCOUNTER
Outgoing call regarding Repatha refill. Rx out of refills; request sent to [facility]. Pt verbalized understanding and is due to inject 7/3. She agreed for [facility] to follow up with her next week to schedule delivery.

## 2023-06-27 NOTE — TELEPHONE ENCOUNTER
Specialty Pharmacy - Refill Coordination    Specialty Medication Orders Linked to Encounter      Flowsheet Row Most Recent Value   Medication #1 evolocumab (REPATHA SURECLICK) 140 mg/mL PnIj (Order#[id number], Rx#[id number])            Refill Questions - Documented Responses      Flowsheet Row Most Recent Value   Patient Availability and HIPAA Verification    Does patient want to proceed with activity? Yes   HIPAA/medical authority confirmed? Yes   Relationship to patient of person spoken to? Self   Refill Screening Questions    Would patient like to speak to a pharmacist? No   When does the patient need to receive the medication? 07/05/23   Refill Delivery Questions    How will the patient receive the medication? [facility]   When does the patient need to receive the medication? 07/05/23   Shipping Address Home   Address in [facility] confirmed and updated if neccessary? Yes   Expected Copay ($) 4.3   Is the patient able to afford the medication copay? Yes   Payment Method CC on file   Days supply of Refill 28   Supplies needed? No supplies needed   Refill activity completed? Yes   Refill activity plan Refill scheduled   Shipment/Pickup Date: 06/29/23            Current Outpatient Medications   Medication Sig    ALPRAZolam (XANAX) 0.5 MG tablet Take 1 tablet (0.5 mg total) by mouth daily as needed.    amLODIPine-benazepriL (LOTREL) 10-40 mg per capsule Take 1 capsule by mouth once daily.    aspirin (ECOTRIN) 81 MG EC tablet Take 1 tablet (81 mg total) by mouth once daily.    cyclobenzaprine (FLEXERIL) 10 MG tablet TAKE 1 TABLET BY MOUTH THREE TIMES A DAY AS NEEDED FOR MUSCLE SPASMS    diclofenac (VOLTAREN) 50 MG EC tablet TAKE 1 TABLET BY MOUTH TWICE A DAY WITH FOOD AS NEEDED FOR PAIN    diclofenac sodium (VOLTAREN) 1 % Gel Apply 2 g topically 4 (four) times daily.    doxazosin (CARDURA) 4 MG tablet Take 1 tablet (4 mg total) by mouth once daily.    evolocumab (REPATHA SURECLICK) 140 mg/mL PnIj Inject 1 mL (140  mg total) into the skin every 14 (fourteen) days.    ezetimibe (ZETIA) 10 mg tablet TAKE 1 TABLET BY MOUTH EVERY DAY    fenofibrate micronized (LOFIBRA) 134 MG Cap TAKE ONE CAPSULE BY MOUTH EVERY DAY WITH BREAKFAST.    hydroCHLOROthiazide (HYDRODIURIL) 12.5 MG Tab TAKE 1 TABLET BY MOUTH EVERY DAY    isosorbide mononitrate (IMDUR) 60 MG 24 hr tablet Take 1 tablet (60 mg total) by mouth once daily.    JARDIANCE 10 mg tablet     metFORMIN (GLUCOPHAGE) 500 MG tablet TAKE 2 TABLETS BY MOUTH TWICE A DAY WITH MEALS    methocarbamoL (ROBAXIN) 500 MG Tab TAKE 1 TABLET BY MOUTH THREE TIMES A DAY AS NEEDED MUSCLE SPASM STRENGTH: 500 MG    metoprolol succinate (TOPROL-XL) 50 MG 24 hr tablet TAKE 1 TABLET BY MOUTH TWICE A DAY    naproxen (NAPROSYN) 500 MG tablet TAKE 1 TABLET BY MOUTH TWICE A DAY (Patient not taking: Reported on 5/30/2023)    niacin (NIASPAN) 750 MG CR tablet Take 1 tablet (750 mg total) by mouth nightly. (Patient not taking: Reported on 5/30/2023)    nitrofurantoin, macrocrystal-monohydrate, (MACROBID) 100 MG capsule Take 1 capsule (100 mg total) by mouth 2 (two) times daily. (Patient not taking: Reported on 5/30/2023)    nitroGLYCERIN (NITROSTAT) 0.4 MG SL tablet TAKE ONE FOR ONSET OF CHEST PAIN / THEN EVERY 5 MINUTES IF CP CONTINUES UP TO 3 DOSES..CALL 911 (Patient not taking: Reported on 5/30/2023)    nitroGLYCERIN (NITROSTAT) 0.4 MG SL tablet TAKE ONE FOR ONSET OF CHEST PAIN / THEN EVERY 5 MINUTES IF CP CONTINUES UP TO 3 DOSES..CALL 911 (Patient not taking: Reported on 5/30/2023)    omeprazole (PRILOSEC) 40 MG capsule Take 1 capsule (40 mg total) by mouth once daily.    ondansetron (ZOFRAN) 4 MG tablet Take 1 tablet (4 mg total) by mouth every 8 (eight) hours as needed for Nausea. (Patient not taking: Reported on 5/30/2023)    rosuvastatin (CRESTOR) 40 MG Tab Take 1 tablet (40 mg total) by mouth every evening.    semaglutide (OZEMPIC) 0.25 mg or 0.5 mg (2 mg/3 mL) pen injector Inject 0.5 mg into the skin  every 7 days.   Last reviewed on 6/20/2023  2:06 PM by Aimee Cooper MD    Review of patient's allergies indicates:   Allergen Reactions    No known drug allergies     Last reviewed on  6/20/2023 2:06 PM by Aimee Cooper      Tasks added this encounter   No tasks added.   Tasks due within next 3 months   6/26/2023 - Refill Coordination Outreach (1 time occurrence)     [facility]  [address] [place] [id number]  Phone: [phone]  Fax: [phone]

## 2023-06-28 DIAGNOSIS — M77.12 LEFT LATERAL EPICONDYLITIS: ICD-10-CM

## 2023-06-28 DIAGNOSIS — M75.102 ROTATOR CUFF SYNDROME OF LEFT SHOULDER: ICD-10-CM

## 2023-06-28 DIAGNOSIS — M75.102 NON-TRAUMATIC ROTATOR CUFF TEAR, LEFT: ICD-10-CM

## 2023-06-28 RX ORDER — NAPROXEN 500 MG/1
TABLET ORAL
Qty: 60 TABLET | Refills: 0 | Status: SHIPPED | OUTPATIENT
Start: 2023-06-28 | End: 2023-07-18 | Stop reason: SDUPTHER

## 2023-07-18 ENCOUNTER — OFFICE VISIT (OUTPATIENT)
Dept: SPORTS MEDICINE | Facility: CLINIC | Age: 63
End: 2023-07-18
Payer: MEDICARE

## 2023-07-18 DIAGNOSIS — M75.102 LEFT ROTATOR CUFF TEAR ARTHROPATHY: Primary | ICD-10-CM

## 2023-07-18 DIAGNOSIS — M12.812 LEFT ROTATOR CUFF TEAR ARTHROPATHY: Primary | ICD-10-CM

## 2023-07-18 DIAGNOSIS — M75.102 NON-TRAUMATIC ROTATOR CUFF TEAR, LEFT: ICD-10-CM

## 2023-07-18 PROCEDURE — 99214 OFFICE O/P EST MOD 30 MIN: CPT | Mod: PBBFAC,25 | Performed by: PHYSICIAN ASSISTANT

## 2023-07-18 PROCEDURE — 99999 PR PBB SHADOW E&M-EST. PATIENT-LVL IV: ICD-10-PCS | Mod: PBBFAC,,, | Performed by: PHYSICIAN ASSISTANT

## 2023-07-18 PROCEDURE — 20610 DRAIN/INJ JOINT/BURSA W/O US: CPT | Mod: S$PBB,LT,, | Performed by: PHYSICIAN ASSISTANT

## 2023-07-18 PROCEDURE — 99999 PR PBB SHADOW E&M-EST. PATIENT-LVL IV: CPT | Mod: PBBFAC,,, | Performed by: PHYSICIAN ASSISTANT

## 2023-07-18 PROCEDURE — 20610 LARGE JOINT ASPIRATION/INJECTION: L SUBACROMIAL BURSA: ICD-10-PCS | Mod: S$PBB,LT,, | Performed by: PHYSICIAN ASSISTANT

## 2023-07-18 PROCEDURE — 99213 OFFICE O/P EST LOW 20 MIN: CPT | Mod: S$PBB,25,, | Performed by: PHYSICIAN ASSISTANT

## 2023-07-18 PROCEDURE — 20610 DRAIN/INJ JOINT/BURSA W/O US: CPT | Mod: PBBFAC | Performed by: PHYSICIAN ASSISTANT

## 2023-07-18 PROCEDURE — 99213 PR OFFICE/OUTPT VISIT, EST, LEVL III, 20-29 MIN: ICD-10-PCS | Mod: S$PBB,25,, | Performed by: PHYSICIAN ASSISTANT

## 2023-07-18 RX ORDER — NAPROXEN 500 MG/1
500 TABLET ORAL 2 TIMES DAILY
Qty: 60 TABLET | Refills: 2 | Status: SHIPPED | OUTPATIENT
Start: 2023-07-18 | End: 2023-10-24

## 2023-07-18 RX ORDER — TRIAMCINOLONE ACETONIDE 40 MG/ML
40 INJECTION, SUSPENSION INTRA-ARTICULAR; INTRAMUSCULAR
Status: DISCONTINUED | OUTPATIENT
Start: 2023-07-18 | End: 2023-07-18 | Stop reason: HOSPADM

## 2023-07-18 RX ADMIN — TRIAMCINOLONE ACETONIDE 40 MG: 200 INJECTION, SUSPENSION INTRA-ARTICULAR; INTRAMUSCULAR at 09:07

## 2023-07-18 NOTE — PROGRESS NOTES
Orthopaedic Follow-Up Visit    Last Appointment: 04/19/2023  Diagnosis: Non-traumatic rotator cuff tear, left, rotator cuff syndrome of left shoulder, left lateral epicondylitis  Prior Procedure: Left shoulder CSI, tennis elbow strap provided, Rx for Voltaren gel provided, Rx for naproxen 500 BID     Dulce Boogie is a 62 y.o. female who is here for f/u evaluation of left shoulder pain. The patient was last seen here by me on 04/19/2023 at which point we decided to try left shoulder CSI, tennis elbow strap , Rx for Voltaren gel provided, Rx for naproxen 500 BID prior to considering further treatment options. The patient returns today reporting that movement of her symptoms with the CSI that was given at last visit but her symptoms have since returned and she is requesting a repeat CSI today      To review her history, Dulce Boogie is a 62 y.o. right-hand dominant female who presents with LEFT shoulder pain and dysfunction. She was previously seen by Clemencia Clayton PA-C in 2019. She was initially treating her for left shoulder rotator cuff impingement. She received 2 SAS CSIs, but ultimately she failed to improve with conservative treatment and they ended up getting an MRI of the left shoulder. MRI of the left shoulder from January 2020 revealed a full-thickness rotator cuff tear.  For several years, she had no acute injury or trauma.  Her symptoms include constant aching, throbbing pain of the left shoulder, limited range of motion, night pain.  Her pain is made worse by raising the arm, overhead activities, repetitive movements.  She has had an x-ray thus far.  Her treatment has included rest, activity modification, oral anti-inflammatories, Robaxin, PT, glenohumeral CSI, subacromial CSI, Robaxin.  At last visit we decided to try a repeat subacromial corticosteroid injection as it had been greater than 3 years since her last CSI    Patient's medications, allergies, past medical, surgical, social and family  histories were reviewed and updated as appropriate.    Review of Systems   All systems reviewed were negative.  Specifically, the patient denies fever, chills, weight loss, chest pain, shortness of breath, or dyspnea on exertion.      Past Medical History:   Diagnosis Date    Abnormal EKG 2022    Anticoagulant long-term use     CAD (coronary artery disease)     Depression     History of colon polyps     Hyperlipidemia associated with type 2 diabetes mellitus     Hyperlipidemia associated with type 2 diabetes mellitus     Hypertension associated with diabetes     Microalbuminuria due to type 2 diabetes mellitus     NSTEMI (non-ST elevated myocardial infarction) 2018    Obesity     Type II diabetes mellitus with complication        Past Surgical History:   Procedure Laterality Date    CAROTID STENT       SECTION, LOW TRANSVERSE      x 2    COLONOSCOPY N/A 06/10/2019    Procedure: COLONOSCOPY;  Surgeon: Maricarmen Boo MD;  Location: [facility] ENDO;  Service: Endoscopy;  Laterality: N/A;    COLONOSCOPY N/A 2022    Procedure: COLONOSCOPY;  Surgeon: Ryan Lau MD;  Location: [facility] ENDO;  Service: Endoscopy;  Laterality: N/A;    CORONARY STENT PLACEMENT N/A 2021    Procedure: INSERTION, STENT, CORONARY ARTERY;  Surgeon: Aimee Cooper MD;  Location: [facility] CATH LAB;  Service: Cardiology;  Laterality: N/A;    LEFT HEART CATHETERIZATION Left 2018    Procedure: HEART CATH-LEFT;  Surgeon: Aimee Cooper MD;  Location: [facility] CATH LAB;  Service: Cardiology;  Laterality: Left;    LEFT HEART CATHETERIZATION Left 2021    Procedure: CATHETERIZATION, HEART, LEFT;  Surgeon: Aimee Cooper MD;  Location: [facility] CATH LAB;  Service: Cardiology;  Laterality: Left;    PERCUTANEOUS TRANSLUMINAL BALLOON ANGIOPLASTY OF CORONARY ARTERY  2021    Procedure: Angioplasty-coronary;  Surgeon: Aimee Cooper MD;  Location: [facility] CATH LAB;  Service: Cardiology;;       Patient's Medications   New  Prescriptions    NAPROXEN (NAPROSYN) 500 MG TABLET    Take 1 tablet (500 mg total) by mouth 2 (two) times daily.   Previous Medications    ALPRAZOLAM (XANAX) 0.5 MG TABLET    Take 1 tablet (0.5 mg total) by mouth daily as needed.    AMLODIPINE-BENAZEPRIL (LOTREL) 10-40 MG PER CAPSULE    Take 1 capsule by mouth once daily.    ASPIRIN (ECOTRIN) 81 MG EC TABLET    Take 1 tablet (81 mg total) by mouth once daily.    CYCLOBENZAPRINE (FLEXERIL) 10 MG TABLET    TAKE 1 TABLET BY MOUTH THREE TIMES A DAY AS NEEDED FOR MUSCLE SPASMS    DICLOFENAC (VOLTAREN) 50 MG EC TABLET    TAKE 1 TABLET BY MOUTH TWICE A DAY WITH FOOD AS NEEDED FOR PAIN    DICLOFENAC SODIUM (VOLTAREN) 1 % GEL    Apply 2 g topically 4 (four) times daily.    DOXAZOSIN (CARDURA) 4 MG TABLET    Take 1 tablet (4 mg total) by mouth once daily.    EVOLOCUMAB (REPATHA SURECLICK) 140 MG/ML PNIJ    Inject 1 mL (140 mg total) into the skin every 14 (fourteen) days.    EZETIMIBE (ZETIA) 10 MG TABLET    TAKE 1 TABLET BY MOUTH EVERY DAY    FENOFIBRATE MICRONIZED (LOFIBRA) 134 MG CAP    TAKE ONE CAPSULE BY MOUTH EVERY DAY WITH BREAKFAST.    HYDROCHLOROTHIAZIDE (HYDRODIURIL) 12.5 MG TAB    TAKE 1 TABLET BY MOUTH EVERY DAY    ISOSORBIDE MONONITRATE (IMDUR) 60 MG 24 HR TABLET    Take 1 tablet (60 mg total) by mouth once daily.    JARDIANCE 10 MG TABLET        METFORMIN (GLUCOPHAGE) 500 MG TABLET    TAKE 2 TABLETS BY MOUTH TWICE A DAY WITH MEALS    METHOCARBAMOL (ROBAXIN) 500 MG TAB    TAKE 1 TABLET BY MOUTH THREE TIMES A DAY AS NEEDED MUSCLE SPASM STRENGTH: 500 MG    METOPROLOL SUCCINATE (TOPROL-XL) 50 MG 24 HR TABLET    TAKE 1 TABLET BY MOUTH TWICE A DAY    NIACIN (NIASPAN) 750 MG CR TABLET    Take 1 tablet (750 mg total) by mouth nightly.    NITROFURANTOIN, MACROCRYSTAL-MONOHYDRATE, (MACROBID) 100 MG CAPSULE    Take 1 capsule (100 mg total) by mouth 2 (two) times daily.    NITROGLYCERIN (NITROSTAT) 0.4 MG SL TABLET    TAKE ONE FOR ONSET OF CHEST PAIN / THEN EVERY 5 MINUTES  IF CP CONTINUES UP TO 3 DOSES..CALL 911    NITROGLYCERIN (NITROSTAT) 0.4 MG SL TABLET    TAKE ONE FOR ONSET OF CHEST PAIN / THEN EVERY 5 MINUTES IF CP CONTINUES UP TO 3 DOSES..CALL 911    OMEPRAZOLE (PRILOSEC) 40 MG CAPSULE    Take 1 capsule (40 mg total) by mouth once daily.    ONDANSETRON (ZOFRAN) 4 MG TABLET    Take 1 tablet (4 mg total) by mouth every 8 (eight) hours as needed for Nausea.    ROSUVASTATIN (CRESTOR) 40 MG TAB    Take 1 tablet (40 mg total) by mouth every evening.    SEMAGLUTIDE (OZEMPIC) 0.25 MG OR 0.5 MG (2 MG/3 ML) PEN INJECTOR    Inject 0.5 mg into the skin every 7 days.   Modified Medications    No medications on file   Discontinued Medications    NAPROXEN (NAPROSYN) 500 MG TABLET    TAKE 1 TABLET BY MOUTH TWICE A DAY       Family History   Problem Relation Age of Onset    Hypertension Mother     Hyperlipidemia Mother     Hypertension Father     Diabetes Father     Hyperlipidemia Father     Colon cancer Father     Hypertension Brother     Breast cancer Neg Hx     Ovarian cancer Neg Hx     Uterine cancer Neg Hx        Review of patient's allergies indicates:   Allergen Reactions    No known drug allergies          Objective:      Physical Exam  Patient is alert and oriented, no distress. Skin is intact. Neuro is normal with no focal motor or sensory findings.    Cervical exam is unremarkable. Intact cervical ROM. Negative Spurling's test     Physical Exam:                       RIGHT                                     LEFT     Scap Dyskinesis/Winging       (-)                                             (-)     Tenderness:                                                                              Greater Tuberosity                  (-)                                            +  Bicipital Groove                       (-)                                             (-)  AC joint                                   (-)                                             (-)  Other:      ROM:  Forward  Elevation       160                                          150  Abduction                    100                                          90  ER (at side)                 80                                            60  IR                                 T10                                          deferred     Strength:   Supraspinatus             5/5                                           4/5  Infraspinatus               5/5                                           5/5  Subscap / IR               5/5                                           5/5      Special Tests:              Neer:                                       (-)                                             +              Jiménez:                                 (-)                                             +              SS Stress:                               (-)                                             +              Bear Hug:                                (-)                                             (-)              [place]'s:                                 (-)                                             +              Resisted Thrower's:                (-)                                             (-)              Cross Arm Abduction:             (-)                                             (-)     Neurovascular examination  - Motor grossly intact bilaterally to shoulder abduction, elbow flexion and extension, wrist flexion and extension, and intrinsic hand musculature  - Sensation intact to light touch bilaterally in axillary, median, radial, and ulnar distributions  - Symmetrical radial pulses    Imaging:    XR Results:  Results for orders placed during the hospital encounter of 04/13/23    X-Ray Shoulder 2 or More Views Left    Narrative  EXAMINATION:  XR SHOULDER COMPLETE 2 OR MORE VIEWS LEFT    CLINICAL HISTORY:  Unspecified rotator cuff tear or rupture of left shoulder, not specified as traumatic    TECHNIQUE:  Two or  three views of the left shoulder were performed.    COMPARISON:  None    FINDINGS:  There is no radiographic evidence of acute osseous, articular, or soft tissue abnormality.  There is mild AC joint arthrosis with prominent bony spurring noted along the undersurface of the acromion.  Glenohumeral joint space appears preserved.    Impression  As Above      Electronically signed by: Gino Ho MD  Date:    04/13/2023  Time:    10:09      MRI Results:  Results for orders placed during the hospital encounter of 01/31/20    MRI Shoulder Without Contrast Left    Narrative  EXAMINATION:  MRI SHOULDER WITHOUT CONTRAST LEFT    CLINICAL HISTORY:  Shoulder pain, prior xray, rotator cuff tear / impingement suspected;  Pain in left shoulder    TECHNIQUE:  Multisequence, multiplanar MR imaging of the shoulder performed without contrast.    COMPARISON:  Prior left shoulder radiographs dating back to 04/01/2019.    FINDINGS:  Rotator cuff:    Supraspinatus: Full-thickness tear involving the supraspinatus tendon with some intact fibers seen posteriorly.    Infraspinatus: Intact    Subscapularis: Intact    Teres minor: Intact    Muscle bulk is maintained.    Labrum: Evaluation limited by lack of joint distention/intra-articular contrast.  Degenerative changes of the labrum are noted.    Biceps: Long head biceps tendon is intact.    Bone: No fracture present.  Bone marrow signal is unremarkable.  Type 3 acromion with some lateral downsloping of the acromion is noted.    Acromioclavicular joint:Degenerative changes are noted with subchondral cystic change and mild spurring.    Cartilage: Articular cartilage of the glenohumeral joint is preserved    Miscellaneous: No significant joint effusion.  Mild subdeltoid fluid is present.    Impression  Full-thickness supraspinatus tendon tear as above.  Mild subdeltoid bursitis, degenerative changes, and other findings as described.      Electronically signed by: Reynaldo Miller  MD  Date:    01/31/2020  Time:    10:41      CT Results:  No results found for this or any previous visit.      Physician read: I agree with the above impression.    Assessment/Plan:   Dulce Boogie is a 62 y.o. female with chronic left shoulder rotator cuff tear, left shoulder rotator cuff tear arthropathy      Plan:    Discussed diagnosis and treatment options with patient today.  She has a known chronic left shoulder rotator cuff tear, left shoulder rotator cuff tear arthropathy  We again discussed operative versus nonoperative treatment options.  Operative treatment to include a rotator cuff repair versus rTSA pending a repeat MRI.  Versus nonoperative treatment to include rest, activity modification, oral anti-inflammatories, PT, intermittent corticosteroid injections.  She again voices that she would like to avoid any surgical intervention  I recommend we proceed with a left shoulder CSI, patient tolerated the procedure well with no immediate complications  I do think she would benefit from a repeat round of formal physical therapy as she has done it in the past and she would slight improvement of her symptoms with PT and it can help her maintain some strength / ROM  Internal Physical therapy referral placed to [facility]  Follow up with me in about 3 months to check progress with PT          Anjana Carvajal PA-C  Sports Medicine Physician Assistant         Disclaimer: This note was prepared using a voice recognition system and is likely to have sound alike errors within the text.

## 2023-07-18 NOTE — PROCEDURES
Large Joint Aspiration/Injection: L subacromial bursa    Date/Time: 7/18/2023 9:30 AM  Performed by: Anjana Carvajal PA-C  Authorized by: Anjana Carvajal PA-C     Consent Done?:  Yes (Verbal)  Indications:  Pain  Site marked: the procedure site was marked    Timeout: prior to procedure the correct patient, procedure, and site was verified    Prep: patient was prepped and draped in usual sterile fashion      Local anesthesia used?: Yes    Anesthesia:  Local infiltration  Local anesthetic:  Lidocaine 1% without epinephrine    Details:  Needle Size:  22 G  Ultrasonic Guidance for needle placement?: No    Approach:  Posterior  Location:  Shoulder  Site:  L subacromial bursa  Medications:  40 mg triamcinolone acetonide 40 mg/mL  Patient tolerance:  Patient tolerated the procedure well with no immediate complications

## 2023-07-19 RX ORDER — ALPRAZOLAM 0.5 MG/1
0.5 TABLET ORAL DAILY PRN
Qty: 90 TABLET | Refills: 5 | Status: SHIPPED | OUTPATIENT
Start: 2023-07-19 | End: 2023-12-14 | Stop reason: SDUPTHER

## 2023-07-19 NOTE — TELEPHONE ENCOUNTER
Care Due:                  Date            Visit Type   Department     Provider  --------------------------------------------------------------------------------                                EP -                              PRIMARY      [facility] INTERNAL  Last Visit: 05-      CARE ([facility])   MEDICINE       Zeus Hanson                              [facility]                              PRIMARY      [facility] INTERNAL  Next Visit: 10-      CARE ([facility])   [facility]       Zeus Hanson                                                            Last  Test          Frequency    Reason                     Performed    Due Date  --------------------------------------------------------------------------------    HBA1C.......  6 months...  metFORMIN, semaglutide...  04-   10-    [facility] Embedded Care Due Messages. Reference number: [id number].   7/18/2023 11:27:22 PM CDT

## 2023-07-20 ENCOUNTER — SPECIALTY PHARMACY (OUTPATIENT)
Dept: PHARMACY | Facility: CLINIC | Age: 63
End: 2023-07-20
Payer: MEDICARE

## 2023-07-20 NOTE — TELEPHONE ENCOUNTER
Outgoing call regarding repatha refill; per pt, she's due to inject on 8/2; informed her that [facility] will follow up on 7/25 to schedule delivery

## 2023-07-21 DIAGNOSIS — M25.512 CHRONIC LEFT SHOULDER PAIN: ICD-10-CM

## 2023-07-21 DIAGNOSIS — G89.29 CHRONIC LEFT SHOULDER PAIN: ICD-10-CM

## 2023-07-21 DIAGNOSIS — M75.82 ROTATOR CUFF TENDINITIS, LEFT: ICD-10-CM

## 2023-07-21 DIAGNOSIS — M75.42 IMPINGEMENT SYNDROME OF LEFT SHOULDER: ICD-10-CM

## 2023-07-22 RX ORDER — METHOCARBAMOL 500 MG/1
TABLET, FILM COATED ORAL
Qty: 60 TABLET | Refills: 5 | Status: SHIPPED | OUTPATIENT
Start: 2023-07-22 | End: 2023-11-20

## 2023-07-28 NOTE — TELEPHONE ENCOUNTER
Specialty Pharmacy - Refill Coordination    Specialty Medication Orders Linked to Encounter      Flowsheet Row Most Recent Value   Medication #1 evolocumab (REPATHA SURECLICK) 140 mg/mL PnIj (Order#[id number], Rx#[id number])            Refill Questions - Documented Responses      Flowsheet Row Most Recent Value   Patient Availability and HIPAA Verification    Does patient want to proceed with activity? Yes   HIPAA/medical authority confirmed? Yes   Relationship to patient of person spoken to? Self   Refill Screening Questions    Would patient like to speak to a pharmacist? No   When does the patient need to receive the medication? 08/02/23   Refill Delivery Questions    How will the patient receive the medication? MEDRx   When does the patient need to receive the medication? 08/02/23   Shipping Address Home   Address in [facility] confirmed and updated if neccessary? Yes   Expected Copay ($) 4.3   Is the patient able to afford the medication copay? Yes   Payment Method CC on file   Days supply of Refill 28   Supplies needed? No supplies needed   Refill activity completed? Yes   Refill activity plan Refill scheduled   Shipment/Pickup Date: 07/31/23            Current Outpatient Medications   Medication Sig    ALPRAZolam (XANAX) 0.5 MG tablet Take 1 tablet (0.5 mg total) by mouth daily as needed.    amLODIPine-benazepriL (LOTREL) 10-40 mg per capsule Take 1 capsule by mouth once daily.    aspirin (ECOTRIN) 81 MG EC tablet Take 1 tablet (81 mg total) by mouth once daily.    cyclobenzaprine (FLEXERIL) 10 MG tablet TAKE 1 TABLET BY MOUTH THREE TIMES A DAY AS NEEDED FOR MUSCLE SPASMS    diclofenac (VOLTAREN) 50 MG EC tablet TAKE 1 TABLET BY MOUTH TWICE A DAY WITH FOOD AS NEEDED FOR PAIN    diclofenac sodium (VOLTAREN) 1 % Gel Apply 2 g topically 4 (four) times daily.    doxazosin (CARDURA) 4 MG tablet Take 1 tablet (4 mg total) by mouth once daily.    evolocumab (REPATHA SURECLICK) 140 mg/mL PnIj Inject 1 mL (140  mg total) into the skin every 14 (fourteen) days.    ezetimibe (ZETIA) 10 mg tablet TAKE 1 TABLET BY MOUTH EVERY DAY    fenofibrate micronized (LOFIBRA) 134 MG Cap TAKE ONE CAPSULE BY MOUTH EVERY DAY WITH BREAKFAST.    hydroCHLOROthiazide (HYDRODIURIL) 12.5 MG Tab TAKE 1 TABLET BY MOUTH EVERY DAY    isosorbide mononitrate (IMDUR) 60 MG 24 hr tablet Take 1 tablet (60 mg total) by mouth once daily.    JARDIANCE 10 mg tablet     metFORMIN (GLUCOPHAGE) 500 MG tablet TAKE 2 TABLETS BY MOUTH TWICE A DAY WITH MEALS    methocarbamoL (ROBAXIN) 500 MG Tab TAKE 1 TABLET BY MOUTH THREE TIMES A DAY AS NEEDED MUSCLE SPASM    metoprolol succinate (TOPROL-XL) 50 MG 24 hr tablet TAKE 1 TABLET BY MOUTH TWICE A DAY    naproxen (NAPROSYN) 500 MG tablet Take 1 tablet (500 mg total) by mouth 2 (two) times daily.    niacin (NIASPAN) 750 MG CR tablet Take 1 tablet (750 mg total) by mouth nightly. (Patient not taking: Reported on 5/30/2023)    nitrofurantoin, macrocrystal-monohydrate, (MACROBID) 100 MG capsule Take 1 capsule (100 mg total) by mouth 2 (two) times daily. (Patient not taking: Reported on 5/30/2023)    nitroGLYCERIN (NITROSTAT) 0.4 MG SL tablet TAKE ONE FOR ONSET OF CHEST PAIN / THEN EVERY 5 MINUTES IF CP CONTINUES UP TO 3 DOSES..CALL 911 (Patient not taking: Reported on 5/30/2023)    nitroGLYCERIN (NITROSTAT) 0.4 MG SL tablet TAKE ONE FOR ONSET OF CHEST PAIN / THEN EVERY 5 MINUTES IF CP CONTINUES UP TO 3 DOSES..CALL 911 (Patient not taking: Reported on 5/30/2023)    omeprazole (PRILOSEC) 40 MG capsule Take 1 capsule (40 mg total) by mouth once daily.    ondansetron (ZOFRAN) 4 MG tablet Take 1 tablet (4 mg total) by mouth every 8 (eight) hours as needed for Nausea. (Patient not taking: Reported on 5/30/2023)    rosuvastatin (CRESTOR) 40 MG Tab Take 1 tablet (40 mg total) by mouth every evening.    semaglutide (OZEMPIC) 0.25 mg or 0.5 mg (2 mg/3 mL) pen injector Inject 0.5 mg into the skin every 7 days.   Last reviewed on  7/18/2023  9:49 AM by Anjana Carvajal PA-C    Review of patient's allergies indicates:   Allergen Reactions    No known drug allergies     Last reviewed on  7/18/2023 9:49 AM by Anjana Carvajal      Tasks added this encounter   No tasks added.   Tasks due within next 3 months   No tasks due.     Britany [facility] - Specialty Pharmacy  [address] [facility] [address]  Phone: [phone]  Fax: [phone]

## 2023-08-06 ENCOUNTER — TELEPHONE (OUTPATIENT)
Dept: ADMINISTRATIVE | Facility: HOSPITAL | Age: 63
End: 2023-08-06
Payer: MEDICARE

## 2023-08-15 ENCOUNTER — CLINICAL SUPPORT (OUTPATIENT)
Dept: REHABILITATION | Facility: HOSPITAL | Age: 63
End: 2023-08-15
Payer: MEDICARE

## 2023-08-15 DIAGNOSIS — M25.512 CHRONIC LEFT SHOULDER PAIN: ICD-10-CM

## 2023-08-15 DIAGNOSIS — M25.522 LEFT ELBOW PAIN: ICD-10-CM

## 2023-08-15 DIAGNOSIS — G89.29 CHRONIC LEFT SHOULDER PAIN: ICD-10-CM

## 2023-08-15 DIAGNOSIS — M75.102 NON-TRAUMATIC ROTATOR CUFF TEAR, LEFT: ICD-10-CM

## 2023-08-15 DIAGNOSIS — M75.102 LEFT ROTATOR CUFF TEAR ARTHROPATHY: ICD-10-CM

## 2023-08-15 DIAGNOSIS — M25.612 DECREASED ROM OF LEFT SHOULDER: ICD-10-CM

## 2023-08-15 DIAGNOSIS — M12.812 LEFT ROTATOR CUFF TEAR ARTHROPATHY: ICD-10-CM

## 2023-08-15 PROCEDURE — 97112 NEUROMUSCULAR REEDUCATION: CPT | Mod: PN

## 2023-08-15 PROCEDURE — 97161 PT EVAL LOW COMPLEX 20 MIN: CPT | Mod: PN

## 2023-08-16 PROBLEM — M25.512 CHRONIC LEFT SHOULDER PAIN: Status: ACTIVE | Noted: 2023-08-16

## 2023-08-16 PROBLEM — M25.522 LEFT ELBOW PAIN: Status: ACTIVE | Noted: 2023-08-16

## 2023-08-16 PROBLEM — G89.29 CHRONIC LEFT SHOULDER PAIN: Status: ACTIVE | Noted: 2023-08-16

## 2023-08-16 PROBLEM — M25.612 DECREASED ROM OF LEFT SHOULDER: Status: ACTIVE | Noted: 2023-08-16

## 2023-08-17 NOTE — PLAN OF CARE
[facility]   Physical Therapy Initial Evaluation        Date: 8/15/2023     Name: Dulce Boogie  Clinic Number: [id number]  Therapy Diagnosis:   Encounter Diagnoses   Name Primary?    Non-traumatic rotator cuff tear, left     Left rotator cuff tear arthropathy     Chronic left shoulder pain     Left elbow pain     Decreased ROM of left shoulder       Physician: Anjana Carvajal PA-C      Physician Orders: PT Eval and Treat  Medical Diagnosis from Referral:   M75.102 (ICD-10-CM) - Non-traumatic rotator cuff tear, left   M75.102,M12.812 (ICD-10-CM) - Left rotator cuff tear arthropathy     Evaluation Date: 8/15/2023  Authorization Period Expiration: 07/18/2025  Plan of Care Expiration: 10/13/2023    Progress Update: 09/13/2023   Visit # / Visits authorized: 1 / 1   FOTO: Visit #1 8/15/2023 - Scored: 1 / 3     PRECAUTIONS: Standard and DM     Time In: 1100  Time Out: 1145  Total Appointment Time (timed & untimed codes): 45 minutes    SUBJECTIVE     Date of onset: Started about 2019 and has recently increased in pain.  Chief Complaint: left shoulder pain    History of current condition - Dulce is a 63 y.o. female who presents to physical therapy reporting left shoulder pain.  Unable to fully raise the left arm and unable to perform over head activity and impairs activity such as dressing.  Reports she now has left elbow pain    Imaging: [x] Xray [x] MRI [] CT: Reviewed    Prior Therapy:  [] No  [x] Yes: 2019  Social History: Pt lives with their spouse [x] House [] Apartment/Condo []other  Stairs: [] No  [x] Yes: 13 steps with rail  Occupation: not currently working  Prior Level of Function: Independent with all activities of daily living  Current Level of Function: Decreased function of the left arm with decreased strength and ability to raise overhead    Pain:  Current 8/10, worst 10/10, best 5 /10   Location: [] Right [x] Left [] Bilateral: shoulder and elbow  Description: Ache  Aggravating  Factors: pushing and pain increases at night  Easing Factors: activity avoidance, rest    Pts goals: Pt reported goals are to improve pain and function    _______________________________________________________  Medical History:   Past Medical History:   Diagnosis Date    Abnormal EKG 2022    Anticoagulant long-term use     CAD (coronary artery disease)     Depression     History of colon polyps     Hyperlipidemia associated with type 2 diabetes mellitus     Hyperlipidemia associated with type 2 diabetes mellitus     Hypertension associated with diabetes     Microalbuminuria due to type 2 diabetes mellitus     NSTEMI (non-ST elevated myocardial infarction) 2018    Obesity     Type II diabetes mellitus with complication        Surgical History:   Dulce Boogie  has a past surgical history that includes  section, low transverse; Carotid stent; Left heart catheterization (Left, 2018); Colonoscopy (N/A, 06/10/2019); Left heart catheterization (Left, 2021); Percutaneous transluminal balloon angioplasty of coronary artery (2021); Coronary stent placement (N/A, 2021); and Colonoscopy (N/A, 2022).    Medications:   Dulce has a current medication list which includes the following prescription(s): alprazolam, amlodipine-benazepril, aspirin, cyclobenzaprine, diclofenac, diclofenac sodium, doxazosin, repatha sureclick, ezetimibe, fenofibrate micronized, hydrochlorothiazide, isosorbide mononitrate, jardiance, metformin, methocarbamol, metoprolol succinate, naproxen, niacin, nitrofurantoin (macrocrystal-monohydrate), nitroglycerin, omeprazole, ondansetron, rosuvastatin, and semaglutide.    Allergies:   Review of patient's allergies indicates:   Allergen Reactions    No known drug allergies           OBJECTIVE     Posture:  Pt presents with postural abnormalities which include:    [x] Forward Head   [] Increased Lumbar Lordosis   [x] Rounded Shoulder   [] Flat Back Posture   []  Increased Thoracic Kyphosis [] Inominate Rotation   [] Increased Trunk Sway  [] Genu Recurvatum   [] Increased Trunk Rotation  [] Pes Planus   [] Other:     Sensation:  Sensation is [x] Intact [] Impaired-- to light touch    Palpation: Increased tone and tenderness noted with palpation to: left wrist extensor group and shoulder    SHOULDER-   SHOULDER   Range of Motion Right  Active     Passive Left  Active     Passive Goal   Forward Flexion (180º) 150 160 95  160º   Abduction (180º) 150 150 60  150º   External Rotation at 45º (45º)  60  80  45º   Functional Internal Rotation (T10) T10-11  belt line  T10   (*) pain and/or dysfunction) pain and/or dysfunction      UE STRENGTH -   Upper Extremity  Strength Right   Goal   Shoulder Flexion []1  []2  []3  [x]4  []5                []+ []- 5/5 B   Shoulder Abduction []1  []2  []3  [x]4  []5                []+ []- 5/5 B   Shoulder IR []1  []2  []3  [x]4  []5                []+ []- 5/5 B   Shoulder ER []1  []2  []3  [x]4  []5                []+ []- 5/5 B   Elbow Flexion  []1  []2  []3  [x]4  []5                []+ []- 5/5 B   Elbow Extension []1  []2  []3  [x]4  []5                []+ []- 5/5 B     Upper Extremity  Strength Left   Goal   Shoulder Flexion []1  [x]2  []3  []4  []5                [x]+ []- 5/5 B   Shoulder Abduction []1  [x]2  []3  []4  []5                [x]+ []- 5/5 B   Shoulder IR []1  []2  []3  [x]4  []5                []+ []- 5/5 B   Shoulder ER []1  []2  []3  [x]4  []5                []+ []- 5/5 B   Elbow Flexion  []1  []2  []3  [x]4  []5                []+ []- 5/5 B   Elbow Extension []1  []2  []3  [x]4  []5                []+ []- 5/5 B      SPECIAL TESTS:    TEST Right Left    Goal   Jiménez Negative Positive Negative B    Pascual's Negative Positive Negative B           FUNCTION:     Intake Outcome Measure for FOTO Shoulder Survey    Therapist reviewed FOTO scores for Dulce Boogie on 8/15/2023.   FOTO documents entered into EPIC - see Media  section.    Intake Score: 40%         TREATMENT     Total Treatment time separate from Evaluation: (24) minutes    Dulce received the following interventions:     Neuromuscular re-education activities to improve: Coordination, Sense, Proprioception, and Posture for 24 minutes. The following activities were included:       Activity   Details    Bilateral shoulder ER x     Shoulder flexion with cane x     Supine shoulder IR/ER x                            PATIENT EDUCATION AND HOME EXERCISES     Education/Self-Care provided:  (included in treatment) minutes   Patient educated on the impairments noted above and the effects of physical therapy intervention to improve overall condition and Quality of Life  Patient was educated on all the above exercise prior/during/after for proper posture, positioning, and execution for safe performance with home exercise program.     Written Home Exercises Provided: yes. Prefers: [x] Printed [x] Electronic  Exercises were reviewed and Dulce was able to demonstrate them prior to the end of the session.  Dulce demonstrated good understanding of the education provided. See EMR under Patient Instructions for exercises provided during therapy sessions.      ASSESSMENT     Dulce is a 63 y.o. female referred to outpatient Physical Therapy with a medical diagnosis of   Encounter Diagnoses   Name Primary?    Non-traumatic rotator cuff tear, left     Left rotator cuff tear arthropathy     Chronic left shoulder pain     Left elbow pain     Decreased ROM of left shoulder    .    Physical exam supports RTC tear impairments including: decreased range of motion, decreased muscular strength, decreased endurance, decreased muscular length/flexibility, impaired joint mobility, and impaired functional mobility.     The above impairments will be addressed through manual therapy techniques, therapeutic exercises, functional training, and modalities as necessary. Patient was treated and educated on exercises  for home program, progression of therapy, and benefits of therapy to achieve full functional mobility.       Pt prognosis is Good.   Pt will benefit from skilled outpatient Physical Therapy to address the deficits stated above and in the chart below, provide pt/family education, and to maximize pt's level of independence.     Plan of care discussed with patient: Yes  Pt's spiritual, cultural and educational needs considered and patient is agreeable to the plan of care and goals as stated below:     Anticipated Barriers for therapy: none    Medical Necessity is demonstrated by the following:     History  Co-morbidities and personal factors that may impact the plan of care [x] LOW: no personal factors / co-morbidities  [] MODERATE: 1-2 personal factors / co-morbidities  [] HIGH: 3+ personal factors / co-morbidities    Moderate / High Support Documentation:   Co-morbidities affecting plan of care:   Past Medical History:   Diagnosis Date    Abnormal EKG 12/8/2022    Anticoagulant long-term use     CAD (coronary artery disease)     Depression     History of colon polyps     Hyperlipidemia associated with type 2 diabetes mellitus     Hyperlipidemia associated with type 2 diabetes mellitus     Hypertension associated with diabetes     Microalbuminuria due to type 2 diabetes mellitus     NSTEMI (non-ST elevated myocardial infarction) 5/21/2018    Obesity     Type II diabetes mellitus with complication        Personal Factors:   no deficits     Examination  Body Structures and Functions, activity limitations and participation restrictions that may impact the plan of care [x] LOW: addressing 1-2 elements  [] MODERATE: 3+ elements  [] HIGH: 4+ elements (please support below)    Moderate / High Support Documentation: 10     Clinical Presentation [x] LOW: stable  [] MODERATE: Evolving  [] HIGH: Unstable     Decision Making/ Complexity Score: low         SHORT TERM GOALS:  4 weeks Progress Date Met   Recent signs and systems  trend is improving in order to progress towards Long term goals.  [] Met  [] Not Met  [x] Progressing    Patient will be independent with Home Exercise Program  in order to further progress and return to maximal function. [] Met  [] Not Met  [x] Progressing    Pain rating at Worst: 5 /10 in order to progress towards increased independence with activity. [] Met  [] Not Met  [x] Progressing    Patient will be able to correct postural deviations in sitting and standing, to decrease pain and promote postural awareness for injury prevention.  [] Met  [] Not Met  [x] Progressing    Patient will improve functional outcome (FOTO) score: by 5% to increase self-worth & perceived functional ability towards long term goals [] Met  [] Not Met  [x] Progressing      LONG TERM GOALS: 8 weeks  Progress Date Met   Patient will return to normal activites of daily living, recreational, and work related activities with less pain and limitation.  [] Met  [] Not Met  [x] Progressing    Patient will improve range of motion  to stated goals in order to return to maximal functional potential.  [] Met  [] Not Met  [x] Progressing    Patient will improve Strength to stated goals of appropriate musculature in order to improve functional independence.  [] Met  [] Not Met  [x] Progressing    Pain Rating at Best: 1/10 to improve Quality of Life.  [] Met  [] Not Met  [x] Progressing    Patient will meet predicted functional outcome (FOTO) score: 56% to increase self-worth & perceived functional ability. [] Met  [] Not Met  [x] Progressing    Patient will have met/partially met personal goal of: improve pain and function in lifting and ADLs [] Met  [] Not Met  [x] Progressing          PLAN   Plan of care Certification: 8/15/2023 to 10/14/2023    Outpatient Physical Therapy 2 times weekly for 8 weeks to include any combination of the following interventions: virtual visits, dry needling, modalities, electrical stimulation (IFC, Pre-Mod, Attended with  Functional Dry Needling), Electrical Stimulation , Manual Therapy, Moist Heat/ Ice, Neuromuscular Re-ed, Patient Education, Self Care, Therapeutic Exercise, Functional Training, and Therapeutic Activites     Thank you for this referral.    Abdoul Dobbs, PT

## 2023-08-31 ENCOUNTER — TELEPHONE (OUTPATIENT)
Dept: INTERNAL MEDICINE | Facility: CLINIC | Age: 63
End: 2023-08-31
Payer: MEDICARE

## 2023-09-01 NOTE — TELEPHONE ENCOUNTER
Left message on patient voicemail informing [facility] is trying to contact her.  Spoke with  David to inform of the message as well.  Instructed for patient to call for detail message.

## 2023-09-29 ENCOUNTER — PATIENT MESSAGE (OUTPATIENT)
Dept: PHARMACY | Facility: CLINIC | Age: 63
End: 2023-09-29
Payer: MEDICARE

## 2023-09-29 RX ORDER — ROSUVASTATIN CALCIUM 40 MG/1
40 TABLET, COATED ORAL NIGHTLY
Qty: 90 TABLET | Refills: 1 | Status: SHIPPED | OUTPATIENT
Start: 2023-09-29 | End: 2024-04-01

## 2023-09-29 NOTE — TELEPHONE ENCOUNTER
Refill Decision Note   Dulce Boogie  is requesting a refill authorization.  Brief Assessment and Rationale for Refill:  Approve     Medication Therapy Plan:         Comments:     Note composed:10:14 AM 09/29/2023

## 2023-09-29 NOTE — TELEPHONE ENCOUNTER
No care due was identified.  [facility] Embedded Care Due Messages. Reference number: [id number].   9/29/2023 12:10:56 AM CDT

## 2023-10-06 DIAGNOSIS — M77.12 LEFT LATERAL EPICONDYLITIS: ICD-10-CM

## 2023-10-06 RX ORDER — DICLOFENAC SODIUM 10 MG/G
2 GEL TOPICAL 4 TIMES DAILY
Qty: 1 EACH | Refills: 2 | Status: SHIPPED | OUTPATIENT
Start: 2023-10-06 | End: 2023-10-16

## 2023-10-09 ENCOUNTER — PATIENT MESSAGE (OUTPATIENT)
Dept: ADMINISTRATIVE | Facility: CLINIC | Age: 63
End: 2023-10-09
Payer: MEDICARE

## 2023-10-09 ENCOUNTER — TELEPHONE (OUTPATIENT)
Dept: ADMINISTRATIVE | Facility: CLINIC | Age: 63
End: 2023-10-09
Payer: MEDICARE

## 2023-10-11 ENCOUNTER — OFFICE VISIT (OUTPATIENT)
Dept: FAMILY MEDICINE | Facility: CLINIC | Age: 63
End: 2023-10-11
Payer: MEDICARE

## 2023-10-11 ENCOUNTER — TELEPHONE (OUTPATIENT)
Dept: ADMINISTRATIVE | Facility: CLINIC | Age: 63
End: 2023-10-11
Payer: MEDICARE

## 2023-10-11 VITALS — BODY MASS INDEX: 33.66 KG/M2 | HEIGHT: 65 IN | WEIGHT: 202 LBS

## 2023-10-11 DIAGNOSIS — E78.5 HYPERLIPIDEMIA ASSOCIATED WITH TYPE 2 DIABETES MELLITUS: ICD-10-CM

## 2023-10-11 DIAGNOSIS — Z00.00 ENCOUNTER FOR PREVENTIVE HEALTH EXAMINATION: Primary | ICD-10-CM

## 2023-10-11 DIAGNOSIS — F32.5 MAJOR DEPRESSION IN REMISSION: ICD-10-CM

## 2023-10-11 DIAGNOSIS — G89.29 CHRONIC LEFT SHOULDER PAIN: ICD-10-CM

## 2023-10-11 DIAGNOSIS — Z23 NEED FOR SHINGLES VACCINE: ICD-10-CM

## 2023-10-11 DIAGNOSIS — E66.09 CLASS 1 OBESITY DUE TO EXCESS CALORIES WITH SERIOUS COMORBIDITY AND BODY MASS INDEX (BMI) OF 33.0 TO 33.9 IN ADULT: ICD-10-CM

## 2023-10-11 DIAGNOSIS — E11.59 HYPERTENSION ASSOCIATED WITH DIABETES: ICD-10-CM

## 2023-10-11 DIAGNOSIS — E11.29 MICROALBUMINURIA DUE TO TYPE 2 DIABETES MELLITUS: ICD-10-CM

## 2023-10-11 DIAGNOSIS — E11.8 TYPE II DIABETES MELLITUS WITH COMPLICATION: ICD-10-CM

## 2023-10-11 DIAGNOSIS — I15.2 HYPERTENSION ASSOCIATED WITH DIABETES: ICD-10-CM

## 2023-10-11 DIAGNOSIS — I77.819 AORTIC ECTASIA: ICD-10-CM

## 2023-10-11 DIAGNOSIS — Z23 NEED FOR DIPHTHERIA-TETANUS-PERTUSSIS (TDAP) VACCINE: ICD-10-CM

## 2023-10-11 DIAGNOSIS — M25.512 CHRONIC LEFT SHOULDER PAIN: ICD-10-CM

## 2023-10-11 DIAGNOSIS — Z86.010 HISTORY OF COLON POLYPS: ICD-10-CM

## 2023-10-11 DIAGNOSIS — D69.2 PURPURA: ICD-10-CM

## 2023-10-11 DIAGNOSIS — E11.69 HYPERLIPIDEMIA ASSOCIATED WITH TYPE 2 DIABETES MELLITUS: ICD-10-CM

## 2023-10-11 DIAGNOSIS — R80.9 MICROALBUMINURIA DUE TO TYPE 2 DIABETES MELLITUS: ICD-10-CM

## 2023-10-11 PROBLEM — E66.811 CLASS 1 OBESITY DUE TO EXCESS CALORIES WITH SERIOUS COMORBIDITY AND BODY MASS INDEX (BMI) OF 33.0 TO 33.9 IN ADULT: Status: ACTIVE | Noted: 2020-11-23

## 2023-10-11 PROCEDURE — G0439 PR MEDICARE ANNUAL WELLNESS SUBSEQUENT VISIT: ICD-10-PCS | Mod: 95,,,

## 2023-10-11 PROCEDURE — G0439 PPPS, SUBSEQ VISIT: HCPCS | Mod: 95,,,

## 2023-10-11 RX ORDER — ZOSTER VACCINE RECOMBINANT, ADJUVANTED 50 MCG/0.5
KIT INTRAMUSCULAR
Qty: 1 EACH | Refills: 1 | Status: SHIPPED | OUTPATIENT
Start: 2023-10-11 | End: 2023-10-16

## 2023-10-11 NOTE — PATIENT INSTRUCTIONS
Counseling and Referral of Other Preventative  (Italic type indicates deductible and co-insurance are waived)    Patient Name: Dulce Boogie  Today's Date: 10/11/2023    Health Maintenance       Date Due Completion Date    TETANUS VACCINE 12/07/2021 12/7/2011    Cervical Cancer Screening 08/13/2022 8/13/2019    Eye Exam 12/21/2022 12/21/2021    Influenza Vaccine (1) 09/01/2023 10/6/2021    Foot Exam 10/06/2023 10/6/2022 (Done)    Override on 10/6/2022: Done    Override on 10/6/2021: Done    Override on 8/13/2019: Done    Override on 10/29/2018: Done (normal)    Override on 7/14/2017: Done    Override on 7/15/2016: Done    Hemoglobin A1c 10/13/2023 4/13/2023    Shingles Vaccine (1 of 2) 10/11/2024 (Originally 8/6/2009) ---    COVID-19 Vaccine (1) 10/11/2024 (Originally 2/6/1961) ---    Diabetes Urine Screening 04/13/2024 4/13/2023    Lipid Panel 04/13/2024 4/13/2023    Mammogram 05/24/2024 5/24/2023    High Dose Statin 10/11/2024 10/11/2023    Aspirin/Antiplatelet Therapy 10/11/2024 10/11/2023    Colorectal Cancer Screening 12/01/2025 12/1/2022        No orders of the defined types were placed in this encounter.    The following information is provided to all patients.  This information is to help you find resources for any of the problems found today that may be affecting your health:                Living healthy guide: www.[facility].[place].gov      Understanding Diabetes: www.diabetes.org      Eating healthy: www.cdc.gov/healthyweight      CDC home safety checklist: www.cdc.gov/steadi/patient.html      Agency on Aging: www.goea.[place].gov      Alcoholics anonymous (AA): www.aa.org      Physical Activity: www.deo.nih.gov/oj4yndw      Tobacco use: www.quitwithusla.org

## 2023-10-11 NOTE — PROGRESS NOTES
"  The patient location is: [place]  The chief complaint leading to consultation is: Medicare AWV     Visit type: audiovisual    Face to Face time with patient: 23  60 minutes of total time spent on the encounter, which includes face to face time and non-face to face time preparing to see the patient (eg, review of tests), Obtaining and/or reviewing separately obtained history, Documenting clinical information in the electronic or other health record, Independently interpreting results (not separately reported) and communicating results to the patient/family/caregiver, or Care coordination (not separately reported).         Each patient to whom he or she provides medical services by telemedicine is:  (1) informed of the relationship between the physician and patient and the respective role of any other health care provider with respect to management of the patient; and (2) notified that he or she may decline to receive medical services by telemedicine and may withdraw from such care at any time.    Notes:       Dulce Boogie presented for a  Medicare AWV and comprehensive Health Risk Assessment today. The following components were reviewed and updated:    Medical history  Family History  Social history  Allergies and Current Medications  Health Risk Assessment  Health Maintenance  Care Team         ** See Completed Assessments for Annual Wellness Visit within the encounter summary.**         The following assessments were completed:  Living Situation  CAGE  Depression Screening  Fall Risk Assessment (MACH 10)  Hearing Assessment(HHI)  Cognitive Function Screening  Nutrition Screening  ADL Screening  PAQ Screening      Vitals:    10/11/23 1004   Weight: 91.6 kg (202 lb)   Height: 5' 5" (1.651 m)     Body mass index is 33.61 kg/m².  Physical Exam  Constitutional:       General: She is not in acute distress.     Appearance: Normal appearance. She is well-developed and well-groomed.   Skin:     Coloration: Skin is not " pale.   Neurological:      Mental Status: She is alert and oriented to person, place, and time.   Psychiatric:         Mood and Affect: Mood normal.         Speech: Speech normal.         Behavior: Behavior normal. Behavior is cooperative.         Thought Content: Thought content normal.               Diagnoses and health risks identified today and associated recommendations/orders:    1. Encounter for preventive health examination  Screenings performed, as noted above. Personal preventative testing needs reviewed.     2. Major depression in remission  Chronic. Stable. Followed by PCP.     3. Hypertension associated with diabetes  4. Hyperlipidemia associated with type 2 diabetes mellitus  5. Aortic ectasia  Chronic; stable on medication. Followed by PCP.     6. Type II diabetes mellitus with complication  7. Microalbuminuria due to type 2 diabetes mellitus  Chronic. Stable with medication. Followed by PCP.     8. Purpura  Chronic. Stable. Followed by PCP.     9. Class 1 obesity due to excess calories with serious comorbidity and body mass index (BMI) of 33.0 to 33.9 in adult  Work on diet modification and increase in physical activity as tolerated. Followed by PCP.     10. History of colon polyps  Last colonoscopy 12/2022, repeat in 3-5 years. Followed by GI.     11. Chronic left shoulder pain  Stable with medication. Followed by Orthopedics.     12. Need for diphtheria-tetanus-pertussis (Tdap) vaccine  - diphth,pertus,acell,,tetanus (BOOSTRIX) 2.5-8-5 Lf-mcg-Lf/0.5mL Susp; Inject 0.5 mLs into the muscle once. for 1 dose  Dispense: 0.5 mL; Refill: 0    13. Need for shingles vaccine  - varicella-zoster [id number]-AS01B, PF, (SHINGRIX, PF,) 50 mcg/0.5 mL injection; Inject 0.5mL IM at 0 and 2-6 months  Dispense: 1 each; Refill: 1      Provided Dulce with a 5-10 year written screening schedule and personal prevention plan. Recommendations were developed using the USPSTF age appropriate recommendations. Education, counseling,  and referrals were provided as needed. After Visit Summary printed and given to patient which includes a list of additional screenings\tests needed.    Follow up in about 1 year (around 10/11/2024) for your next annual wellness visit.    Laura Jane NP      Advance Care Planning     I offered to discuss advanced care planning, including how to pick a person who would make decisions for you if you were unable to make them for yourself, called a health care power of , and what kind of decisions you might make such as use of life sustaining treatments such as ventilators and tube feeding when faced with a life limiting illness recorded on a living will that they will need to know. (How you want to be cared for as you near the end of your natural life)     X  Patient is unwilling to engage in a discussion regarding advance directives at this time.

## 2023-10-13 ENCOUNTER — LAB VISIT (OUTPATIENT)
Dept: LAB | Facility: HOSPITAL | Age: 63
End: 2023-10-13
Attending: INTERNAL MEDICINE
Payer: MEDICARE

## 2023-10-13 DIAGNOSIS — E11.8 TYPE II DIABETES MELLITUS WITH COMPLICATION: ICD-10-CM

## 2023-10-13 LAB
ALBUMIN SERPL BCP-MCNC: 3.8 G/DL (ref 3.5–5.2)
ALP SERPL-CCNC: 62 U/L (ref 55–135)
ALT SERPL W/O P-5'-P-CCNC: 28 U/L (ref 10–44)
ANION GAP SERPL CALC-SCNC: 11 MMOL/L (ref 8–16)
AST SERPL-CCNC: 31 U/L (ref 10–40)
BILIRUB SERPL-MCNC: 0.3 MG/DL (ref 0.1–1)
BUN SERPL-MCNC: 13 MG/DL (ref 8–23)
CALCIUM SERPL-MCNC: 9.3 MG/DL (ref 8.7–10.5)
CHLORIDE SERPL-SCNC: 105 MMOL/L (ref 95–110)
CHOLEST SERPL-MCNC: 142 MG/DL (ref 120–199)
CHOLEST/HDLC SERPL: 3 {RATIO} (ref 2–5)
CO2 SERPL-SCNC: 26 MMOL/L (ref 23–29)
CREAT SERPL-MCNC: 0.9 MG/DL (ref 0.5–1.4)
EST. GFR  (NO RACE VARIABLE): >60 ML/MIN/1.73 M^2
ESTIMATED AVG GLUCOSE: 171 MG/DL (ref 68–131)
GLUCOSE SERPL-MCNC: 140 MG/DL (ref 70–110)
HBA1C MFR BLD: 7.6 % (ref 4–5.6)
HDLC SERPL-MCNC: 48 MG/DL (ref 40–75)
HDLC SERPL: 33.8 % (ref 20–50)
LDLC SERPL CALC-MCNC: 66.8 MG/DL (ref 63–159)
NONHDLC SERPL-MCNC: 94 MG/DL
POTASSIUM SERPL-SCNC: 3.5 MMOL/L (ref 3.5–5.1)
PROT SERPL-MCNC: 7.5 G/DL (ref 6–8.4)
SODIUM SERPL-SCNC: 142 MMOL/L (ref 136–145)
TRIGL SERPL-MCNC: 136 MG/DL (ref 30–150)
TSH SERPL DL<=0.005 MIU/L-ACNC: 1.74 UIU/ML (ref 0.4–4)

## 2023-10-13 PROCEDURE — 84443 ASSAY THYROID STIM HORMONE: CPT | Performed by: INTERNAL MEDICINE

## 2023-10-13 PROCEDURE — 80061 LIPID PANEL: CPT | Performed by: INTERNAL MEDICINE

## 2023-10-13 PROCEDURE — 83036 HEMOGLOBIN GLYCOSYLATED A1C: CPT | Performed by: INTERNAL MEDICINE

## 2023-10-13 PROCEDURE — 36415 COLL VENOUS BLD VENIPUNCTURE: CPT | Performed by: INTERNAL MEDICINE

## 2023-10-13 PROCEDURE — 80053 COMPREHEN METABOLIC PANEL: CPT | Performed by: INTERNAL MEDICINE

## 2023-10-16 ENCOUNTER — OFFICE VISIT (OUTPATIENT)
Dept: INTERNAL MEDICINE | Facility: CLINIC | Age: 63
End: 2023-10-16
Payer: MEDICARE

## 2023-10-16 VITALS
WEIGHT: 200.19 LBS | DIASTOLIC BLOOD PRESSURE: 70 MMHG | HEART RATE: 63 BPM | HEIGHT: 65 IN | BODY MASS INDEX: 33.35 KG/M2 | TEMPERATURE: 97 F | SYSTOLIC BLOOD PRESSURE: 130 MMHG | OXYGEN SATURATION: 97 %

## 2023-10-16 DIAGNOSIS — I25.10 CORONARY ARTERY DISEASE INVOLVING NATIVE CORONARY ARTERY OF NATIVE HEART WITHOUT ANGINA PECTORIS: Chronic | ICD-10-CM

## 2023-10-16 DIAGNOSIS — R80.9 MICROALBUMINURIA DUE TO TYPE 2 DIABETES MELLITUS: ICD-10-CM

## 2023-10-16 DIAGNOSIS — R30.0 DYSURIA: ICD-10-CM

## 2023-10-16 DIAGNOSIS — E11.69 HYPERLIPIDEMIA ASSOCIATED WITH TYPE 2 DIABETES MELLITUS: ICD-10-CM

## 2023-10-16 DIAGNOSIS — E78.5 HYPERLIPIDEMIA ASSOCIATED WITH TYPE 2 DIABETES MELLITUS: ICD-10-CM

## 2023-10-16 DIAGNOSIS — I15.2 HYPERTENSION ASSOCIATED WITH DIABETES: ICD-10-CM

## 2023-10-16 DIAGNOSIS — F32.5 MAJOR DEPRESSION IN REMISSION: ICD-10-CM

## 2023-10-16 DIAGNOSIS — I25.2 HISTORY OF NON-ST ELEVATION MYOCARDIAL INFARCTION (NSTEMI): ICD-10-CM

## 2023-10-16 DIAGNOSIS — E11.59 HYPERTENSION ASSOCIATED WITH DIABETES: ICD-10-CM

## 2023-10-16 DIAGNOSIS — E11.8 TYPE II DIABETES MELLITUS WITH COMPLICATION: Primary | ICD-10-CM

## 2023-10-16 DIAGNOSIS — Z86.010 HISTORY OF COLON POLYPS: ICD-10-CM

## 2023-10-16 DIAGNOSIS — E11.29 MICROALBUMINURIA DUE TO TYPE 2 DIABETES MELLITUS: ICD-10-CM

## 2023-10-16 DIAGNOSIS — E66.09 CLASS 1 OBESITY DUE TO EXCESS CALORIES WITH SERIOUS COMORBIDITY AND BODY MASS INDEX (BMI) OF 33.0 TO 33.9 IN ADULT: ICD-10-CM

## 2023-10-16 LAB
BACTERIA #/AREA URNS AUTO: ABNORMAL /HPF
BILIRUB UR QL STRIP: NEGATIVE
CAOX CRY UR QL COMP ASSIST: ABNORMAL
CLARITY UR REFRACT.AUTO: CLEAR
COLOR UR AUTO: YELLOW
GLUCOSE UR QL STRIP: ABNORMAL
HGB UR QL STRIP: NEGATIVE
HYALINE CASTS UR QL AUTO: 9 /LPF
KETONES UR QL STRIP: NEGATIVE
LEUKOCYTE ESTERASE UR QL STRIP: NEGATIVE
MICROSCOPIC COMMENT: ABNORMAL
NITRITE UR QL STRIP: NEGATIVE
PH UR STRIP: 6 [PH] (ref 5–8)
PROT UR QL STRIP: NEGATIVE
RBC #/AREA URNS AUTO: 4 /HPF (ref 0–4)
SP GR UR STRIP: >1.03 (ref 1–1.03)
SQUAMOUS #/AREA URNS AUTO: 6 /HPF
URN SPEC COLLECT METH UR: ABNORMAL
WBC #/AREA URNS AUTO: 4 /HPF (ref 0–5)
YEAST UR QL AUTO: ABNORMAL

## 2023-10-16 PROCEDURE — 99215 OFFICE O/P EST HI 40 MIN: CPT | Mod: PBBFAC,PO,25 | Performed by: INTERNAL MEDICINE

## 2023-10-16 PROCEDURE — 87088 URINE BACTERIA CULTURE: CPT | Performed by: INTERNAL MEDICINE

## 2023-10-16 PROCEDURE — 87086 URINE CULTURE/COLONY COUNT: CPT | Performed by: INTERNAL MEDICINE

## 2023-10-16 PROCEDURE — 99215 PR OFFICE/OUTPT VISIT, EST, LEVL V, 40-54 MIN: ICD-10-PCS | Mod: 25,S$PBB,, | Performed by: INTERNAL MEDICINE

## 2023-10-16 PROCEDURE — 99999 PR PBB SHADOW E&M-EST. PATIENT-LVL V: CPT | Mod: PBBFAC,,, | Performed by: INTERNAL MEDICINE

## 2023-10-16 PROCEDURE — 99999PBSHW FLU VACCINE (QUAD) GREATER THAN OR EQUAL TO 3YO PRESERVATIVE FREE IM: Mod: PBBFAC,,,

## 2023-10-16 PROCEDURE — 99999PBSHW FLU VACCINE (QUAD) GREATER THAN OR EQUAL TO 3YO PRESERVATIVE FREE IM: ICD-10-PCS | Mod: PBBFAC,,,

## 2023-10-16 PROCEDURE — G0008 ADMIN INFLUENZA VIRUS VAC: HCPCS | Mod: PBBFAC,PO

## 2023-10-16 PROCEDURE — 81001 URINALYSIS AUTO W/SCOPE: CPT | Performed by: INTERNAL MEDICINE

## 2023-10-16 PROCEDURE — 99215 OFFICE O/P EST HI 40 MIN: CPT | Mod: 25,S$PBB,, | Performed by: INTERNAL MEDICINE

## 2023-10-16 PROCEDURE — 99999 PR PBB SHADOW E&M-EST. PATIENT-LVL V: ICD-10-PCS | Mod: PBBFAC,,, | Performed by: INTERNAL MEDICINE

## 2023-10-16 RX ORDER — SEMAGLUTIDE 1.34 MG/ML
1 INJECTION, SOLUTION SUBCUTANEOUS
Qty: 9 ML | Refills: 3 | Status: SHIPPED | OUTPATIENT
Start: 2023-10-16 | End: 2024-02-06

## 2023-10-18 ENCOUNTER — PATIENT MESSAGE (OUTPATIENT)
Dept: INTERNAL MEDICINE | Facility: CLINIC | Age: 63
End: 2023-10-18
Payer: MEDICARE

## 2023-10-18 DIAGNOSIS — I15.2 HYPERTENSION ASSOCIATED WITH DIABETES: ICD-10-CM

## 2023-10-18 DIAGNOSIS — E11.59 HYPERTENSION ASSOCIATED WITH DIABETES: ICD-10-CM

## 2023-10-18 LAB — BACTERIA UR CULT: ABNORMAL

## 2023-10-18 RX ORDER — ISOSORBIDE MONONITRATE 60 MG/1
60 TABLET, EXTENDED RELEASE ORAL
Qty: 90 TABLET | Refills: 3 | Status: SHIPPED | OUTPATIENT
Start: 2023-10-18

## 2023-10-18 RX ORDER — AMOXICILLIN AND CLAVULANATE POTASSIUM 875; 125 MG/1; MG/1
1 TABLET, FILM COATED ORAL EVERY 12 HOURS
Qty: 20 TABLET | Refills: 0 | Status: SHIPPED | OUTPATIENT
Start: 2023-10-18 | End: 2024-02-06

## 2023-10-18 NOTE — TELEPHONE ENCOUNTER
Your urine culture grew out a bacteria and I have sent in an antibiotic prescription to your pharmacy. I want you to also take a probiotic daily as well. They are all over the counter. Choose any one and take daily.

## 2023-10-18 NOTE — TELEPHONE ENCOUNTER
No care due was identified.  [facility] Embedded Care Due Messages. Reference number: [id number].   10/18/2023 2:33:41 PM CDT

## 2023-10-19 RX ORDER — EMPAGLIFLOZIN 10 MG/1
10 TABLET, FILM COATED ORAL
Qty: 90 TABLET | Refills: 3 | OUTPATIENT
Start: 2023-10-19

## 2023-10-19 RX ORDER — EZETIMIBE 10 MG/1
TABLET ORAL
Qty: 90 TABLET | Refills: 3 | Status: SHIPPED | OUTPATIENT
Start: 2023-10-19

## 2023-10-19 NOTE — TELEPHONE ENCOUNTER
Refill Decision Note   Dulce Boogie  is requesting a refill authorization.  Brief Assessment and Rationale for Refill:  Approve  Quick Discontinue     Medication Therapy Plan:  Patient dose adjustment to Jardiance 25 mg 10/16/23    Medication Reconciliation Completed: No   Comments:     No Care Gaps recommended.     Note composed:10:23 AM 10/19/2023

## 2023-10-24 ENCOUNTER — OFFICE VISIT (OUTPATIENT)
Dept: SPORTS MEDICINE | Facility: CLINIC | Age: 63
End: 2023-10-24
Payer: MEDICARE

## 2023-10-24 VITALS — BODY MASS INDEX: 33.32 KG/M2 | WEIGHT: 200 LBS | HEIGHT: 65 IN

## 2023-10-24 DIAGNOSIS — M75.102 NON-TRAUMATIC ROTATOR CUFF TEAR, LEFT: ICD-10-CM

## 2023-10-24 DIAGNOSIS — M75.102 LEFT ROTATOR CUFF TEAR ARTHROPATHY: Primary | ICD-10-CM

## 2023-10-24 DIAGNOSIS — M12.812 LEFT ROTATOR CUFF TEAR ARTHROPATHY: Primary | ICD-10-CM

## 2023-10-24 PROCEDURE — 20610 DRAIN/INJ JOINT/BURSA W/O US: CPT | Mod: PBBFAC | Performed by: PHYSICIAN ASSISTANT

## 2023-10-24 PROCEDURE — 20610 DRAIN/INJ JOINT/BURSA W/O US: CPT | Mod: S$PBB,LT,, | Performed by: PHYSICIAN ASSISTANT

## 2023-10-24 PROCEDURE — 99999PBSHW PR PBB SHADOW TECHNICAL ONLY FILED TO HB: ICD-10-PCS | Mod: PBBFAC,,,

## 2023-10-24 PROCEDURE — 99999 PR PBB SHADOW E&M-EST. PATIENT-LVL IV: ICD-10-PCS | Mod: PBBFAC,,, | Performed by: PHYSICIAN ASSISTANT

## 2023-10-24 PROCEDURE — 99213 PR OFFICE/OUTPT VISIT, EST, LEVL III, 20-29 MIN: ICD-10-PCS | Mod: S$PBB,25,, | Performed by: PHYSICIAN ASSISTANT

## 2023-10-24 PROCEDURE — 99214 OFFICE O/P EST MOD 30 MIN: CPT | Mod: PBBFAC,25 | Performed by: PHYSICIAN ASSISTANT

## 2023-10-24 PROCEDURE — 99999 PR PBB SHADOW E&M-EST. PATIENT-LVL IV: CPT | Mod: PBBFAC,,, | Performed by: PHYSICIAN ASSISTANT

## 2023-10-24 PROCEDURE — 99999PBSHW PR PBB SHADOW TECHNICAL ONLY FILED TO HB: Mod: PBBFAC,,,

## 2023-10-24 PROCEDURE — 99213 OFFICE O/P EST LOW 20 MIN: CPT | Mod: S$PBB,25,, | Performed by: PHYSICIAN ASSISTANT

## 2023-10-24 PROCEDURE — 20610 LARGE JOINT ASPIRATION/INJECTION: L SUBACROMIAL BURSA: ICD-10-PCS | Mod: S$PBB,LT,, | Performed by: PHYSICIAN ASSISTANT

## 2023-10-24 RX ORDER — NAPROXEN 500 MG/1
500 TABLET ORAL 2 TIMES DAILY
Qty: 60 TABLET | Refills: 1 | Status: SHIPPED | OUTPATIENT
Start: 2023-10-24 | End: 2024-01-31

## 2023-10-24 RX ORDER — TRIAMCINOLONE ACETONIDE 40 MG/ML
40 INJECTION, SUSPENSION INTRA-ARTICULAR; INTRAMUSCULAR
Status: DISCONTINUED | OUTPATIENT
Start: 2023-10-24 | End: 2023-10-24 | Stop reason: HOSPADM

## 2023-10-24 RX ADMIN — TRIAMCINOLONE ACETONIDE 40 MG: 200 INJECTION, SUSPENSION INTRA-ARTICULAR; INTRAMUSCULAR at 09:10

## 2023-10-24 NOTE — PROGRESS NOTES
Orthopaedic Follow-Up Visit    Last Appointment:  07/18/2023  Diagnosis:  Left rotator cuff tear arthropathy  Prior Procedure:  Left SAS CSI, referral to PT, oral anti-inflammatories    Dulce Boogie is a 63 y.o. female who is here for f/u evaluation of left shoulder pain. The patient was last seen here by me on 07/18/2023 at which point we decided to do a left shoulder CSI, referral to PT, and oral anti-inflammatories prior to considering further treatment options. The patient returns today reporting that the symptoms improved with the injection but have recently started to return and is interested in a repeat CSI.     To review her history, Dulce Boogie is a 62 y.o. right-hand dominant female who presents with LEFT shoulder pain and dysfunction. She was previously seen by Clemencia Clayton PA-C in 2019. She was initially treating her for left shoulder rotator cuff impingement. She received 2 SAS CSIs, but ultimately she failed to improve with conservative treatment and they ended up getting an MRI of the left shoulder. MRI of the left shoulder from January 2020 revealed a full-thickness rotator cuff tear.  For several years, she had no acute injury or trauma.  Her symptoms include constant aching, throbbing pain of the left shoulder, limited range of motion, night pain.  Her pain is made worse by raising the arm, overhead activities, repetitive movements.  She has had an x-ray thus far.  Her treatment has included rest, activity modification, oral anti-inflammatories, Robaxin, PT, glenohumeral CSI, subacromial CSI, Robaxin.  At last visit we decided to try a repeat subacromial corticosteroid injection as it had been greater than 3 years since her last CSI    Patient's medications, allergies, past medical, surgical, social and family histories were reviewed and updated as appropriate.    Review of Systems   All systems reviewed were negative.  Specifically, the patient denies fever, chills, weight loss, chest pain,  shortness of breath, or dyspnea on exertion.      Past Medical History:   Diagnosis Date    Abnormal EKG 2022    Anticoagulant long-term use     CAD (coronary artery disease)     Depression     History of colon polyps     Hyperlipidemia associated with type 2 diabetes mellitus     Hypertension associated with diabetes     Microalbuminuria due to type 2 diabetes mellitus     NSTEMI (non-ST elevated myocardial infarction) 2018    Obesity     Type II diabetes mellitus with complication        Past Surgical History:   Procedure Laterality Date    CAROTID STENT       SECTION, LOW TRANSVERSE      x 2    COLONOSCOPY N/A 06/10/2019    Procedure: COLONOSCOPY;  Surgeon: Maricarmen Boo MD;  Location: [facility], [place] ENDO;  Service: Endoscopy;  Laterality: N/A;    COLONOSCOPY N/A 2022    Procedure: COLONOSCOPY;  Surgeon: Ryan Lau MD;  Location: [facility] [place] ENDO;  Service: Endoscopy;  Laterality: N/A;    CORONARY STENT PLACEMENT N/A 2021    Procedure: INSERTION, STENT, CORONARY ARTERY;  Surgeon: Aimee Cooper MD;  Location: [facility], [place] CATH LAB;  Service: Cardiology;  Laterality: N/A;    LEFT HEART CATHETERIZATION Left 2018    Procedure: HEART CATH-LEFT;  Surgeon: Aimee Cooper MD;  Location: [facility], [place] CATH LAB;  Service: Cardiology;  Laterality: Left;    LEFT HEART CATHETERIZATION Left 2021    Procedure: CATHETERIZATION, HEART, LEFT;  Surgeon: Aimee Cooper MD;  Location: [facility] [place] CATH LAB;  Service: Cardiology;  Laterality: Left;    PERCUTANEOUS TRANSLUMINAL BALLOON ANGIOPLASTY OF CORONARY ARTERY  2021    Procedure: Angioplasty-coronary;  Surgeon: Aimee Cooper MD;  Location: [facility] [place] CATH LAB;  Service: Cardiology;;       Patient's Medications   New Prescriptions    NAPROXEN (NAPROSYN) 500 MG TABLET    Take 1 tablet (500 mg total) by mouth 2 (two) times daily.   Previous Medications    ALPRAZOLAM (XANAX) 0.5 MG TABLET    Take 1 tablet (0.5 mg total) by mouth daily as needed.     AMLODIPINE-BENAZEPRIL (LOTREL) 10-40 MG PER CAPSULE    Take 1 capsule by mouth once daily.    AMOXICILLIN-CLAVULANATE 875-125MG (AUGMENTIN) 875-125 MG PER TABLET    Take 1 tablet by mouth every 12 (twelve) hours.    ASPIRIN (ECOTRIN) 81 MG EC TABLET    Take 1 tablet (81 mg total) by mouth once daily.    CYCLOBENZAPRINE (FLEXERIL) 10 MG TABLET    TAKE 1 TABLET BY MOUTH THREE TIMES A DAY AS NEEDED FOR MUSCLE SPASMS    DOXAZOSIN (CARDURA) 4 MG TABLET    Take 1 tablet (4 mg total) by mouth once daily.    EMPAGLIFLOZIN (JARDIANCE) 25 MG TABLET    Take 1 tablet (25 mg total) by mouth once daily.    EVOLOCUMAB (REPATHA SURECLICK) 140 MG/ML PNIJ    Inject 1 mL (140 mg total) into the skin every 14 (fourteen) days.    EZETIMIBE (ZETIA) 10 MG TABLET    TAKE 1 TABLET BY MOUTH EVERY DAY    HYDROCHLOROTHIAZIDE (HYDRODIURIL) 12.5 MG TAB    TAKE 1 TABLET BY MOUTH EVERY DAY    ISOSORBIDE MONONITRATE (IMDUR) 60 MG 24 HR TABLET    TAKE 1 TABLET BY MOUTH ONCE DAILY    METFORMIN (GLUCOPHAGE) 500 MG TABLET    TAKE 2 TABLETS BY MOUTH TWICE A DAY WITH MEALS    METHOCARBAMOL (ROBAXIN) 500 MG TAB    TAKE 1 TABLET BY MOUTH THREE TIMES A DAY AS NEEDED MUSCLE SPASM    METOPROLOL SUCCINATE (TOPROL-XL) 50 MG 24 HR TABLET    TAKE 1 TABLET BY MOUTH TWICE A DAY    NITROGLYCERIN (NITROSTAT) 0.4 MG SL TABLET    TAKE ONE FOR ONSET OF CHEST PAIN / THEN EVERY 5 MINUTES IF CP CONTINUES UP TO 3 DOSES..CALL 911    OMEPRAZOLE (PRILOSEC) 40 MG CAPSULE    Take 1 capsule (40 mg total) by mouth once daily.    ROSUVASTATIN (CRESTOR) 40 MG TAB    TAKE 1 TABLET BY MOUTH EVERY DAY IN THE EVENING    SEMAGLUTIDE (OZEMPIC) 1 MG/DOSE (4 MG/3 ML)    Inject 1 mg into the skin every 7 days.   Modified Medications    No medications on file   Discontinued Medications    DICLOFENAC (VOLTAREN) 50 MG EC TABLET    TAKE 1 TABLET BY MOUTH TWICE A DAY WITH FOOD AS NEEDED FOR PAIN    NAPROXEN (NAPROSYN) 500 MG TABLET    Take 1 tablet (500 mg total) by mouth 2 (two) times daily.        Family History   Problem Relation Age of Onset    Hypertension Mother     Hyperlipidemia Mother     Hypertension Father     Diabetes Father     Hyperlipidemia Father     Colon cancer Father     Hypertension Brother     Breast cancer Neg Hx     Ovarian cancer Neg Hx     Uterine cancer Neg Hx        Review of patient's allergies indicates:   Allergen Reactions    No known drug allergies          Objective:      Physical Exam  Patient is alert and oriented, no distress. Skin is intact. Neuro is normal with no focal motor or sensory findings.    Cervical exam is unremarkable. Intact cervical ROM. Negative Spurling's test     Physical Exam:                       RIGHT                                     LEFT     Scap Dyskinesis/Winging       (-)                                             (-)     Tenderness:                                                                              Greater Tuberosity                  (-)                                            +  Bicipital Groove                       (-)                                             (-)  AC joint                                   (-)                                             (-)  Other:      ROM:  Forward Elevation       160                                          150  Abduction                    100                                          90  ER (at side)                 80                                            60  IR                                 T10                                          deferred     Strength:   Supraspinatus             5/5                                           4/5  Infraspinatus               5/5                                           5/5  Subscap / IR               5/5                                           5/5      Special Tests:              Neer:                                       (-)                                             +              Jiménez:                                 (-)                                              +              SS Stress:                               (-)                                             +              Bear Hug:                                (-)                                             (-)              Pelican's:                                 (-)                                             +              Resisted Thrower's:                (-)                                             (-)              Cross Arm Abduction:             (-)                                             (-)    Neurovascular examination  - Motor grossly intact bilaterally to shoulder abduction, elbow flexion and extension, wrist flexion and extension, and intrinsic hand musculature  - Sensation intact to light touch bilaterally in axillary, median, radial, and ulnar distributions  - Symmetrical radial pulses    Imaging:    XR Results:  Results for orders placed during the hospital encounter of 04/13/23    X-Ray Shoulder 2 or More Views Left    Narrative  EXAMINATION:  XR SHOULDER COMPLETE 2 OR MORE VIEWS LEFT    CLINICAL HISTORY:  Unspecified rotator cuff tear or rupture of left shoulder, not specified as traumatic    TECHNIQUE:  Two or three views of the left shoulder were performed.    COMPARISON:  None    FINDINGS:  There is no radiographic evidence of acute osseous, articular, or soft tissue abnormality.  There is mild AC joint arthrosis with prominent bony spurring noted along the undersurface of the acromion.  Glenohumeral joint space appears preserved.    Impression  As Above      Electronically signed by: Gino Ho MD  Date:    04/13/2023  Time:    10:09      MRI Results:  Results for orders placed during the hospital encounter of 01/31/20    MRI Shoulder Without Contrast Left    Narrative  EXAMINATION:  MRI SHOULDER WITHOUT CONTRAST LEFT    CLINICAL HISTORY:  Shoulder pain, prior xray, rotator cuff tear / impingement suspected;  Pain in left  shoulder    TECHNIQUE:  Multisequence, multiplanar MR imaging of the shoulder performed without contrast.    COMPARISON:  Prior left shoulder radiographs dating back to 04/01/2019.    FINDINGS:  Rotator cuff:    Supraspinatus: Full-thickness tear involving the supraspinatus tendon with some intact fibers seen posteriorly.    Infraspinatus: Intact    Subscapularis: Intact    Teres minor: Intact    Muscle bulk is maintained.    Labrum: Evaluation limited by lack of joint distention/intra-articular contrast.  Degenerative changes of the labrum are noted.    Biceps: Long head biceps tendon is intact.    Bone: No fracture present.  Bone marrow signal is unremarkable.  Type 3 acromion with some lateral downsloping of the acromion is noted.    Acromioclavicular joint:Degenerative changes are noted with subchondral cystic change and mild spurring.    Cartilage: Articular cartilage of the glenohumeral joint is preserved    Miscellaneous: No significant joint effusion.  Mild subdeltoid fluid is present.    Impression  Full-thickness supraspinatus tendon tear as above.  Mild subdeltoid bursitis, degenerative changes, and other findings as described.      Electronically signed by: Reynaldo Miller MD  Date:    01/31/2020  Time:    10:41      CT Results:  No results found for this or any previous visit.      Physician read: I agree with the above impression.    Assessment/Plan:   Dulce Boogie is a 63 y.o. female with chronic left shoulder rotator cuff tear, left shoulder rotator cuff tear arthropathy    Plan:    Discussed diagnosis and treatment options with the patient today.  She has a known chronic left shoulder rotator cuff tear, left shoulder rotator cuff tear arthropathy  We again discussed operative versus nonoperative treatment options.  Operative treatment to include a reverse total shoulder arthroplasty versus nonoperative treatment to include rest, activity modification, oral anti-inflammatories, PT, intermittent  corticosteroid injections.  At last visit we did a corticosteroid injection into the left shoulder, she had great improvement of her pain with this injection and is interested in a repeat injection today  I recommend we move for with a left shoulder CSI, patient tolerated the procedure well with no immediate complications  Follow up with me in 3 months            Anjana Carvajal PA-C  Sports Medicine Physician Assistant       Disclaimer: This note was prepared using a voice recognition system and is likely to have sound alike errors within the text.

## 2023-10-24 NOTE — PROCEDURES
Large Joint Aspiration/Injection: L subacromial bursa    Date/Time: 10/24/2023 9:30 AM    Performed by: Anjana Carvajal PA-C  Authorized by: Anjana Carvajal PA-C    Consent Done?:  Yes (Verbal)  Indications:  Pain  Site marked: the procedure site was marked    Timeout: prior to procedure the correct patient, procedure, and site was verified    Prep: patient was prepped and draped in usual sterile fashion      Local anesthesia used?: Yes    Anesthesia:  Local infiltration  Local anesthetic:  Lidocaine 1% without epinephrine    Details:  Needle Size:  22 G  Ultrasonic Guidance for needle placement?: No    Approach:  Posterior  Location:  Shoulder  Site:  L subacromial bursa  Medications:  40 mg triamcinolone acetonide 40 mg/mL  Patient tolerance:  Patient tolerated the procedure well with no immediate complications    MEDICAL NECESSITY FOR VISCOSUPPLEMENTATION: After thorough evaluation of the patient, I have determined that visco-supplementation is medically necessary. The patient has painful DJD of the knee with failure of conservative therapy including lifestyle modifications and rehabilitation exercises. Oral analgesis/NSAIDs have not adequately controlled symptoms and there is radiographic evidence of joint space narrowing, subchondral sclerosis, and some early osteophytic changes Kellgren- Richard grade 2 or greater, or in lack of radiographic evidence, there is arthroscopic or other evidence of chondrosis.

## 2023-11-17 DIAGNOSIS — M75.42 IMPINGEMENT SYNDROME OF LEFT SHOULDER: ICD-10-CM

## 2023-11-17 DIAGNOSIS — M75.82 ROTATOR CUFF TENDINITIS, LEFT: ICD-10-CM

## 2023-11-17 DIAGNOSIS — M25.512 CHRONIC LEFT SHOULDER PAIN: ICD-10-CM

## 2023-11-17 DIAGNOSIS — G89.29 CHRONIC LEFT SHOULDER PAIN: ICD-10-CM

## 2023-11-18 NOTE — TELEPHONE ENCOUNTER
No care due was identified.  [facility] Embedded Care Due Messages. Reference number: [id number].   11/17/2023 7:21:18 PM CST

## 2023-11-20 RX ORDER — METHOCARBAMOL 500 MG/1
TABLET, FILM COATED ORAL
Qty: 60 TABLET | Refills: 5 | Status: SHIPPED | OUTPATIENT
Start: 2023-11-20

## 2023-11-20 NOTE — TELEPHONE ENCOUNTER
Requested Prescriptions     Pending Prescriptions Disp Refills    methocarbamoL (ROBAXIN) 500 MG Tab [Pharmacy Med Name: METHOCARBAMOL 500 MG TABLET] 60 tablet 5     Sig: TAKE 1 TABLET BY MOUTH THREE TIMES A DAY AS NEEDED MUSCLE SPASM     LV 10/16/2023   NV 01/23/2024  LF 07/22/2023

## 2023-12-14 ENCOUNTER — PATIENT MESSAGE (OUTPATIENT)
Dept: ADMINISTRATIVE | Facility: OTHER | Age: 63
End: 2023-12-14
Payer: MEDICARE

## 2023-12-14 RX ORDER — CYCLOBENZAPRINE HCL 10 MG
10 TABLET ORAL 3 TIMES DAILY PRN
Qty: 90 TABLET | Refills: 5 | Status: SHIPPED | OUTPATIENT
Start: 2023-12-14

## 2023-12-14 RX ORDER — AMLODIPINE AND BENAZEPRIL HYDROCHLORIDE 10; 40 MG/1; MG/1
1 CAPSULE ORAL DAILY
Qty: 90 CAPSULE | Refills: 3 | Status: SHIPPED | OUTPATIENT
Start: 2023-12-14 | End: 2024-12-13

## 2023-12-14 RX ORDER — NITROGLYCERIN 0.4 MG/1
TABLET SUBLINGUAL
Qty: 25 TABLET | Refills: 11 | Status: SHIPPED | OUTPATIENT
Start: 2023-12-14

## 2023-12-14 RX ORDER — OMEPRAZOLE 40 MG/1
40 CAPSULE, DELAYED RELEASE ORAL DAILY
Qty: 90 CAPSULE | Refills: 3 | Status: SHIPPED | OUTPATIENT
Start: 2023-12-14

## 2023-12-14 RX ORDER — ALPRAZOLAM 0.5 MG/1
0.5 TABLET ORAL DAILY PRN
Qty: 90 TABLET | Refills: 5 | Status: SHIPPED | OUTPATIENT
Start: 2023-12-14

## 2024-01-02 ENCOUNTER — PATIENT OUTREACH (OUTPATIENT)
Dept: ADMINISTRATIVE | Facility: HOSPITAL | Age: 64
End: 2024-01-02
Payer: MEDICARE

## 2024-01-02 NOTE — LETTER
January 2, 2024        [place]  [address] [facility]  [facility] [address]             [address] [address]  Phone: [phone]   Patient: Dulce Boogie   MR Number: [id number]   YOB: 1960   Date of Visit: 1/2/2024       Dear Dr. Sterling:    To whom it may concern     We are seeing Dulce Boogie, YOB: 1960, at [facility]. Zeus Hanson MD is their primary care physician. To help with our health maintenance records could you please send the following:     Last Diabetic Eye Exam Report that states Positive or Negative Retinopathy.    Please send fax to [phone] Attention Cayla BEAL LPN Care Coordination.    Thank-you in advance for your assistance. If you have any questions or concerns, please don't hesitate to contact me at [phone].     Cayla BEAL LPN  Care Coordination Department  [facility]  Phone number [phone]

## 2024-01-02 NOTE — PROGRESS NOTES
DM Eye Report: No exam found, no answer when called, LVM, Fax form sent for Diabetic Eye Exam result to last eye screening on file.

## 2024-01-08 ENCOUNTER — PATIENT MESSAGE (OUTPATIENT)
Dept: ADMINISTRATIVE | Facility: OTHER | Age: 64
End: 2024-01-08
Payer: MEDICARE

## 2024-01-10 DIAGNOSIS — I25.118 ATHEROSCLEROSIS OF NATIVE CORONARY ARTERY OF NATIVE HEART WITH STABLE ANGINA PECTORIS: Primary | ICD-10-CM

## 2024-01-18 ENCOUNTER — LAB VISIT (OUTPATIENT)
Dept: LAB | Facility: HOSPITAL | Age: 64
End: 2024-01-18
Attending: INTERNAL MEDICINE
Payer: MEDICARE

## 2024-01-18 DIAGNOSIS — E11.8 TYPE II DIABETES MELLITUS WITH COMPLICATION: ICD-10-CM

## 2024-01-18 PROCEDURE — 82043 UR ALBUMIN QUANTITATIVE: CPT | Performed by: INTERNAL MEDICINE

## 2024-01-19 LAB
ALBUMIN/CREAT UR: 96.9 UG/MG (ref 0–30)
CREAT UR-MCNC: 65 MG/DL (ref 15–325)
MICROALBUMIN UR DL<=1MG/L-MCNC: 63 UG/ML

## 2024-01-24 ENCOUNTER — TELEPHONE (OUTPATIENT)
Dept: SPORTS MEDICINE | Facility: CLINIC | Age: 64
End: 2024-01-24
Payer: MEDICARE

## 2024-01-24 NOTE — TELEPHONE ENCOUNTER
Reached out to pt about her message regarding an appointment for Left shoulder pain, Pt is currently scheduled for 2-5-24 [place]

## 2024-01-26 DIAGNOSIS — M75.102 LEFT ROTATOR CUFF TEAR ARTHROPATHY: ICD-10-CM

## 2024-01-26 DIAGNOSIS — M75.102 NON-TRAUMATIC ROTATOR CUFF TEAR, LEFT: ICD-10-CM

## 2024-01-26 DIAGNOSIS — M12.812 LEFT ROTATOR CUFF TEAR ARTHROPATHY: ICD-10-CM

## 2024-01-31 RX ORDER — NAPROXEN 500 MG/1
500 TABLET ORAL 2 TIMES DAILY
Qty: 60 TABLET | Refills: 1 | Status: SHIPPED | OUTPATIENT
Start: 2024-01-31 | End: 2024-02-05 | Stop reason: SDUPTHER

## 2024-02-02 NOTE — PROGRESS NOTES
Orthopaedic Follow-Up Visit    Last Appointment:  10/24/2023  Diagnosis:  Left rotator cuff tear arthropathy, nontraumatic rotator cuff tear  Prior Procedure:  Left shoulder CSI 10/24/    Dulce Boogie is a 63 y.o. female who is here for f/u evaluation of left shoulder pain. The patient was last seen here by me on 10/24/2023 at which point we decided to try a corticosteroid injection into the left shoulder prior to considering further treatment options. The patient returns today reporting that the symptoms initially improved with the previous CSI but have sinced returned and is interested in a repeat CSI    To review her history,  Dulce Boogie is a 62 y.o. right-hand dominant female who presents with LEFT shoulder pain and dysfunction. She was previously seen by Clemencia Clayton PA-C in 2019. She was initially treating her for left shoulder rotator cuff impingement. She received 2 SAS CSIs, but ultimately she failed to improve with conservative treatment and they ended up getting an MRI of the left shoulder. MRI of the left shoulder from January 2020 revealed a full-thickness rotator cuff tear. For several years, she had no acute injury or trauma. Her symptoms include constant aching, throbbing pain of the left shoulder, limited range of motion, night pain. Her pain is made worse by raising the arm, overhead activities, repetitive movements. She has had an x-ray thus far. Her treatment has included rest, activity modification, oral anti-inflammatories, Robaxin, PT, glenohumeral CSI, subacromial CSI, Robaxin.  At last visit we decided to try a repeat subacromial corticosteroid injection as it had been greater than 3 years since her last CSI     Patient's medications, allergies, past medical, surgical, social and family histories were reviewed and updated as appropriate.    Review of Systems   All systems reviewed were negative.  Specifically, the patient denies fever, chills, weight loss, chest pain, shortness of  breath, or dyspnea on exertion.      Past Medical History:   Diagnosis Date    Abnormal EKG 2022    Anticoagulant long-term use     CAD (coronary artery disease)     Depression     History of colon polyps     Hyperlipidemia associated with type 2 diabetes mellitus     Hypertension associated with diabetes     Microalbuminuria due to type 2 diabetes mellitus     NSTEMI (non-ST elevated myocardial infarction) 2018    Obesity     Type II diabetes mellitus with complication        Past Surgical History:   Procedure Laterality Date    CAROTID STENT       SECTION, LOW TRANSVERSE      x 2    COLONOSCOPY N/A 06/10/2019    Procedure: COLONOSCOPY;  Surgeon: Maricarmen Boo MD;  Location: [facility] ENDO;  Service: Endoscopy;  Laterality: N/A;    COLONOSCOPY N/A 2022    Procedure: COLONOSCOPY;  Surgeon: Ryan Lau MD;  Location: [facility] ENDO;  Service: Endoscopy;  Laterality: N/A;    CORONARY STENT PLACEMENT N/A 2021    Procedure: INSERTION, STENT, CORONARY ARTERY;  Surgeon: Aimee Cooper MD;  Location: [facility] CATH LAB;  Service: Cardiology;  Laterality: N/A;    LEFT HEART CATHETERIZATION Left 2018    Procedure: HEART CATH-LEFT;  Surgeon: Aimee Cooper MD;  Location: [facility] CATH LAB;  Service: Cardiology;  Laterality: Left;    LEFT HEART CATHETERIZATION Left 2021    Procedure: CATHETERIZATION, HEART, LEFT;  Surgeon: Aimee Cooper MD;  Location: [facility] CATH LAB;  Service: Cardiology;  Laterality: Left;    PERCUTANEOUS TRANSLUMINAL BALLOON ANGIOPLASTY OF CORONARY ARTERY  2021    Procedure: Angioplasty-coronary;  Surgeon: Aimee Cooper MD;  Location: [facility] CATH LAB;  Service: Cardiology;;       Patient's Medications   New Prescriptions    No medications on file   Previous Medications    ALPRAZOLAM (XANAX) 0.5 MG TABLET    Take 1 tablet (0.5 mg total) by mouth daily as needed.    AMLODIPINE-BENAZEPRIL (LOTREL) 10-40 MG PER CAPSULE    Take 1 capsule by mouth once daily.     AMOXICILLIN-CLAVULANATE 875-125MG (AUGMENTIN) 875-125 MG PER TABLET    Take 1 tablet by mouth every 12 (twelve) hours.    ASPIRIN (ECOTRIN) 81 MG EC TABLET    Take 1 tablet (81 mg total) by mouth once daily.    CYCLOBENZAPRINE (FLEXERIL) 10 MG TABLET    Take 1 tablet (10 mg total) by mouth 3 (three) times daily as needed for Muscle spasms.    DOXAZOSIN (CARDURA) 4 MG TABLET    Take 1 tablet (4 mg total) by mouth once daily.    EMPAGLIFLOZIN (JARDIANCE) 25 MG TABLET    Take 1 tablet (25 mg total) by mouth once daily.    EVOLOCUMAB (REPATHA SURECLICK) 140 MG/ML PNIJ    Inject 1 mL (140 mg total) into the skin every 14 (fourteen) days.    EZETIMIBE (ZETIA) 10 MG TABLET    TAKE 1 TABLET BY MOUTH EVERY DAY    HYDROCHLOROTHIAZIDE (HYDRODIURIL) 12.5 MG TAB    TAKE 1 TABLET BY MOUTH EVERY DAY    ISOSORBIDE MONONITRATE (IMDUR) 60 MG 24 HR TABLET    TAKE 1 TABLET BY MOUTH ONCE DAILY    METFORMIN (GLUCOPHAGE) 500 MG TABLET    TAKE 2 TABLETS BY MOUTH TWICE A DAY WITH MEALS    METHOCARBAMOL (ROBAXIN) 500 MG TAB    TAKE 1 TABLET BY MOUTH THREE TIMES A DAY AS NEEDED MUSCLE SPASM    METOPROLOL SUCCINATE (TOPROL-XL) 50 MG 24 HR TABLET    TAKE 1 TABLET BY MOUTH TWICE A DAY    NAPROXEN (NAPROSYN) 500 MG TABLET    TAKE 1 TABLET BY MOUTH TWICE A DAY    NITROGLYCERIN (NITROSTAT) 0.4 MG SL TABLET    TAKE ONE FOR ONSET OF CHEST PAIN / THEN EVERY 5 MINUTES IF CP CONTINUES UP TO 3 DOSES..CALL 911    OMEPRAZOLE (PRILOSEC) 40 MG CAPSULE    Take 1 capsule (40 mg total) by mouth once daily.    ROSUVASTATIN (CRESTOR) 40 MG TAB    TAKE 1 TABLET BY MOUTH EVERY DAY IN THE EVENING    SEMAGLUTIDE (OZEMPIC) 1 MG/DOSE (4 MG/3 ML)    Inject 1 mg into the skin every 7 days.   Modified Medications    No medications on file   Discontinued Medications    No medications on file       Family History   Problem Relation Age of Onset    Hypertension Mother     Hyperlipidemia Mother     Hypertension Father     Diabetes Father     Hyperlipidemia Father     Colon  cancer Father     Hypertension Brother     Breast cancer Neg Hx     Ovarian cancer Neg Hx     Uterine cancer Neg Hx        Review of patient's allergies indicates:   Allergen Reactions    No known drug allergies          Objective:      Physical Exam  Patient is alert and oriented, no distress. Skin is intact. Neuro is normal with no focal motor or sensory findings.    Cervical exam is unremarkable. Intact cervical ROM. Negative Spurling's test     Physical Exam:                    RIGHT                                     LEFT     Scap Dyskinesis/Winging       (-)                                             (-)     Tenderness:                                                                              Greater Tuberosity                  (-)                                            +  Bicipital Groove                       (-)                                             (-)  AC joint                                   (-)                                             (-)  Other:      ROM:  Forward Elevation       160                                          150  Abduction                    100                                          90  ER (at side)                 80                                            60  IR                                 T10                                          deferred     Strength:   Supraspinatus             5/5                                           3+/5  Infraspinatus               5/5                                           5/5  Subscap / IR               5/5                                           5/5      Special Tests:              Neer:                                       (-)                                             +              Jiménez:                                 (-)                                             +              SS Stress:                               (-)                                             +              Bear Hug:                                 (-)                                            (-)              Hampshire's:                                 (-)                                             +              Resisted Thrower's:                (-)                                             (-)              Cross Arm Abduction:             (-)                                             (-)     Neurovascular examination  - Motor grossly intact bilaterally to shoulder abduction, elbow flexion and extension, wrist flexion and extension, and intrinsic hand musculature  - Sensation intact to light touch bilaterally in axillary, median, radial, and ulnar distributions  - Symmetrical radial pulses    Imaging:    XR Results:  Results for orders placed during the hospital encounter of 04/13/23    X-Ray Shoulder 2 or More Views Left    Narrative  EXAMINATION:  XR SHOULDER COMPLETE 2 OR MORE VIEWS LEFT    CLINICAL HISTORY:  Unspecified rotator cuff tear or rupture of left shoulder, not specified as traumatic    TECHNIQUE:  Two or three views of the left shoulder were performed.    COMPARISON:  None    FINDINGS:  There is no radiographic evidence of acute osseous, articular, or soft tissue abnormality.  There is mild AC joint arthrosis with prominent bony spurring noted along the undersurface of the acromion.  Glenohumeral joint space appears preserved.    Impression  As Above      Electronically signed by: Gino Ho MD  Date:    04/13/2023  Time:    10:09      MRI Results:  Results for orders placed during the hospital encounter of 01/31/20    MRI Shoulder Without Contrast Left    Narrative  EXAMINATION:  MRI SHOULDER WITHOUT CONTRAST LEFT    CLINICAL HISTORY:  Shoulder pain, prior xray, rotator cuff tear / impingement suspected;  Pain in left shoulder    TECHNIQUE:  Multisequence, multiplanar MR imaging of the shoulder performed without contrast.    COMPARISON:  Prior left shoulder radiographs dating back to 04/01/2019.    FINDINGS:  Rotator  cuff:    Supraspinatus: Full-thickness tear involving the supraspinatus tendon with some intact fibers seen posteriorly.    Infraspinatus: Intact    Subscapularis: Intact    Teres minor: Intact    Muscle bulk is maintained.    Labrum: Evaluation limited by lack of joint distention/intra-articular contrast.  Degenerative changes of the labrum are noted.    Biceps: Long head biceps tendon is intact.    Bone: No fracture present.  Bone marrow signal is unremarkable.  Type 3 acromion with some lateral downsloping of the acromion is noted.    Acromioclavicular joint:Degenerative changes are noted with subchondral cystic change and mild spurring.    Cartilage: Articular cartilage of the glenohumeral joint is preserved    Miscellaneous: No significant joint effusion.  Mild subdeltoid fluid is present.    Impression  Full-thickness supraspinatus tendon tear as above.  Mild subdeltoid bursitis, degenerative changes, and other findings as described.      Electronically signed by: Reynaldo Miller MD  Date:    01/31/2020  Time:    10:41        Physician read: I agree with the above impression.    Assessment/Plan:   Dulce Boogie is a 63 y.o. female with chronic left shoulder rotator cuff tear, left shoulder rotator cuff tear arthropathy    Plan:    Discussed diagnosis and treatment options with the patient today. She has a known chronic left shoulder rotator cuff tear, left shoulder rotator cuff tear arthropathy  We again discussed operative versus nonoperative treatment options. Operative treatment to include a reverse total shoulder arthroplasty versus nonoperative treatment to include rest, activity modification, oral anti-inflammatories, PT, intermittent corticosteroid injections.  At last visit we did a corticosteroid injection into the left shoulder, she had great improvement of her pain with this injection and is interested in a repeat injection today  I recommend we move for with a left shoulder CSI, patient tolerated the  procedure well with no immediate complications  Follow up with me in 3 months              Anjana Carvajal PA-C  Sports Medicine Physician Assistant       Disclaimer: This note was prepared using a voice recognition system and is likely to have sound alike errors within the text.

## 2024-02-05 ENCOUNTER — OFFICE VISIT (OUTPATIENT)
Dept: SPORTS MEDICINE | Facility: CLINIC | Age: 64
End: 2024-02-05
Payer: MEDICARE

## 2024-02-05 VITALS — HEIGHT: 65 IN | RESPIRATION RATE: 17 BRPM | BODY MASS INDEX: 33.32 KG/M2 | WEIGHT: 200 LBS

## 2024-02-05 DIAGNOSIS — M12.812 LEFT ROTATOR CUFF TEAR ARTHROPATHY: Primary | ICD-10-CM

## 2024-02-05 DIAGNOSIS — M75.102 LEFT ROTATOR CUFF TEAR ARTHROPATHY: Primary | ICD-10-CM

## 2024-02-05 DIAGNOSIS — M75.102 NON-TRAUMATIC ROTATOR CUFF TEAR, LEFT: ICD-10-CM

## 2024-02-05 PROCEDURE — 99214 OFFICE O/P EST MOD 30 MIN: CPT | Mod: S$PBB,25,, | Performed by: PHYSICIAN ASSISTANT

## 2024-02-05 PROCEDURE — 99214 OFFICE O/P EST MOD 30 MIN: CPT | Mod: PBBFAC,25 | Performed by: PHYSICIAN ASSISTANT

## 2024-02-05 PROCEDURE — 99999PBSHW PR PBB SHADOW TECHNICAL ONLY FILED TO HB: Mod: PBBFAC,,,

## 2024-02-05 PROCEDURE — 20610 DRAIN/INJ JOINT/BURSA W/O US: CPT | Mod: S$PBB,LT,, | Performed by: PHYSICIAN ASSISTANT

## 2024-02-05 PROCEDURE — 99999 PR PBB SHADOW E&M-EST. PATIENT-LVL IV: CPT | Mod: PBBFAC,,, | Performed by: PHYSICIAN ASSISTANT

## 2024-02-05 PROCEDURE — 20610 DRAIN/INJ JOINT/BURSA W/O US: CPT | Mod: PBBFAC | Performed by: PHYSICIAN ASSISTANT

## 2024-02-05 RX ORDER — NAPROXEN 500 MG/1
500 TABLET ORAL 2 TIMES DAILY
Qty: 60 TABLET | Refills: 1 | Status: SHIPPED | OUTPATIENT
Start: 2024-02-05 | End: 2024-05-15 | Stop reason: SDUPTHER

## 2024-02-05 RX ORDER — TRIAMCINOLONE ACETONIDE 40 MG/ML
40 INJECTION, SUSPENSION INTRA-ARTICULAR; INTRAMUSCULAR
Status: DISCONTINUED | OUTPATIENT
Start: 2024-02-05 | End: 2024-02-05 | Stop reason: HOSPADM

## 2024-02-05 RX ADMIN — TRIAMCINOLONE ACETONIDE 40 MG: 200 INJECTION, SUSPENSION INTRA-ARTICULAR; INTRAMUSCULAR at 11:02

## 2024-02-05 NOTE — PROCEDURES
Large Joint Aspiration/Injection: L subacromial bursa    Date/Time: 2/5/2024 11:30 AM    Performed by: Anjana Carvajal PA-C  Authorized by: Anjana Carvajal PA-C    Consent Done?:  Yes (Verbal)  Indications:  Pain  Site marked: the procedure site was marked    Timeout: prior to procedure the correct patient, procedure, and site was verified    Prep: patient was prepped and draped in usual sterile fashion      Local anesthesia used?: Yes    Anesthesia:  Local infiltration  Local anesthetic:  Lidocaine 1% without epinephrine    Details:  Needle Size:  22 G  Ultrasonic Guidance for needle placement?: No    Approach:  Posterior  Location:  Shoulder  Site:  L subacromial bursa  Medications:  40 mg triamcinolone acetonide 40 mg/mL  Patient tolerance:  Patient tolerated the procedure well with no immediate complications    Procedure Note:  We discussed the risk and benefits of injections, including pain, infection, bleeding, damage to adjacent structures, risk of reaction to injection. We discussed the steroid/cortisone injections will not heal the problem but mat help decrease inflammation and help with symptoms. We discussed the risk of repeated injections. The patient expressed understanding and wanted to proceed with the injection. We performed a timeout to verify the proper patient, proper procedure, and the proper site. The injection site was prepared in a sterile fashion. The patient tolerated it well and there were no complication. We did discuss with the patient that steroid injections can cause some increase in blood sugar and blood pressure for up to a week after the injection.

## 2024-02-06 ENCOUNTER — OFFICE VISIT (OUTPATIENT)
Dept: INTERNAL MEDICINE | Facility: CLINIC | Age: 64
End: 2024-02-06
Payer: MEDICARE

## 2024-02-06 VITALS
SYSTOLIC BLOOD PRESSURE: 135 MMHG | OXYGEN SATURATION: 98 % | DIASTOLIC BLOOD PRESSURE: 85 MMHG | WEIGHT: 198.63 LBS | HEART RATE: 86 BPM | BODY MASS INDEX: 33.09 KG/M2 | HEIGHT: 65 IN

## 2024-02-06 DIAGNOSIS — I15.2 HYPERTENSION ASSOCIATED WITH DIABETES: ICD-10-CM

## 2024-02-06 DIAGNOSIS — E78.5 HYPERLIPIDEMIA ASSOCIATED WITH TYPE 2 DIABETES MELLITUS: ICD-10-CM

## 2024-02-06 DIAGNOSIS — F32.5 MAJOR DEPRESSION IN REMISSION: ICD-10-CM

## 2024-02-06 DIAGNOSIS — E11.29 MICROALBUMINURIA DUE TO TYPE 2 DIABETES MELLITUS: ICD-10-CM

## 2024-02-06 DIAGNOSIS — Z86.010 HISTORY OF COLON POLYPS: ICD-10-CM

## 2024-02-06 DIAGNOSIS — E11.8 TYPE II DIABETES MELLITUS WITH COMPLICATION: Primary | ICD-10-CM

## 2024-02-06 DIAGNOSIS — Z12.31 SCREENING MAMMOGRAM FOR BREAST CANCER: ICD-10-CM

## 2024-02-06 DIAGNOSIS — I77.819 AORTIC ECTASIA: ICD-10-CM

## 2024-02-06 DIAGNOSIS — E11.59 HYPERTENSION ASSOCIATED WITH DIABETES: ICD-10-CM

## 2024-02-06 DIAGNOSIS — E11.69 HYPERLIPIDEMIA ASSOCIATED WITH TYPE 2 DIABETES MELLITUS: ICD-10-CM

## 2024-02-06 DIAGNOSIS — R80.9 MICROALBUMINURIA DUE TO TYPE 2 DIABETES MELLITUS: ICD-10-CM

## 2024-02-06 DIAGNOSIS — E66.09 CLASS 1 OBESITY DUE TO EXCESS CALORIES WITH SERIOUS COMORBIDITY AND BODY MASS INDEX (BMI) OF 33.0 TO 33.9 IN ADULT: ICD-10-CM

## 2024-02-06 DIAGNOSIS — I25.10 CORONARY ARTERY DISEASE INVOLVING NATIVE CORONARY ARTERY OF NATIVE HEART WITHOUT ANGINA PECTORIS: Chronic | ICD-10-CM

## 2024-02-06 PROCEDURE — 99214 OFFICE O/P EST MOD 30 MIN: CPT | Mod: PBBFAC,PO | Performed by: INTERNAL MEDICINE

## 2024-02-06 PROCEDURE — 99999 PR PBB SHADOW E&M-EST. PATIENT-LVL IV: CPT | Mod: PBBFAC,,, | Performed by: INTERNAL MEDICINE

## 2024-02-06 PROCEDURE — 99214 OFFICE O/P EST MOD 30 MIN: CPT | Mod: S$PBB,,, | Performed by: INTERNAL MEDICINE

## 2024-02-06 RX ORDER — ONDANSETRON 4 MG/1
4 TABLET, FILM COATED ORAL EVERY 8 HOURS PRN
Qty: 60 TABLET | Refills: 3 | Status: SHIPPED | OUTPATIENT
Start: 2024-02-06

## 2024-02-06 RX ORDER — SEMAGLUTIDE 2.68 MG/ML
2 INJECTION, SOLUTION SUBCUTANEOUS
Qty: 3 ML | Refills: 11 | Status: SHIPPED | OUTPATIENT
Start: 2024-02-06 | End: 2025-02-05

## 2024-02-06 RX ORDER — SPIRONOLACTONE 25 MG/1
25 TABLET ORAL DAILY
Qty: 90 TABLET | Refills: 3 | Status: SHIPPED | OUTPATIENT
Start: 2024-02-06 | End: 2025-02-05

## 2024-02-06 NOTE — PROGRESS NOTES
"HPI:  Patient is a 63-year-old female who comes in today for follow-up of her diabetes, hypertension and lipids.  Patient states he has been taking her medications consistently.  Patient states her blood sugars have been doing well.  She has has no hypoglycemic problems.  Her blood pressure home is usually in the 130/80 range.  Her only complaint at this time is facial hair.  Current meds have been verified and updated per the EMR  Exam:/85   Pulse 86   Ht 5' 5" (1.651 m)   Wt 90.1 kg (198 lb 10.2 oz)   LMP 06/21/2003 (Exact Date)   SpO2 98%   BMI 33.05 kg/m²   Carotids 2+ equal without bruits  Chest clear  Cardiovascular regular rate and rhythm without murmur gallop or rub  She has some mild facial hair underneath the chin.    Lab Results   Component Value Date    WBC 8.24 04/13/2023    HGB 13.9 04/13/2023    HCT 43.9 04/13/2023     04/13/2023    CHOL 164 01/18/2024    TRIG 195 (H) 01/18/2024    HDL 43 01/18/2024    ALT 28 10/13/2023    AST 31 10/13/2023     01/18/2024    K 3.5 01/18/2024     01/18/2024    CREATININE 0.8 01/18/2024    BUN 13 01/18/2024    CO2 25 01/18/2024    TSH 1.743 10/13/2023    INR 1.0 01/23/2021    GLUF 106 01/07/2005    HGBA1C 7.5 (H) 01/18/2024    BNP 14 01/23/2021       Impression:  Diabetes, not to goal  Hypertension, well controlled at home  Hyperlipidemia, stable, on Repatha and statin  Coronary artery disease, asymptomatic  Facial hair, mild  Patient Active Problem List   Diagnosis    CAD with far remote PCI of unknown vessel    History of colon polyps    History of non-ST elevation myocardial infarction (NSTEMI)    Type II diabetes mellitus with complication    Hypertension associated with diabetes    Hyperlipidemia associated with type 2 diabetes mellitus    Major depression in remission    Aortic ectasia    Microalbuminuria due to type 2 diabetes mellitus    Class 1 obesity due to excess calories with serious comorbidity and body mass index (BMI) of " 33.0 to 33.9 in adult    Chronic left shoulder pain    Left elbow pain    Decreased ROM of left shoulder       Plan:  Orders Placed This Encounter    Mammo Digital Screening Bilat w/ [place]    Hemoglobin A1C    Comprehensive Metabolic Panel    Lipid Panel    TSH    semaglutide (OZEMPIC) 2 mg/dose (8 mg/3 mL) PnIj    ondansetron (ZOFRAN) 4 MG tablet    spironolactone (ALDACTONE) 25 MG tablet     Patient will increase Ozempic to 2 mg today.  She was given some Zofran to use as needed for nausea.  Patient was started on spironolactone 25 mg in the morning.  Patient will see me again in 6 months with above lab work and mammogram.    This note is generated with speech recognition software and is subject to transcription error and sound alike phrases that may be missed by proofreading.

## 2024-02-22 ENCOUNTER — OFFICE VISIT (OUTPATIENT)
Dept: CARDIOLOGY | Facility: CLINIC | Age: 64
End: 2024-02-22
Payer: MEDICARE

## 2024-02-22 ENCOUNTER — HOSPITAL ENCOUNTER (OUTPATIENT)
Dept: CARDIOLOGY | Facility: HOSPITAL | Age: 64
Discharge: HOME | End: 2024-02-22
Attending: INTERNAL MEDICINE
Payer: MEDICARE

## 2024-02-22 VITALS
OXYGEN SATURATION: 97 % | HEART RATE: 80 BPM | SYSTOLIC BLOOD PRESSURE: 138 MMHG | DIASTOLIC BLOOD PRESSURE: 76 MMHG | WEIGHT: 199.06 LBS | BODY MASS INDEX: 33.16 KG/M2 | HEIGHT: 65 IN

## 2024-02-22 DIAGNOSIS — E66.09 CLASS 1 OBESITY DUE TO EXCESS CALORIES WITH SERIOUS COMORBIDITY AND BODY MASS INDEX (BMI) OF 33.0 TO 33.9 IN ADULT: ICD-10-CM

## 2024-02-22 DIAGNOSIS — R80.9 MICROALBUMINURIA DUE TO TYPE 2 DIABETES MELLITUS: ICD-10-CM

## 2024-02-22 DIAGNOSIS — I77.819 AORTIC ECTASIA: ICD-10-CM

## 2024-02-22 DIAGNOSIS — E11.69 HYPERLIPIDEMIA ASSOCIATED WITH TYPE 2 DIABETES MELLITUS: ICD-10-CM

## 2024-02-22 DIAGNOSIS — E11.29 MICROALBUMINURIA DUE TO TYPE 2 DIABETES MELLITUS: ICD-10-CM

## 2024-02-22 DIAGNOSIS — E11.8 TYPE II DIABETES MELLITUS WITH COMPLICATION: ICD-10-CM

## 2024-02-22 DIAGNOSIS — I25.118 ATHEROSCLEROSIS OF NATIVE CORONARY ARTERY OF NATIVE HEART WITH STABLE ANGINA PECTORIS: ICD-10-CM

## 2024-02-22 DIAGNOSIS — I25.10 CORONARY ARTERY DISEASE INVOLVING NATIVE CORONARY ARTERY OF NATIVE HEART WITHOUT ANGINA PECTORIS: Primary | Chronic | ICD-10-CM

## 2024-02-22 DIAGNOSIS — I15.2 HYPERTENSION ASSOCIATED WITH DIABETES: ICD-10-CM

## 2024-02-22 DIAGNOSIS — E78.5 HYPERLIPIDEMIA ASSOCIATED WITH TYPE 2 DIABETES MELLITUS: ICD-10-CM

## 2024-02-22 DIAGNOSIS — E66.01 SEVERE OBESITY (BMI 35.0-35.9 WITH COMORBIDITY): ICD-10-CM

## 2024-02-22 DIAGNOSIS — E11.59 HYPERTENSION ASSOCIATED WITH DIABETES: ICD-10-CM

## 2024-02-22 DIAGNOSIS — I25.2 HISTORY OF NON-ST ELEVATION MYOCARDIAL INFARCTION (NSTEMI): ICD-10-CM

## 2024-02-22 LAB
OHS QRS DURATION: 88 MS
OHS QTC CALCULATION: 428 MS

## 2024-02-22 PROCEDURE — 93005 ELECTROCARDIOGRAM TRACING: CPT

## 2024-02-22 PROCEDURE — 99214 OFFICE O/P EST MOD 30 MIN: CPT | Mod: S$PBB,,, | Performed by: INTERNAL MEDICINE

## 2024-02-22 PROCEDURE — 93010 ELECTROCARDIOGRAM REPORT: CPT | Mod: ,,, | Performed by: INTERNAL MEDICINE

## 2024-02-22 PROCEDURE — 99214 OFFICE O/P EST MOD 30 MIN: CPT | Mod: PBBFAC,25 | Performed by: INTERNAL MEDICINE

## 2024-02-22 PROCEDURE — 99999 PR PBB SHADOW E&M-EST. PATIENT-LVL IV: CPT | Mod: PBBFAC,,, | Performed by: INTERNAL MEDICINE

## 2024-02-22 NOTE — PROGRESS NOTES
Subjective:   Patient ID:  Dulce Boogie is a 63 y.o. female who presents for follow up of No chief complaint on file.      HPI  8/5/2021 HAVEN RODARTE NP  Ms. Boogie's current conditions include CAD with remote PCI in 2010 (previously followed by Dr. Valle at  Cardiology), HTN, HLD, DM II. Admitted May 2018 with unstable angina. Underwent LHC on 5/22/18 and noted to have LAD 50% mid long lesion ffr 0.81. Also noted LCX non obs CAD and patent stent, RCA 90% prox treated with solange x2 and PDA distal 80% treated with SOLANGE x 2.      Admitted in Feb 2021 with NSTEMI  Underwent successful PCI of mid LAD 80% lesion.   Has no abnormal bleeding on ASA and Brilinta  Started on Imdur following cath   Wasn't taking Repatha as prescribed.      Denies any chest pain, SOB, COOPER,  orthopnea, PND, dizziness, palpitations,  near syncope, syncope or edema . Has no symptoms concerning for angina or equivalent. No CNS Complaints to suggest TIA or CVA. Does well with limiting sodium intake.  Has not required any SL nitro.   BP log  Ranging 130's systolic/80's   Is grieving the loss of her son      2/17/2022  No new complaints she is trying to be compliant with diet not consistent on exercise. Compliant with diet asymptomatic cardiovascular wise.tolerating emdical therapy      12/8/2022   Had colonoscopy 2 polyps removed. Has been having difficulty controlling her blood pressure and her lipids and diabetes all her markers are increased. She ahs dietary indiscretion. Has no other issues clinically except having heart burn she took tums. Denies any other chest pain. Has been compliant with medical therapy.she denies any exertional symptoms but has not been very active.      6/202/2023   Here for f/u has not been exercising has been started on ozempic 2 weeks ago. Ha sno cardiac symptoms needs better diet compliance made improvement on lipids diabetes still the issue     2/22/2024   Asymptomatic not very compliant with diet exercise  compliant with meds. Lipids increased A1c not on target  Past Medical History:   Diagnosis Date    Abnormal EKG 2022    Anticoagulant long-term use     CAD (coronary artery disease)     Depression     History of colon polyps     Hyperlipidemia associated with type 2 diabetes mellitus     Hypertension associated with diabetes     Microalbuminuria due to type 2 diabetes mellitus     NSTEMI (non-ST elevated myocardial infarction) 2018    Obesity     Type II diabetes mellitus with complication        Past Surgical History:   Procedure Laterality Date    CAROTID STENT       SECTION, LOW TRANSVERSE      x 2    COLONOSCOPY N/A 06/10/2019    Procedure: COLONOSCOPY;  Surgeon: Maricarmen Boo MD;  Location: [facility] ENDO;  Service: Endoscopy;  Laterality: N/A;    COLONOSCOPY N/A 2022    Procedure: COLONOSCOPY;  Surgeon: Ryan Lau MD;  Location: [facility] ENDO;  Service: Endoscopy;  Laterality: N/A;    CORONARY STENT PLACEMENT N/A 2021    Procedure: INSERTION, STENT, CORONARY ARTERY;  Surgeon: Aimee Cooper MD;  Location: [facility] CATH LAB;  Service: Cardiology;  Laterality: N/A;    LEFT HEART CATHETERIZATION Left 2018    Procedure: HEART CATH-LEFT;  Surgeon: Aimee Cooper MD;  Location: [facility] CATH LAB;  Service: Cardiology;  Laterality: Left;    LEFT HEART CATHETERIZATION Left 2021    Procedure: CATHETERIZATION, HEART, LEFT;  Surgeon: Aimee Cooper MD;  Location: [facility] CATH LAB;  Service: Cardiology;  Laterality: Left;    PERCUTANEOUS TRANSLUMINAL BALLOON ANGIOPLASTY OF CORONARY ARTERY  2021    Procedure: Angioplasty-coronary;  Surgeon: Aimee Cooper MD;  Location: [facility] CATH LAB;  Service: Cardiology;;       Social History     Tobacco Use    Smoking status: Never    Smokeless tobacco: Never   Substance Use Topics    Alcohol use: No    Drug use: No       Family History   Problem Relation Age of Onset    Hypertension Mother     Hyperlipidemia Mother     Hypertension Father      Diabetes Father     Hyperlipidemia Father     Colon cancer Father     Hypertension Brother     Breast cancer Neg Hx     Ovarian cancer Neg Hx     Uterine cancer Neg Hx        Current Outpatient Medications   Medication Sig    ALPRAZolam (XANAX) 0.5 MG tablet Take 1 tablet (0.5 mg total) by mouth daily as needed.    amLODIPine-benazepriL (LOTREL) 10-40 mg per capsule Take 1 capsule by mouth once daily.    aspirin (ECOTRIN) 81 MG EC tablet Take 1 tablet (81 mg total) by mouth once daily.    cyclobenzaprine (FLEXERIL) 10 MG tablet Take 1 tablet (10 mg total) by mouth 3 (three) times daily as needed for Muscle spasms.    doxazosin (CARDURA) 4 MG tablet Take 1 tablet (4 mg total) by mouth once daily.    empagliflozin (JARDIANCE) 25 mg tablet Take 1 tablet (25 mg total) by mouth once daily.    evolocumab (REPATHA SURECLICK) 140 mg/mL PnIj Inject 1 mL (140 mg total) into the skin every 14 (fourteen) days.    ezetimibe (ZETIA) 10 mg tablet TAKE 1 TABLET BY MOUTH EVERY DAY    hydroCHLOROthiazide (HYDRODIURIL) 12.5 MG Tab TAKE 1 TABLET BY MOUTH EVERY DAY    isosorbide mononitrate (IMDUR) 60 MG 24 hr tablet TAKE 1 TABLET BY MOUTH ONCE DAILY    metFORMIN (GLUCOPHAGE) 500 MG tablet TAKE 2 TABLETS BY MOUTH TWICE A DAY WITH MEALS    methocarbamoL (ROBAXIN) 500 MG Tab TAKE 1 TABLET BY MOUTH THREE TIMES A DAY AS NEEDED MUSCLE SPASM    metoprolol succinate (TOPROL-XL) 50 MG 24 hr tablet TAKE 1 TABLET BY MOUTH TWICE A DAY    naproxen (NAPROSYN) 500 MG tablet Take 1 tablet (500 mg total) by mouth 2 (two) times daily.    nitroGLYCERIN (NITROSTAT) 0.4 MG SL tablet TAKE ONE FOR ONSET OF CHEST PAIN / THEN EVERY 5 MINUTES IF CP CONTINUES UP TO 3 DOSES..CALL 911    omeprazole (PRILOSEC) 40 MG capsule Take 1 capsule (40 mg total) by mouth once daily.    ondansetron (ZOFRAN) 4 MG tablet Take 1 tablet (4 mg total) by mouth every 8 (eight) hours as needed for Nausea.    rosuvastatin (CRESTOR) 40 MG Tab TAKE 1 TABLET BY MOUTH EVERY DAY IN THE  EVENING    semaglutide (OZEMPIC) 2 mg/dose (8 mg/3 mL) PnIj Inject 2 mg into the skin every 7 days.    spironolactone (ALDACTONE) 25 MG tablet Take 1 tablet (25 mg total) by mouth once daily.     No current facility-administered medications for this visit.     Current Outpatient Medications on File Prior to Visit   Medication Sig    ALPRAZolam (XANAX) 0.5 MG tablet Take 1 tablet (0.5 mg total) by mouth daily as needed.    amLODIPine-benazepriL (LOTREL) 10-40 mg per capsule Take 1 capsule by mouth once daily.    aspirin (ECOTRIN) 81 MG EC tablet Take 1 tablet (81 mg total) by mouth once daily.    cyclobenzaprine (FLEXERIL) 10 MG tablet Take 1 tablet (10 mg total) by mouth 3 (three) times daily as needed for Muscle spasms.    doxazosin (CARDURA) 4 MG tablet Take 1 tablet (4 mg total) by mouth once daily.    empagliflozin (JARDIANCE) 25 mg tablet Take 1 tablet (25 mg total) by mouth once daily.    evolocumab (REPATHA SURECLICK) 140 mg/mL PnIj Inject 1 mL (140 mg total) into the skin every 14 (fourteen) days.    ezetimibe (ZETIA) 10 mg tablet TAKE 1 TABLET BY MOUTH EVERY DAY    hydroCHLOROthiazide (HYDRODIURIL) 12.5 MG Tab TAKE 1 TABLET BY MOUTH EVERY DAY    isosorbide mononitrate (IMDUR) 60 MG 24 hr tablet TAKE 1 TABLET BY MOUTH ONCE DAILY    metFORMIN (GLUCOPHAGE) 500 MG tablet TAKE 2 TABLETS BY MOUTH TWICE A DAY WITH MEALS    methocarbamoL (ROBAXIN) 500 MG Tab TAKE 1 TABLET BY MOUTH THREE TIMES A DAY AS NEEDED MUSCLE SPASM    metoprolol succinate (TOPROL-XL) 50 MG 24 hr tablet TAKE 1 TABLET BY MOUTH TWICE A DAY    naproxen (NAPROSYN) 500 MG tablet Take 1 tablet (500 mg total) by mouth 2 (two) times daily.    nitroGLYCERIN (NITROSTAT) 0.4 MG SL tablet TAKE ONE FOR ONSET OF CHEST PAIN / THEN EVERY 5 MINUTES IF CP CONTINUES UP TO 3 DOSES..CALL 911    omeprazole (PRILOSEC) 40 MG capsule Take 1 capsule (40 mg total) by mouth once daily.    ondansetron (ZOFRAN) 4 MG tablet Take 1 tablet (4 mg total) by mouth every 8  (eight) hours as needed for Nausea.    rosuvastatin (CRESTOR) 40 MG Tab TAKE 1 TABLET BY MOUTH EVERY DAY IN THE EVENING    semaglutide (OZEMPIC) 2 mg/dose (8 mg/3 mL) PnIj Inject 2 mg into the skin every 7 days.    spironolactone (ALDACTONE) 25 MG tablet Take 1 tablet (25 mg total) by mouth once daily.     No current facility-administered medications on file prior to visit.     Review of patient's allergies indicates:   Allergen Reactions    No known drug allergies       Review of Systems   Constitutional: Negative for diaphoresis, malaise/fatigue and weight gain.   HENT:  Negative for hoarse voice.    Eyes:  Negative for double vision and visual disturbance.   Cardiovascular:  Negative for chest pain, claudication, cyanosis, dyspnea on exertion, irregular heartbeat, leg swelling, near-syncope, orthopnea, palpitations, paroxysmal nocturnal dyspnea and syncope.   Respiratory:  Negative for cough, hemoptysis, shortness of breath and snoring.    Hematologic/Lymphatic: Negative for bleeding problem. Does not bruise/bleed easily.   Skin:  Negative for color change and poor wound healing.   Musculoskeletal:  Negative for muscle cramps, muscle weakness and myalgias.   Gastrointestinal:  Negative for bloating, abdominal pain, change in bowel habit, diarrhea, heartburn, hematemesis, hematochezia, melena and nausea.   Neurological:  Negative for excessive daytime sleepiness, dizziness, headaches, light-headedness, loss of balance, numbness and weakness.   Psychiatric/Behavioral:  Negative for memory loss. The patient does not have insomnia.    Allergic/Immunologic: Negative for hives.       Objective:   Physical Exam  Constitutional:       General: She is not in acute distress.     Appearance: Normal appearance. She is well-developed. She is not ill-appearing.   HENT:      Head: Normocephalic and atraumatic.   Eyes:      General: No scleral icterus.     Pupils: Pupils are equal, round, and reactive to light.   Neck:       "Thyroid: No thyromegaly.      Vascular: Normal carotid pulses. No carotid bruit, hepatojugular reflux or JVD.      Trachea: No tracheal deviation.   Cardiovascular:      Rate and Rhythm: Normal rate and regular rhythm.      Pulses: Normal pulses.      Heart sounds: Normal heart sounds. No murmur heard.     No friction rub. No gallop.   Pulmonary:      Effort: Pulmonary effort is normal. No respiratory distress.      Breath sounds: Normal breath sounds. No wheezing, rhonchi or rales.   Chest:      Chest wall: No tenderness.   Abdominal:      General: Bowel sounds are normal. There is no abdominal bruit.      Palpations: Abdomen is soft. There is no hepatomegaly or pulsatile mass.      Tenderness: There is no abdominal tenderness.   Musculoskeletal:      Right shoulder: No deformity.      Cervical back: Normal range of motion and neck supple.   Skin:     General: Skin is warm and dry.      Findings: No erythema or rash.      Nails: There is no clubbing.   Neurological:      Mental Status: She is alert and oriented to person, place, and time.      Cranial Nerves: No cranial nerve deficit.      Coordination: Coordination normal.   Psychiatric:         Speech: Speech normal.         Behavior: Behavior normal.       Vitals:    02/22/24 1002 02/22/24 1006   BP: 132/88 138/76   BP Location: Left arm Right arm   Patient Position: Sitting Sitting   BP Method: Large (Manual) Large (Manual)   Pulse: 80    SpO2: 97%    Weight: 90.3 kg (199 lb 1.2 oz)    Height: 5' 5" (1.651 m)      Lab Results   Component Value Date    CHOL 164 01/18/2024    CHOL 142 10/13/2023    CHOL 155 04/13/2023      Body mass index is 33.13 kg/m².   Lab Results   Component Value Date    HGBA1C 7.5 (H) 01/18/2024      BMP  Lab Results   Component Value Date     01/18/2024    K 3.5 01/18/2024     01/18/2024    CO2 25 01/18/2024    BUN 13 01/18/2024    CREATININE 0.8 01/18/2024    CALCIUM 9.4 01/18/2024    ANIONGAP 11 01/18/2024    EGFRNORACEVR " >60.0 01/18/2024      Lab Results   Component Value Date    HDL 43 01/18/2024    HDL 48 10/13/2023    HDL 42 04/13/2023     Lab Results   Component Value Date    LDLCALC 82.0 01/18/2024    LDLCALC 66.8 10/13/2023    LDLCALC 75.6 04/13/2023     Lab Results   Component Value Date    TRIG 195 (H) 01/18/2024    TRIG 136 10/13/2023    TRIG 187 (H) 04/13/2023     Lab Results   Component Value Date    CHOLHDL 26.2 01/18/2024    CHOLHDL 33.8 10/13/2023    CHOLHDL 27.1 04/13/2023       Chemistry        Component Value Date/Time     01/18/2024 0848    K 3.5 01/18/2024 0848     01/18/2024 0848    CO2 25 01/18/2024 0848    BUN 13 01/18/2024 0848    CREATININE 0.8 01/18/2024 0848     (H) 01/18/2024 0848        Component Value Date/Time    CALCIUM 9.4 01/18/2024 0848    ALKPHOS 62 10/13/2023 0753    AST 31 10/13/2023 0753    ALT 28 10/13/2023 0753    BILITOT 0.3 10/13/2023 0753    ESTGFRAFRICA >60.0 03/30/2022 0900    EGFRNONAA >60.0 03/30/2022 0900          Lab Results   Component Value Date    TSH 1.743 10/13/2023     Lab Results   Component Value Date    INR 1.0 01/23/2021    INR 1.0 01/23/2021    INR 1.0 05/21/2018     Lab Results   Component Value Date    WBC 8.24 04/13/2023    HGB 13.9 04/13/2023    HCT 43.9 04/13/2023    MCV 84 04/13/2023     04/13/2023     BMP  Sodium   Date Value Ref Range Status   01/18/2024 141 136 - 145 mmol/L Final     Potassium   Date Value Ref Range Status   01/18/2024 3.5 3.5 - 5.1 mmol/L Final     Chloride   Date Value Ref Range Status   01/18/2024 105 95 - 110 mmol/L Final     CO2   Date Value Ref Range Status   01/18/2024 25 23 - 29 mmol/L Final     BUN   Date Value Ref Range Status   01/18/2024 13 8 - 23 mg/dL Final     Creatinine   Date Value Ref Range Status   01/18/2024 0.8 0.5 - 1.4 mg/dL Final     Calcium   Date Value Ref Range Status   01/18/2024 9.4 8.7 - 10.5 mg/dL Final     Anion Gap   Date Value Ref Range Status   01/18/2024 11 8 - 16 mmol/L Final     eGFR  if    Date Value Ref Range Status   03/30/2022 >60.0 >60 mL/min/1.73 m^2 Final     eGFR if non    Date Value Ref Range Status   03/30/2022 >60.0 >60 mL/min/1.73 m^2 Final     Comment:     Calculation used to obtain the estimated glomerular filtration  rate (eGFR) is the CKD-EPI equation.        CrCl cannot be calculated (Patient's most recent lab result is older than the maximum 7 days allowed.).    Assessment:     1. Coronary artery disease involving native coronary artery of native heart without angina pectoris    2. Severe obesity (BMI 35.0-35.9 with comorbidity)    3. History of non-ST elevation myocardial infarction (NSTEMI)    4. Hypertension associated with diabetes    5. Type II diabetes mellitus with complication    6. Hyperlipidemia associated with type 2 diabetes mellitus    7. Aortic ectasia    8. Class 1 obesity due to excess calories with serious comorbidity and body mass index (BMI) of 33.0 to 33.9 in adult    9. Microalbuminuria due to type 2 diabetes mellitus      Cad s/p pci old mi asymptomatic needs more regular exercise counseled  Obesity discussed weight loss diet compliance exercise she is now on ozempic also.  Htn controlled low slat diet emphasized   Hlp not on target diet compliance seems to be the issue she is on repatha and statins continue same    Diabetes uncontrolled  discussed appropriate diet exercise weight loss.  Plan:     Continue current therapy  Cardiac low salt diet.  Risk factor modification and excercise program./weight loss  F/u in 6 months with lipid cmp a1c

## 2024-03-25 ENCOUNTER — TELEPHONE (OUTPATIENT)
Dept: INTERNAL MEDICINE | Facility: CLINIC | Age: 64
End: 2024-03-25
Payer: MEDICARE

## 2024-03-25 NOTE — TELEPHONE ENCOUNTER
----- Message from Debra Rinaldi sent at 3/25/2024  3:34 PM CDT -----  Contact: Dulce Cardona is requesting a call back from Tasia in Dr. Ohara office.Please give her a call back at [phone] Or [phone]

## 2024-04-01 RX ORDER — ROSUVASTATIN CALCIUM 40 MG/1
40 TABLET, COATED ORAL NIGHTLY
Qty: 90 TABLET | Refills: 3 | Status: SHIPPED | OUTPATIENT
Start: 2024-04-01

## 2024-04-02 RX ORDER — AMOXICILLIN AND CLAVULANATE POTASSIUM 875; 125 MG/1; MG/1
1 TABLET, FILM COATED ORAL EVERY 12 HOURS
Qty: 20 TABLET | Refills: 0 | OUTPATIENT
Start: 2024-04-02

## 2024-04-03 ENCOUNTER — PATIENT MESSAGE (OUTPATIENT)
Dept: ADMINISTRATIVE | Facility: OTHER | Age: 64
End: 2024-04-03
Payer: MEDICARE

## 2024-04-04 NOTE — TELEPHONE ENCOUNTER
Spoke with pt verbalized understanding that Dr. Hanson recommend that the provider that order medication ( Amoxicillin- Clav 875-125 mg ) should refill the requested refill order

## 2024-05-02 ENCOUNTER — TELEPHONE (OUTPATIENT)
Dept: SPORTS MEDICINE | Facility: CLINIC | Age: 64
End: 2024-05-02
Payer: MEDICARE

## 2024-05-02 NOTE — TELEPHONE ENCOUNTER
Reached out to pt about her message regarding an appointment to f/u on left shoulder pain, LVM for a return call for scheduling assistance

## 2024-05-03 RX ORDER — METFORMIN HYDROCHLORIDE 500 MG/1
TABLET ORAL
Qty: 360 TABLET | Refills: 3 | Status: SHIPPED | OUTPATIENT
Start: 2024-05-03

## 2024-05-03 NOTE — TELEPHONE ENCOUNTER
Requested Prescriptions     Pending Prescriptions Disp Refills    metFORMIN (GLUCOPHAGE) 500 MG tablet [Pharmacy Med Name: METFORMIN  MG TABLET] 360 tablet 3     Sig: TAKE 2 TABLETS BY MOUTH TWICE A DAY WITH MEALS     LV 02/06/2024  NV 08/13/2024  LF 04/07/2023

## 2024-05-06 ENCOUNTER — TELEPHONE (OUTPATIENT)
Dept: SPORTS MEDICINE | Facility: CLINIC | Age: 64
End: 2024-05-06
Payer: MEDICARE

## 2024-05-06 NOTE — TELEPHONE ENCOUNTER
Reached out to pt about her message regarding a 3 month f/u appointment for her Left shoulder. PT is scheduled for 5/15/24    ----- Message from Madalyn Ferguson sent at 5/6/2024  3:29 PM CDT -----  Contact: [phone]  Patient needs first available appointment due to 6 moth check up. Please call patient at [phone]. Thanks KB

## 2024-05-13 ENCOUNTER — TELEPHONE (OUTPATIENT)
Dept: INTERNAL MEDICINE | Facility: CLINIC | Age: 64
End: 2024-05-13
Payer: MEDICARE

## 2024-05-15 ENCOUNTER — OFFICE VISIT (OUTPATIENT)
Dept: SPORTS MEDICINE | Facility: CLINIC | Age: 64
End: 2024-05-15
Payer: MEDICARE

## 2024-05-15 VITALS — WEIGHT: 199.06 LBS | BODY MASS INDEX: 33.16 KG/M2 | RESPIRATION RATE: 17 BRPM | HEIGHT: 65 IN

## 2024-05-15 DIAGNOSIS — M12.812 LEFT ROTATOR CUFF TEAR ARTHROPATHY: Primary | ICD-10-CM

## 2024-05-15 DIAGNOSIS — M75.102 LEFT ROTATOR CUFF TEAR ARTHROPATHY: Primary | ICD-10-CM

## 2024-05-15 DIAGNOSIS — M75.102 NON-TRAUMATIC ROTATOR CUFF TEAR, LEFT: ICD-10-CM

## 2024-05-15 PROCEDURE — 20610 DRAIN/INJ JOINT/BURSA W/O US: CPT | Mod: PBBFAC | Performed by: PHYSICIAN ASSISTANT

## 2024-05-15 PROCEDURE — 20610 DRAIN/INJ JOINT/BURSA W/O US: CPT | Mod: LT,S$GLB,, | Performed by: PHYSICIAN ASSISTANT

## 2024-05-15 PROCEDURE — 99999 PR PBB SHADOW E&M-EST. PATIENT-LVL IV: CPT | Mod: PBBFAC,,, | Performed by: PHYSICIAN ASSISTANT

## 2024-05-15 PROCEDURE — 99214 OFFICE O/P EST MOD 30 MIN: CPT | Mod: PBBFAC | Performed by: PHYSICIAN ASSISTANT

## 2024-05-15 PROCEDURE — 99213 OFFICE O/P EST LOW 20 MIN: CPT | Mod: 25,S$GLB,, | Performed by: PHYSICIAN ASSISTANT

## 2024-05-15 RX ORDER — NAPROXEN 500 MG/1
500 TABLET ORAL 2 TIMES DAILY WITH MEALS
Qty: 60 TABLET | Refills: 1 | Status: SHIPPED | OUTPATIENT
Start: 2024-05-15 | End: 2024-07-14

## 2024-05-15 RX ORDER — TRIAMCINOLONE ACETONIDE 40 MG/ML
40 INJECTION, SUSPENSION INTRA-ARTICULAR; INTRAMUSCULAR
Status: DISCONTINUED | OUTPATIENT
Start: 2024-05-15 | End: 2024-05-15 | Stop reason: HOSPADM

## 2024-05-15 RX ADMIN — TRIAMCINOLONE ACETONIDE 40 MG: 200 INJECTION, SUSPENSION INTRA-ARTICULAR; INTRAMUSCULAR at 08:05

## 2024-05-15 NOTE — PROGRESS NOTES
Orthopaedic Follow-Up Visit    Last Appointment:  02/05/2024  Diagnosis:  Left rotator cuff tear arthropathy, nontraumatic rotator cuff tear  Prior Procedure:  Left shoulder SAS CSI, Rx for naproxen    Dulce Boogie is a 63 y.o. female who is here for f/u evaluation of left shoulder pain. The patient was last seen here by me on 02/05/2024 at which point we decided to try a left shoulder CSI and prescription of oral anti-inflammatories prior to considering further treatment options. The patient returns today reporting that the symptoms have returned. She reports she got about 2-1/2 months of excellent pain relief with the previously administered injection, she is interested in a repeat shot today..     To review her history, Dulce Boogie is a 62 y.o. right-hand dominant female who presents with LEFT shoulder pain and dysfunction. She was previously seen by Clemencia Clayton PA-C in 2019. She was initially treating her for left shoulder rotator cuff impingement. She received 2 SAS CSIs, but ultimately she failed to improve with conservative treatment and they ended up getting an MRI of the left shoulder. MRI of the left shoulder from January 2020 revealed a full-thickness rotator cuff tear. For several years, she had no acute injury or trauma. Her symptoms include constant aching, throbbing pain of the left shoulder, limited range of motion, night pain. Her pain is made worse by raising the arm, overhead activities, repetitive movements. She has had an x-ray thus far. Her treatment has included rest, activity modification, oral anti-inflammatories, Robaxin, PT, glenohumeral CSI, subacromial CSI, Robaxin.  At last visit we decided to try a repeat subacromial corticosteroid injection as it had been greater than 3 years since her last CSI     Patient's medications, allergies, past medical, surgical, social and family histories were reviewed and updated as appropriate.    Review of Systems   All systems reviewed were  negative.  Specifically, the patient denies fever, chills, weight loss, chest pain, shortness of breath, or dyspnea on exertion.      Past Medical History:   Diagnosis Date    Abnormal EKG 2022    Anticoagulant long-term use     CAD (coronary artery disease)     Depression     History of colon polyps     Hyperlipidemia associated with type 2 diabetes mellitus     Hypertension associated with diabetes     Microalbuminuria due to type 2 diabetes mellitus     NSTEMI (non-ST elevated myocardial infarction) 2018    Obesity     Type II diabetes mellitus with complication        Past Surgical History:   Procedure Laterality Date    CAROTID STENT       SECTION, LOW TRANSVERSE      x 2    COLONOSCOPY N/A 06/10/2019    Procedure: COLONOSCOPY;  Surgeon: Maricarmen Boo MD;  Location: [facility] ENDO;  Service: Endoscopy;  Laterality: N/A;    COLONOSCOPY N/A 2022    Procedure: COLONOSCOPY;  Surgeon: Ryan Lau MD;  Location: [facility] ENDO;  Service: Endoscopy;  Laterality: N/A;    CORONARY STENT PLACEMENT N/A 2021    Procedure: INSERTION, STENT, CORONARY ARTERY;  Surgeon: Aimee Cooper MD;  Location: [facility] CATH LAB;  Service: Cardiology;  Laterality: N/A;    LEFT HEART CATHETERIZATION Left 2018    Procedure: HEART CATH-LEFT;  Surgeon: Aimee Cooper MD;  Location: [facility] CATH LAB;  Service: Cardiology;  Laterality: Left;    LEFT HEART CATHETERIZATION Left 2021    Procedure: CATHETERIZATION, HEART, LEFT;  Surgeon: Aimee Cooper MD;  Location: [facility] CATH LAB;  Service: Cardiology;  Laterality: Left;    PERCUTANEOUS TRANSLUMINAL BALLOON ANGIOPLASTY OF CORONARY ARTERY  2021    Procedure: Angioplasty-coronary;  Surgeon: Aimee Cooper MD;  Location: [facility] CATH LAB;  Service: Cardiology;;       Patient's Medications   New Prescriptions    NAPROXEN (NAPROSYN) 500 MG TABLET    Take 1 tablet (500 mg total) by mouth 2 (two) times daily with meals.   Previous Medications    ALPRAZOLAM  (XANAX) 0.5 MG TABLET    Take 1 tablet (0.5 mg total) by mouth daily as needed.    AMLODIPINE-BENAZEPRIL (LOTREL) 10-40 MG PER CAPSULE    Take 1 capsule by mouth once daily.    ASPIRIN (ECOTRIN) 81 MG EC TABLET    Take 1 tablet (81 mg total) by mouth once daily.    CYCLOBENZAPRINE (FLEXERIL) 10 MG TABLET    Take 1 tablet (10 mg total) by mouth 3 (three) times daily as needed for Muscle spasms.    DOXAZOSIN (CARDURA) 4 MG TABLET    Take 1 tablet (4 mg total) by mouth once daily.    EMPAGLIFLOZIN (JARDIANCE) 25 MG TABLET    Take 1 tablet (25 mg total) by mouth once daily.    EVOLOCUMAB (REPATHA SURECLICK) 140 MG/ML PNIJ    Inject 1 mL (140 mg total) into the skin every 14 (fourteen) days.    EZETIMIBE (ZETIA) 10 MG TABLET    TAKE 1 TABLET BY MOUTH EVERY DAY    HYDROCHLOROTHIAZIDE (HYDRODIURIL) 12.5 MG TAB    TAKE 1 TABLET BY MOUTH EVERY DAY    ISOSORBIDE MONONITRATE (IMDUR) 60 MG 24 HR TABLET    TAKE 1 TABLET BY MOUTH ONCE DAILY    METFORMIN (GLUCOPHAGE) 500 MG TABLET    TAKE 2 TABLETS BY MOUTH TWICE A DAY WITH MEALS    METHOCARBAMOL (ROBAXIN) 500 MG TAB    TAKE 1 TABLET BY MOUTH THREE TIMES A DAY AS NEEDED MUSCLE SPASM    METOPROLOL SUCCINATE (TOPROL-XL) 50 MG 24 HR TABLET    TAKE 1 TABLET BY MOUTH TWICE A DAY    NITROGLYCERIN (NITROSTAT) 0.4 MG SL TABLET    TAKE ONE FOR ONSET OF CHEST PAIN / THEN EVERY 5 MINUTES IF CP CONTINUES UP TO 3 DOSES..CALL 911    OMEPRAZOLE (PRILOSEC) 40 MG CAPSULE    Take 1 capsule (40 mg total) by mouth once daily.    ONDANSETRON (ZOFRAN) 4 MG TABLET    Take 1 tablet (4 mg total) by mouth every 8 (eight) hours as needed for Nausea.    ROSUVASTATIN (CRESTOR) 40 MG TAB    TAKE 1 TABLET BY MOUTH EVERY DAY IN THE EVENING    SEMAGLUTIDE (OZEMPIC) 2 MG/DOSE (8 MG/3 ML) PNIJ    Inject 2 mg into the skin every 7 days.    SPIRONOLACTONE (ALDACTONE) 25 MG TABLET    Take 1 tablet (25 mg total) by mouth once daily.   Modified Medications    No medications on file   Discontinued Medications    NAPROXEN  (NAPROSYN) 500 MG TABLET    Take 1 tablet (500 mg total) by mouth 2 (two) times daily.       Family History   Problem Relation Name Age of Onset    Hypertension Mother      Hyperlipidemia Mother      Hypertension Father      Diabetes Father      Hyperlipidemia Father      Colon cancer Father      Hypertension Brother x3     Breast cancer Neg Hx      Ovarian cancer Neg Hx      Uterine cancer Neg Hx         Review of patient's allergies indicates:   Allergen Reactions    No known drug allergies          Objective:      Physical Exam  Patient is alert and oriented, no distress. Skin is intact. Neuro is normal with no focal motor or sensory findings.    Cervical exam is unremarkable. Intact cervical ROM. Negative Spurling's test     Physical Exam:                    RIGHT                                     LEFT     Scap Dyskinesis/Winging       (-)                                             (-)     Tenderness:                                                                              Greater Tuberosity                  (-)                                            +  Bicipital Groove                       (-)                                             (-)  AC joint                                   (-)                                             (-)  Other:      ROM:  Forward Elevation       160                                          150  Abduction                    100                                          90  ER (at side)                 80                                            60  IR                                 T10                                          deferred     Strength:   Supraspinatus             5/5                                           3+/5  Infraspinatus               5/5                                           5/5  Subscap / IR               5/5                                           5/5      Special Tests:              Neer:                                       (-)                                              +              Jiménez:                                 (-)                                             +              SS Stress:                               (-)                                             +              Bear Hug:                                (-)                                            (-)              [place]'s:                                 (-)                                             +              Resisted Thrower's:                (-)                                             (-)              Cross Arm Abduction:             (-)                                             (-)    Neurovascular examination  - Motor grossly intact bilaterally to shoulder abduction, elbow flexion and extension, wrist flexion and extension, and intrinsic hand musculature  - Sensation intact to light touch bilaterally in axillary, median, radial, and ulnar distributions  - Symmetrical radial pulses    Imaging:    XR Results:  Results for orders placed during the hospital encounter of 04/13/23    X-Ray Shoulder 2 or More Views Left    Narrative  EXAMINATION:  XR SHOULDER COMPLETE 2 OR MORE VIEWS LEFT    CLINICAL HISTORY:  Unspecified rotator cuff tear or rupture of left shoulder, not specified as traumatic    TECHNIQUE:  Two or three views of the left shoulder were performed.    COMPARISON:  None    FINDINGS:  There is no radiographic evidence of acute osseous, articular, or soft tissue abnormality.  There is mild AC joint arthrosis with prominent bony spurring noted along the undersurface of the acromion.  Glenohumeral joint space appears preserved.    Impression  As Above      Electronically signed by: Gino Ho MD  Date:    04/13/2023  Time:    10:09      MRI Results:  Results for orders placed during the hospital encounter of 01/31/20    MRI Shoulder Without Contrast Left    Narrative  EXAMINATION:  MRI SHOULDER WITHOUT CONTRAST LEFT    CLINICAL  HISTORY:  Shoulder pain, prior xray, rotator cuff tear / impingement suspected;  Pain in left shoulder    TECHNIQUE:  Multisequence, multiplanar MR imaging of the shoulder performed without contrast.    COMPARISON:  Prior left shoulder radiographs dating back to 04/01/2019.    FINDINGS:  Rotator cuff:    Supraspinatus: Full-thickness tear involving the supraspinatus tendon with some intact fibers seen posteriorly.    Infraspinatus: Intact    Subscapularis: Intact    Teres minor: Intact    Muscle bulk is maintained.    Labrum: Evaluation limited by lack of joint distention/intra-articular contrast.  Degenerative changes of the labrum are noted.    Biceps: Long head biceps tendon is intact.    Bone: No fracture present.  Bone marrow signal is unremarkable.  Type 3 acromion with some lateral downsloping of the acromion is noted.    Acromioclavicular joint:Degenerative changes are noted with subchondral cystic change and mild spurring.    Cartilage: Articular cartilage of the glenohumeral joint is preserved    Miscellaneous: No significant joint effusion.  Mild subdeltoid fluid is present.    Impression  Full-thickness supraspinatus tendon tear as above.  Mild subdeltoid bursitis, degenerative changes, and other findings as described.      Electronically signed by: Reynaldo iMller MD  Date:    01/31/2020  Time:    10:41      Physician read: I agree with the above impression.    Assessment/Plan:   Dulce Boogie is a 63 y.o. female with chronic left rotator cuff tear, left shoulder rotator cuff tear arthropathy    Plan:    Discussed diagnosis and treatment options with the patient today.  She has a known chronic left shoulder rotator cuff tear that is consistent with left shoulder rotator cuff tear arthropathy  We again discussed operative versus nonoperative treatment options.  Operative treatment to include a reverse total shoulder arthroplasty versus nonoperative treatment to include rest, activity modification, oral  anti-inflammatories, formal physical therapy, intermittent corticosteroid injections   It has been 3 months since her last injection, she had excellent relief with her last CSI in his interested in a repeat injection today  I recommend move forward with a left shoulder CSI, patient tolerated the procedure well with no immediate complications  At last visit she was prescribed naproxen, she reports she only takes this intermittently as needed for pain, denies any adverse side effects, refilled naproxen today  Follow up with me in 3 months            Anjana Carvajal PA-C  Sports Medicine Physician Assistant       Disclaimer: This note was prepared using a voice recognition system and is likely to have sound alike errors within the text.

## 2024-05-15 NOTE — PROCEDURES
Large Joint Aspiration/Injection: L subacromial bursa    Date/Time: 5/15/2024 8:30 AM    Performed by: Anjana Carvajal PA-C  Authorized by: Anjana Carvajal PA-C    Consent Done?:  Yes (Verbal)  Indications:  Pain  Site marked: the procedure site was marked    Timeout: prior to procedure the correct patient, procedure, and site was verified    Prep: patient was prepped and draped in usual sterile fashion      Local anesthesia used?: Yes    Anesthesia:  Local infiltration  Local anesthetic:  Lidocaine 1% without epinephrine    Details:  Needle Size:  22 G  Ultrasonic Guidance for needle placement?: No    Approach:  Posterior  Location:  Shoulder  Site:  L subacromial bursa  Medications:  40 mg triamcinolone acetonide 40 mg/mL  Patient tolerance:  Patient tolerated the procedure well with no immediate complications    Procedure Note:  We discussed the risk and benefits of injections, including pain, infection, bleeding, damage to adjacent structures, risk of reaction to injection. We discussed the steroid/cortisone injections will not heal the problem but mat help decrease inflammation and help with symptoms. We discussed the risk of repeated injections. The patient expressed understanding and wanted to proceed with the injection. We performed a timeout to verify the proper patient, proper procedure, and the proper site. The injection site was prepared in a sterile fashion. The patient tolerated it well and there were no complication. We did discuss with the patient that steroid injections can cause some increase in blood sugar and blood pressure for up to a week after the injection.

## 2024-06-03 RX ORDER — DOXAZOSIN 4 MG/1
4 TABLET ORAL
Qty: 90 TABLET | Refills: 3 | Status: SHIPPED | OUTPATIENT
Start: 2024-06-03

## 2024-06-03 NOTE — TELEPHONE ENCOUNTER
Requested Prescriptions     Pending Prescriptions Disp Refills    doxazosin (CARDURA) 4 MG tablet [Pharmacy Med Name: DOXAZOSIN MESYLATE 4 MG TAB] 90 tablet 3     Sig: TAKE 1 TABLET BY MOUTH ONCE DAILY.     LV 02/06/2024   NV 08/13/2024   LF 05/30/2023

## 2024-06-13 DIAGNOSIS — E11.59 HYPERTENSION ASSOCIATED WITH DIABETES: ICD-10-CM

## 2024-06-13 DIAGNOSIS — I15.2 HYPERTENSION ASSOCIATED WITH DIABETES: ICD-10-CM

## 2024-06-13 DIAGNOSIS — I25.10 CORONARY ARTERY DISEASE INVOLVING NATIVE CORONARY ARTERY OF NATIVE HEART WITHOUT ANGINA PECTORIS: Chronic | ICD-10-CM

## 2024-06-13 RX ORDER — METOPROLOL SUCCINATE 50 MG/1
TABLET, EXTENDED RELEASE ORAL
Qty: 180 TABLET | Refills: 3 | Status: SHIPPED | OUTPATIENT
Start: 2024-06-13

## 2024-06-13 RX ORDER — HYDROCHLOROTHIAZIDE 12.5 MG/1
TABLET ORAL
Qty: 90 TABLET | Refills: 3 | Status: SHIPPED | OUTPATIENT
Start: 2024-06-13

## 2024-06-29 DIAGNOSIS — I25.10 CORONARY ARTERY DISEASE, UNSPECIFIED VESSEL OR LESION TYPE, UNSPECIFIED WHETHER ANGINA PRESENT, UNSPECIFIED WHETHER NATIVE OR TRANSPLANTED HEART: Primary | ICD-10-CM

## 2024-06-29 DIAGNOSIS — E78.5 HYPERLIPIDEMIA WITH TARGET LDL LESS THAN 70: ICD-10-CM

## 2024-07-01 RX ORDER — CYCLOBENZAPRINE HCL 10 MG
TABLET ORAL
Qty: 90 TABLET | Refills: 5 | Status: SHIPPED | OUTPATIENT
Start: 2024-07-01

## 2024-07-01 RX ORDER — EVOLOCUMAB 140 MG/ML
INJECTION, SOLUTION SUBCUTANEOUS
Qty: 2 EACH | Refills: 11 | Status: SHIPPED | OUTPATIENT
Start: 2024-07-01

## 2024-08-04 DIAGNOSIS — M75.102 LEFT ROTATOR CUFF TEAR ARTHROPATHY: ICD-10-CM

## 2024-08-04 DIAGNOSIS — M12.812 LEFT ROTATOR CUFF TEAR ARTHROPATHY: ICD-10-CM

## 2024-08-04 DIAGNOSIS — M75.102 NON-TRAUMATIC ROTATOR CUFF TEAR, LEFT: ICD-10-CM

## 2024-08-04 RX ORDER — NAPROXEN 500 MG/1
500 TABLET ORAL 2 TIMES DAILY WITH MEALS
Qty: 60 TABLET | Refills: 1 | Status: SHIPPED | OUTPATIENT
Start: 2024-08-04

## 2024-08-04 NOTE — PROGRESS NOTES
"HPI:  Patient is a 62-year-old female who comes today for follow-up for diabetes, hypertension, lipids, coronary disease.  Unfortunately, patient did not get her lab work done prior to this appointment.  Patient denies any hypoglycemia.  She does not check her blood sugar at home.  She states her blood pressure home is 130/80.  Recheck it here today and again I get 158/92.  She denies any chest pain.  Her only problem is continued issues with her left shoulder.  She has a history of rotator cuff problems dating back about 3+ years.  She is not doing any therapy at this time.    Current meds have been verified and updated per the EMR  Exam:BP (!) 158/90 (BP Location: Left arm)   Pulse 62   Ht 5' 5" (1.651 m)   Wt 92.9 kg (204 lb 12.9 oz)   LMP 06/21/2003 (Exact Date)   SpO2 98%   BMI 34.08 kg/m²   Carotids 2+ equal without bruits  Chest clear  Cardiovascular regular rate and rhythm without murmur gallop or rub  Left shoulder shows significant decreased range of motion.  She has decreased external rotation.  She has a positive impingement sign.    Lab Results   Component Value Date    WBC 8.67 03/30/2022    HGB 12.5 03/30/2022    HCT 40.2 03/30/2022     (L) 03/30/2022    CHOL 190 09/28/2022    TRIG 302 (H) 09/28/2022    HDL 41 09/28/2022    ALT 39 09/28/2022    AST 34 09/28/2022     09/28/2022    K 3.4 (L) 09/28/2022     09/28/2022    CREATININE 0.9 09/28/2022    BUN 18 09/28/2022    CO2 24 09/28/2022    TSH 1.890 03/30/2022    INR 1.0 01/23/2021    GLUF 106 01/07/2005    HGBA1C 7.6 (H) 09/28/2022       Impression:  Left rotator cuff syndrome  Hypertension, not to goal.  Controlled by her home blood pressure readings but it is not be really been validated  Diabetes and lipids, control unknown  Coronary artery disease, asymptomatic  Patient Active Problem List   Diagnosis    CAD with far remote PCI of unknown vessel    History of colon polyps    History of non-ST elevation myocardial infarction " (NSTEMI)    Type II diabetes mellitus with complication    Hypertension associated with diabetes    Hyperlipidemia associated with type 2 diabetes mellitus    Major depression in remission    Aortic ectasia    Microalbuminuria due to type 2 diabetes mellitus    Severe obesity (BMI 35.0-35.9 with comorbidity)       Plan:  Orders Placed This Encounter    X-Ray Shoulder 2 or More Views Left    Hemoglobin A1C    Comprehensive Metabolic Panel    Lipid Panel    TSH    Ambulatory referral/consult to Orthopedics     She will get her lab work done today.  The above lab work will be in 6 months.  She will have x-rays of her shoulder today and be referred to Orthopedics.  She is due to get her mammogram done.  I want to see her back in 1 month with her home blood pressure machine with her.    This note is generated with speech recognition software and is subject to transcription error and sound alike phrases that may be missed by proofreading.       Patient requests all Lab, Cardiology, and Radiology Results on their Discharge Instructions

## 2024-08-05 ENCOUNTER — LAB VISIT (OUTPATIENT)
Dept: LAB | Facility: HOSPITAL | Age: 64
End: 2024-08-05
Attending: INTERNAL MEDICINE
Payer: MEDICARE

## 2024-08-05 ENCOUNTER — PATIENT OUTREACH (OUTPATIENT)
Dept: ADMINISTRATIVE | Facility: HOSPITAL | Age: 64
End: 2024-08-05
Payer: MEDICARE

## 2024-08-05 DIAGNOSIS — E11.8 TYPE II DIABETES MELLITUS WITH COMPLICATION: ICD-10-CM

## 2024-08-05 LAB
ALBUMIN SERPL BCP-MCNC: 3.6 G/DL (ref 3.5–5.2)
ALP SERPL-CCNC: 59 U/L (ref 55–135)
ALT SERPL W/O P-5'-P-CCNC: 30 U/L (ref 10–44)
ANION GAP SERPL CALC-SCNC: 12 MMOL/L (ref 8–16)
AST SERPL-CCNC: 29 U/L (ref 10–40)
BILIRUB SERPL-MCNC: 0.3 MG/DL (ref 0.1–1)
BUN SERPL-MCNC: 18 MG/DL (ref 8–23)
CALCIUM SERPL-MCNC: 9.4 MG/DL (ref 8.7–10.5)
CHLORIDE SERPL-SCNC: 107 MMOL/L (ref 95–110)
CHOLEST SERPL-MCNC: 128 MG/DL (ref 120–199)
CHOLEST/HDLC SERPL: 3.1 {RATIO} (ref 2–5)
CO2 SERPL-SCNC: 22 MMOL/L (ref 23–29)
CREAT SERPL-MCNC: 0.9 MG/DL (ref 0.5–1.4)
EST. GFR  (NO RACE VARIABLE): >60 ML/MIN/1.73 M^2
ESTIMATED AVG GLUCOSE: 171 MG/DL (ref 68–131)
GLUCOSE SERPL-MCNC: 147 MG/DL (ref 70–110)
HBA1C MFR BLD: 7.6 % (ref 4–5.6)
HDLC SERPL-MCNC: 41 MG/DL (ref 40–75)
HDLC SERPL: 32 % (ref 20–50)
LDLC SERPL CALC-MCNC: 56 MG/DL (ref 63–159)
NONHDLC SERPL-MCNC: 87 MG/DL
POTASSIUM SERPL-SCNC: 3.5 MMOL/L (ref 3.5–5.1)
PROT SERPL-MCNC: 7.1 G/DL (ref 6–8.4)
SODIUM SERPL-SCNC: 141 MMOL/L (ref 136–145)
TRIGL SERPL-MCNC: 155 MG/DL (ref 30–150)
TSH SERPL DL<=0.005 MIU/L-ACNC: 2.33 UIU/ML (ref 0.4–4)

## 2024-08-05 PROCEDURE — 36415 COLL VENOUS BLD VENIPUNCTURE: CPT | Mod: PO | Performed by: INTERNAL MEDICINE

## 2024-08-05 PROCEDURE — 83036 HEMOGLOBIN GLYCOSYLATED A1C: CPT | Performed by: INTERNAL MEDICINE

## 2024-08-05 PROCEDURE — 80053 COMPREHEN METABOLIC PANEL: CPT | Performed by: INTERNAL MEDICINE

## 2024-08-05 PROCEDURE — 84443 ASSAY THYROID STIM HORMONE: CPT | Performed by: INTERNAL MEDICINE

## 2024-08-05 PROCEDURE — 80061 LIPID PANEL: CPT | Performed by: INTERNAL MEDICINE

## 2024-08-13 ENCOUNTER — OFFICE VISIT (OUTPATIENT)
Dept: INTERNAL MEDICINE | Facility: CLINIC | Age: 64
End: 2024-08-13
Payer: MEDICARE

## 2024-08-13 VITALS
RESPIRATION RATE: 20 BRPM | OXYGEN SATURATION: 99 % | TEMPERATURE: 97 F | HEIGHT: 65 IN | DIASTOLIC BLOOD PRESSURE: 80 MMHG | HEART RATE: 68 BPM | BODY MASS INDEX: 33.28 KG/M2 | WEIGHT: 199.75 LBS | SYSTOLIC BLOOD PRESSURE: 130 MMHG

## 2024-08-13 DIAGNOSIS — E66.09 CLASS 1 OBESITY DUE TO EXCESS CALORIES WITH SERIOUS COMORBIDITY AND BODY MASS INDEX (BMI) OF 33.0 TO 33.9 IN ADULT: ICD-10-CM

## 2024-08-13 DIAGNOSIS — I77.819 AORTIC ECTASIA: ICD-10-CM

## 2024-08-13 DIAGNOSIS — E11.69 HYPERLIPIDEMIA ASSOCIATED WITH TYPE 2 DIABETES MELLITUS: ICD-10-CM

## 2024-08-13 DIAGNOSIS — I15.2 HYPERTENSION ASSOCIATED WITH DIABETES: ICD-10-CM

## 2024-08-13 DIAGNOSIS — Z00.00 ROUTINE GENERAL MEDICAL EXAMINATION AT A HEALTH CARE FACILITY: Primary | ICD-10-CM

## 2024-08-13 DIAGNOSIS — E11.8 TYPE II DIABETES MELLITUS WITH COMPLICATION: ICD-10-CM

## 2024-08-13 DIAGNOSIS — I25.10 CORONARY ARTERY DISEASE INVOLVING NATIVE CORONARY ARTERY OF NATIVE HEART WITHOUT ANGINA PECTORIS: Chronic | ICD-10-CM

## 2024-08-13 DIAGNOSIS — F32.5 MAJOR DEPRESSION IN REMISSION: ICD-10-CM

## 2024-08-13 DIAGNOSIS — Z86.010 HISTORY OF COLON POLYPS: ICD-10-CM

## 2024-08-13 DIAGNOSIS — E78.5 HYPERLIPIDEMIA WITH TARGET LDL LESS THAN 70: ICD-10-CM

## 2024-08-13 DIAGNOSIS — E11.29 MICROALBUMINURIA DUE TO TYPE 2 DIABETES MELLITUS: ICD-10-CM

## 2024-08-13 DIAGNOSIS — E11.59 HYPERTENSION ASSOCIATED WITH DIABETES: ICD-10-CM

## 2024-08-13 DIAGNOSIS — I25.10 CORONARY ARTERY DISEASE, UNSPECIFIED VESSEL OR LESION TYPE, UNSPECIFIED WHETHER ANGINA PRESENT, UNSPECIFIED WHETHER NATIVE OR TRANSPLANTED HEART: ICD-10-CM

## 2024-08-13 DIAGNOSIS — E78.5 HYPERLIPIDEMIA ASSOCIATED WITH TYPE 2 DIABETES MELLITUS: ICD-10-CM

## 2024-08-13 DIAGNOSIS — R80.9 MICROALBUMINURIA DUE TO TYPE 2 DIABETES MELLITUS: ICD-10-CM

## 2024-08-13 PROCEDURE — 99999 PR PBB SHADOW E&M-EST. PATIENT-LVL V: CPT | Mod: PBBFAC,,, | Performed by: INTERNAL MEDICINE

## 2024-08-13 RX ORDER — EVOLOCUMAB 140 MG/ML
140 INJECTION, SOLUTION SUBCUTANEOUS
Qty: 6 ML | Refills: 3 | Status: SHIPPED | OUTPATIENT
Start: 2024-08-13 | End: 2024-08-15

## 2024-08-13 RX ORDER — GLIMEPIRIDE 2 MG/1
2 TABLET ORAL
Qty: 90 TABLET | Refills: 3 | Status: SHIPPED | OUTPATIENT
Start: 2024-08-13 | End: 2025-08-13

## 2024-08-13 NOTE — PROGRESS NOTES
HPI:  Is a 64-year-old female who comes in today for follow-up of her diabetes, hypertension, lipids, coronary disease, and for her annual physical.  Patient states she is doing well.  She denies any chest pains or shortness a breath.  She has had no problems with her blood sugar.  She denies any hypoglycemic problems.  Her blood pressure is about 130/80 at home.    Current MEDS: medcard review, verified and update  Allergies: Per the electronic medical record    Past Medical History:   Diagnosis Date    Abnormal EKG 2022    Anticoagulant long-term use     CAD (coronary artery disease)     Depression     History of colon polyps     Hyperlipidemia associated with type 2 diabetes mellitus     Hypertension associated with diabetes     Microalbuminuria due to type 2 diabetes mellitus     NSTEMI (non-ST elevated myocardial infarction) 2018    Obesity     Type II diabetes mellitus with complication        Past Surgical History:   Procedure Laterality Date    CAROTID STENT       SECTION, LOW TRANSVERSE      x 2    COLONOSCOPY N/A 06/10/2019    Procedure: COLONOSCOPY;  Surgeon: Maricarmen Boo MD;  Location: [facility] ENDO;  Service: Endoscopy;  Laterality: N/A;    COLONOSCOPY N/A 2022    Procedure: COLONOSCOPY;  Surgeon: Ryan Lau MD;  Location: [facility] ENDO;  Service: Endoscopy;  Laterality: N/A;    CORONARY STENT PLACEMENT N/A 2021    Procedure: INSERTION, STENT, CORONARY ARTERY;  Surgeon: Aimee Cooper MD;  Location: [facility] CATH LAB;  Service: Cardiology;  Laterality: N/A;    LEFT HEART CATHETERIZATION Left 2018    Procedure: HEART CATH-LEFT;  Surgeon: Aimee Cooper MD;  Location: [facility] CATH LAB;  Service: Cardiology;  Laterality: Left;    LEFT HEART CATHETERIZATION Left 2021    Procedure: CATHETERIZATION, HEART, LEFT;  Surgeon: Aimee Cooper MD;  Location: [facility] CATH LAB;  Service: Cardiology;  Laterality: Left;    PERCUTANEOUS TRANSLUMINAL BALLOON ANGIOPLASTY OF  "CORONARY ARTERY  01/25/2021    Procedure: Angioplasty-coronary;  Surgeon: Aimee Cooper MD;  Location: [facility] CATH LAB;  Service: Cardiology;;       SHx: per the electronic medical record    FHx: recorded in the electronic medical record    ROS:    denies any chest pains or shortness of breath. Denies any nausea, vomiting or diarrhea. Denies any fever, chills or sweats. Denies any change in weight, voice, stool, skin or hair. Denies any dysuria, dyspepsia or dysphagia. Denies any change in vision, hearing or headaches. Denies any swollen lymph nodes or loss of memory.    PE:  /80   Pulse 68   Temp 96.9 °F (36.1 °C) (Tympanic)   Resp 20   Ht 5' 5" (1.651 m)   Wt 90.6 kg (199 lb 11.8 oz)   LMP 06/21/2003 (Exact Date)   SpO2 99%   BMI 33.24 kg/m²   Gen: Well-developed, well-nourished, female, in no acute distress, oriented x3  HEENT: neck is supple, no adenopathy, carotids 2+ equal without bruits, thyroid exam normal size without nodules.  CHEST: clear to auscultation and percussion  CVS: regular rate and rhythm without significant murmur, gallop, or rubs  ABD: soft, benign, no rebound no guarding, no distention. Bowel sounds are normal.     Nontender,  no palpable masses, no organomegaly and no audible bruits.  BREAST: no masses.  No nipple inversion, retraction, or deviation.  EXT: no clubbing, cyanosis, or edema  LYMPH: no cervical, inguinal, or axillary adenopathy  FEET: no loss of sensation.  No ulcers or pressure sores.  Monofilament testing normal  NEURO: gait normal.  Cranial nerves II- XII intact. No nystagmus.  Speech normal.   Gross motor and sensory unremarkable.  PELVIC: deferred    Lab Results   Component Value Date    WBC 8.24 04/13/2023    HGB 13.9 04/13/2023    HCT 43.9 04/13/2023     04/13/2023    CHOL 128 08/05/2024    TRIG 155 (H) 08/05/2024    HDL 41 08/05/2024    ALT 30 08/05/2024    AST 29 08/05/2024     08/05/2024    K 3.5 08/05/2024     08/05/2024    CREATININE " 0.9 08/05/2024    BUN 18 08/05/2024    CO2 22 (L) 08/05/2024    TSH 2.335 08/05/2024    INR 1.0 01/23/2021    GLUF 106 01/07/2005    HGBA1C 7.6 (H) 08/05/2024    BNP 14 01/23/2021       Impression:  Diabetes, not to goal  Hypertension and lipids, both well controlled  Coronary artery disease, stable, followed by Cardiology  Patient Active Problem List   Diagnosis    CAD with far remote PCI of unknown vessel    History of colon polyps    History of non-ST elevation myocardial infarction (NSTEMI)    Type II diabetes mellitus with complication    Hypertension associated with diabetes    Hyperlipidemia associated with type 2 diabetes mellitus    Major depression in remission    Aortic ectasia    Microalbuminuria due to type 2 diabetes mellitus    Class 1 obesity due to excess calories with serious comorbidity and body mass index (BMI) of 33.0 to 33.9 in adult    Chronic left shoulder pain    Left elbow pain    Decreased ROM of left shoulder    Severe obesity (BMI 35.0-35.9 with comorbidity)       Plan:   Orders Placed This Encounter    Hemoglobin A1C    Basic Metabolic Panel    glimepiride (AMARYL) 2 MG tablet    evolocumab (REPATHA SYRINGE) 140 mg/mL Syrg     Patient will be started on Amaryl 2 mg once in the morning in addition to continuing all of her other medications.  Patient will see me again in 3 months with the above lab work.  She is due to get her mammogram    This note is generated with speech recognition software and is subject to transcription error and sound alike phrases that may be missed by proofreading.

## 2024-08-15 ENCOUNTER — TELEPHONE (OUTPATIENT)
Dept: INTERNAL MEDICINE | Facility: CLINIC | Age: 64
End: 2024-08-15
Payer: MEDICARE

## 2024-08-15 DIAGNOSIS — E78.5 HYPERLIPIDEMIA WITH TARGET LDL LESS THAN 70: Primary | ICD-10-CM

## 2024-08-15 DIAGNOSIS — G72.0 STATIN MYOPATHY: ICD-10-CM

## 2024-08-15 DIAGNOSIS — T46.6X5A STATIN MYOPATHY: ICD-10-CM

## 2024-08-15 RX ORDER — EVOLOCUMAB 140 MG/ML
140 INJECTION, SOLUTION SUBCUTANEOUS
Qty: 6 EACH | Refills: 3 | Status: ACTIVE | OUTPATIENT
Start: 2024-08-15

## 2024-08-15 NOTE — TELEPHONE ENCOUNTER
----- Message from Lakshmi Phillip MA sent at 8/14/2024  5:00 PM CDT -----  Regarding: FW: Repatha switch  Patient is requesting the autoinjector pens instead of the syringes.  ----- Message -----  From: Boo Shelley, PharmD  Sent: 8/14/2024   4:52 PM CDT  To: [facility] Staff  Subject: Repatha switch                                   Good afternoon,    [facility] has received a Repatha Rx for Pt. Pt is requesting to have the autoinjector pens instead of syringes. Please sent new order for Repatha [employer] per Pt request.    Thank you,  Boo Shelley  Clinical Pharmacist, PharmD    [facility]  [address] [place], [place] [address] [place] [address]  Phone: [phone]  Fax: [phone]

## 2024-08-16 ENCOUNTER — LAB VISIT (OUTPATIENT)
Dept: LAB | Facility: HOSPITAL | Age: 64
End: 2024-08-16
Attending: INTERNAL MEDICINE
Payer: MEDICARE

## 2024-08-16 DIAGNOSIS — E66.01 SEVERE OBESITY (BMI 35.0-35.9 WITH COMORBIDITY): ICD-10-CM

## 2024-08-16 DIAGNOSIS — I25.10 CORONARY ARTERY DISEASE INVOLVING NATIVE CORONARY ARTERY OF NATIVE HEART WITHOUT ANGINA PECTORIS: Chronic | ICD-10-CM

## 2024-08-16 DIAGNOSIS — E11.8 TYPE II DIABETES MELLITUS WITH COMPLICATION: ICD-10-CM

## 2024-08-16 LAB
ALBUMIN SERPL BCP-MCNC: 3.6 G/DL (ref 3.5–5.2)
ALP SERPL-CCNC: 61 U/L (ref 55–135)
ALT SERPL W/O P-5'-P-CCNC: 33 U/L (ref 10–44)
ANION GAP SERPL CALC-SCNC: 8 MMOL/L (ref 8–16)
AST SERPL-CCNC: 28 U/L (ref 10–40)
BILIRUB SERPL-MCNC: 0.3 MG/DL (ref 0.1–1)
BUN SERPL-MCNC: 13 MG/DL (ref 8–23)
CALCIUM SERPL-MCNC: 9.2 MG/DL (ref 8.7–10.5)
CHLORIDE SERPL-SCNC: 107 MMOL/L (ref 95–110)
CHOLEST SERPL-MCNC: 204 MG/DL (ref 120–199)
CHOLEST/HDLC SERPL: 4.5 {RATIO} (ref 2–5)
CO2 SERPL-SCNC: 25 MMOL/L (ref 23–29)
CREAT SERPL-MCNC: 0.8 MG/DL (ref 0.5–1.4)
EST. GFR  (NO RACE VARIABLE): >60 ML/MIN/1.73 M^2
ESTIMATED AVG GLUCOSE: 180 MG/DL (ref 68–131)
GLUCOSE SERPL-MCNC: 155 MG/DL (ref 70–110)
HBA1C MFR BLD: 7.9 % (ref 4–5.6)
HDLC SERPL-MCNC: 45 MG/DL (ref 40–75)
HDLC SERPL: 22.1 % (ref 20–50)
LDLC SERPL CALC-MCNC: 112.8 MG/DL (ref 63–159)
NONHDLC SERPL-MCNC: 159 MG/DL
POTASSIUM SERPL-SCNC: 3.3 MMOL/L (ref 3.5–5.1)
PROT SERPL-MCNC: 7.1 G/DL (ref 6–8.4)
SODIUM SERPL-SCNC: 140 MMOL/L (ref 136–145)
TRIGL SERPL-MCNC: 231 MG/DL (ref 30–150)

## 2024-08-16 PROCEDURE — 80053 COMPREHEN METABOLIC PANEL: CPT | Performed by: INTERNAL MEDICINE

## 2024-08-16 PROCEDURE — 36415 COLL VENOUS BLD VENIPUNCTURE: CPT | Performed by: INTERNAL MEDICINE

## 2024-08-16 PROCEDURE — 83036 HEMOGLOBIN GLYCOSYLATED A1C: CPT | Performed by: INTERNAL MEDICINE

## 2024-08-16 PROCEDURE — 80061 LIPID PANEL: CPT | Performed by: INTERNAL MEDICINE

## 2024-08-22 ENCOUNTER — OFFICE VISIT (OUTPATIENT)
Dept: CARDIOLOGY | Facility: CLINIC | Age: 64
End: 2024-08-22
Payer: MEDICARE

## 2024-08-22 VITALS
HEIGHT: 65 IN | DIASTOLIC BLOOD PRESSURE: 76 MMHG | OXYGEN SATURATION: 98 % | WEIGHT: 202.06 LBS | HEART RATE: 71 BPM | SYSTOLIC BLOOD PRESSURE: 138 MMHG | BODY MASS INDEX: 33.66 KG/M2

## 2024-08-22 DIAGNOSIS — I25.2 HISTORY OF NON-ST ELEVATION MYOCARDIAL INFARCTION (NSTEMI): ICD-10-CM

## 2024-08-22 DIAGNOSIS — E66.09 CLASS 1 OBESITY DUE TO EXCESS CALORIES WITH SERIOUS COMORBIDITY AND BODY MASS INDEX (BMI) OF 33.0 TO 33.9 IN ADULT: ICD-10-CM

## 2024-08-22 DIAGNOSIS — E11.69 HYPERLIPIDEMIA ASSOCIATED WITH TYPE 2 DIABETES MELLITUS: ICD-10-CM

## 2024-08-22 DIAGNOSIS — E11.8 TYPE II DIABETES MELLITUS WITH COMPLICATION: ICD-10-CM

## 2024-08-22 DIAGNOSIS — I77.819 AORTIC ECTASIA: ICD-10-CM

## 2024-08-22 DIAGNOSIS — E78.5 HYPERLIPIDEMIA ASSOCIATED WITH TYPE 2 DIABETES MELLITUS: ICD-10-CM

## 2024-08-22 DIAGNOSIS — I25.10 CORONARY ARTERY DISEASE INVOLVING NATIVE CORONARY ARTERY OF NATIVE HEART WITHOUT ANGINA PECTORIS: Primary | Chronic | ICD-10-CM

## 2024-08-22 DIAGNOSIS — R80.9 MICROALBUMINURIA DUE TO TYPE 2 DIABETES MELLITUS: ICD-10-CM

## 2024-08-22 DIAGNOSIS — E66.01 SEVERE OBESITY (BMI 35.0-35.9 WITH COMORBIDITY): ICD-10-CM

## 2024-08-22 DIAGNOSIS — E11.59 HYPERTENSION ASSOCIATED WITH DIABETES: ICD-10-CM

## 2024-08-22 DIAGNOSIS — I15.2 HYPERTENSION ASSOCIATED WITH DIABETES: ICD-10-CM

## 2024-08-22 DIAGNOSIS — E11.29 MICROALBUMINURIA DUE TO TYPE 2 DIABETES MELLITUS: ICD-10-CM

## 2024-08-22 PROCEDURE — 99999 PR PBB SHADOW E&M-EST. PATIENT-LVL IV: CPT | Mod: PBBFAC,,, | Performed by: INTERNAL MEDICINE

## 2024-08-22 NOTE — PROGRESS NOTES
Subjective:   Patient ID:  Dulce Boogie is a 64 y.o. female who presents for follow up of No chief complaint on file.      HPI  8/5/2021 HAVEN RODARTE NP  Ms. Boogie's current conditions include CAD with remote PCI in 2010 (previously followed by Dr. Valle at  Cardiology), HTN, HLD, DM II. Admitted May 2018 with unstable angina. Underwent LHC on 5/22/18 and noted to have LAD 50% mid long lesion ffr 0.81. Also noted LCX non obs CAD and patent stent, RCA 90% prox treated with solange x2 and PDA distal 80% treated with SOLANGE x 2.      Admitted in Feb 2021 with NSTEMI  Underwent successful PCI of mid LAD 80% lesion.   Has no abnormal bleeding on ASA and Brilinta  Started on Imdur following cath   Wasn't taking Repatha as prescribed.      Denies any chest pain, SOB, COOPER,  orthopnea, PND, dizziness, palpitations,  near syncope, syncope or edema . Has no symptoms concerning for angina or equivalent. No CNS Complaints to suggest TIA or CVA. Does well with limiting sodium intake.  Has not required any SL nitro.   BP log  Ranging 130's systolic/80's   Is grieving the loss of her son      2/17/2022  No new complaints she is trying to be compliant with diet not consistent on exercise. Compliant with diet asymptomatic cardiovascular wise.tolerating emdical therapy      12/8/2022   Had colonoscopy 2 polyps removed. Has been having difficulty controlling her blood pressure and her lipids and diabetes all her markers are increased. She ahs dietary indiscretion. Has no other issues clinically except having heart burn she took tums. Denies any other chest pain. Has been compliant with medical therapy.she denies any exertional symptoms but has not been very active.      6/202/2023   Here for f/u has not been exercising has been started on ozempic 2 weeks ago. Ha sno cardiac symptoms needs better diet compliance made improvement on lipids diabetes still the issue      2/22/2024   Asymptomatic not very compliant with diet exercise  compliant with meds. Lipids increased A1c not on target    2024  Asymptomatic still non compliant with diabetes A1c is increased more ldl on target.    Past Medical History:   Diagnosis Date    Abnormal EKG 2022    Anticoagulant long-term use     CAD (coronary artery disease)     Depression     History of colon polyps     Hyperlipidemia associated with type 2 diabetes mellitus     Hypertension associated with diabetes     Microalbuminuria due to type 2 diabetes mellitus     NSTEMI (non-ST elevated myocardial infarction) 2018    Obesity     Type II diabetes mellitus with complication        Past Surgical History:   Procedure Laterality Date    CAROTID STENT       SECTION, LOW TRANSVERSE      x 2    COLONOSCOPY N/A 06/10/2019    Procedure: COLONOSCOPY;  Surgeon: Maricarmen Boo MD;  Location: [facility] ENDO;  Service: Endoscopy;  Laterality: N/A;    COLONOSCOPY N/A 2022    Procedure: COLONOSCOPY;  Surgeon: Ryan Lau MD;  Location: [facility] ENDO;  Service: Endoscopy;  Laterality: N/A;    CORONARY STENT PLACEMENT N/A 2021    Procedure: INSERTION, STENT, CORONARY ARTERY;  Surgeon: iAmee Cooper MD;  Location: [facility] CATH LAB;  Service: Cardiology;  Laterality: N/A;    LEFT HEART CATHETERIZATION Left 2018    Procedure: HEART CATH-LEFT;  Surgeon: Aimee Cooper MD;  Location: [facility] CATH LAB;  Service: Cardiology;  Laterality: Left;    LEFT HEART CATHETERIZATION Left 2021    Procedure: CATHETERIZATION, HEART, LEFT;  Surgeon: Aimee Cooper MD;  Location: [facility] CATH LAB;  Service: Cardiology;  Laterality: Left;    PERCUTANEOUS TRANSLUMINAL BALLOON ANGIOPLASTY OF CORONARY ARTERY  2021    Procedure: Angioplasty-coronary;  Surgeon: Aimee Cooper MD;  Location: [facility] CATH LAB;  Service: Cardiology;;       Social History     Tobacco Use    Smoking status: Never    Smokeless tobacco: Never   Substance Use Topics    Alcohol use: No    Drug use: No       Family History    Problem Relation Name Age of Onset    Hypertension Mother      Hyperlipidemia Mother      Hypertension Father      Diabetes Father      Hyperlipidemia Father      Colon cancer Father      Hypertension Brother x3     Breast cancer Neg Hx      Ovarian cancer Neg Hx      Uterine cancer Neg Hx         Current Outpatient Medications   Medication Sig    ALPRAZolam (XANAX) 0.5 MG tablet Take 1 tablet (0.5 mg total) by mouth daily as needed.    amLODIPine-benazepriL (LOTREL) 10-40 mg per capsule Take 1 capsule by mouth once daily.    aspirin (ECOTRIN) 81 MG EC tablet Take 1 tablet (81 mg total) by mouth once daily.    cyclobenzaprine (FLEXERIL) 10 MG tablet TAKE 1 TABLET BY MOUTH THREE TIMES A DAY AS NEEDED FOR MUSCLE SPASMS    doxazosin (CARDURA) 4 MG tablet TAKE 1 TABLET BY MOUTH ONCE DAILY.    empagliflozin (JARDIANCE) 25 mg tablet Take 1 tablet (25 mg total) by mouth once daily.    evolocumab (REPATHA SURECLICK) 140 mg/mL PnIj Inject 1 mL (140 mg total) into the skin every 14 (fourteen) days.    ezetimibe (ZETIA) 10 mg tablet TAKE 1 TABLET BY MOUTH EVERY DAY    hydroCHLOROthiazide (HYDRODIURIL) 12.5 MG Tab TAKE 1 TABLET BY MOUTH EVERY DAY    isosorbide mononitrate (IMDUR) 60 MG 24 hr tablet TAKE 1 TABLET BY MOUTH ONCE DAILY    metFORMIN (GLUCOPHAGE) 500 MG tablet TAKE 2 TABLETS BY MOUTH TWICE A DAY WITH MEALS    methocarbamoL (ROBAXIN) 500 MG Tab TAKE 1 TABLET BY MOUTH THREE TIMES A DAY AS NEEDED MUSCLE SPASM    metoprolol succinate (TOPROL-XL) 50 MG 24 hr tablet TAKE 1 TABLET BY MOUTH TWICE A DAY    naproxen (NAPROSYN) 500 MG tablet TAKE 1 TABLET BY MOUTH TWICE A DAY WITH MEALS    nitroGLYCERIN (NITROSTAT) 0.4 MG SL tablet TAKE ONE FOR ONSET OF CHEST PAIN / THEN EVERY 5 MINUTES IF CP CONTINUES UP TO 3 DOSES..CALL 911    omeprazole (PRILOSEC) 40 MG capsule Take 1 capsule (40 mg total) by mouth once daily.    ondansetron (ZOFRAN) 4 MG tablet Take 1 tablet (4 mg total) by mouth every 8 (eight) hours as needed for  Nausea.    rosuvastatin (CRESTOR) 40 MG Tab TAKE 1 TABLET BY MOUTH EVERY DAY IN THE EVENING    semaglutide (OZEMPIC) 2 mg/dose (8 mg/3 mL) PnIj Inject 2 mg into the skin every 7 days.    spironolactone (ALDACTONE) 25 MG tablet Take 1 tablet (25 mg total) by mouth once daily.    glimepiride (AMARYL) 2 MG tablet Take 1 tablet (2 mg total) by mouth before breakfast. (Patient not taking: Reported on 8/22/2024)     No current facility-administered medications for this visit.     Current Outpatient Medications on File Prior to Visit   Medication Sig    ALPRAZolam (XANAX) 0.5 MG tablet Take 1 tablet (0.5 mg total) by mouth daily as needed.    amLODIPine-benazepriL (LOTREL) 10-40 mg per capsule Take 1 capsule by mouth once daily.    aspirin (ECOTRIN) 81 MG EC tablet Take 1 tablet (81 mg total) by mouth once daily.    cyclobenzaprine (FLEXERIL) 10 MG tablet TAKE 1 TABLET BY MOUTH THREE TIMES A DAY AS NEEDED FOR MUSCLE SPASMS    doxazosin (CARDURA) 4 MG tablet TAKE 1 TABLET BY MOUTH ONCE DAILY.    empagliflozin (JARDIANCE) 25 mg tablet Take 1 tablet (25 mg total) by mouth once daily.    evolocumab (REPATHA SURECLICK) 140 mg/mL PnIj Inject 1 mL (140 mg total) into the skin every 14 (fourteen) days.    ezetimibe (ZETIA) 10 mg tablet TAKE 1 TABLET BY MOUTH EVERY DAY    hydroCHLOROthiazide (HYDRODIURIL) 12.5 MG Tab TAKE 1 TABLET BY MOUTH EVERY DAY    isosorbide mononitrate (IMDUR) 60 MG 24 hr tablet TAKE 1 TABLET BY MOUTH ONCE DAILY    metFORMIN (GLUCOPHAGE) 500 MG tablet TAKE 2 TABLETS BY MOUTH TWICE A DAY WITH MEALS    methocarbamoL (ROBAXIN) 500 MG Tab TAKE 1 TABLET BY MOUTH THREE TIMES A DAY AS NEEDED MUSCLE SPASM    metoprolol succinate (TOPROL-XL) 50 MG 24 hr tablet TAKE 1 TABLET BY MOUTH TWICE A DAY    naproxen (NAPROSYN) 500 MG tablet TAKE 1 TABLET BY MOUTH TWICE A DAY WITH MEALS    nitroGLYCERIN (NITROSTAT) 0.4 MG SL tablet TAKE ONE FOR ONSET OF CHEST PAIN / THEN EVERY 5 MINUTES IF CP CONTINUES UP TO 3 DOSES..CALL 911     omeprazole (PRILOSEC) 40 MG capsule Take 1 capsule (40 mg total) by mouth once daily.    ondansetron (ZOFRAN) 4 MG tablet Take 1 tablet (4 mg total) by mouth every 8 (eight) hours as needed for Nausea.    rosuvastatin (CRESTOR) 40 MG Tab TAKE 1 TABLET BY MOUTH EVERY DAY IN THE EVENING    semaglutide (OZEMPIC) 2 mg/dose (8 mg/3 mL) PnIj Inject 2 mg into the skin every 7 days.    spironolactone (ALDACTONE) 25 MG tablet Take 1 tablet (25 mg total) by mouth once daily.    glimepiride (AMARYL) 2 MG tablet Take 1 tablet (2 mg total) by mouth before breakfast. (Patient not taking: Reported on 8/22/2024)     No current facility-administered medications on file prior to visit.     Review of patient's allergies indicates:   Allergen Reactions    No known drug allergies       Review of Systems   Constitutional: Negative for diaphoresis, malaise/fatigue and weight gain.   HENT:  Negative for hoarse voice.    Eyes:  Negative for double vision and visual disturbance.   Cardiovascular:  Negative for chest pain, claudication, cyanosis, dyspnea on exertion, irregular heartbeat, leg swelling, near-syncope, orthopnea, palpitations, paroxysmal nocturnal dyspnea and syncope.   Respiratory:  Negative for cough, hemoptysis, shortness of breath and snoring.    Hematologic/Lymphatic: Negative for bleeding problem. Does not bruise/bleed easily.   Skin:  Negative for color change and poor wound healing.   Musculoskeletal:  Negative for muscle cramps, muscle weakness and myalgias.   Gastrointestinal:  Negative for bloating, abdominal pain, change in bowel habit, diarrhea, heartburn, hematemesis, hematochezia, melena and nausea.   Neurological:  Negative for excessive daytime sleepiness, dizziness, headaches, light-headedness, loss of balance, numbness and weakness.   Psychiatric/Behavioral:  Negative for memory loss. The patient does not have insomnia.    Allergic/Immunologic: Negative for hives.       Objective:   Physical  Exam  Constitutional:       General: She is not in acute distress.     Appearance: Normal appearance. She is well-developed. She is obese. She is not ill-appearing.   HENT:      Head: Normocephalic and atraumatic.   Eyes:      General: No scleral icterus.     Pupils: Pupils are equal, round, and reactive to light.   Neck:      Thyroid: No thyromegaly.      Vascular: Normal carotid pulses. No carotid bruit, hepatojugular reflux or JVD.      Trachea: No tracheal deviation.   Cardiovascular:      Rate and Rhythm: Normal rate and regular rhythm.      Pulses: Normal pulses.      Heart sounds: Normal heart sounds. No murmur heard.     No friction rub. No gallop.   Pulmonary:      Effort: Pulmonary effort is normal. No respiratory distress.      Breath sounds: Normal breath sounds. No wheezing, rhonchi or rales.   Chest:      Chest wall: No tenderness.   Abdominal:      General: Bowel sounds are normal. There is no abdominal bruit.      Palpations: Abdomen is soft. There is no hepatomegaly or pulsatile mass.      Tenderness: There is no abdominal tenderness.   Musculoskeletal:      Right shoulder: No deformity.      Cervical back: Normal range of motion and neck supple.      Right lower leg: No edema.      Left lower leg: No edema.   Skin:     General: Skin is warm and dry.      Findings: No erythema or rash.      Nails: There is no clubbing.   Neurological:      General: No focal deficit present.      Mental Status: She is alert and oriented to person, place, and time.      Cranial Nerves: No cranial nerve deficit.      Coordination: Coordination normal.   Psychiatric:         Mood and Affect: Mood normal.         Speech: Speech normal.         Behavior: Behavior normal.       Vitals:    08/22/24 1122 08/22/24 1124   BP: (!) 144/80 (!) 142/74   BP Location: Right arm Left arm   Patient Position: Sitting Sitting   BP Method: Large (Manual) Large (Manual)   Pulse: 71    SpO2: 98%    Weight: 91.7 kg (202 lb 0.8 oz)   "  Height: 5' 5" (1.651 m)      Lab Results   Component Value Date    CHOL 204 (H) 08/16/2024    CHOL 128 08/05/2024    CHOL 164 01/18/2024      Body mass index is 33.62 kg/m².   Lab Results   Component Value Date    HGBA1C 7.9 (H) 08/16/2024      BMP  Lab Results   Component Value Date     08/16/2024    K 3.3 (L) 08/16/2024     08/16/2024    CO2 25 08/16/2024    BUN 13 08/16/2024    CREATININE 0.8 08/16/2024    CALCIUM 9.2 08/16/2024    ANIONGAP 8 08/16/2024    EGFRNORACEVR >60.0 08/16/2024      Lab Results   Component Value Date    HDL 45 08/16/2024    HDL 41 08/05/2024    HDL 43 01/18/2024     Lab Results   Component Value Date    LDLCALC 112.8 08/16/2024    LDLCALC 56.0 (L) 08/05/2024    LDLCALC 82.0 01/18/2024     Lab Results   Component Value Date    TRIG 231 (H) 08/16/2024    TRIG 155 (H) 08/05/2024    TRIG 195 (H) 01/18/2024     Lab Results   Component Value Date    CHOLHDL 22.1 08/16/2024    CHOLHDL 32.0 08/05/2024    CHOLHDL 26.2 01/18/2024       Chemistry        Component Value Date/Time     08/16/2024 0857    K 3.3 (L) 08/16/2024 0857     08/16/2024 0857    CO2 25 08/16/2024 0857    BUN 13 08/16/2024 0857    CREATININE 0.8 08/16/2024 0857     (H) 08/16/2024 0857        Component Value Date/Time    CALCIUM 9.2 08/16/2024 0857    ALKPHOS 61 08/16/2024 0857    AST 28 08/16/2024 0857    ALT 33 08/16/2024 0857    BILITOT 0.3 08/16/2024 0857    ESTGFRAFRICA >60.0 03/30/2022 0900    EGFRNONAA >60.0 03/30/2022 0900          Lab Results   Component Value Date    TSH 2.335 08/05/2024     Lab Results   Component Value Date    INR 1.0 01/23/2021    INR 1.0 01/23/2021    INR 1.0 05/21/2018     Lab Results   Component Value Date    WBC 8.24 04/13/2023    HGB 13.9 04/13/2023    HCT 43.9 04/13/2023    MCV 84 04/13/2023     04/13/2023     BMP  Sodium   Date Value Ref Range Status   08/16/2024 140 136 - 145 mmol/L Final     Potassium   Date Value Ref Range Status   08/16/2024 3.3 (L) " 3.5 - 5.1 mmol/L Final     Chloride   Date Value Ref Range Status   08/16/2024 107 95 - 110 mmol/L Final     CO2   Date Value Ref Range Status   08/16/2024 25 23 - 29 mmol/L Final     BUN   Date Value Ref Range Status   08/16/2024 13 8 - 23 mg/dL Final     Creatinine   Date Value Ref Range Status   08/16/2024 0.8 0.5 - 1.4 mg/dL Final     Calcium   Date Value Ref Range Status   08/16/2024 9.2 8.7 - 10.5 mg/dL Final     Anion Gap   Date Value Ref Range Status   08/16/2024 8 8 - 16 mmol/L Final     eGFR if    Date Value Ref Range Status   03/30/2022 >60.0 >60 mL/min/1.73 m^2 Final     eGFR if non    Date Value Ref Range Status   03/30/2022 >60.0 >60 mL/min/1.73 m^2 Final     Comment:     Calculation used to obtain the estimated glomerular filtration  rate (eGFR) is the CKD-EPI equation.        Estimated Creatinine Clearance: 78.5 mL/min (based on SCr of 0.8 mg/dL).    Assessment:     1. Coronary artery disease involving native coronary artery of native heart without angina pectoris    2. History of non-ST elevation myocardial infarction (NSTEMI)    3. Type II diabetes mellitus with complication    4. Hypertension associated with diabetes    5. Hyperlipidemia associated with type 2 diabetes mellitus    6. Aortic ectasia    7. Microalbuminuria due to type 2 diabetes mellitus    8. Class 1 obesity due to excess calories with serious comorbidity and body mass index (BMI) of 33.0 to 33.9 in adult    9. Severe obesity (BMI 35.0-35.9 with comorbidity)      Cad s/p pci old mi asymptomatic needs more regular exercise counseled discussed weight loss and diabetes compliance  Obesity discussed weight loss diet compliance exercise she is now on ozempic also.but still heavy on carbohydrates  Htn controlled low salt diet emphasized   Hlp on target diet compliance seems to be somewhat  the issue she had improvement since last check she is on repatha and statins continue same    Diabetes uncontrolled   discussed appropriate diet exercise weight loss and starches calories and carbohydrates intake.  Plan:   Continue current therapy  Cardiac low salt diet.  Risk factor modification and excercise program./weight loss  F/u in 6 months with lipid cmp a1c

## 2024-08-28 ENCOUNTER — OFFICE VISIT (OUTPATIENT)
Dept: SPORTS MEDICINE | Facility: CLINIC | Age: 64
End: 2024-08-28
Payer: MEDICARE

## 2024-08-28 VITALS — HEIGHT: 65 IN | WEIGHT: 202.19 LBS | BODY MASS INDEX: 33.69 KG/M2

## 2024-08-28 DIAGNOSIS — M12.812 LEFT ROTATOR CUFF TEAR ARTHROPATHY: Primary | ICD-10-CM

## 2024-08-28 DIAGNOSIS — M75.102 LEFT ROTATOR CUFF TEAR ARTHROPATHY: Primary | ICD-10-CM

## 2024-08-28 PROCEDURE — 1159F MED LIST DOCD IN RCRD: CPT | Mod: CPTII,S$GLB,, | Performed by: PHYSICIAN ASSISTANT

## 2024-08-28 PROCEDURE — 4010F ACE/ARB THERAPY RXD/TAKEN: CPT | Mod: CPTII,S$GLB,, | Performed by: PHYSICIAN ASSISTANT

## 2024-08-28 PROCEDURE — 99213 OFFICE O/P EST LOW 20 MIN: CPT | Mod: 25,S$GLB,, | Performed by: PHYSICIAN ASSISTANT

## 2024-08-28 PROCEDURE — 3008F BODY MASS INDEX DOCD: CPT | Mod: CPTII,S$GLB,, | Performed by: PHYSICIAN ASSISTANT

## 2024-08-28 PROCEDURE — 3066F NEPHROPATHY DOC TX: CPT | Mod: CPTII,S$GLB,, | Performed by: PHYSICIAN ASSISTANT

## 2024-08-28 PROCEDURE — 99999 PR PBB SHADOW E&M-EST. PATIENT-LVL IV: CPT | Mod: PBBFAC,,, | Performed by: PHYSICIAN ASSISTANT

## 2024-08-28 PROCEDURE — 3060F POS MICROALBUMINURIA REV: CPT | Mod: CPTII,S$GLB,, | Performed by: PHYSICIAN ASSISTANT

## 2024-08-28 PROCEDURE — 1160F RVW MEDS BY RX/DR IN RCRD: CPT | Mod: CPTII,S$GLB,, | Performed by: PHYSICIAN ASSISTANT

## 2024-08-28 PROCEDURE — 3051F HG A1C>EQUAL 7.0%<8.0%: CPT | Mod: CPTII,S$GLB,, | Performed by: PHYSICIAN ASSISTANT

## 2024-08-28 PROCEDURE — 1101F PT FALLS ASSESS-DOCD LE1/YR: CPT | Mod: CPTII,S$GLB,, | Performed by: PHYSICIAN ASSISTANT

## 2024-08-28 PROCEDURE — 3288F FALL RISK ASSESSMENT DOCD: CPT | Mod: CPTII,S$GLB,, | Performed by: PHYSICIAN ASSISTANT

## 2024-08-28 PROCEDURE — 20610 DRAIN/INJ JOINT/BURSA W/O US: CPT | Mod: LT,S$GLB,, | Performed by: PHYSICIAN ASSISTANT

## 2024-08-28 RX ORDER — TRIAMCINOLONE ACETONIDE 40 MG/ML
40 INJECTION, SUSPENSION INTRA-ARTICULAR; INTRAMUSCULAR
Status: DISCONTINUED | OUTPATIENT
Start: 2024-08-28 | End: 2024-08-28 | Stop reason: HOSPADM

## 2024-08-28 RX ADMIN — TRIAMCINOLONE ACETONIDE 40 MG: 40 INJECTION, SUSPENSION INTRA-ARTICULAR; INTRAMUSCULAR at 08:08

## 2024-08-28 NOTE — PROGRESS NOTES
Orthopaedic Follow-Up Visit    Last Appointment:  05/15/2024  Diagnosis:  Left rotator cuff tear arthropathy, nontraumatic rotator cuff tear  Prior Procedure:  Left SAS CSI 05/15/2024, Rx for naproxen    Dulce Boogie is a 64 y.o. female who is here for f/u evaluation of left shoulder pain. The patient was last seen here by me on 05/15/2024 at which point we decided to try a left shoulder subacromial corticosteroid injection and oral anti-inflammatories prior to considering further treatment options. The patient returns today reporting that the symptoms improved with the previously administered injection, she is interested in a repeat injection today.  Still reports she is not interested in surgical intervention.      To review her history,  Dulce Boogie is a 62 y.o. right-hand dominant female who presents with LEFT shoulder pain and dysfunction. She was previously seen by Clemencia Clayton PA-C in 2019. She was initially treating her for left shoulder rotator cuff impingement. She received 2 SAS CSIs, but ultimately she failed to improve with conservative treatment and they ended up getting an MRI of the left shoulder. MRI of the left shoulder from January 2020 revealed a full-thickness rotator cuff tear. For several years, she had no acute injury or trauma. Her symptoms include constant aching, throbbing pain of the left shoulder, limited range of motion, night pain. Her pain is made worse by raising the arm, overhead activities, repetitive movements. She has had an x-ray thus far. Her treatment has included rest, activity modification, oral anti-inflammatories, Robaxin, PT, glenohumeral CSI, subacromial CSI, Robaxin.      Patient's medications, allergies, past medical, surgical, social and family histories were reviewed and updated as appropriate.    Review of Systems   All systems reviewed were negative.  Specifically, the patient denies fever, chills, weight loss, chest pain, shortness of breath, or dyspnea on  exertion.      Past Medical History:   Diagnosis Date    Abnormal EKG 2022    Anticoagulant long-term use     CAD (coronary artery disease)     Depression     History of colon polyps     Hyperlipidemia associated with type 2 diabetes mellitus     Hypertension associated with diabetes     Microalbuminuria due to type 2 diabetes mellitus     NSTEMI (non-ST elevated myocardial infarction) 2018    Obesity     Type II diabetes mellitus with complication        Past Surgical History:   Procedure Laterality Date    CAROTID STENT       SECTION, LOW TRANSVERSE      x 2    COLONOSCOPY N/A 06/10/2019    Procedure: COLONOSCOPY;  Surgeon: Maricarmen Boo MD;  Location: [facility] ENDO;  Service: Endoscopy;  Laterality: N/A;    COLONOSCOPY N/A 2022    Procedure: COLONOSCOPY;  Surgeon: Ryan Lau MD;  Location: [facility] ENDO;  Service: Endoscopy;  Laterality: N/A;    CORONARY STENT PLACEMENT N/A 2021    Procedure: INSERTION, STENT, CORONARY ARTERY;  Surgeon: Aimee Cooper MD;  Location: [facility] CATH LAB;  Service: Cardiology;  Laterality: N/A;    LEFT HEART CATHETERIZATION Left 2018    Procedure: HEART CATH-LEFT;  Surgeon: Aimee Cooper MD;  Location: [facility] CATH LAB;  Service: Cardiology;  Laterality: Left;    LEFT HEART CATHETERIZATION Left 2021    Procedure: CATHETERIZATION, HEART, LEFT;  Surgeon: Aimee Cooper MD;  Location: [facility] CATH LAB;  Service: Cardiology;  Laterality: Left;    PERCUTANEOUS TRANSLUMINAL BALLOON ANGIOPLASTY OF CORONARY ARTERY  2021    Procedure: Angioplasty-coronary;  Surgeon: Aimee Cooper MD;  Location: [facility] CATH LAB;  Service: Cardiology;;       Patient's Medications   New Prescriptions    No medications on file   Previous Medications    ALPRAZOLAM (XANAX) 0.5 MG TABLET    Take 1 tablet (0.5 mg total) by mouth daily as needed.    AMLODIPINE-BENAZEPRIL (LOTREL) 10-40 MG PER CAPSULE    Take 1 capsule by mouth once daily.    ASPIRIN (ECOTRIN) 81 MG EC  TABLET    Take 1 tablet (81 mg total) by mouth once daily.    CYCLOBENZAPRINE (FLEXERIL) 10 MG TABLET    TAKE 1 TABLET BY MOUTH THREE TIMES A DAY AS NEEDED FOR MUSCLE SPASMS    DOXAZOSIN (CARDURA) 4 MG TABLET    TAKE 1 TABLET BY MOUTH ONCE DAILY.    EMPAGLIFLOZIN (JARDIANCE) 25 MG TABLET    Take 1 tablet (25 mg total) by mouth once daily.    EVOLOCUMAB (REPATHA SURECLICK) 140 MG/ML PNIJ    Inject 1 mL (140 mg total) into the skin every 14 (fourteen) days.    EZETIMIBE (ZETIA) 10 MG TABLET    TAKE 1 TABLET BY MOUTH EVERY DAY    GLIMEPIRIDE (AMARYL) 2 MG TABLET    Take 1 tablet (2 mg total) by mouth before breakfast.    HYDROCHLOROTHIAZIDE (HYDRODIURIL) 12.5 MG TAB    TAKE 1 TABLET BY MOUTH EVERY DAY    ISOSORBIDE MONONITRATE (IMDUR) 60 MG 24 HR TABLET    TAKE 1 TABLET BY MOUTH ONCE DAILY    METFORMIN (GLUCOPHAGE) 500 MG TABLET    TAKE 2 TABLETS BY MOUTH TWICE A DAY WITH MEALS    METHOCARBAMOL (ROBAXIN) 500 MG TAB    TAKE 1 TABLET BY MOUTH THREE TIMES A DAY AS NEEDED MUSCLE SPASM    METOPROLOL SUCCINATE (TOPROL-XL) 50 MG 24 HR TABLET    TAKE 1 TABLET BY MOUTH TWICE A DAY    NAPROXEN (NAPROSYN) 500 MG TABLET    TAKE 1 TABLET BY MOUTH TWICE A DAY WITH MEALS    NITROGLYCERIN (NITROSTAT) 0.4 MG SL TABLET    TAKE ONE FOR ONSET OF CHEST PAIN / THEN EVERY 5 MINUTES IF CP CONTINUES UP TO 3 DOSES..CALL 911    OMEPRAZOLE (PRILOSEC) 40 MG CAPSULE    Take 1 capsule (40 mg total) by mouth once daily.    ONDANSETRON (ZOFRAN) 4 MG TABLET    Take 1 tablet (4 mg total) by mouth every 8 (eight) hours as needed for Nausea.    ROSUVASTATIN (CRESTOR) 40 MG TAB    TAKE 1 TABLET BY MOUTH EVERY DAY IN THE EVENING    SEMAGLUTIDE (OZEMPIC) 2 MG/DOSE (8 MG/3 ML) PNIJ    Inject 2 mg into the skin every 7 days.    SPIRONOLACTONE (ALDACTONE) 25 MG TABLET    Take 1 tablet (25 mg total) by mouth once daily.   Modified Medications    No medications on file   Discontinued Medications    No medications on file       Family History   Problem Relation  Name Age of Onset    Hypertension Mother      Hyperlipidemia Mother      Hypertension Father      Diabetes Father      Hyperlipidemia Father      Colon cancer Father      Hypertension Brother x3     Breast cancer Neg Hx      Ovarian cancer Neg Hx      Uterine cancer Neg Hx         Review of patient's allergies indicates:   Allergen Reactions    No known drug allergies          Objective:      Physical Exam  Patient is alert and oriented, no distress. Skin is intact. Neuro is normal with no focal motor or sensory findings.    Cervical exam is unremarkable. Intact cervical ROM. Negative Spurling's test    Physical Exam:  RIGHT    LEFT    Scap Dyskinesis/Winging (-)    (-)    Tenderness:          Greater Tuberosity  (-)    +  Bicipital Groove  (-)    (-)  AC joint   (-)    (-)  Other:     ROM:  Forward Elevation 160    150  Abduction  100    90  ER (at side)  80    60  IR   T10    deferred    Strength:   Supraspinatus  5/5    3/5  Infraspinatus  5/5    4/5  Subscap / IR  5/5    5/5     Special Tests:   Neer:    (-)    +   Jiménez:   (-)    +   SS Stress:   (-)    +   Bear Hug:   (-)    (-)   Pipestone's:   (-)    +   Resisted Thrower's:   (-)    (-)   Cross Arm Abduction:  (-)    (-)    Neurovascular examination  - Motor grossly intact bilaterally to shoulder abduction, elbow flexion and extension, wrist flexion and extension, and intrinsic hand musculature  - Sensation intact to light touch bilaterally in axillary, median, radial, and ulnar distributions  - Symmetrical radial pulses    Imaging:    XR Results:  Results for orders placed during the hospital encounter of 04/13/23    X-Ray Shoulder 2 or More Views Left    Narrative  EXAMINATION:  XR SHOULDER COMPLETE 2 OR MORE VIEWS LEFT    CLINICAL HISTORY:  Unspecified rotator cuff tear or rupture of left shoulder, not specified as traumatic    TECHNIQUE:  Two or three views of the left shoulder were performed.    COMPARISON:  None    FINDINGS:  There is no radiographic  evidence of acute osseous, articular, or soft tissue abnormality.  There is mild AC joint arthrosis with prominent bony spurring noted along the undersurface of the acromion.  Glenohumeral joint space appears preserved.    Impression  As Above      Electronically signed by: Gino Ho MD  Date:    04/13/2023  Time:    10:09      MRI Results:  Results for orders placed during the hospital encounter of 01/31/20    MRI Shoulder Without Contrast Left    Narrative  EXAMINATION:  MRI SHOULDER WITHOUT CONTRAST LEFT    CLINICAL HISTORY:  Shoulder pain, prior xray, rotator cuff tear / impingement suspected;  Pain in left shoulder    TECHNIQUE:  Multisequence, multiplanar MR imaging of the shoulder performed without contrast.    COMPARISON:  Prior left shoulder radiographs dating back to 04/01/2019.    FINDINGS:  Rotator cuff:    Supraspinatus: Full-thickness tear involving the supraspinatus tendon with some intact fibers seen posteriorly.    Infraspinatus: Intact    Subscapularis: Intact    Teres minor: Intact    Muscle bulk is maintained.    Labrum: Evaluation limited by lack of joint distention/intra-articular contrast.  Degenerative changes of the labrum are noted.    Biceps: Long head biceps tendon is intact.    Bone: No fracture present.  Bone marrow signal is unremarkable.  Type 3 acromion with some lateral downsloping of the acromion is noted.    Acromioclavicular joint:Degenerative changes are noted with subchondral cystic change and mild spurring.    Cartilage: Articular cartilage of the glenohumeral joint is preserved    Miscellaneous: No significant joint effusion.  Mild subdeltoid fluid is present.    Impression  Full-thickness supraspinatus tendon tear as above.  Mild subdeltoid bursitis, degenerative changes, and other findings as described.      Electronically signed by: Reynaldo Miller MD  Date:    01/31/2020  Time:    10:41        Physician read: I agree with the above impression.    Assessment/Plan:    Dulce Boogie is a 64 y.o. female with chronic left rotator cuff tear, left shoulder rotator cuff tear arthropathy    Plan:    Discussed diagnosis and treatment options with the patient today.  She has known chronic left shoulder rotator cuff tear that has consistent with left rotator cuff tear arthropathy  We again discussed operative and nonoperative treatment options.  Operative treatment to include a reverse total shoulder arthroplasty versus a rotator cuff repair.  I do suspect a reverse total shoulder arthroplasty would be more appropriate, I suspect her rotator cuff tears more retracted compared to the MRI we have from 2020.  She reports she does not want surgical intervention for the time being.  It has been 3 months since her last injection, she had good relief with the shot and she is interested in a repeat 1 today, OK to move forward  Left shoulder SAS CSI given in clinic, patient tolerated the procedure well with no immediate complications  Can continue naproxen as needed  Follow up with me in 3-4 months          Anjana Carvajal PA-C  Sports Medicine Physician Assistant       Disclaimer: This note was prepared using a voice recognition system and is likely to have sound alike errors within the text.

## 2024-08-28 NOTE — PROCEDURES
Large Joint Aspiration/Injection: L subacromial bursa    Date/Time: 8/28/2024 8:30 AM    Performed by: Anjana Carvajal PA-C  Authorized by: Anjana Carvajal PA-C    Consent Done?:  Yes (Verbal)  Indications:  Pain  Site marked: the procedure site was marked    Timeout: prior to procedure the correct patient, procedure, and site was verified    Prep: patient was prepped and draped in usual sterile fashion      Local anesthesia used?: Yes    Anesthesia:  Local infiltration  Local anesthetic:  Lidocaine 1% without epinephrine    Details:  Needle Size:  22 G  Ultrasonic Guidance for needle placement?: No    Approach:  Posterior  Location:  Shoulder  Site:  L subacromial bursa  Medications:  40 mg triamcinolone acetonide 40 mg/mL  Patient tolerance:  Patient tolerated the procedure well with no immediate complications    Procedure Note:  We discussed the risk and benefits of injections, including pain, infection, bleeding, damage to adjacent structures, risk of reaction to injection. We discussed the steroid/cortisone injections will not heal the problem but mat help decrease inflammation and help with symptoms. We discussed the risk of repeated injections. The patient expressed understanding and wanted to proceed with the injection. We performed a timeout to verify the proper patient, proper procedure, and the proper site. The injection site was prepared in a sterile fashion. The patient tolerated it well and there were no complication. We did discuss with the patient that steroid injections can cause some increase in blood sugar and blood pressure for up to a week after the injection.

## 2024-10-01 RX ORDER — NITROGLYCERIN 0.4 MG/1
TABLET SUBLINGUAL
Qty: 25 TABLET | Refills: 11 | Status: SHIPPED | OUTPATIENT
Start: 2024-10-01

## 2024-10-01 RX ORDER — CYCLOBENZAPRINE HCL 10 MG
10 TABLET ORAL 3 TIMES DAILY PRN
Qty: 90 TABLET | Refills: 5 | Status: SHIPPED | OUTPATIENT
Start: 2024-10-01

## 2024-10-01 RX ORDER — ALPRAZOLAM 0.5 MG/1
0.5 TABLET ORAL DAILY PRN
Qty: 90 TABLET | Refills: 5 | Status: SHIPPED | OUTPATIENT
Start: 2024-10-01

## 2024-10-02 DIAGNOSIS — M75.102 LEFT ROTATOR CUFF TEAR ARTHROPATHY: ICD-10-CM

## 2024-10-02 DIAGNOSIS — M12.812 LEFT ROTATOR CUFF TEAR ARTHROPATHY: ICD-10-CM

## 2024-10-02 DIAGNOSIS — M75.102 NON-TRAUMATIC ROTATOR CUFF TEAR, LEFT: ICD-10-CM

## 2024-10-02 RX ORDER — NAPROXEN 500 MG/1
500 TABLET ORAL 2 TIMES DAILY WITH MEALS
Qty: 60 TABLET | Refills: 1 | Status: SHIPPED | OUTPATIENT
Start: 2024-10-02

## 2024-10-27 DIAGNOSIS — E11.59 HYPERTENSION ASSOCIATED WITH DIABETES: ICD-10-CM

## 2024-10-27 DIAGNOSIS — I15.2 HYPERTENSION ASSOCIATED WITH DIABETES: ICD-10-CM

## 2024-10-28 RX ORDER — ISOSORBIDE MONONITRATE 60 MG/1
60 TABLET, EXTENDED RELEASE ORAL
Qty: 90 TABLET | Refills: 3 | Status: SHIPPED | OUTPATIENT
Start: 2024-10-28

## 2024-11-12 ENCOUNTER — HOSPITAL ENCOUNTER (OUTPATIENT)
Dept: RADIOLOGY | Facility: HOSPITAL | Age: 64
Discharge: HOME | End: 2024-11-12
Attending: INTERNAL MEDICINE
Payer: MEDICARE

## 2024-11-12 DIAGNOSIS — Z12.31 SCREENING MAMMOGRAM FOR BREAST CANCER: ICD-10-CM

## 2024-11-13 ENCOUNTER — LAB VISIT (OUTPATIENT)
Dept: LAB | Facility: HOSPITAL | Age: 64
End: 2024-11-13
Attending: INTERNAL MEDICINE
Payer: MEDICARE

## 2024-11-13 DIAGNOSIS — E11.8 TYPE II DIABETES MELLITUS WITH COMPLICATION: ICD-10-CM

## 2024-11-13 LAB
ANION GAP SERPL CALC-SCNC: 9 MMOL/L (ref 8–16)
BUN SERPL-MCNC: 14 MG/DL (ref 8–23)
CALCIUM SERPL-MCNC: 8.9 MG/DL (ref 8.7–10.5)
CHLORIDE SERPL-SCNC: 106 MMOL/L (ref 95–110)
CO2 SERPL-SCNC: 27 MMOL/L (ref 23–29)
CREAT SERPL-MCNC: 0.8 MG/DL (ref 0.5–1.4)
EST. GFR  (NO RACE VARIABLE): >60 ML/MIN/1.73 M^2
ESTIMATED AVG GLUCOSE: 171 MG/DL (ref 68–131)
GLUCOSE SERPL-MCNC: 183 MG/DL (ref 70–110)
HBA1C MFR BLD: 7.6 % (ref 4–5.6)
POTASSIUM SERPL-SCNC: 3.1 MMOL/L (ref 3.5–5.1)
SODIUM SERPL-SCNC: 142 MMOL/L (ref 136–145)

## 2024-11-13 PROCEDURE — 83036 HEMOGLOBIN GLYCOSYLATED A1C: CPT | Performed by: INTERNAL MEDICINE

## 2024-11-13 PROCEDURE — 36415 COLL VENOUS BLD VENIPUNCTURE: CPT | Mod: PO | Performed by: INTERNAL MEDICINE

## 2024-11-13 PROCEDURE — 80048 BASIC METABOLIC PNL TOTAL CA: CPT | Performed by: INTERNAL MEDICINE

## 2024-11-19 ENCOUNTER — OFFICE VISIT (OUTPATIENT)
Dept: INTERNAL MEDICINE | Facility: CLINIC | Age: 64
End: 2024-11-19
Payer: MEDICARE

## 2024-11-19 VITALS
SYSTOLIC BLOOD PRESSURE: 138 MMHG | DIASTOLIC BLOOD PRESSURE: 87 MMHG | TEMPERATURE: 98 F | BODY MASS INDEX: 33.49 KG/M2 | HEART RATE: 74 BPM | OXYGEN SATURATION: 96 % | WEIGHT: 201.25 LBS

## 2024-11-19 DIAGNOSIS — E11.8 TYPE II DIABETES MELLITUS WITH COMPLICATION: ICD-10-CM

## 2024-11-19 DIAGNOSIS — E11.29 MICROALBUMINURIA DUE TO TYPE 2 DIABETES MELLITUS: ICD-10-CM

## 2024-11-19 DIAGNOSIS — Z86.0100 HISTORY OF COLON POLYPS: ICD-10-CM

## 2024-11-19 DIAGNOSIS — E11.59 HYPERTENSION ASSOCIATED WITH DIABETES: ICD-10-CM

## 2024-11-19 DIAGNOSIS — E78.5 HYPERLIPIDEMIA ASSOCIATED WITH TYPE 2 DIABETES MELLITUS: Primary | ICD-10-CM

## 2024-11-19 DIAGNOSIS — R80.9 MICROALBUMINURIA DUE TO TYPE 2 DIABETES MELLITUS: ICD-10-CM

## 2024-11-19 DIAGNOSIS — I25.10 CORONARY ARTERY DISEASE INVOLVING NATIVE CORONARY ARTERY OF NATIVE HEART WITHOUT ANGINA PECTORIS: Chronic | ICD-10-CM

## 2024-11-19 DIAGNOSIS — E11.69 HYPERLIPIDEMIA ASSOCIATED WITH TYPE 2 DIABETES MELLITUS: Primary | ICD-10-CM

## 2024-11-19 DIAGNOSIS — F32.5 MAJOR DEPRESSION IN REMISSION: ICD-10-CM

## 2024-11-19 DIAGNOSIS — I77.819 AORTIC ECTASIA: ICD-10-CM

## 2024-11-19 DIAGNOSIS — I15.2 HYPERTENSION ASSOCIATED WITH DIABETES: ICD-10-CM

## 2024-11-19 DIAGNOSIS — E66.01 SEVERE OBESITY (BMI 35.0-35.9 WITH COMORBIDITY): ICD-10-CM

## 2024-11-19 PROCEDURE — 3288F FALL RISK ASSESSMENT DOCD: CPT | Mod: CPTII,S$GLB,, | Performed by: INTERNAL MEDICINE

## 2024-11-19 PROCEDURE — 99214 OFFICE O/P EST MOD 30 MIN: CPT | Mod: S$GLB,,, | Performed by: INTERNAL MEDICINE

## 2024-11-19 PROCEDURE — G2211 COMPLEX E/M VISIT ADD ON: HCPCS | Mod: S$GLB,,, | Performed by: INTERNAL MEDICINE

## 2024-11-19 PROCEDURE — 3079F DIAST BP 80-89 MM HG: CPT | Mod: CPTII,S$GLB,, | Performed by: INTERNAL MEDICINE

## 2024-11-19 PROCEDURE — 3066F NEPHROPATHY DOC TX: CPT | Mod: CPTII,S$GLB,, | Performed by: INTERNAL MEDICINE

## 2024-11-19 PROCEDURE — 3008F BODY MASS INDEX DOCD: CPT | Mod: CPTII,S$GLB,, | Performed by: INTERNAL MEDICINE

## 2024-11-19 PROCEDURE — 1159F MED LIST DOCD IN RCRD: CPT | Mod: CPTII,S$GLB,, | Performed by: INTERNAL MEDICINE

## 2024-11-19 PROCEDURE — 3051F HG A1C>EQUAL 7.0%<8.0%: CPT | Mod: CPTII,S$GLB,, | Performed by: INTERNAL MEDICINE

## 2024-11-19 PROCEDURE — 99999 PR PBB SHADOW E&M-EST. PATIENT-LVL IV: CPT | Mod: PBBFAC,,, | Performed by: INTERNAL MEDICINE

## 2024-11-19 PROCEDURE — 1101F PT FALLS ASSESS-DOCD LE1/YR: CPT | Mod: CPTII,S$GLB,, | Performed by: INTERNAL MEDICINE

## 2024-11-19 PROCEDURE — 1160F RVW MEDS BY RX/DR IN RCRD: CPT | Mod: CPTII,S$GLB,, | Performed by: INTERNAL MEDICINE

## 2024-11-19 PROCEDURE — 4010F ACE/ARB THERAPY RXD/TAKEN: CPT | Mod: CPTII,S$GLB,, | Performed by: INTERNAL MEDICINE

## 2024-11-19 PROCEDURE — 3075F SYST BP GE 130 - 139MM HG: CPT | Mod: CPTII,S$GLB,, | Performed by: INTERNAL MEDICINE

## 2024-11-19 PROCEDURE — 3060F POS MICROALBUMINURIA REV: CPT | Mod: CPTII,S$GLB,, | Performed by: INTERNAL MEDICINE

## 2024-11-19 RX ORDER — EZETIMIBE 10 MG/1
10 TABLET ORAL DAILY
Qty: 90 TABLET | Refills: 3 | Status: SHIPPED | OUTPATIENT
Start: 2024-11-19

## 2024-11-19 RX ORDER — OMEPRAZOLE 40 MG/1
40 CAPSULE, DELAYED RELEASE ORAL DAILY
Qty: 90 CAPSULE | Refills: 3 | Status: SHIPPED | OUTPATIENT
Start: 2024-11-19

## 2024-11-19 RX ORDER — SEMAGLUTIDE 2.68 MG/ML
2 INJECTION, SOLUTION SUBCUTANEOUS
Qty: 3 ML | Refills: 11 | Status: SHIPPED | OUTPATIENT
Start: 2024-11-19 | End: 2025-11-19

## 2024-11-19 RX ORDER — SPIRONOLACTONE 25 MG/1
25 TABLET ORAL DAILY
Qty: 90 TABLET | Refills: 3 | Status: SHIPPED | OUTPATIENT
Start: 2024-11-19 | End: 2025-11-19

## 2024-11-19 RX ORDER — ROSUVASTATIN CALCIUM 40 MG/1
40 TABLET, COATED ORAL NIGHTLY
Qty: 90 TABLET | Refills: 3 | Status: SHIPPED | OUTPATIENT
Start: 2024-11-19

## 2024-11-19 RX ORDER — AMLODIPINE AND BENAZEPRIL HYDROCHLORIDE 10; 40 MG/1; MG/1
1 CAPSULE ORAL DAILY
Qty: 90 CAPSULE | Refills: 3 | Status: SHIPPED | OUTPATIENT
Start: 2024-11-19 | End: 2025-11-19

## 2024-11-19 NOTE — PROGRESS NOTES
HPI:  Patient is a 64-year-old female who comes in today for follow-up of her hypertension, lipids, diabetes, coronary disease.  Patient denies any chest pains or shortness a breath.  Patient states her blood pressure home is usually 130-1 40/80 range.  She states her blood sugars are fine.  She denies any hypoglycemic problems.  She denies any new problems or complaints at this time.  She admits she has not been taking the Aldactone.  Emphasize to her that she needs to take it as it is part of treating her hypokalemia    Current meds have been verified and updated per the EMR  Exam:/87   Pulse 74   Temp 97.5 °F (36.4 °C) (Tympanic)   Wt 91.3 kg (201 lb 4.5 oz)   LMP 06/21/2003 (Exact Date)   SpO2 96%   BMI 33.49 kg/m²   Carotids 2+ equal without bruits, neck is supple without adenopathy  Chest clear  Cardiovascular regular rate and rhythm without murmur gallop or rub  Extremities without edema    Lab Results   Component Value Date    WBC 8.24 04/13/2023    HGB 13.9 04/13/2023    HCT 43.9 04/13/2023     04/13/2023    CHOL 204 (H) 08/16/2024    TRIG 231 (H) 08/16/2024    HDL 45 08/16/2024    ALT 33 08/16/2024    AST 28 08/16/2024     11/13/2024    K 3.1 (L) 11/13/2024     11/13/2024    CREATININE 0.8 11/13/2024    BUN 14 11/13/2024    CO2 27 11/13/2024    TSH 2.335 08/05/2024    INR 1.0 01/23/2021    GLUF 106 01/07/2005    HGBA1C 7.6 (H) 11/13/2024    BNP 14 01/23/2021       Impression:  Hypertension and lipids both well controlled on current meds  Diabetes, control improved but yet not to goal.  Emphasize to her that she needs to start exercising 5 days a week for 45 minutes.  Encouraged her to be more consistent with her low carb diet.  Coronary artery disease, asymptomatic, followed by Cardiology  Patient Active Problem List   Diagnosis    CAD with far remote PCI of unknown vessel    History of colon polyps    History of non-ST elevation myocardial infarction (NSTEMI)    Type II  diabetes mellitus with complication    Hypertension associated with diabetes    Hyperlipidemia associated with type 2 diabetes mellitus    Major depression in remission    Aortic ectasia    Microalbuminuria due to type 2 diabetes mellitus    Class 1 obesity due to excess calories with serious comorbidity and body mass index (BMI) of 33.0 to 33.9 in adult    Chronic left shoulder pain    Left elbow pain    Decreased ROM of left shoulder    Severe obesity (BMI 35.0-35.9 with comorbidity)       Plan:  Orders Placed This Encounter    Hemoglobin A1C    Lipid Panel    Comprehensive Metabolic Panel    Microalbumin/Creatinine Ratio, Urine    TSH    spironolactone (ALDACTONE) 25 MG tablet    rosuvastatin (CRESTOR) 40 MG Tab    empagliflozin (JARDIANCE) 25 mg tablet    omeprazole (PRILOSEC) 40 MG capsule    semaglutide (OZEMPIC) 2 mg/dose (8 mg/3 mL) PnIj    amLODIPine-benazepriL (LOTREL) 10-40 mg per capsule    ezetimibe (ZETIA) 10 mg tablet   Patient will stay on her same meds.  She will be seen in 6 months with the above lab work.      This note is generated with speech recognition software and is subject to transcription error and sound alike phrases that may be missed by proofreading.

## 2024-12-01 DIAGNOSIS — M12.812 LEFT ROTATOR CUFF TEAR ARTHROPATHY: ICD-10-CM

## 2024-12-01 DIAGNOSIS — M75.102 NON-TRAUMATIC ROTATOR CUFF TEAR, LEFT: ICD-10-CM

## 2024-12-01 DIAGNOSIS — M75.102 LEFT ROTATOR CUFF TEAR ARTHROPATHY: ICD-10-CM

## 2024-12-02 RX ORDER — NAPROXEN 500 MG/1
500 TABLET ORAL 2 TIMES DAILY WITH MEALS
Qty: 60 TABLET | Refills: 1 | Status: SHIPPED | OUTPATIENT
Start: 2024-12-02

## 2024-12-04 ENCOUNTER — OFFICE VISIT (OUTPATIENT)
Dept: SPORTS MEDICINE | Facility: CLINIC | Age: 64
End: 2024-12-04
Payer: MEDICARE

## 2024-12-04 VITALS — WEIGHT: 201.25 LBS | HEIGHT: 65 IN | BODY MASS INDEX: 33.53 KG/M2

## 2024-12-04 DIAGNOSIS — M75.102 LEFT ROTATOR CUFF TEAR ARTHROPATHY: Primary | ICD-10-CM

## 2024-12-04 DIAGNOSIS — M12.812 LEFT ROTATOR CUFF TEAR ARTHROPATHY: Primary | ICD-10-CM

## 2024-12-04 PROCEDURE — 20610 DRAIN/INJ JOINT/BURSA W/O US: CPT | Mod: LT,S$GLB,, | Performed by: PHYSICIAN ASSISTANT

## 2024-12-04 PROCEDURE — 99499 UNLISTED E&M SERVICE: CPT | Mod: S$GLB,,, | Performed by: PHYSICIAN ASSISTANT

## 2024-12-04 PROCEDURE — 99999 PR PBB SHADOW E&M-EST. PATIENT-LVL III: CPT | Mod: PBBFAC,,, | Performed by: PHYSICIAN ASSISTANT

## 2024-12-04 RX ADMIN — TRIAMCINOLONE ACETONIDE 40 MG: 40 INJECTION, SUSPENSION INTRA-ARTICULAR; INTRAMUSCULAR at 09:12

## 2024-12-05 ENCOUNTER — HOSPITAL ENCOUNTER (OUTPATIENT)
Dept: RADIOLOGY | Facility: HOSPITAL | Age: 64
Discharge: HOME | End: 2024-12-05
Attending: INTERNAL MEDICINE
Payer: MEDICARE

## 2024-12-05 PROCEDURE — 77063 BREAST TOMOSYNTHESIS BI: CPT | Mod: 26,,, | Performed by: RADIOLOGY

## 2024-12-05 PROCEDURE — 77067 SCR MAMMO BI INCL CAD: CPT | Mod: 26,,, | Performed by: RADIOLOGY

## 2024-12-05 PROCEDURE — 77067 SCR MAMMO BI INCL CAD: CPT | Mod: TC

## 2024-12-06 NOTE — PROGRESS NOTES
Orthopaedic Follow-Up Visit    Last Appointment:  08/28/2024  Diagnosis:  Left rotator cuff tear arthropathy  Prior Procedure:  Left SAS CSI 08/28/2024, naproxen    Dulce Boogie is a 64 y.o. female who is here for f/u evaluation of left shoulder pain. The patient was last seen here by me on 08/28/2024 at which point she was still not interested in operative management and we elected to move forward with a left shoulder corticosteroid injection prior to considering further treatment options. The patient returns today reporting that the symptoms have returned and is interested in a repeat injection today.  She reports she got about 10 weeks of good pain relief with her last injection.  She is not interested in surgical intervention at this time.    To review her history, Dulce Boogie is a 62 y.o. right-hand dominant female who presents with LEFT shoulder pain and dysfunction. She was previously seen by Clemencia Clayton PA-C in 2019. She was initially treating her for left shoulder rotator cuff impingement. She received 2 SAS CSIs, but ultimately she failed to improve with conservative treatment and they ended up getting an MRI of the left shoulder. MRI of the left shoulder from January 2020 revealed a full-thickness rotator cuff tear. For several years, she had no acute injury or trauma. Her symptoms include constant aching, throbbing pain of the left shoulder, limited range of motion, night pain. Her pain is made worse by raising the arm, overhead activities, repetitive movements. She has had an x-ray thus far. Her treatment has included rest, activity modification, oral anti-inflammatories, Robaxin, PT, glenohumeral CSI, subacromial CSI, Robaxin.       Patient's medications, allergies, past medical, surgical, social and family histories were reviewed and updated as appropriate.    Review of Systems   All systems reviewed were negative.  Specifically, the patient denies fever, chills, weight loss, chest pain,  shortness of breath, or dyspnea on exertion.      Past Medical History:   Diagnosis Date    Abnormal EKG 2022    Anticoagulant long-term use     CAD (coronary artery disease)     Depression     History of colon polyps     Hyperlipidemia associated with type 2 diabetes mellitus     Hypertension associated with diabetes     Microalbuminuria due to type 2 diabetes mellitus     NSTEMI (non-ST elevated myocardial infarction) 2018    Obesity     Type II diabetes mellitus with complication        Past Surgical History:   Procedure Laterality Date    CAROTID STENT       SECTION, LOW TRANSVERSE      x 2    COLONOSCOPY N/A 06/10/2019    Procedure: COLONOSCOPY;  Surgeon: Maricarmen Boo MD;  Location: [facility] ENDO;  Service: Endoscopy;  Laterality: N/A;    COLONOSCOPY N/A 2022    Procedure: COLONOSCOPY;  Surgeon: Ryan Lau MD;  Location: [facility] ENDO;  Service: Endoscopy;  Laterality: N/A;    CORONARY STENT PLACEMENT N/A 2021    Procedure: INSERTION, STENT, CORONARY ARTERY;  Surgeon: Aimee Cooper MD;  Location: [facility] CATH LAB;  Service: Cardiology;  Laterality: N/A;    LEFT HEART CATHETERIZATION Left 2018    Procedure: HEART CATH-LEFT;  Surgeon: Aimee Cooper MD;  Location: [facility] CATH LAB;  Service: Cardiology;  Laterality: Left;    LEFT HEART CATHETERIZATION Left 2021    Procedure: CATHETERIZATION, HEART, LEFT;  Surgeon: Aimee Cooper MD;  Location: [facility] CATH LAB;  Service: Cardiology;  Laterality: Left;    PERCUTANEOUS TRANSLUMINAL BALLOON ANGIOPLASTY OF CORONARY ARTERY  2021    Procedure: Angioplasty-coronary;  Surgeon: Aimee Cooper MD;  Location: [facility] CATH LAB;  Service: Cardiology;;       Patient's Medications   New Prescriptions    No medications on file   Previous Medications    ALPRAZOLAM (XANAX) 0.5 MG TABLET    Take 1 tablet (0.5 mg total) by mouth daily as needed.    AMLODIPINE-BENAZEPRIL (LOTREL) 10-40 MG PER CAPSULE    Take 1 capsule by mouth once  daily.    ASPIRIN (ECOTRIN) 81 MG EC TABLET    Take 1 tablet (81 mg total) by mouth once daily.    CYCLOBENZAPRINE (FLEXERIL) 10 MG TABLET    Take 1 tablet (10 mg total) by mouth 3 (three) times daily as needed for Muscle spasms.    DOXAZOSIN (CARDURA) 4 MG TABLET    TAKE 1 TABLET BY MOUTH ONCE DAILY.    EMPAGLIFLOZIN (JARDIANCE) 25 MG TABLET    Take 1 tablet (25 mg total) by mouth once daily.    EVOLOCUMAB (REPATHA SURECLICK) 140 MG/ML PNIJ    Inject 1 mL (140 mg total) into the skin every 14 (fourteen) days.    EZETIMIBE (ZETIA) 10 MG TABLET    Take 1 tablet (10 mg total) by mouth once daily.    GLIMEPIRIDE (AMARYL) 2 MG TABLET    Take 1 tablet (2 mg total) by mouth before breakfast.    HYDROCHLOROTHIAZIDE (HYDRODIURIL) 12.5 MG TAB    TAKE 1 TABLET BY MOUTH EVERY DAY    ISOSORBIDE MONONITRATE (IMDUR) 60 MG 24 HR TABLET    TAKE 1 TABLET BY MOUTH EVERY DAY    METFORMIN (GLUCOPHAGE) 500 MG TABLET    TAKE 2 TABLETS BY MOUTH TWICE A DAY WITH MEALS    METHOCARBAMOL (ROBAXIN) 500 MG TAB    TAKE 1 TABLET BY MOUTH THREE TIMES A DAY AS NEEDED MUSCLE SPASM    METOPROLOL SUCCINATE (TOPROL-XL) 50 MG 24 HR TABLET    TAKE 1 TABLET BY MOUTH TWICE A DAY    NAPROXEN (NAPROSYN) 500 MG TABLET    TAKE 1 TABLET BY MOUTH TWICE A DAY WITH MEALS    NITROGLYCERIN (NITROSTAT) 0.4 MG SL TABLET    TAKE ONE FOR ONSET OF CHEST PAIN / THEN EVERY 5 MINUTES IF CP CONTINUES UP TO 3 DOSES..CALL 911    OMEPRAZOLE (PRILOSEC) 40 MG CAPSULE    Take 1 capsule (40 mg total) by mouth once daily.    ONDANSETRON (ZOFRAN) 4 MG TABLET    Take 1 tablet (4 mg total) by mouth every 8 (eight) hours as needed for Nausea.    ROSUVASTATIN (CRESTOR) 40 MG TAB    Take 1 tablet (40 mg total) by mouth every evening.    SEMAGLUTIDE (OZEMPIC) 2 MG/DOSE (8 MG/3 ML) PNIJ    Inject 2 mg into the skin every 7 days.    SPIRONOLACTONE (ALDACTONE) 25 MG TABLET    Take 1 tablet (25 mg total) by mouth once daily.   Modified Medications    No medications on file   Discontinued  Medications    No medications on file       Family History   Problem Relation Name Age of Onset    Hypertension Mother      Hyperlipidemia Mother      Hypertension Father      Diabetes Father      Hyperlipidemia Father      Colon cancer Father      Hypertension Brother x3     Breast cancer Neg Hx      Ovarian cancer Neg Hx      Uterine cancer Neg Hx         Review of patient's allergies indicates:   Allergen Reactions    No known drug allergies          Objective:      Physical Exam  Patient is alert and oriented, no distress. Skin is intact. Neuro is normal with no focal motor or sensory findings.    Cervical exam is unremarkable. Intact cervical ROM. Negative Spurling's test     Physical Exam:                       RIGHT                                     LEFT     Scap Dyskinesis/Winging       (-)                                             (-)     Tenderness:                                                                              Greater Tuberosity                  (-)                                             +  Bicipital Groove                       (-)                                             (-)  AC joint                                   (-)                                             (-)  Other:      ROM:  Forward Elevation 160                                          150  Abduction                    100                                          90  ER (at side)                 80                                            60  IR                                 T10                                          deferred     Strength:   Supraspinatus             5/5                                           3/5  Infraspinatus               5/5                                           4/5  Subscap / IR               5/5                                           5/5      Special Tests:              Neer:                                       (-)                                             +               Jiménez:                                 (-)                                             +              SS Stress:                               (-)                                             +              Bear Hug:                                (-)                                             (-)              Coos's:                                 (-)                                             +              Resisted Thrower's:                (-)                                             (-)              Cross Arm Abduction:             (-)                                             (-)    Neurovascular examination  - Motor grossly intact bilaterally to shoulder abduction, elbow flexion and extension, wrist flexion and extension, and intrinsic hand musculature  - Sensation intact to light touch bilaterally in axillary, median, radial, and ulnar distributions  - Symmetrical radial pulses    Imaging:    XR Results:  Results for orders placed during the hospital encounter of 04/13/23    X-Ray Shoulder 2 or More Views Left    Narrative  EXAMINATION:  XR SHOULDER COMPLETE 2 OR MORE VIEWS LEFT    CLINICAL HISTORY:  Unspecified rotator cuff tear or rupture of left shoulder, not specified as traumatic    TECHNIQUE:  Two or three views of the left shoulder were performed.    COMPARISON:  None    FINDINGS:  There is no radiographic evidence of acute osseous, articular, or soft tissue abnormality.  There is mild AC joint arthrosis with prominent bony spurring noted along the undersurface of the acromion.  Glenohumeral joint space appears preserved.    Impression  As Above      Electronically signed by: Gino Ho MD  Date:    04/13/2023  Time:    10:09      MRI Results:  Results for orders placed during the hospital encounter of 01/31/20    MRI Shoulder Without Contrast Left    Narrative  EXAMINATION:  MRI SHOULDER WITHOUT CONTRAST LEFT    CLINICAL HISTORY:  Shoulder pain, prior xray, rotator cuff tear /  impingement suspected;  Pain in left shoulder    TECHNIQUE:  Multisequence, multiplanar MR imaging of the shoulder performed without contrast.    COMPARISON:  Prior left shoulder radiographs dating back to 04/01/2019.    FINDINGS:  Rotator cuff:    Supraspinatus: Full-thickness tear involving the supraspinatus tendon with some intact fibers seen posteriorly.    Infraspinatus: Intact    Subscapularis: Intact    Teres minor: Intact    Muscle bulk is maintained.    Labrum: Evaluation limited by lack of joint distention/intra-articular contrast.  Degenerative changes of the labrum are noted.    Biceps: Long head biceps tendon is intact.    Bone: No fracture present.  Bone marrow signal is unremarkable.  Type 3 acromion with some lateral downsloping of the acromion is noted.    Acromioclavicular joint:Degenerative changes are noted with subchondral cystic change and mild spurring.    Cartilage: Articular cartilage of the glenohumeral joint is preserved    Miscellaneous: No significant joint effusion.  Mild subdeltoid fluid is present.    Impression  Full-thickness supraspinatus tendon tear as above.  Mild subdeltoid bursitis, degenerative changes, and other findings as described.      Electronically signed by: Reynaldo Miller MD  Date:    01/31/2020  Time:    10:41      Physician read: I agree with the above impression.    Assessment/Plan:   Dulce Boogie is a 64 y.o. female with chronic left rotator cuff tear, left shoulder rotator cuff tear arthropathy     Plan:  Discussed diagnosis and treatment options with the patient today. She has known chronic left shoulder rotator cuff tear that has consistent with left rotator cuff tear arthropathy  We again discussed operative and nonoperative treatment options. Operative treatment to include a reverse total shoulder arthroplasty versus a rotator cuff repair. I do suspect a reverse total shoulder arthroplasty would be more appropriate, I suspect her rotator cuff tears more  retracted compared to the MRI we have from 2020. She reports she does not want surgical intervention for the time being.  It has been 3 months since her last injection, she had good relief with the shot and she is interested in a repeat 1 today, OK to move forward  Left shoulder SAS CSI given in clinic, patient tolerated the procedure well with no immediate complications  Can continue naproxen  & tylenol as needed  Follow up with me in 3-4 months        Anjana Carvajal PA-C  Sports Medicine Physician Assistant       Disclaimer: This note was prepared using a voice recognition system and is likely to have sound alike errors within the text.

## 2024-12-09 RX ORDER — TRIAMCINOLONE ACETONIDE 40 MG/ML
40 INJECTION, SUSPENSION INTRA-ARTICULAR; INTRAMUSCULAR
Status: DISCONTINUED | OUTPATIENT
Start: 2024-12-04 | End: 2024-12-09 | Stop reason: HOSPADM

## 2024-12-09 NOTE — PROCEDURES
Large Joint Aspiration/Injection: L subacromial bursa    Date/Time: 12/4/2024 9:30 AM    Performed by: Anjana Carvajal PA-C  Authorized by: Anjana Carvajal PA-C    Consent Done?:  Yes (Verbal)  Indications:  Pain  Site marked: the procedure site was marked    Timeout: prior to procedure the correct patient, procedure, and site was verified    Prep: patient was prepped and draped in usual sterile fashion      Local anesthesia used?: Yes    Anesthesia:  Local infiltration  Local anesthetic:  Lidocaine 1% without epinephrine    Details:  Needle Size:  22 G  Ultrasonic Guidance for needle placement?: No    Approach:  Posterior  Location:  Shoulder  Site:  L subacromial bursa  Medications:  40 mg triamcinolone acetonide 40 mg/mL  Patient tolerance:  Patient tolerated the procedure well with no immediate complications      Procedure Note:  We discussed the risk and benefits of injections, including pain, infection, bleeding, damage to adjacent structures, risk of reaction to injection. We discussed the steroid/cortisone injections will not heal the problem but mat help decrease inflammation and help with symptoms. We discussed the risk of repeated injections. The patient expressed understanding and wanted to proceed with the injection. We performed a timeout to verify the proper patient, proper procedure, and the proper site. The injection site was prepared in a sterile fashion. The patient tolerated it well and there were no complication. We did discuss with the patient that steroid injections can cause some increase in blood sugar and blood pressure for up to a week after the injection.

## 2024-12-28 RX ORDER — ONDANSETRON 4 MG/1
4 TABLET, FILM COATED ORAL EVERY 8 HOURS PRN
Qty: 60 TABLET | Refills: 3 | Status: SHIPPED | OUTPATIENT
Start: 2024-12-28

## 2024-12-28 NOTE — TELEPHONE ENCOUNTER
Refill Routing Note   Medication(s) are not appropriate for processing by [facility] for the following reason(s):        Outside of protocol    [facility] action(s):  Route             Appointments  past 12m or future 3m with PCP    Date Provider   Last Visit   11/19/2024 Zeus Hanson MD   Next Visit   Visit date not found Zeus Hanson MD   ED visits in past 90 days: 0        Note composed:11:31 PM 12/27/2024

## 2025-03-01 NOTE — TELEPHONE ENCOUNTER
Refill Routing Note   Medication(s) are not appropriate for processing by [facility] for the following reason(s):        Non-participating provider    [facility] action(s):  Route             Appointments  past 12m or future 3m with PCP    Date Provider   Last Visit   11/19/2024 Zeus Hanson MD   Next Visit   Visit date not found Zeus Hanson MD   ED visits in past 90 days: 0        Note composed:4:14 PM 03/01/2025

## 2025-03-03 RX ORDER — METFORMIN HYDROCHLORIDE 500 MG/1
1000 TABLET ORAL 2 TIMES DAILY WITH MEALS
Qty: 360 TABLET | Refills: 3 | Status: SHIPPED | OUTPATIENT
Start: 2025-03-03

## 2025-03-06 ENCOUNTER — HOSPITAL ENCOUNTER (EMERGENCY)
Facility: HOSPITAL | Age: 65
Discharge: HOME | End: 2025-03-06
Attending: EMERGENCY MEDICINE
Payer: MEDICARE

## 2025-03-06 VITALS
HEART RATE: 81 BPM | BODY MASS INDEX: 32.95 KG/M2 | DIASTOLIC BLOOD PRESSURE: 65 MMHG | TEMPERATURE: 99 F | SYSTOLIC BLOOD PRESSURE: 134 MMHG | WEIGHT: 198 LBS | OXYGEN SATURATION: 95 % | RESPIRATION RATE: 16 BRPM

## 2025-03-06 DIAGNOSIS — R11.2 NAUSEA AND VOMITING, UNSPECIFIED VOMITING TYPE: Primary | ICD-10-CM

## 2025-03-06 DIAGNOSIS — R10.9 ABDOMINAL PAIN, UNSPECIFIED ABDOMINAL LOCATION: ICD-10-CM

## 2025-03-06 LAB
ALBUMIN SERPL BCP-MCNC: 3.3 G/DL (ref 3.5–5.2)
ALP SERPL-CCNC: 62 U/L (ref 40–150)
ALT SERPL W/O P-5'-P-CCNC: 23 U/L (ref 10–44)
ANION GAP SERPL CALC-SCNC: 12 MMOL/L (ref 8–16)
AST SERPL-CCNC: 25 U/L (ref 10–40)
BACTERIA #/AREA URNS HPF: ABNORMAL /HPF
BASOPHILS # BLD AUTO: 0.04 K/UL (ref 0–0.2)
BASOPHILS NFR BLD: 0.3 % (ref 0–1.9)
BILIRUB SERPL-MCNC: 0.3 MG/DL (ref 0.1–1)
BILIRUB UR QL STRIP: NEGATIVE
BUN SERPL-MCNC: 12 MG/DL (ref 8–23)
CALCIUM SERPL-MCNC: 7.9 MG/DL (ref 8.7–10.5)
CHLORIDE SERPL-SCNC: 112 MMOL/L (ref 95–110)
CLARITY UR: CLEAR
CO2 SERPL-SCNC: 18 MMOL/L (ref 23–29)
COLOR UR: YELLOW
CREAT SERPL-MCNC: 0.8 MG/DL (ref 0.5–1.4)
DIFFERENTIAL METHOD BLD: ABNORMAL
EOSINOPHIL # BLD AUTO: 0 K/UL (ref 0–0.5)
EOSINOPHIL NFR BLD: 0.3 % (ref 0–8)
ERYTHROCYTE [DISTWIDTH] IN BLOOD BY AUTOMATED COUNT: 15.2 % (ref 11.5–14.5)
EST. GFR  (NO RACE VARIABLE): >60 ML/MIN/1.73 M^2
GLUCOSE SERPL-MCNC: 147 MG/DL (ref 70–110)
GLUCOSE UR QL STRIP: ABNORMAL
HCT VFR BLD AUTO: 40.9 % (ref 37–48.5)
HCV AB SERPL QL IA: NEGATIVE
HEP C VIRUS HOLD SPECIMEN: NORMAL
HGB BLD-MCNC: 13.4 G/DL (ref 12–16)
HGB UR QL STRIP: ABNORMAL
HIV 1+2 AB+HIV1 P24 AG SERPL QL IA: NEGATIVE
HYALINE CASTS #/AREA URNS LPF: 0 /LPF
IMM GRANULOCYTES # BLD AUTO: 0.03 K/UL (ref 0–0.04)
IMM GRANULOCYTES NFR BLD AUTO: 0.3 % (ref 0–0.5)
KETONES UR QL STRIP: ABNORMAL
LEUKOCYTE ESTERASE UR QL STRIP: NEGATIVE
LIPASE SERPL-CCNC: 16 U/L (ref 4–60)
LYMPHOCYTES # BLD AUTO: 3.4 K/UL (ref 1–4.8)
LYMPHOCYTES NFR BLD: 28.3 % (ref 18–48)
MAGNESIUM SERPL-MCNC: 1.7 MG/DL (ref 1.6–2.6)
MCH RBC QN AUTO: 26.9 PG (ref 27–31)
MCHC RBC AUTO-ENTMCNC: 32.8 G/DL (ref 32–36)
MCV RBC AUTO: 82 FL (ref 82–98)
MICROSCOPIC COMMENT: ABNORMAL
MONOCYTES # BLD AUTO: 0.7 K/UL (ref 0.3–1)
MONOCYTES NFR BLD: 6.1 % (ref 4–15)
NEUTROPHILS # BLD AUTO: 7.7 K/UL (ref 1.8–7.7)
NEUTROPHILS NFR BLD: 64.7 % (ref 38–73)
NITRITE UR QL STRIP: NEGATIVE
NRBC BLD-RTO: 0 /100 WBC
PH UR STRIP: 7 [PH] (ref 5–8)
PLATELET # BLD AUTO: 214 K/UL (ref 150–450)
PMV BLD AUTO: 13 FL (ref 9.2–12.9)
POTASSIUM SERPL-SCNC: 2.9 MMOL/L (ref 3.5–5.1)
PROT SERPL-MCNC: 7 G/DL (ref 6–8.4)
PROT UR QL STRIP: ABNORMAL
RBC # BLD AUTO: 4.98 M/UL (ref 4–5.4)
RBC #/AREA URNS HPF: 3 /HPF (ref 0–4)
SODIUM SERPL-SCNC: 142 MMOL/L (ref 136–145)
SP GR UR STRIP: 1.03 (ref 1–1.03)
SQUAMOUS #/AREA URNS HPF: 3 /HPF
URN SPEC COLLECT METH UR: ABNORMAL
UROBILINOGEN UR STRIP-ACNC: NEGATIVE EU/DL
WBC # BLD AUTO: 11.88 K/UL (ref 3.9–12.7)
WBC #/AREA URNS HPF: 12 /HPF (ref 0–5)
WBC CLUMPS URNS QL MICRO: ABNORMAL
YEAST URNS QL MICRO: ABNORMAL

## 2025-03-06 PROCEDURE — 96375 TX/PRO/DX INJ NEW DRUG ADDON: CPT | Mod: HCNC

## 2025-03-06 PROCEDURE — 80053 COMPREHEN METABOLIC PANEL: CPT | Mod: HCNC | Performed by: EMERGENCY MEDICINE

## 2025-03-06 PROCEDURE — 86803 HEPATITIS C AB TEST: CPT | Mod: HCNC | Performed by: EMERGENCY MEDICINE

## 2025-03-06 PROCEDURE — 87086 URINE CULTURE/COLONY COUNT: CPT | Mod: HCNC | Performed by: EMERGENCY MEDICINE

## 2025-03-06 PROCEDURE — 83735 ASSAY OF MAGNESIUM: CPT | Mod: HCNC | Performed by: EMERGENCY MEDICINE

## 2025-03-06 PROCEDURE — 25000003 PHARM REV CODE 250: Mod: HCNC | Performed by: EMERGENCY MEDICINE

## 2025-03-06 PROCEDURE — 96367 TX/PROPH/DG ADDL SEQ IV INF: CPT | Mod: HCNC

## 2025-03-06 PROCEDURE — 96376 TX/PRO/DX INJ SAME DRUG ADON: CPT | Mod: HCNC

## 2025-03-06 PROCEDURE — 85025 COMPLETE CBC W/AUTO DIFF WBC: CPT | Mod: HCNC | Performed by: EMERGENCY MEDICINE

## 2025-03-06 PROCEDURE — 81000 URINALYSIS NONAUTO W/SCOPE: CPT | Mod: HCNC | Performed by: EMERGENCY MEDICINE

## 2025-03-06 PROCEDURE — 99284 EMERGENCY DEPT VISIT MOD MDM: CPT | Mod: 25,HCNC

## 2025-03-06 PROCEDURE — 83690 ASSAY OF LIPASE: CPT | Mod: HCNC | Performed by: EMERGENCY MEDICINE

## 2025-03-06 PROCEDURE — 63600175 PHARM REV CODE 636 W HCPCS: Mod: HCNC | Performed by: EMERGENCY MEDICINE

## 2025-03-06 PROCEDURE — 96365 THER/PROPH/DIAG IV INF INIT: CPT | Mod: HCNC

## 2025-03-06 PROCEDURE — 87389 HIV-1 AG W/HIV-1&-2 AB AG IA: CPT | Mod: HCNC | Performed by: EMERGENCY MEDICINE

## 2025-03-06 PROCEDURE — 96361 HYDRATE IV INFUSION ADD-ON: CPT | Mod: HCNC

## 2025-03-06 RX ORDER — ONDANSETRON HYDROCHLORIDE 2 MG/ML
4 INJECTION, SOLUTION INTRAVENOUS
Status: COMPLETED | OUTPATIENT
Start: 2025-03-06 | End: 2025-03-06

## 2025-03-06 RX ORDER — ELECTROLYTES/DEXTROSE
600 SOLUTION, ORAL ORAL
Status: DISCONTINUED | OUTPATIENT
Start: 2025-03-06 | End: 2025-03-06 | Stop reason: HOSPADM

## 2025-03-06 RX ORDER — SODIUM CHLORIDE 9 MG/ML
1000 INJECTION, SOLUTION INTRAVENOUS
Status: COMPLETED | OUTPATIENT
Start: 2025-03-06 | End: 2025-03-06

## 2025-03-06 RX ORDER — POTASSIUM CHLORIDE 7.45 MG/ML
10 INJECTION INTRAVENOUS
Status: COMPLETED | OUTPATIENT
Start: 2025-03-06 | End: 2025-03-06

## 2025-03-06 RX ORDER — METOCLOPRAMIDE HYDROCHLORIDE 5 MG/ML
10 INJECTION INTRAMUSCULAR; INTRAVENOUS
Status: COMPLETED | OUTPATIENT
Start: 2025-03-06 | End: 2025-03-06

## 2025-03-06 RX ORDER — KETOROLAC TROMETHAMINE 30 MG/ML
15 INJECTION, SOLUTION INTRAMUSCULAR; INTRAVENOUS
Status: COMPLETED | OUTPATIENT
Start: 2025-03-06 | End: 2025-03-06

## 2025-03-06 RX ORDER — ONDANSETRON 4 MG/1
4 TABLET, ORALLY DISINTEGRATING ORAL EVERY 6 HOURS PRN
Qty: 30 TABLET | Refills: 0 | Status: SHIPPED | OUTPATIENT
Start: 2025-03-06

## 2025-03-06 RX ORDER — ACETAMINOPHEN 650 MG/1
650 SUPPOSITORY RECTAL
Status: DISCONTINUED | OUTPATIENT
Start: 2025-03-06 | End: 2025-03-06

## 2025-03-06 RX ORDER — METOCLOPRAMIDE 10 MG/1
10 TABLET ORAL EVERY 6 HOURS PRN
Qty: 30 TABLET | Refills: 0 | Status: SHIPPED | OUTPATIENT
Start: 2025-03-06

## 2025-03-06 RX ADMIN — KETOROLAC TROMETHAMINE 15 MG: 30 INJECTION, SOLUTION INTRAMUSCULAR at 01:03

## 2025-03-06 RX ADMIN — Medication 600 ML: at 12:03

## 2025-03-06 RX ADMIN — POTASSIUM BICARBONATE 40 MEQ: 391 TABLET, EFFERVESCENT ORAL at 12:03

## 2025-03-06 RX ADMIN — SODIUM CHLORIDE 1000 ML: 9 INJECTION, SOLUTION INTRAVENOUS at 01:03

## 2025-03-06 RX ADMIN — KETOROLAC TROMETHAMINE 15 MG: 30 INJECTION, SOLUTION INTRAMUSCULAR at 06:03

## 2025-03-06 RX ADMIN — POTASSIUM CHLORIDE 10 MEQ: 7.46 INJECTION, SOLUTION INTRAVENOUS at 12:03

## 2025-03-06 RX ADMIN — METOCLOPRAMIDE 10 MG: 5 INJECTION, SOLUTION INTRAMUSCULAR; INTRAVENOUS at 01:03

## 2025-03-06 RX ADMIN — PROMETHAZINE HYDROCHLORIDE 12.5 MG: 25 INJECTION INTRAMUSCULAR; INTRAVENOUS at 08:03

## 2025-03-06 RX ADMIN — SODIUM CHLORIDE 1000 ML: 9 INJECTION, SOLUTION INTRAVENOUS at 06:03

## 2025-03-06 RX ADMIN — ONDANSETRON 4 MG: 2 INJECTION INTRAMUSCULAR; INTRAVENOUS at 06:03

## 2025-03-06 NOTE — ED PROVIDER NOTES
SCRIBE #1 NOTE: I, Mahogany Dixon, am scribing for, and in the presence of, Faheem Tim Jr., MD. I have scribed the entire note.       History     Chief Complaint   Patient presents with    Abdominal Pain    Nausea    Vomiting     Pt c/o epigastric pain with n/v since 6p. Pt states that she resumed her ozempic after stopping for a while. Pt states that she took 2mg.      Review of patient's allergies indicates:   Allergen Reactions    No known drug allergies          History of Present Illness     HPI    3/6/2025, 7:50 AM  History obtained from the patient      History of Present Illness: Dulce Boogie is a 64 y.o. female patient with a PMHx of DM, HTN, HLD, and CAD who presents to the Emergency Department for evaluation of right-sided abdominal pain which began yesterday. Patient states she used to take 2 mg of ozempic and then stopped, but started again yesterday. Patient states she did not have this problem with her ozempic the first time. Symptoms are constant and moderate in severity. No mitigating or exacerbating factors reported. Associated sxs include n/v. Patient denies any back pain or removal of gallbladder. No prior Tx included. No further complaints or concerns at this time.       Arrival mode: Personal Transportation    PCP: Zeus Hanson MD        Past Medical History:  Past Medical History:   Diagnosis Date    Abnormal EKG 2022    Anticoagulant long-term use     CAD (coronary artery disease)     Depression     History of colon polyps     Hyperlipidemia associated with type 2 diabetes mellitus     Hypertension associated with diabetes     Microalbuminuria due to type 2 diabetes mellitus     NSTEMI (non-ST elevated myocardial infarction) 2018    Obesity     Type II diabetes mellitus with complication        Past Surgical History:  Past Surgical History:   Procedure Laterality Date    CAROTID STENT       SECTION, LOW TRANSVERSE      x 2    COLONOSCOPY N/A 06/10/2019     Procedure: COLONOSCOPY;  Surgeon: Maricarmen Boo MD;  Location: [facility] ENDO;  Service: Endoscopy;  Laterality: N/A;    COLONOSCOPY N/A 12/01/2022    Procedure: COLONOSCOPY;  Surgeon: Ryan Lau MD;  Location: [facility] ENDO;  Service: Endoscopy;  Laterality: N/A;    CORONARY STENT PLACEMENT N/A 01/25/2021    Procedure: INSERTION, STENT, CORONARY ARTERY;  Surgeon: Aimee Cooper MD;  Location: [facility] CATH LAB;  Service: Cardiology;  Laterality: N/A;    LEFT HEART CATHETERIZATION Left 05/22/2018    Procedure: HEART CATH-LEFT;  Surgeon: Aimee Cooper MD;  Location: [facility] CATH LAB;  Service: Cardiology;  Laterality: Left;    LEFT HEART CATHETERIZATION Left 01/25/2021    Procedure: CATHETERIZATION, HEART, LEFT;  Surgeon: Aimee Cooper MD;  Location: [facility] CATH LAB;  Service: Cardiology;  Laterality: Left;    PERCUTANEOUS TRANSLUMINAL BALLOON ANGIOPLASTY OF CORONARY ARTERY  01/25/2021    Procedure: Angioplasty-coronary;  Surgeon: Aimee Cooper MD;  Location: [facility] CATH LAB;  Service: Cardiology;;         Family History:  Family History   Problem Relation Name Age of Onset    Hypertension Mother      Hyperlipidemia Mother      Hypertension Father      Diabetes Father      Hyperlipidemia Father      Colon cancer Father      Hypertension Brother x3     Breast cancer Neg Hx      Ovarian cancer Neg Hx      Uterine cancer Neg Hx         Social History:  Social History     Tobacco Use    Smoking status: Never    Smokeless tobacco: Never   Substance and Sexual Activity    Alcohol use: No    Drug use: No    Sexual activity: Not Currently     Partners: Male        Review of Systems     Review of Systems   Constitutional:  Negative for fever.   HENT:  Negative for sore throat.    Respiratory:  Negative for shortness of breath.    Cardiovascular:  Negative for chest pain.   Gastrointestinal:  Positive for abdominal pain (r-sided), nausea and vomiting.   Genitourinary:  Negative for dysuria.   Musculoskeletal:  Negative for  back pain.   Skin:  Negative for rash.   Neurological:  Negative for weakness.   Hematological:  Does not bruise/bleed easily.   All other systems reviewed and are negative.     Physical Exam     Initial Vitals [03/06/25 0412]   BP Pulse Resp Temp SpO2   (!) 178/94 98 18 98.7 °F (37.1 °C) 96 %      MAP       --          Physical Exam  Nursing Notes and Vital Signs Reviewed.  Constitutional: Patient is in no acute distress. Well-developed and well-nourished.  Head: Atraumatic. Normocephalic.  Eyes: PERRL. EOM intact. Conjunctivae are not pale. No scleral icterus.  ENT: Mucous membranes are moist. Oropharynx is clear and symmetric.    Neck: Supple. Full ROM. No lymphadenopathy.  Cardiovascular: Regular rate. Regular rhythm. No murmurs, rubs, or gallops. Distal pulses are 2+ and symmetric.  Pulmonary/Chest: No respiratory distress. Clear to auscultation bilaterally. No wheezing or rales.  Abdominal: Soft and non-distended.  There is epigastric RUQ tenderness.  No rebound, guarding, or rigidity. Good bowel sounds.  Genitourinary: No CVA tenderness.  Musculoskeletal: Moves all extremities. No obvious deformities. No edema. No calf tenderness.  Skin: Warm and dry.  Neurological:  Alert, awake, and appropriate.  Normal speech.  No acute focal neurological deficits are appreciated.  Psychiatric: Normal affect. Good eye contact. Appropriate in content.     ED Course   Procedures  ED Vital Signs:  Vitals:    03/06/25 0412 03/06/25 0500 03/06/25 0530 03/06/25 0630   BP: (!) 178/94 (!) 197/98 (!) 169/88 (!) 160/87   Pulse: 98 86 92 100   Resp: 18 17 16 17   Temp: 98.7 °F (37.1 °C)      TempSrc: Oral      SpO2: 96% 97% 99% 98%   Weight: 89.8 kg (198 lb)       03/06/25 0645 03/06/25 0715 03/06/25 0800 03/06/25 0832   BP: (!) 159/90 (!) 169/71 (!) 167/85 (!) 168/92   Pulse: 91 87 (!) 128 93   Resp: 18 16 17 18   Temp:    99.1 °F (37.3 °C)   TempSrc:    Oral   SpO2: 97% 98% 98% 97%   Weight:        03/06/25 0900 03/06/25 1100  03/06/25 1221 03/06/25 1300   BP: (!) 158/90 (!) 186/75 (!) 163/83 (!) 181/81   Pulse: 89 87 87 89   Resp: 17 18 19 17   Temp:       TempSrc:       SpO2: 95% 95% 96% 96%   Weight:        03/06/25 1500 03/06/25 1520   BP: 138/66 134/65   Pulse: 92 81   Resp: 15 16   Temp: 98.8 °F (37.1 °C) 98.7 °F (37.1 °C)   TempSrc: Oral Oral   SpO2: 96% 95%   Weight:         Abnormal Lab Results:  Labs Reviewed   CBC W/ AUTO DIFFERENTIAL - Abnormal       Result Value    WBC 11.88      RBC 4.98      Hemoglobin 13.4      Hematocrit 40.9      MCV 82      MCH 26.9 (*)     MCHC 32.8      RDW 15.2 (*)     Platelets 214      MPV 13.0 (*)     Immature Granulocytes 0.3      Gran # (ANC) 7.7      Immature Grans (Abs) 0.03      Lymph # 3.4      Mono # 0.7      Eos # 0.0      Baso # 0.04      nRBC 0      Gran % 64.7      Lymph % 28.3      Mono % 6.1      Eosinophil % 0.3      Basophil % 0.3      Differential Method Automated     COMPREHENSIVE METABOLIC PANEL - Abnormal    Sodium 142      Potassium 2.9 (*)     Chloride 112 (*)     CO2 18 (*)     Glucose 147 (*)     BUN 12      Creatinine 0.8      Calcium 7.9 (*)     Total Protein 7.0      Albumin 3.3 (*)     Total Bilirubin 0.3      Alkaline Phosphatase 62      AST 25      ALT 23      eGFR >60      Anion Gap 12     URINALYSIS, REFLEX TO URINE CULTURE - Abnormal    Specimen UA Urine, Clean Catch      Color, UA Yellow      Appearance, UA Clear      pH, UA 7.0      Specific Gravity, UA 1.030      Protein, UA 3+ (*)     Glucose, UA 4+ (*)     Ketones, UA 1+ (*)     Bilirubin (UA) Negative      Occult Blood UA Trace (*)     Nitrite, UA Negative      Urobilinogen, UA Negative      Leukocytes, UA Negative      Narrative:     Specimen Source->Urine   URINALYSIS MICROSCOPIC - Abnormal    RBC, UA 3      WBC, UA 12 (*)     WBC Clumps, UA None      Bacteria Few (*)     Yeast, UA Rare (*)     Squam Epithel, UA 3      Hyaline Casts, UA 0      Microscopic Comment SEE COMMENT      Narrative:     Specimen  Source->Urine   CULTURE, URINE    Urine Culture, Routine        Value: Multiple organisms isolated. None in predominance.  Repeat if    Urine Culture, Routine clinically necessary.      Narrative:     Specimen Source->Urine   HEPATITIS C ANTIBODY    Hepatitis C Ab Negative      Narrative:     Release to patient->Immediate   HEP C VIRUS HOLD SPECIMEN    HEP C Virus Hold Specimen Hold for HCV sendout      Narrative:     Release to patient->Immediate   HIV 1 / 2 ANTIBODY    HIV 1/2 Ag/Ab Negative      Narrative:     Release to patient->Immediate   LIPASE    Lipase 16     MAGNESIUM    Magnesium 1.7          All Lab Results:  Results for orders placed or performed during the hospital encounter of 03/06/25   Hepatitis C Antibody    Collection Time: 03/06/25  6:57 AM   Result Value Ref Range    Hepatitis C Ab Negative Negative   HCV Virus Hold Specimen    Collection Time: 03/06/25  6:57 AM   Result Value Ref Range    HEP C Virus Hold Specimen Hold for HCV sendout    HIV 1/2 Ag/Ab (4th Gen)    Collection Time: 03/06/25  6:57 AM   Result Value Ref Range    HIV 1/2 Ag/Ab Negative Negative   CBC W/ AUTO DIFFERENTIAL    Collection Time: 03/06/25  6:57 AM   Result Value Ref Range    WBC 11.88 3.90 - 12.70 K/uL    RBC 4.98 4.00 - 5.40 M/uL    Hemoglobin 13.4 12.0 - 16.0 g/dL    Hematocrit 40.9 37.0 - 48.5 %    MCV 82 82 - 98 fL    MCH 26.9 (L) 27.0 - 31.0 pg    MCHC 32.8 32.0 - 36.0 g/dL    RDW 15.2 (H) 11.5 - 14.5 %    Platelets 214 150 - 450 K/uL    MPV 13.0 (H) 9.2 - 12.9 fL    Immature Granulocytes 0.3 0.0 - 0.5 %    Gran # (ANC) 7.7 1.8 - 7.7 K/uL    Immature Grans (Abs) 0.03 0.00 - 0.04 K/uL    Lymph # 3.4 1.0 - 4.8 K/uL    Mono # 0.7 0.3 - 1.0 K/uL    Eos # 0.0 0.0 - 0.5 K/uL    Baso # 0.04 0.00 - 0.20 K/uL    nRBC 0 0 /100 WBC    Gran % 64.7 38.0 - 73.0 %    Lymph % 28.3 18.0 - 48.0 %    Mono % 6.1 4.0 - 15.0 %    Eosinophil % 0.3 0.0 - 8.0 %    Basophil % 0.3 0.0 - 1.9 %    Differential Method Automated    Comp.  Metabolic Panel    Collection Time: 03/06/25  6:57 AM   Result Value Ref Range    Sodium 142 136 - 145 mmol/L    Potassium 2.9 (L) 3.5 - 5.1 mmol/L    Chloride 112 (H) 95 - 110 mmol/L    CO2 18 (L) 23 - 29 mmol/L    Glucose 147 (H) 70 - 110 mg/dL    BUN 12 8 - 23 mg/dL    Creatinine 0.8 0.5 - 1.4 mg/dL    Calcium 7.9 (L) 8.7 - 10.5 mg/dL    Total Protein 7.0 6.0 - 8.4 g/dL    Albumin 3.3 (L) 3.5 - 5.2 g/dL    Total Bilirubin 0.3 0.1 - 1.0 mg/dL    Alkaline Phosphatase 62 40 - 150 U/L    AST 25 10 - 40 U/L    ALT 23 10 - 44 U/L    eGFR >60 >60 mL/min/1.73 m^2    Anion Gap 12 8 - 16 mmol/L   Lipase    Collection Time: 03/06/25  6:57 AM   Result Value Ref Range    Lipase 16 4 - 60 U/L   Urine culture    Collection Time: 03/06/25  9:46 AM    Specimen: Urine   Result Value Ref Range    Urine Culture, Routine       Multiple organisms isolated. None in predominance.  Repeat if    Urine Culture, Routine clinically necessary.    Urinalysis, Reflex to Urine Culture Urine, Clean Catch    Collection Time: 03/06/25  9:46 AM    Specimen: Urine   Result Value Ref Range    Specimen UA Urine, Clean Catch     Color, UA Yellow Yellow, Straw, Idania    Appearance, UA Clear Clear    pH, UA 7.0 5.0 - 8.0    Specific Gravity, UA 1.030 1.005 - 1.030    Protein, UA 3+ (A) Negative    Glucose, UA 4+ (A) Negative    Ketones, UA 1+ (A) Negative    Bilirubin (UA) Negative Negative    Occult Blood UA Trace (A) Negative    Nitrite, UA Negative Negative    Urobilinogen, UA Negative <2.0 EU/dL    Leukocytes, UA Negative Negative   Urinalysis Microscopic    Collection Time: 03/06/25  9:46 AM   Result Value Ref Range    RBC, UA 3 0 - 4 /hpf    WBC, UA 12 (H) 0 - 5 /hpf    WBC Clumps, UA None None-Rare    Bacteria Few (A) None-Occ /hpf    Yeast, UA Rare (A) None    Squam Epithel, UA 3 /hpf    Hyaline Casts, UA 0 0-1/lpf /lpf    Microscopic Comment SEE COMMENT    Magnesium    Collection Time: 03/06/25 12:11 PM   Result Value Ref Range    Magnesium 1.7  1.6 - 2.6 mg/dL     *Note: Due to a large number of results and/or encounters for the requested time period, some results have not been displayed. A complete set of results can be found in Results Review.       Imaging Results:  Imaging Results              X-Ray Abdomen Flat And Erect (Final result)  Result time 03/06/25 07:14:12      Final result by Lam MontanaBaltazar), MD (03/06/25 07:14:12)                   Impression:        No evidence of bowel obstruction.    Finalized on: 3/6/2025 7:14 AM By:  Jayden Montana MD  [facility]# [id number]      2025-03-06 07:16:[id number]     [facility]               Narrative:      EXAM:  XR ABDOMEN FLAT AND ERECT    CLINICAL HISTORY:  Abdominal pain    FINDINGS:    No free air. Nonobstructive bowel gas pattern. No abnormal masses or suspicious appearing calcifications. The skeleton is intact.    The lung bases appear clear.                                           No EKG ordered.           The Emergency Provider reviewed the vital signs and test results, which are outlined above.     ED Discussion     1:36 PM: Re-evaluated pt. at bedside. Patient is resting comfortably and is in no acute distress. Pt vomited once after PO potassium. Patient now has IV potassium. Reglan and Toradol are ordered for patient. Discussed with pt and/or family/caretaker all pertinent results. Discussed with pt and/or family/caretaker any concerns expressed at this time. Answered all questions. Pt and/or family/caretaker express understanding at this time.     3:31  PM: Reassessed pt at this time. Discussed with patient and/or family/caretaker all pertinent ED information and results. Discussed pt dx and plan of tx. Gave the patient all f/u and return to the ED instructions. All questions and concerns were addressed at this time. Patient and/or family/caretaker expresses understanding of information and instructions, and is comfortable with plan to discharge. Pt is able to tolerate PO and ambulate without  assistance. Pt is stable for discharge.     I discussed with patient and/or family/caretaker that evaluation in the ED does not suggest any emergent or life threatening medical conditions requiring immediate intervention beyond what was provided in the ED, and I believe patient is safe for discharge.  Regardless, an unremarkable evaluation in the ED does not preclude the development or presence of a serious of life threatening condition. As such, I instructed that the patient is to return immediately for any worsening or change in current symptoms.    Regarding VOMITING, I advised patient on importance of staying well hydrated; eating frequent, small amounts of clear liquids; avoid solid foods until there has been no vomiting for six hours, and then work slowly back to a normal diet; and to use an OTC bismuth stomach remedy for upset stomach, nausea, indigestion, and diarrhea. I discussed signs and symptoms of dehydration and possible causes of nausea and vomiting including viral infections, medications, migraine headaches, food poisoning, allergies, and peptic ulcer disease. Instructed patient to take medications as prescribed and to follow up with primary care provider or return to ER if condition worsens.     For NAUSEA/VOMITING, I advised patient on importance of staying well hydrated; eating frequent, small amounts of clear liquids; avoid solid foods until there has been no vomiting for six hours, and then work slowly back to a normal diet; and to use an OTC bismuth stomach remedy for upset stomach, nausea, indigestion, and diarrhea. We discussed signs and symptoms of dehydration and possible causes of N/V with patient (viral infections, medications, migraine headaches, food poisoning, allergies, and peptic ulcer disease).  Reiterated the importance of following up with primary care provider if no improvement is noted within 72 hours.     Regarding ABDOMINAL PAIN, I recommended that the patient: Sip water or  other clear fluids; avoid solid food for the first few hours after vomiting or diarrhea; if vomiting, wait 6 hours, and then eat small amounts of mild foods such as rice, applesauce, or crackers; avoid dairy products; avoid citrus, high-fat foods, fried or greasy foods, tomato products, caffeine, alcohol, and carbonated beverages;  avoid aspirin, ibuprofen or other anti-inflammatory medications, and narcotic pain medications unless prescribed.  In regards to prevention, I encouraged patient to:  Avoid fatty or greasy foods; drink plenty of water each day; eat small meals more frequently; exercise regularly; limit foods that produce gas; make sure meals are well-balanced and high in fiber and include plenty of fruits and vegetables.       Medical Decision Making  Amount and/or Complexity of Data Reviewed  Labs: ordered. Decision-making details documented in ED Course.  Radiology: ordered. Decision-making details documented in ED Course.    Risk  OTC drugs.  Prescription drug management.  Parenteral controlled substances.  Risk Details: OTC drugs, prescription drugs and controlled substances considered.  Due to patient's symptoms improving and pain controlled pain medications ordered appropriately.  Differential diagnosis;  Gastroenteritis, Bowel obstruction, Colitis, Diverticulitis, Cholecystitis, Appendicitis, Perforated bowel, Herniation, Infectious etiology, UTI, Pyelonephritis,  Biliary obstruction, kidney stone                   ED Medication(s):  Medications   sodium chloride 0.9% bolus 1,000 mL 1,000 mL (0 mLs Intravenous Stopped 3/6/25 0931)   ondansetron injection 4 mg (4 mg Intravenous Given 3/6/25 0655)   ketorolac injection 15 mg (15 mg Intravenous Given 3/6/25 0656)   promethazine (PHENERGAN) 12.5 mg in 0.9% NaCl 50 mL IVPB (0 mg Intravenous Stopped 3/6/25 0931)   potassium bicarbonate disintegrating tablet 40 mEq (40 mEq Oral Given 3/6/25 1211)   potassium chloride 10 mEq in 100 mL IVPB (0 mEq  Intravenous Stopped 3/6/25 1339)   metoclopramide injection 10 mg (10 mg Intravenous Given 3/6/25 1343)   0.9% NaCl infusion (1,000 mLs Intravenous New Bag 3/6/25 1342)   ketorolac injection 15 mg (15 mg Intravenous Given 3/6/25 1343)       Discharge Medication List as of 3/6/2025  3:36 PM        START taking these medications    Details   metoclopramide HCl (REGLAN) 10 MG tablet Take 1 tablet (10 mg total) by mouth every 6 (six) hours as needed (nausea/vomiting)., Starting u 3/6/2025, Print      ondansetron (ZOFRAN-ODT) 4 MG TbDL Take 1 tablet (4 mg total) by mouth every 6 (six) hours as needed (if vomiting use odt)., Starting Thu 3/6/2025, Print              Follow-up Information       Zeus Hanson MD In 1 week.    Specialty: Internal Medicine  Contact information:  [address]  [address]  [facility] [id number] [phone]               [facility] - Emergency Dept..    Specialty: Emergency Medicine  Why: As needed, If symptoms worsen  Contact information:  [address]  [facility] [address] [phone]                               Scribe Attestation:   Scribe #1: I performed the above scribed service and the documentation accurately describes the services I performed. I attest to the accuracy of the note.     Attending:   Physician Attestation Statement for Scribe #1: I, Faheem Tim Jr., MD, personally performed the services described in this documentation, as scribed by Mahogany Dixon, in my presence, and it is both accurate and complete.           Clinical Impression       ICD-10-CM ICD-9-CM   1. Nausea and vomiting, unspecified vomiting type  R11.2 787.01   2. Abdominal pain, unspecified abdominal location  R10.9 789.00       Disposition:   Disposition: Discharged  Condition: Stable       Faheem Tim Jr., MD  03/11/25 1955

## 2025-03-06 NOTE — DISCHARGE INSTRUCTIONS
Regarding VOMITING, I advised patient on importance of staying well hydrated; eating frequent, small amounts of clear liquids; avoid solid foods until there has been no vomiting for six hours, and then work slowly back to a normal diet; and to use an OTC bismuth stomach remedy for upset stomach, nausea, indigestion, and diarrhea. I discussed signs and symptoms of dehydration and possible causes of nausea and vomiting including viral infections, medications, migraine headaches, food poisoning, allergies, and peptic ulcer disease. Instructed patient to take medications as prescribed and to follow up with primary care provider or return to ER if condition worsens.     For NAUSEA/VOMITING, I advised patient on importance of staying well hydrated; eating frequent, small amounts of clear liquids; avoid solid foods until there has been no vomiting for six hours, and then work slowly back to a normal diet; and to use an OTC bismuth stomach remedy for upset stomach, nausea, indigestion, and diarrhea. We discussed signs and symptoms of dehydration and possible causes of N/V with patient (viral infections, medications, migraine headaches, food poisoning, allergies, and peptic ulcer disease).  Reiterated the importance of following up with primary care provider if no improvement is noted within 72 hours.     Regarding ABDOMINAL PAIN, I recommended that the patient: Sip water or other clear fluids; avoid solid food for the first few hours after vomiting or diarrhea; if vomiting, wait 6 hours, and then eat small amounts of mild foods such as rice, applesauce, or crackers; avoid dairy products; avoid citrus, high-fat foods, fried or greasy foods, tomato products, caffeine, alcohol, and carbonated beverages;  avoid aspirin, ibuprofen or other anti-inflammatory medications, and narcotic pain medications unless prescribed.  In regards to prevention, I encouraged patient to:  Avoid fatty or greasy foods; drink plenty of water each  day; eat small meals more frequently; exercise regularly; limit foods that produce gas; make sure meals are well-balanced and high in fiber and include plenty of fruits and vegetables.

## 2025-03-07 ENCOUNTER — LAB VISIT (OUTPATIENT)
Dept: LAB | Facility: HOSPITAL | Age: 65
End: 2025-03-07
Attending: INTERNAL MEDICINE
Payer: MEDICARE

## 2025-03-07 DIAGNOSIS — E66.811 CLASS 1 OBESITY DUE TO EXCESS CALORIES WITH SERIOUS COMORBIDITY AND BODY MASS INDEX (BMI) OF 33.0 TO 33.9 IN ADULT: ICD-10-CM

## 2025-03-07 DIAGNOSIS — E66.09 CLASS 1 OBESITY DUE TO EXCESS CALORIES WITH SERIOUS COMORBIDITY AND BODY MASS INDEX (BMI) OF 33.0 TO 33.9 IN ADULT: ICD-10-CM

## 2025-03-07 DIAGNOSIS — E11.8 TYPE II DIABETES MELLITUS WITH COMPLICATION: ICD-10-CM

## 2025-03-07 LAB
ALBUMIN SERPL BCP-MCNC: 3.5 G/DL (ref 3.5–5.2)
ALP SERPL-CCNC: 58 U/L (ref 40–150)
ALT SERPL W/O P-5'-P-CCNC: 29 U/L (ref 10–44)
ANION GAP SERPL CALC-SCNC: 11 MMOL/L (ref 8–16)
AST SERPL-CCNC: 53 U/L (ref 10–40)
BILIRUB SERPL-MCNC: 0.4 MG/DL (ref 0.1–1)
BUN SERPL-MCNC: 19 MG/DL (ref 8–23)
CALCIUM SERPL-MCNC: 8.7 MG/DL (ref 8.7–10.5)
CHLORIDE SERPL-SCNC: 106 MMOL/L (ref 95–110)
CHOLEST SERPL-MCNC: 143 MG/DL (ref 120–199)
CHOLEST/HDLC SERPL: 3.4 {RATIO} (ref 2–5)
CO2 SERPL-SCNC: 26 MMOL/L (ref 23–29)
CREAT SERPL-MCNC: 1 MG/DL (ref 0.5–1.4)
EST. GFR  (NO RACE VARIABLE): >60 ML/MIN/1.73 M^2
ESTIMATED AVG GLUCOSE: 183 MG/DL (ref 68–131)
GLUCOSE SERPL-MCNC: 158 MG/DL (ref 70–110)
HBA1C MFR BLD: 8 % (ref 4–5.6)
HDLC SERPL-MCNC: 42 MG/DL (ref 40–75)
HDLC SERPL: 29.4 % (ref 20–50)
LDLC SERPL CALC-MCNC: 67.6 MG/DL (ref 63–159)
NONHDLC SERPL-MCNC: 101 MG/DL
POTASSIUM SERPL-SCNC: 2.9 MMOL/L (ref 3.5–5.1)
PROT SERPL-MCNC: 7.3 G/DL (ref 6–8.4)
SODIUM SERPL-SCNC: 143 MMOL/L (ref 136–145)
TRIGL SERPL-MCNC: 167 MG/DL (ref 30–150)

## 2025-03-07 PROCEDURE — 80061 LIPID PANEL: CPT | Mod: HCNC | Performed by: INTERNAL MEDICINE

## 2025-03-07 PROCEDURE — 83036 HEMOGLOBIN GLYCOSYLATED A1C: CPT | Mod: HCNC | Performed by: INTERNAL MEDICINE

## 2025-03-07 PROCEDURE — 80053 COMPREHEN METABOLIC PANEL: CPT | Mod: HCNC | Performed by: INTERNAL MEDICINE

## 2025-03-07 PROCEDURE — 36415 COLL VENOUS BLD VENIPUNCTURE: CPT | Mod: HCNC | Performed by: INTERNAL MEDICINE

## 2025-03-08 LAB
BACTERIA UR CULT: NORMAL
BACTERIA UR CULT: NORMAL

## 2025-03-20 RX ORDER — CYCLOBENZAPRINE HCL 10 MG
10 TABLET ORAL 3 TIMES DAILY PRN
Qty: 90 TABLET | Refills: 5 | Status: SHIPPED | OUTPATIENT
Start: 2025-03-20

## 2025-04-07 ENCOUNTER — OFFICE VISIT (OUTPATIENT)
Dept: SPORTS MEDICINE | Facility: CLINIC | Age: 65
End: 2025-04-07
Payer: MEDICARE

## 2025-04-07 DIAGNOSIS — M12.812 LEFT ROTATOR CUFF TEAR ARTHROPATHY: Primary | ICD-10-CM

## 2025-04-07 DIAGNOSIS — M75.102 LEFT ROTATOR CUFF TEAR ARTHROPATHY: Primary | ICD-10-CM

## 2025-04-07 PROCEDURE — 99999 PR PBB SHADOW E&M-EST. PATIENT-LVL III: CPT | Mod: PBBFAC,HCNC,, | Performed by: PHYSICIAN ASSISTANT

## 2025-04-07 RX ORDER — TRIAMCINOLONE ACETONIDE 40 MG/ML
40 INJECTION, SUSPENSION INTRA-ARTICULAR; INTRAMUSCULAR
Status: DISCONTINUED | OUTPATIENT
Start: 2025-04-07 | End: 2025-04-07 | Stop reason: HOSPADM

## 2025-04-07 RX ORDER — NAPROXEN 500 MG/1
500 TABLET ORAL 2 TIMES DAILY WITH MEALS
Qty: 60 TABLET | Refills: 4 | Status: SHIPPED | OUTPATIENT
Start: 2025-04-07

## 2025-04-07 RX ADMIN — TRIAMCINOLONE ACETONIDE 40 MG: 40 INJECTION, SUSPENSION INTRA-ARTICULAR; INTRAMUSCULAR at 01:04

## 2025-04-07 NOTE — PROGRESS NOTES
Orthopaedic Follow-Up Visit    Last Appointment:  12/4/2024  Diagnosis:  Left rotator cuff tear arthropathy  Prior Procedure:  Left SAS CSI 12/04/2024      Dulce Boogie is a 64 y.o. female who is here for f/u evaluation of left shoulder pain. The patient was last seen here by me on 12/04/2024 at which point she was still not interested in operative management and we elected to move forward with a repeat left shoulder corticosteroid injection prior to considering further treatment options.  The patient returns today reporting that the symptoms have returned and is interested in moving forward with a repeat injection today.  She is about 2-1/2 months of good pain relief with the injections.    To review her history, Dulce Boogie is a 64 y.o. right-hand dominant female who presents with LEFT shoulder pain and dysfunction. She was previously seen by Clemencia Clayton PA-C in 2019. She was initially treating her for left shoulder rotator cuff impingement. She received 2 SAS CSIs, but ultimately she failed to improve with conservative treatment and they ended up getting an MRI of the left shoulder. MRI of the left shoulder from January 2020 revealed a full-thickness rotator cuff tear. For several years, she had no acute injury or trauma. Her symptoms include constant aching, throbbing pain of the left shoulder, limited range of motion, night pain. Her pain is made worse by raising the arm, overhead activities, repetitive movements. She has had an x-ray thus far. Her treatment has included rest, activity modification, oral anti-inflammatories, Robaxin, PT, glenohumeral CSI, subacromial CSI, Robaxin.       Patient's medications, allergies, past medical, surgical, social and family histories were reviewed and updated as appropriate.    Review of Systems   All systems reviewed were negative.  Specifically, the patient denies fever, chills, weight loss, chest pain, shortness of breath, or dyspnea on exertion.      Past Medical  History:   Diagnosis Date    Abnormal EKG 2022    Anticoagulant long-term use     CAD (coronary artery disease)     Depression     History of colon polyps     Hyperlipidemia associated with type 2 diabetes mellitus     Hypertension associated with diabetes     Microalbuminuria due to type 2 diabetes mellitus     NSTEMI (non-ST elevated myocardial infarction) 2018    Obesity     Type II diabetes mellitus with complication        Past Surgical History:   Procedure Laterality Date    CAROTID STENT       SECTION, LOW TRANSVERSE      x 2    COLONOSCOPY N/A 06/10/2019    Procedure: COLONOSCOPY;  Surgeon: Maricarmen Boo MD;  Location: [facility] ENDO;  Service: Endoscopy;  Laterality: N/A;    COLONOSCOPY N/A 2022    Procedure: COLONOSCOPY;  Surgeon: Ryan Lau MD;  Location: [facility] ENDO;  Service: Endoscopy;  Laterality: N/A;    CORONARY STENT PLACEMENT N/A 2021    Procedure: INSERTION, STENT, CORONARY ARTERY;  Surgeon: Aimee Cooper MD;  Location: [facility] CATH LAB;  Service: Cardiology;  Laterality: N/A;    LEFT HEART CATHETERIZATION Left 2018    Procedure: HEART CATH-LEFT;  Surgeon: Aimee Cooper MD;  Location: [facility] CATH LAB;  Service: Cardiology;  Laterality: Left;    LEFT HEART CATHETERIZATION Left 2021    Procedure: CATHETERIZATION, HEART, LEFT;  Surgeon: Aimee Cooper MD;  Location: [facility] CATH LAB;  Service: Cardiology;  Laterality: Left;    PERCUTANEOUS TRANSLUMINAL BALLOON ANGIOPLASTY OF CORONARY ARTERY  2021    Procedure: Angioplasty-coronary;  Surgeon: Aimee Cooper MD;  Location: [facility] CATH LAB;  Service: Cardiology;;       Patient's Medications   New Prescriptions    No medications on file   Previous Medications    ALPRAZOLAM (XANAX) 0.5 MG TABLET    Take 1 tablet (0.5 mg total) by mouth daily as needed.    AMLODIPINE-BENAZEPRIL (LOTREL) 10-40 MG PER CAPSULE    Take 1 capsule by mouth once daily.    ASPIRIN (ECOTRIN) 81 MG EC TABLET    Take 1 tablet (81  mg total) by mouth once daily.    CYCLOBENZAPRINE (FLEXERIL) 10 MG TABLET    Take 1 tablet (10 mg total) by mouth 3 (three) times daily as needed for Muscle spasms.    DOXAZOSIN (CARDURA) 4 MG TABLET    TAKE 1 TABLET BY MOUTH ONCE DAILY.    EMPAGLIFLOZIN (JARDIANCE) 25 MG TABLET    Take 1 tablet (25 mg total) by mouth once daily.    EVOLOCUMAB (REPATHA SURECLICK) 140 MG/ML PNIJ    Inject 1 mL (140 mg total) into the skin every 14 (fourteen) days.    EZETIMIBE (ZETIA) 10 MG TABLET    Take 1 tablet (10 mg total) by mouth once daily.    GLIMEPIRIDE (AMARYL) 2 MG TABLET    Take 1 tablet (2 mg total) by mouth before breakfast.    HYDROCHLOROTHIAZIDE (HYDRODIURIL) 12.5 MG TAB    TAKE 1 TABLET BY MOUTH EVERY DAY    ISOSORBIDE MONONITRATE (IMDUR) 60 MG 24 HR TABLET    TAKE 1 TABLET BY MOUTH EVERY DAY    METFORMIN (GLUCOPHAGE) 500 MG TABLET    TAKE 2 TABLETS BY MOUTH TWICE A DAY WITH MEALS    METHOCARBAMOL (ROBAXIN) 500 MG TAB    TAKE 1 TABLET BY MOUTH THREE TIMES A DAY AS NEEDED MUSCLE SPASM    METOCLOPRAMIDE HCL (REGLAN) 10 MG TABLET    Take 1 tablet (10 mg total) by mouth every 6 (six) hours as needed (nausea/vomiting).    METOPROLOL SUCCINATE (TOPROL-XL) 50 MG 24 HR TABLET    TAKE 1 TABLET BY MOUTH TWICE A DAY    NAPROXEN (NAPROSYN) 500 MG TABLET    TAKE 1 TABLET BY MOUTH TWICE A DAY WITH MEALS    NITROGLYCERIN (NITROSTAT) 0.4 MG SL TABLET    TAKE ONE FOR ONSET OF CHEST PAIN / THEN EVERY 5 MINUTES IF CP CONTINUES UP TO 3 DOSES..CALL 911    OMEPRAZOLE (PRILOSEC) 40 MG CAPSULE    Take 1 capsule (40 mg total) by mouth once daily.    ONDANSETRON (ZOFRAN) 4 MG TABLET    Take 1 tablet (4 mg total) by mouth every 8 (eight) hours as needed for Nausea.    ONDANSETRON (ZOFRAN-ODT) 4 MG TBDL    Take 1 tablet (4 mg total) by mouth every 6 (six) hours as needed (if vomiting use odt).    ROSUVASTATIN (CRESTOR) 40 MG TAB    Take 1 tablet (40 mg total) by mouth every evening.    SEMAGLUTIDE (OZEMPIC) 2 MG/DOSE (8 MG/3 ML) PNIJ    Inject  2 mg into the skin every 7 days.    SPIRONOLACTONE (ALDACTONE) 25 MG TABLET    Take 1 tablet (25 mg total) by mouth once daily.   Modified Medications    No medications on file   Discontinued Medications    No medications on file       Family History   Problem Relation Name Age of Onset    Hypertension Mother      Hyperlipidemia Mother      Hypertension Father      Diabetes Father      Hyperlipidemia Father      Colon cancer Father      Hypertension Brother x3     Breast cancer Neg Hx      Ovarian cancer Neg Hx      Uterine cancer Neg Hx         Review of patient's allergies indicates:   Allergen Reactions    No known drug allergies          Objective:      Physical Exam  Patient is alert and oriented, no distress. Skin is intact. Neuro is normal with no focal motor or sensory findings.    Cervical exam is unremarkable. Intact cervical ROM. Negative Spurling's test     Physical Exam:                       RIGHT                                     LEFT     Scap Dyskinesis/Winging       (-)                                             (-)     Tenderness:                                                                              Greater Tuberosity                  (-)                                             +  Bicipital Groove                       (-)                                             (-)  AC joint                                   (-)                                             (-)  Other:      ROM:  Forward Elevation       160                                          150  Abduction                    100                                          90  ER (at side)                 80                                            60  IR                                 T10                                          deferred     Strength:   Supraspinatus             5/5                                           3/5  Infraspinatus               5/5                                           4/5  Subscap / IR                5/5                                           5/5      Special Tests:              Neer:                                       (-)                                             +              Jiménez:                                 (-)                                             +              SS Stress:                               (-)                                             +              Bear Hug:                                (-)                                             (-)              Graves's:                                 (-)                                             +              Resisted Thrower's:                (-)                                             (-)              Cross Arm Abduction:             (-)                                             (-)    Neurovascular examination  - Motor grossly intact bilaterally to shoulder abduction, elbow flexion and extension, wrist flexion and extension, and intrinsic hand musculature  - Sensation intact to light touch bilaterally in axillary, median, radial, and ulnar distributions  - Symmetrical radial pulses    Imaging:    XR Results:  Results for orders placed during the hospital encounter of 04/13/23    X-Ray Shoulder 2 or More Views Left    Narrative  EXAMINATION:  XR SHOULDER COMPLETE 2 OR MORE VIEWS LEFT    CLINICAL HISTORY:  Unspecified rotator cuff tear or rupture of left shoulder, not specified as traumatic    TECHNIQUE:  Two or three views of the left shoulder were performed.    COMPARISON:  None    FINDINGS:  There is no radiographic evidence of acute osseous, articular, or soft tissue abnormality.  There is mild AC joint arthrosis with prominent bony spurring noted along the undersurface of the acromion.  Glenohumeral joint space appears preserved.    Impression  As Above      Electronically signed by: Gino Ho MD  Date:    04/13/2023  Time:    10:09      MRI Results:  Results for orders placed during the hospital  encounter of 01/31/20    MRI Shoulder Without Contrast Left    Narrative  EXAMINATION:  MRI SHOULDER WITHOUT CONTRAST LEFT    CLINICAL HISTORY:  Shoulder pain, prior xray, rotator cuff tear / impingement suspected;  Pain in left shoulder    TECHNIQUE:  Multisequence, multiplanar MR imaging of the shoulder performed without contrast.    COMPARISON:  Prior left shoulder radiographs dating back to 04/01/2019.    FINDINGS:  Rotator cuff:    Supraspinatus: Full-thickness tear involving the supraspinatus tendon with some intact fibers seen posteriorly.    Infraspinatus: Intact    Subscapularis: Intact    Teres minor: Intact    Muscle bulk is maintained.    Labrum: Evaluation limited by lack of joint distention/intra-articular contrast.  Degenerative changes of the labrum are noted.    Biceps: Long head biceps tendon is intact.    Bone: No fracture present.  Bone marrow signal is unremarkable.  Type 3 acromion with some lateral downsloping of the acromion is noted.    Acromioclavicular joint:Degenerative changes are noted with subchondral cystic change and mild spurring.    Cartilage: Articular cartilage of the glenohumeral joint is preserved    Miscellaneous: No significant joint effusion.  Mild subdeltoid fluid is present.    Impression  Full-thickness supraspinatus tendon tear as above.  Mild subdeltoid bursitis, degenerative changes, and other findings as described.      Electronically signed by: Reynaldo Miller MD  Date:    01/31/2020  Time:    10:41    Physician read: I agree with the above impression.    Assessment/Plan:   Dulce Boogie is a 64 y.o. female with chronic left rotator cuff tear, left shoulder rotator cuff tear arthropathy    Plan:    Discussed diagnosis and treatment options with the patient today.  She has known chronic left shoulder rotator cuff tear that has consistent with rotator cuff tear arthropathy  We again discussed operative and nonoperative treatment options today.  Operative treatment to  include a reverse total shoulder arthroplasty.  She is not interested in operative management.  It has been 4 months since her last corticosteroid injection, she is interested in a repeat injection today.  Left shoulder SAS CSI given in clinic, patient tolerated procedure well with no immediate complications  Take continue naproxen and Tylenol as needed for pain  Follow up with me upon my return from maternity leave heating an injection sooner to my return, she can follow up with Dr. Diaz or Dr. Ramírez for a repeat injection          Anjana Carvajal PA-C  Sports Medicine Physician Assistant       Disclaimer: This note was prepared using a voice recognition system and is likely to have sound alike errors within the text.

## 2025-04-07 NOTE — PROCEDURES
Large Joint Aspiration/Injection: L subacromial bursa    Date/Time: 4/7/2025 1:00 PM    Performed by: Anjana Carvajal PA-C  Authorized by: Anjana Carvajal PA-C    Consent Done?:  Yes (Verbal)  Indications:  Pain  Site marked: the procedure site was marked    Timeout: prior to procedure the correct patient, procedure, and site was verified    Prep: patient was prepped and draped in usual sterile fashion      Local anesthesia used?: Yes    Anesthesia:  Local infiltration  Local anesthetic:  Lidocaine 1% without epinephrine    Details:  Needle Size:  22 G  Ultrasonic Guidance for needle placement?: No    Approach:  Posterior  Location:  Shoulder  Site:  L subacromial bursa  Medications:  40 mg triamcinolone acetonide 40 mg/mL  Patient tolerance:  Patient tolerated the procedure well with no immediate complications    Procedure Note:  We discussed the risk and benefits of injections, including pain, infection, bleeding, damage to adjacent structures, risk of reaction to injection. We discussed the steroid/cortisone injections will not heal the problem but mat help decrease inflammation and help with symptoms. We discussed the risk of repeated injections. The patient expressed understanding and wanted to proceed with the injection. We performed a timeout to verify the proper patient, proper procedure, and the proper site. The injection site was prepared in a sterile fashion. The patient tolerated it well and there were no complication. We did discuss with the patient that steroid injections can cause some increase in blood sugar and blood pressure for up to a week after the injection.

## 2025-04-09 DIAGNOSIS — E11.59 HYPERTENSION ASSOCIATED WITH DIABETES: Primary | ICD-10-CM

## 2025-04-09 DIAGNOSIS — I15.2 HYPERTENSION ASSOCIATED WITH DIABETES: Primary | ICD-10-CM

## 2025-04-10 ENCOUNTER — HOSPITAL ENCOUNTER (OUTPATIENT)
Dept: CARDIOLOGY | Facility: HOSPITAL | Age: 65
Discharge: HOME | End: 2025-04-10
Attending: INTERNAL MEDICINE
Payer: MEDICARE

## 2025-04-10 ENCOUNTER — OFFICE VISIT (OUTPATIENT)
Dept: CARDIOLOGY | Facility: CLINIC | Age: 65
End: 2025-04-10
Payer: MEDICARE

## 2025-04-10 VITALS
WEIGHT: 198.06 LBS | BODY MASS INDEX: 32.96 KG/M2 | HEART RATE: 75 BPM | SYSTOLIC BLOOD PRESSURE: 142 MMHG | OXYGEN SATURATION: 97 % | DIASTOLIC BLOOD PRESSURE: 72 MMHG

## 2025-04-10 DIAGNOSIS — E78.5 HYPERLIPIDEMIA WITH TARGET LDL LESS THAN 70: ICD-10-CM

## 2025-04-10 DIAGNOSIS — I15.2 HYPERTENSION ASSOCIATED WITH DIABETES: ICD-10-CM

## 2025-04-10 DIAGNOSIS — I25.2 OLD MI (MYOCARDIAL INFARCTION): ICD-10-CM

## 2025-04-10 DIAGNOSIS — R94.31 ABNORMAL EKG: ICD-10-CM

## 2025-04-10 DIAGNOSIS — E11.59 HYPERTENSION ASSOCIATED WITH DIABETES: ICD-10-CM

## 2025-04-10 DIAGNOSIS — E66.811 CLASS 1 OBESITY DUE TO EXCESS CALORIES WITH SERIOUS COMORBIDITY AND BODY MASS INDEX (BMI) OF 33.0 TO 33.9 IN ADULT: ICD-10-CM

## 2025-04-10 DIAGNOSIS — E11.29 MICROALBUMINURIA DUE TO TYPE 2 DIABETES MELLITUS: ICD-10-CM

## 2025-04-10 DIAGNOSIS — I77.819 AORTIC ECTASIA: ICD-10-CM

## 2025-04-10 DIAGNOSIS — E11.8 TYPE II DIABETES MELLITUS WITH COMPLICATION: ICD-10-CM

## 2025-04-10 DIAGNOSIS — E78.5 HYPERLIPIDEMIA ASSOCIATED WITH TYPE 2 DIABETES MELLITUS: ICD-10-CM

## 2025-04-10 DIAGNOSIS — E11.69 HYPERLIPIDEMIA ASSOCIATED WITH TYPE 2 DIABETES MELLITUS: ICD-10-CM

## 2025-04-10 DIAGNOSIS — R80.9 MICROALBUMINURIA DUE TO TYPE 2 DIABETES MELLITUS: ICD-10-CM

## 2025-04-10 DIAGNOSIS — E66.09 CLASS 1 OBESITY DUE TO EXCESS CALORIES WITH SERIOUS COMORBIDITY AND BODY MASS INDEX (BMI) OF 33.0 TO 33.9 IN ADULT: ICD-10-CM

## 2025-04-10 DIAGNOSIS — I25.10 CORONARY ARTERY DISEASE INVOLVING NATIVE CORONARY ARTERY OF NATIVE HEART WITHOUT ANGINA PECTORIS: Primary | Chronic | ICD-10-CM

## 2025-04-10 DIAGNOSIS — G72.0 STATIN MYOPATHY: ICD-10-CM

## 2025-04-10 DIAGNOSIS — E66.01 SEVERE OBESITY (BMI 35.0-35.9 WITH COMORBIDITY): ICD-10-CM

## 2025-04-10 DIAGNOSIS — T46.6X5A STATIN MYOPATHY: ICD-10-CM

## 2025-04-10 LAB
OHS QRS DURATION: 86 MS
OHS QTC CALCULATION: 402 MS

## 2025-04-10 PROCEDURE — 93010 ELECTROCARDIOGRAM REPORT: CPT | Mod: HCNC,,, | Performed by: INTERNAL MEDICINE

## 2025-04-10 PROCEDURE — 99999 PR PBB SHADOW E&M-EST. PATIENT-LVL IV: CPT | Mod: PBBFAC,HCNC,, | Performed by: INTERNAL MEDICINE

## 2025-04-10 PROCEDURE — 93005 ELECTROCARDIOGRAM TRACING: CPT | Mod: HCNC

## 2025-04-10 RX ORDER — EVOLOCUMAB 140 MG/ML
140 INJECTION, SOLUTION SUBCUTANEOUS
Qty: 6 EACH | Refills: 3 | Status: SHIPPED | OUTPATIENT
Start: 2025-04-10

## 2025-04-10 NOTE — PROGRESS NOTES
Subjective:   Patient ID:  Dulce Boogie is a 64 y.o. female who presents for follow up of No chief complaint on file.      HPI  8/5/2021 HVAEN RODARTE NP  Ms. Boogie's current conditions include CAD with remote PCI in 2010 (previously followed by Dr. Valle at  Cardiology), HTN, HLD, DM II. Admitted May 2018 with unstable angina. Underwent LHC on 5/22/18 and noted to have LAD 50% mid long lesion ffr 0.81. Also noted LCX non obs CAD and patent stent, RCA 90% prox treated with solange x2 and PDA distal 80% treated with SOLANGE x 2.      Admitted in Feb 2021 with NSTEMI  Underwent successful PCI of mid LAD 80% lesion.   Has no abnormal bleeding on ASA and Brilinta  Started on Imdur following cath   Wasn't taking Repatha as prescribed.      Denies any chest pain, SOB, COOPER,  orthopnea, PND, dizziness, palpitations,  near syncope, syncope or edema . Has no symptoms concerning for angina or equivalent. No CNS Complaints to suggest TIA or CVA. Does well with limiting sodium intake.  Has not required any SL nitro.   BP log  Ranging 130's systolic/80's   Is grieving the loss of her son      2/17/2022  No new complaints she is trying to be compliant with diet not consistent on exercise. Compliant with diet asymptomatic cardiovascular wise.tolerating emdical therapy      12/8/2022   Had colonoscopy 2 polyps removed. Has been having difficulty controlling her blood pressure and her lipids and diabetes all her markers are increased. She ahs dietary indiscretion. Has no other issues clinically except having heart burn she took tums. Denies any other chest pain. Has been compliant with medical therapy.she denies any exertional symptoms but has not been very active.      6/202/2023   Here for f/u has not been exercising has been started on ozempic 2 weeks ago. Ha sno cardiac symptoms needs better diet compliance made improvement on lipids diabetes still the issue      2/22/2024   Asymptomatic not very compliant with diet exercise  compliant with meds. Lipids increased A1c not on target     2024  Asymptomatic still non compliant with diabetes A1c is increased more ldl on target.    4/10/2025  Asymptomatic could not tolerate ozempic due to continued nausea. A1c increased ldl on target.no exercise . diet Compliance is an issue      Past Medical History:   Diagnosis Date    Abnormal EKG 2022    Anticoagulant long-term use     CAD (coronary artery disease)     Depression     History of colon polyps     Hyperlipidemia associated with type 2 diabetes mellitus     Hypertension associated with diabetes     Microalbuminuria due to type 2 diabetes mellitus     NSTEMI (non-ST elevated myocardial infarction) 2018    Obesity     Type II diabetes mellitus with complication        Past Surgical History:   Procedure Laterality Date    CAROTID STENT       SECTION, LOW TRANSVERSE      x 2    COLONOSCOPY N/A 06/10/2019    Procedure: COLONOSCOPY;  Surgeon: Maricarmen Boo MD;  Location: [facility] ENDO;  Service: Endoscopy;  Laterality: N/A;    COLONOSCOPY N/A 2022    Procedure: COLONOSCOPY;  Surgeon: Ryan Lau MD;  Location: [facility] ENDO;  Service: Endoscopy;  Laterality: N/A;    CORONARY STENT PLACEMENT N/A 2021    Procedure: INSERTION, STENT, CORONARY ARTERY;  Surgeon: Aimee Cooper MD;  Location: [facility] CATH LAB;  Service: Cardiology;  Laterality: N/A;    LEFT HEART CATHETERIZATION Left 2018    Procedure: HEART CATH-LEFT;  Surgeon: Aimee Cooper MD;  Location: [facility] CATH LAB;  Service: Cardiology;  Laterality: Left;    LEFT HEART CATHETERIZATION Left 2021    Procedure: CATHETERIZATION, HEART, LEFT;  Surgeon: Aimee Cooper MD;  Location: [facility] CATH LAB;  Service: Cardiology;  Laterality: Left;    PERCUTANEOUS TRANSLUMINAL BALLOON ANGIOPLASTY OF CORONARY ARTERY  2021    Procedure: Angioplasty-coronary;  Surgeon: Aimee Cooper MD;  Location: [facility] CATH LAB;  Service: Cardiology;;       Social  History[1]    Family History   Problem Relation Name Age of Onset    Hypertension Mother      Hyperlipidemia Mother      Hypertension Father      Diabetes Father      Hyperlipidemia Father      Colon cancer Father      Hypertension Brother x3     Breast cancer Neg Hx      Ovarian cancer Neg Hx      Uterine cancer Neg Hx         Current Outpatient Medications   Medication Sig    ALPRAZolam (XANAX) 0.5 MG tablet Take 1 tablet (0.5 mg total) by mouth daily as needed.    amLODIPine-benazepriL (LOTREL) 10-40 mg per capsule Take 1 capsule by mouth once daily.    aspirin (ECOTRIN) 81 MG EC tablet Take 1 tablet (81 mg total) by mouth once daily.    cyclobenzaprine (FLEXERIL) 10 MG tablet Take 1 tablet (10 mg total) by mouth 3 (three) times daily as needed for Muscle spasms.    doxazosin (CARDURA) 4 MG tablet TAKE 1 TABLET BY MOUTH ONCE DAILY.    empagliflozin (JARDIANCE) 25 mg tablet Take 1 tablet (25 mg total) by mouth once daily.    evolocumab (REPATHA SURECLICK) 140 mg/mL PnIj Inject 1 mL (140 mg total) into the skin every 14 (fourteen) days.    ezetimibe (ZETIA) 10 mg tablet Take 1 tablet (10 mg total) by mouth once daily.    glimepiride (AMARYL) 2 MG tablet Take 1 tablet (2 mg total) by mouth before breakfast.    hydroCHLOROthiazide (HYDRODIURIL) 12.5 MG Tab TAKE 1 TABLET BY MOUTH EVERY DAY    isosorbide mononitrate (IMDUR) 60 MG 24 hr tablet TAKE 1 TABLET BY MOUTH EVERY DAY    metFORMIN (GLUCOPHAGE) 500 MG tablet TAKE 2 TABLETS BY MOUTH TWICE A DAY WITH MEALS    methocarbamoL (ROBAXIN) 500 MG Tab TAKE 1 TABLET BY MOUTH THREE TIMES A DAY AS NEEDED MUSCLE SPASM    metoclopramide HCl (REGLAN) 10 MG tablet Take 1 tablet (10 mg total) by mouth every 6 (six) hours as needed (nausea/vomiting).    metoprolol succinate (TOPROL-XL) 50 MG 24 hr tablet TAKE 1 TABLET BY MOUTH TWICE A DAY    naproxen (NAPROSYN) 500 MG tablet Take 1 tablet (500 mg total) by mouth 2 (two) times daily with meals.    nitroGLYCERIN (NITROSTAT) 0.4 MG SL  tablet TAKE ONE FOR ONSET OF CHEST PAIN / THEN EVERY 5 MINUTES IF CP CONTINUES UP TO 3 DOSES..CALL 911    omeprazole (PRILOSEC) 40 MG capsule Take 1 capsule (40 mg total) by mouth once daily.    ondansetron (ZOFRAN) 4 MG tablet Take 1 tablet (4 mg total) by mouth every 8 (eight) hours as needed for Nausea.    ondansetron (ZOFRAN-ODT) 4 MG TbDL Take 1 tablet (4 mg total) by mouth every 6 (six) hours as needed (if vomiting use odt).    rosuvastatin (CRESTOR) 40 MG Tab Take 1 tablet (40 mg total) by mouth every evening.    semaglutide (OZEMPIC) 2 mg/dose (8 mg/3 mL) PnIj Inject 2 mg into the skin every 7 days. (Patient not taking: Reported on 4/10/2025)    spironolactone (ALDACTONE) 25 MG tablet Take 1 tablet (25 mg total) by mouth once daily.     No current facility-administered medications for this visit.     Current Outpatient Medications on File Prior to Visit   Medication Sig    ALPRAZolam (XANAX) 0.5 MG tablet Take 1 tablet (0.5 mg total) by mouth daily as needed.    amLODIPine-benazepriL (LOTREL) 10-40 mg per capsule Take 1 capsule by mouth once daily.    aspirin (ECOTRIN) 81 MG EC tablet Take 1 tablet (81 mg total) by mouth once daily.    cyclobenzaprine (FLEXERIL) 10 MG tablet Take 1 tablet (10 mg total) by mouth 3 (three) times daily as needed for Muscle spasms.    doxazosin (CARDURA) 4 MG tablet TAKE 1 TABLET BY MOUTH ONCE DAILY.    empagliflozin (JARDIANCE) 25 mg tablet Take 1 tablet (25 mg total) by mouth once daily.    evolocumab (REPATHA SURECLICK) 140 mg/mL PnIj Inject 1 mL (140 mg total) into the skin every 14 (fourteen) days.    ezetimibe (ZETIA) 10 mg tablet Take 1 tablet (10 mg total) by mouth once daily.    glimepiride (AMARYL) 2 MG tablet Take 1 tablet (2 mg total) by mouth before breakfast.    hydroCHLOROthiazide (HYDRODIURIL) 12.5 MG Tab TAKE 1 TABLET BY MOUTH EVERY DAY    isosorbide mononitrate (IMDUR) 60 MG 24 hr tablet TAKE 1 TABLET BY MOUTH EVERY DAY    metFORMIN (GLUCOPHAGE) 500 MG tablet  TAKE 2 TABLETS BY MOUTH TWICE A DAY WITH MEALS    methocarbamoL (ROBAXIN) 500 MG Tab TAKE 1 TABLET BY MOUTH THREE TIMES A DAY AS NEEDED MUSCLE SPASM    metoclopramide HCl (REGLAN) 10 MG tablet Take 1 tablet (10 mg total) by mouth every 6 (six) hours as needed (nausea/vomiting).    metoprolol succinate (TOPROL-XL) 50 MG 24 hr tablet TAKE 1 TABLET BY MOUTH TWICE A DAY    naproxen (NAPROSYN) 500 MG tablet Take 1 tablet (500 mg total) by mouth 2 (two) times daily with meals.    nitroGLYCERIN (NITROSTAT) 0.4 MG SL tablet TAKE ONE FOR ONSET OF CHEST PAIN / THEN EVERY 5 MINUTES IF CP CONTINUES UP TO 3 DOSES..CALL 911    omeprazole (PRILOSEC) 40 MG capsule Take 1 capsule (40 mg total) by mouth once daily.    ondansetron (ZOFRAN) 4 MG tablet Take 1 tablet (4 mg total) by mouth every 8 (eight) hours as needed for Nausea.    ondansetron (ZOFRAN-ODT) 4 MG TbDL Take 1 tablet (4 mg total) by mouth every 6 (six) hours as needed (if vomiting use odt).    rosuvastatin (CRESTOR) 40 MG Tab Take 1 tablet (40 mg total) by mouth every evening.    semaglutide (OZEMPIC) 2 mg/dose (8 mg/3 mL) PnIj Inject 2 mg into the skin every 7 days. (Patient not taking: Reported on 4/10/2025)    spironolactone (ALDACTONE) 25 MG tablet Take 1 tablet (25 mg total) by mouth once daily.     No current facility-administered medications on file prior to visit.       Review of Systems   Constitutional: Negative for diaphoresis, malaise/fatigue and weight gain.   HENT:  Negative for hoarse voice.    Eyes:  Negative for double vision and visual disturbance.   Cardiovascular:  Negative for chest pain, claudication, cyanosis, dyspnea on exertion, irregular heartbeat, leg swelling, near-syncope, orthopnea, palpitations, paroxysmal nocturnal dyspnea and syncope.   Respiratory:  Negative for cough, hemoptysis, shortness of breath and snoring.    Hematologic/Lymphatic: Negative for bleeding problem. Does not bruise/bleed easily.   Skin:  Negative for color change and  poor wound healing.   Musculoskeletal:  Negative for muscle cramps, muscle weakness and myalgias.   Gastrointestinal:  Negative for bloating, abdominal pain, change in bowel habit, diarrhea, heartburn, hematemesis, hematochezia, melena and nausea.   Neurological:  Negative for excessive daytime sleepiness, dizziness, headaches, light-headedness, loss of balance, numbness and weakness.   Psychiatric/Behavioral:  Negative for memory loss. The patient does not have insomnia.    Allergic/Immunologic: Negative for hives.       Objective:   Physical Exam  Constitutional:       General: She is not in acute distress.     Appearance: Normal appearance. She is well-developed. She is obese. She is not ill-appearing.   HENT:      Head: Normocephalic and atraumatic.   Eyes:      General: No scleral icterus.     Pupils: Pupils are equal, round, and reactive to light.   Neck:      Thyroid: No thyromegaly.      Vascular: Normal carotid pulses. No carotid bruit, hepatojugular reflux or JVD.      Trachea: No tracheal deviation.   Cardiovascular:      Rate and Rhythm: Normal rate and regular rhythm.      Pulses: Normal pulses.      Heart sounds: Normal heart sounds. No murmur heard.     No friction rub. No gallop.   Pulmonary:      Effort: Pulmonary effort is normal. No respiratory distress.      Breath sounds: Normal breath sounds. No wheezing, rhonchi or rales.   Chest:      Chest wall: No tenderness.   Abdominal:      General: Bowel sounds are normal. There is no abdominal bruit.      Palpations: Abdomen is soft. There is no hepatomegaly or pulsatile mass.      Tenderness: There is no abdominal tenderness.   Musculoskeletal:      Right shoulder: No deformity.      Cervical back: Normal range of motion and neck supple.      Right lower leg: No edema.      Left lower leg: No edema.   Skin:     General: Skin is warm and dry.      Findings: No erythema or rash.      Nails: There is no clubbing.   Neurological:      General: No focal  deficit present.      Mental Status: She is alert and oriented to person, place, and time.      Cranial Nerves: No cranial nerve deficit.      Coordination: Coordination normal.   Psychiatric:         Mood and Affect: Mood normal.         Speech: Speech normal.         Behavior: Behavior normal.       Vitals:    04/10/25 1329 04/10/25 1330   BP: (!) 140/70 (!) 142/72   BP Location: Left arm Right arm   Patient Position: Sitting Sitting   Pulse: 75    SpO2: 97%    Weight: 89.8 kg (198 lb 1.3 oz)      Lab Results   Component Value Date    CHOL 143 03/07/2025    CHOL 204 (H) 08/16/2024    CHOL 128 08/05/2024      Body mass index is 32.96 kg/m².   Lab Results   Component Value Date    HGBA1C 8.0 (H) 03/07/2025      BMP  Lab Results   Component Value Date     03/07/2025    K 2.9 (L) 03/07/2025     03/07/2025    CO2 26 03/07/2025    BUN 19 03/07/2025    CREATININE 1.0 03/07/2025    CALCIUM 8.7 03/07/2025    ANIONGAP 11 03/07/2025    EGFRNORACEVR >60.0 03/07/2025      Lab Results   Component Value Date    HDL 42 03/07/2025    HDL 45 08/16/2024    HDL 41 08/05/2024     Lab Results   Component Value Date    LDLCALC 67.6 03/07/2025    LDLCALC 112.8 08/16/2024    LDLCALC 56.0 (L) 08/05/2024     Lab Results   Component Value Date    TRIG 167 (H) 03/07/2025    TRIG 231 (H) 08/16/2024    TRIG 155 (H) 08/05/2024     Lab Results   Component Value Date    CHOLHDL 29.4 03/07/2025    CHOLHDL 22.1 08/16/2024    CHOLHDL 32.0 08/05/2024       Chemistry        Component Value Date/Time     03/07/2025 0755    K 2.9 (L) 03/07/2025 0755     03/07/2025 0755    CO2 26 03/07/2025 0755    BUN 19 03/07/2025 0755    CREATININE 1.0 03/07/2025 0755     (H) 03/07/2025 0755        Component Value Date/Time    CALCIUM 8.7 03/07/2025 0755    ALKPHOS 58 03/07/2025 0755    AST 53 (H) 03/07/2025 0755    ALT 29 03/07/2025 0755    BILITOT 0.4 03/07/2025 0755    ESTGFRAFRICA >60.0 03/30/2022 0900    EGFRNONAA >60.0 03/30/2022  0900          Lab Results   Component Value Date    TSH 2.335 08/05/2024     Lab Results   Component Value Date    INR 1.0 01/23/2021    INR 1.0 01/23/2021    INR 1.0 05/21/2018     Lab Results   Component Value Date    WBC 11.88 03/06/2025    HGB 13.4 03/06/2025    HCT 40.9 03/06/2025    MCV 82 03/06/2025     03/06/2025     BMP  Sodium   Date Value Ref Range Status   03/07/2025 143 136 - 145 mmol/L Final     Potassium   Date Value Ref Range Status   03/07/2025 2.9 (L) 3.5 - 5.1 mmol/L Final     Chloride   Date Value Ref Range Status   03/07/2025 106 95 - 110 mmol/L Final     CO2   Date Value Ref Range Status   03/07/2025 26 23 - 29 mmol/L Final     BUN   Date Value Ref Range Status   03/07/2025 19 8 - 23 mg/dL Final     Creatinine   Date Value Ref Range Status   03/07/2025 1.0 0.5 - 1.4 mg/dL Final     Calcium   Date Value Ref Range Status   03/07/2025 8.7 8.7 - 10.5 mg/dL Final     Anion Gap   Date Value Ref Range Status   03/07/2025 11 8 - 16 mmol/L Final     eGFR if    Date Value Ref Range Status   03/30/2022 >60.0 >60 mL/min/1.73 m^2 Final     eGFR if non    Date Value Ref Range Status   03/30/2022 >60.0 >60 mL/min/1.73 m^2 Final     Comment:     Calculation used to obtain the estimated glomerular filtration  rate (eGFR) is the CKD-EPI equation.        CrCl cannot be calculated (Patient's most recent lab result is older than the maximum 7 days allowed.).    Assessment:     1. Coronary artery disease involving native coronary artery of native heart without angina pectoris    2. Severe obesity (BMI 35.0-35.9 with comorbidity)    3. Type II diabetes mellitus with complication    4. Hyperlipidemia associated with type 2 diabetes mellitus    5. Hypertension associated with diabetes    6. Aortic ectasia    7. Microalbuminuria due to type 2 diabetes mellitus    8. Class 1 obesity due to excess calories with serious comorbidity and body mass index (BMI) of 33.0 to 33.9 in adult     9. Old MI (myocardial infarction)    10. Abnormal EKG      Cad s/p pci old mi asymptomatic needs more regular exercise counseled discussed weight loss and diabetes compliance  Obesity discussed weight loss diet compliance exercise did not tolerate ozempic  also but still heavy on carbohydrates counseled offered trial of mounjaro she  will let me know.  Htn controlled low salt diet emphasized   Hlp on target diet compliance seems to be somewhat  the issue she had improvement since last check she is on repatha and statins continue same    Diabetes uncontrolled  discussed appropriate diet exercise weight loss and starches calories and carbohydrates intake.? Negrita martinez she will let me know  Plan:   Continue current therapy  Cardiac low salt diet.  Risk factor modification and excercise program.  F/u in 6 months with lipid cmp a1c             [1]   Social History  Tobacco Use    Smoking status: Never    Smokeless tobacco: Never   Substance Use Topics    Alcohol use: No    Drug use: No

## 2025-04-16 ENCOUNTER — OFFICE VISIT (OUTPATIENT)
Dept: INTERNAL MEDICINE | Facility: CLINIC | Age: 65
End: 2025-04-16
Payer: MEDICARE

## 2025-04-16 VITALS
BODY MASS INDEX: 32.8 KG/M2 | DIASTOLIC BLOOD PRESSURE: 78 MMHG | WEIGHT: 196.88 LBS | HEIGHT: 65 IN | SYSTOLIC BLOOD PRESSURE: 150 MMHG | TEMPERATURE: 98 F | OXYGEN SATURATION: 97 % | HEART RATE: 72 BPM

## 2025-04-16 DIAGNOSIS — E11.8 TYPE II DIABETES MELLITUS WITH COMPLICATION: Primary | ICD-10-CM

## 2025-04-16 DIAGNOSIS — I15.2 HYPERTENSION ASSOCIATED WITH DIABETES: ICD-10-CM

## 2025-04-16 DIAGNOSIS — F41.9 ANXIETY: ICD-10-CM

## 2025-04-16 DIAGNOSIS — E11.59 HYPERTENSION ASSOCIATED WITH DIABETES: ICD-10-CM

## 2025-04-16 PROCEDURE — 1126F AMNT PAIN NOTED NONE PRSNT: CPT | Mod: HCNC,CPTII,S$GLB,

## 2025-04-16 PROCEDURE — 3078F DIAST BP <80 MM HG: CPT | Mod: HCNC,CPTII,S$GLB,

## 2025-04-16 PROCEDURE — 3008F BODY MASS INDEX DOCD: CPT | Mod: HCNC,CPTII,S$GLB,

## 2025-04-16 PROCEDURE — 3288F FALL RISK ASSESSMENT DOCD: CPT | Mod: HCNC,CPTII,S$GLB,

## 2025-04-16 PROCEDURE — 3077F SYST BP >= 140 MM HG: CPT | Mod: HCNC,CPTII,S$GLB,

## 2025-04-16 PROCEDURE — 99214 OFFICE O/P EST MOD 30 MIN: CPT | Mod: HCNC,S$GLB,,

## 2025-04-16 PROCEDURE — G2211 COMPLEX E/M VISIT ADD ON: HCPCS | Mod: HCNC,S$GLB,,

## 2025-04-16 PROCEDURE — 3052F HG A1C>EQUAL 8.0%<EQUAL 9.0%: CPT | Mod: HCNC,CPTII,S$GLB,

## 2025-04-16 PROCEDURE — 1101F PT FALLS ASSESS-DOCD LE1/YR: CPT | Mod: HCNC,CPTII,S$GLB,

## 2025-04-16 PROCEDURE — 99999 PR PBB SHADOW E&M-EST. PATIENT-LVL IV: CPT | Mod: PBBFAC,HCNC,,

## 2025-04-16 PROCEDURE — 4010F ACE/ARB THERAPY RXD/TAKEN: CPT | Mod: HCNC,CPTII,S$GLB,

## 2025-04-16 PROCEDURE — 1159F MED LIST DOCD IN RCRD: CPT | Mod: HCNC,CPTII,S$GLB,

## 2025-04-16 RX ORDER — ALPRAZOLAM 0.5 MG/1
0.5 TABLET ORAL DAILY PRN
Qty: 90 TABLET | Refills: 5 | Status: SHIPPED | OUTPATIENT
Start: 2025-04-16

## 2025-04-17 NOTE — PROGRESS NOTES
Patient ID: Dulce Boogie is a 64 y.o. female.    Chief Complaint: Establish Care and Medication Refill (/)    History of Present Illness    CHIEF COMPLAINT:  - Ms. Boogie presents for a follow-up visit to discuss medication management and recent lab results.    HPI:  Ms. Boogie has been seeing Dr. Hanson every 3 to 6 months for management of her diabetes and other health conditions. She has a history of heart issues, including stent placement, and is currently following up with a cardiologist. Her blood pressure is usually controlled at home, but it was slightly elevated during this visit, which she attributes to having to get her medication today.    Regarding diabetes management, she has been prescribed metformin 1000 mg twice daily, glyburide, and Jardiance 25 mg. She reports significant GI side effects from one of these medications, leading to discontinuation. Recent lab work from last month showed that her diabetes levels were still high.    She takes Xanax as needed for anxiety, not daily, but only when she feels the need, sometimes when she has visual disturbances.    She denies nausea as a side effect of her diabetes medication.      ROS:  General: -fever, -chills, -fatigue, -weight gain, -weight loss  Eyes: -vision changes, -redness, -discharge  ENT: -ear pain, -nasal congestion, -sore throat  Cardiovascular: -chest pain, -palpitations, -lower extremity edema  Respiratory: -cough, -shortness of breath  Gastrointestinal: +abdominal pain, -nausea, -vomiting, -diarrhea, -constipation, -blood in stool, +indigestion  Genitourinary: -dysuria, -hematuria, -frequency  Musculoskeletal: -joint pain, -muscle pain  Skin: -rash, -lesion  Neurological: -headache, -dizziness, -numbness, -tingling  Psychiatric: +anxiety, -depression, -sleep difficulty         Pmh, Psh, Family Hx, Social Hx updated in Epic Tabs today.         4/16/2025     8:56 AM 2/6/2024     8:10 AM 10/11/2023    10:07 AM 1/24/2022    10:02 AM 11/23/2020      7:58 AM 10/29/2018     9:33 AM   Depression Patient Health Questionnaire   Over the last two weeks how often have you been bothered by little interest or pleasure in doing things Not at all Not at all Several days Not at all  Several days  Several days    Over the last two weeks how often have you been bothered by feeling down, depressed or hopeless Not at all Not at all Several days Several days  Several days  Not at all    PHQ-2 Total Score 0 0 2 1 2 1       Data saved with a previous flowsheet row definition       Active Problem List with Overview Notes    Diagnosis Date Noted    Abdominal pain 03/06/2025    Nausea and vomiting 03/06/2025    Severe obesity (BMI 35.0-35.9 with comorbidity) 02/22/2024    Chronic left shoulder pain 08/16/2023    Left elbow pain 08/16/2023    Decreased ROM of left shoulder 08/16/2023    Abnormal EKG 12/08/2022    Class 1 obesity due to excess calories with serious comorbidity and body mass index (BMI) of 33.0 to 33.9 in adult 11/23/2020    Microalbuminuria due to type 2 diabetes mellitus     Major depression in remission 10/29/2018    Aortic ectasia 10/29/2018     CXR 7/10/2011- mild      Type II diabetes mellitus with complication     Hypertension associated with diabetes     Hyperlipidemia associated with type 2 diabetes mellitus     Old MI (myocardial infarction) 05/21/2018    History of colon polyps     CAD with far remote PCI of unknown vessel        Past Medical History:   Diagnosis Date    Abnormal EKG 12/08/2022    Anticoagulant long-term use     CAD (coronary artery disease)     Depression     History of colon polyps     Hyperlipidemia associated with type 2 diabetes mellitus     Hypertension associated with diabetes     Microalbuminuria due to type 2 diabetes mellitus     NSTEMI (non-ST elevated myocardial infarction) 05/21/2018    Obesity     Type II diabetes mellitus with complication        Past Surgical History:   Procedure Laterality Date    CAROTID STENT        SECTION, LOW TRANSVERSE      x 2    COLONOSCOPY N/A 06/10/2019    Procedure: COLONOSCOPY;  Surgeon: Maricarmen Boo MD;  Location: [facility] ENDO;  Service: Endoscopy;  Laterality: N/A;    COLONOSCOPY N/A 2022    Procedure: COLONOSCOPY;  Surgeon: Ryan Lau MD;  Location: [facility] ENDO;  Service: Endoscopy;  Laterality: N/A;    CORONARY STENT PLACEMENT N/A 2021    Procedure: INSERTION, STENT, CORONARY ARTERY;  Surgeon: Aimee Cooper MD;  Location: [facility] CATH LAB;  Service: Cardiology;  Laterality: N/A;    LEFT HEART CATHETERIZATION Left 2018    Procedure: HEART CATH-LEFT;  Surgeon: Aimee Cooper MD;  Location: [facility] CATH LAB;  Service: Cardiology;  Laterality: Left;    LEFT HEART CATHETERIZATION Left 2021    Procedure: CATHETERIZATION, HEART, LEFT;  Surgeon: Aimee Cooper MD;  Location: [facility] CATH LAB;  Service: Cardiology;  Laterality: Left;    PERCUTANEOUS TRANSLUMINAL BALLOON ANGIOPLASTY OF CORONARY ARTERY  2021    Procedure: Angioplasty-coronary;  Surgeon: Aimee Cooper MD;  Location: [facility] CATH LAB;  Service: Cardiology;;       Family History   Problem Relation Name Age of Onset    Hypertension Mother      Hyperlipidemia Mother      Hypertension Father      Diabetes Father      Hyperlipidemia Father      Colon cancer Father      Hypertension Brother x3     Breast cancer Neg Hx      Ovarian cancer Neg Hx      Uterine cancer Neg Hx         Social History     Socioeconomic History    Marital status:    Tobacco Use    Smoking status: Never    Smokeless tobacco: Never   Substance and Sexual Activity    Alcohol use: No    Drug use: No    Sexual activity: Not Currently     Partners: Male     Social Drivers of Health     Financial Resource Strain: Medium Risk (2024)    Overall Financial Resource Strain (CARDIA)     Difficulty of Paying Living Expenses: Somewhat hard   Food Insecurity: Food Insecurity Present (2024)    Hunger Vital Sign     Worried About  Running Out of Food in the Last Year: Sometimes true     Ran Out of Food in the Last Year: Sometimes true   Transportation Needs: Unmet Transportation Needs (1/8/2024)    PRAPARE - Transportation     Lack of Transportation (Medical): No     Lack of Transportation (Non-Medical): Yes   Physical Activity: Insufficiently Active (1/8/2024)    Exercise Vital Sign     Days of Exercise per Week: 2 days     Minutes of Exercise per Session: 20 min   Stress: Stress Concern Present (1/8/2024)    [employer] - Occupational Stress Questionnaire     Feeling of Stress : To some extent   Housing Stability: High Risk (10/9/2023)    Housing Stability Vital Sign     Unable to Pay for Housing in the Last Year: Yes     Number of Places Lived in the Last Year: 1     Unstable Housing in the Last Year: No       Medications Ordered Prior to Encounter[1]    Review of patient's allergies indicates:   Allergen Reactions    No known drug allergies        General - Well developed, alert and oriented in NAD  HEENT - normocephalic, no evidence of trauma, sclera white, EOMI  Neck - full range of motion  COR - regular rate and rhythm without murmurs or gallops  Lungs - Clear  Abdomen - soft, non-tender  Ext - no cyanosis or edema     Assessment:     1. Type II diabetes mellitus with complication    2. Anxiety    3. Hypertension associated with diabetes        Pertinent Labs:    Chemistry        Component Value Date/Time     03/07/2025 0755    K 2.9 (L) 03/07/2025 0755     03/07/2025 0755    CO2 26 03/07/2025 0755    BUN 19 03/07/2025 0755    CREATININE 1.0 03/07/2025 0755     (H) 03/07/2025 0755        Component Value Date/Time    CALCIUM 8.7 03/07/2025 0755    ALKPHOS 58 03/07/2025 0755    AST 53 (H) 03/07/2025 0755    ALT 29 03/07/2025 0755    BILITOT 0.4 03/07/2025 0755    ESTGFRAFRICA >60.0 03/30/2022 0900    EGFRNONAA >60.0 03/30/2022 0900          Lab Results   Component Value Date    WBC 11.88  "03/06/2025    HGB 13.4 03/06/2025    HCT 40.9 03/06/2025    MCV 82 03/06/2025    MCH 26.9 (L) 03/06/2025    MCHC 32.8 03/06/2025    RDW 15.2 (H) 03/06/2025     03/06/2025    MPV 13.0 (H) 03/06/2025    PLTEST Appears normal 01/24/2021       Lab Results   Component Value Date    HGBA1C 8.0 (H) 03/07/2025    HGBA1C 7.6 (H) 11/13/2024    HGBA1C 7.9 (H) 08/16/2024     (H) 03/07/2025     Lab Results   Component Value Date    LDLCALC 67.6 03/07/2025     Lab Results   Component Value Date    TSH 2.335 08/05/2024     Lab Results   Component Value Date    CHOL 143 03/07/2025    CHOL 204 (H) 08/16/2024    CHOL 128 08/05/2024     Lab Results   Component Value Date    TRIG 167 (H) 03/07/2025    TRIG 231 (H) 08/16/2024    TRIG 155 (H) 08/05/2024     Lab Results   Component Value Date    HDL 42 03/07/2025    HDL 45 08/16/2024    HDL 41 08/05/2024     Lab Results   Component Value Date    LDLCALC 67.6 03/07/2025    LDLCALC 112.8 08/16/2024    LDLCALC 56.0 (L) 08/05/2024     No results found for: "NONHDLC"  Lab Results   Component Value Date    CHOLHDL 29.4 03/07/2025    CHOLHDL 22.1 08/16/2024    CHOLHDL 32.0 08/05/2024       The ASCVD Risk score (Jose G BURNETT, et al., 2019) failed to calculate for the following reasons:    Risk score cannot be calculated because patient has a medical history suggesting prior/existing ASCVD    Plan:     Assessment & Plan    Reviewed medication regimen, noting concerns about diabetes control.  Considered adjusting diabetes medications due to slightly elevated recent lab values, acknowledging difficulty tolerating certain diabetes medications in the past.  Will reassess diabetes management after repeat labs in May.  Evaluated anxiety management, noting Xanax use as needed.  Continued Xanax as needed for anxiety.    TYPE 2 DIABETES MELLITUS:  - Ordered repeat labs to be done before May follow-up visit.  - Noted that the patient has been taking metformin 1000 mg twice daily, glyburide, and " Jardiance 25 mg for diabetes management.  - Evaluated recent lab results showing slightly elevated diabetes levels despite medication.  - Advised the patient to adhere to a diabetic diet.  - Suggested possible medication adjustment based on May lab results.  - Emphasized the importance of dietary changes for diabetes control.  - Instructed the patient to repeat labs in May to reassess diabetes control.  - Noted that the patient is taking multiple oral hypoglycemic medications: metformin 1000 mg twice daily, glyburide, and Jardiance 25 mg.  - Considered adjusting diabetes medications based on recent lab results.  - Planned to reassess diabetes medication regimen after reviewing next set of lab results in May.  - Educated on importance of adhering to diabetic diet, avoiding sugar and simple carbohydrates.    HYPERTENSION:  - Noted that the patient is taking medications for blood pressure management.  - Observed slightly elevated blood pressure during the visit, but acknowledged that it is usually controlled at home.  - Advised the patient to continue current blood pressure medications.    ANXIETY DISORDER:  - Discussed potential risks of regular Xanax use, including addiction and withdrawal symptoms.  - Noted that the patient takes Xanax as needed for anxiety, not daily.  - Approved continued use of Xanax as needed.  - Prescribed Xanax and sent prescription to pharmacy.    CORONARY ANGIOPLASTY HISTORY:  - Noted the patient's history of stents.  - Advised the patient to continue follow-up visits with cardiologist.         1. Type II diabetes mellitus with complication    2. Anxiety  - ALPRAZolam (XANAX) 0.5 MG tablet; Take 1 tablet (0.5 mg total) by mouth daily as needed.  Dispense: 90 tablet; Refill: 5    3. Hypertension associated with diabetes    Hypertension: Amlodipine-benazepril 10-40 mg daily, hydrochlorothiazide 12.5 mg daily, metoprolol succinate 50 mg daily, spironolactone 25 mg daily.  DM2:  Metformin 1000  mg twice daily, glimepiride 2 mg daily, Jardiance 25 mg daily.  CAD s/p PCI 2010:  Following cardiology.  On rosuvastatin 40 mg daily, ezetimibe 10 mg daily, Repatha, metoprolol succinate, spironolactone, benazepril, isosorbide mononitrate 60 mg daily.  Anxiety: Alprazolam 0.5 mg as needed.    Immunization History   Administered Date(s) Administered    Influenza (FLUAD) - Quadrivalent - Adjuvanted - PF *Preferred* (65+) 11/23/2020, 10/06/2021    Influenza - Intradermal - Trivalent - PF 12/20/2012    Influenza - Quadrivalent - PF *Preferred* (6 months and older) 10/16/2023    Influenza - Trivalent - Afluria, Fluzone MDV 10/28/2010, 12/07/2011    Influenza - Trivalent - Fluzone High Dose - PF (65 years and older) 01/06/2016, 01/17/2017, 10/29/2018, 10/25/2019    Pneumococcal Conjugate - 13 Valent 01/06/2016    Pneumococcal Polysaccharide - 23 Valent 01/17/2017    Tdap 12/07/2011       No orders of the defined types were placed in this encounter.      Portions of this note were generated by [employer]box.    Each patient to whom medical services by telemedicine are provided:  (1) informed of the relationship between the physician and patient and the respective role of any other health care provider with respect to management of the patient; and (2) notified that he or she may decline to receive medical services by telemedicine and may withdraw from such care at any time.    I spent a total of 35 minutes face to face and non-face to face on the date of this visit.This includes time preparing to see the patient (eg, review of tests, notes), obtaining and/or reviewing additional history from an independent historian and/or outside medical records, documenting clinical information in the electronic health record, independently interpreting results and/or communicating results to the patient/family/caregiver, or care coordinator.  Visit today included increased complexity associated with the care of the episodic problem  addressed and managing the longitudinal care of the patient due to the serious and/or complex managed problem(s).      This note was generated with the assistance of ambient listening technology. Verbal consent was obtained by the patient and accompanying visitor(s) for the recording of patient appointment to facilitate this note. I attest to having reviewed and edited the generated note for accuracy, though some syntax or spelling errors may persist. Please contact the author of this note for any clarification.      Devonte Shaffer MD         [1]   Current Outpatient Medications on File Prior to Visit   Medication Sig Dispense Refill    amLODIPine-benazepriL (LOTREL) 10-40 mg per capsule Take 1 capsule by mouth once daily. 90 capsule 3    aspirin (ECOTRIN) 81 MG EC tablet Take 1 tablet (81 mg total) by mouth once daily.  0    cyclobenzaprine (FLEXERIL) 10 MG tablet Take 1 tablet (10 mg total) by mouth 3 (three) times daily as needed for Muscle spasms. 90 tablet 5    doxazosin (CARDURA) 4 MG tablet TAKE 1 TABLET BY MOUTH ONCE DAILY. 90 tablet 3    empagliflozin (JARDIANCE) 25 mg tablet Take 1 tablet (25 mg total) by mouth once daily. 90 tablet 3    evolocumab (REPATHA SURECLICK) 140 mg/mL PnIj Inject 1 mL (140 mg total) into the skin every 14 (fourteen) days. 6 each 3    ezetimibe (ZETIA) 10 mg tablet Take 1 tablet (10 mg total) by mouth once daily. 90 tablet 3    glimepiride (AMARYL) 2 MG tablet Take 1 tablet (2 mg total) by mouth before breakfast. 90 tablet 3    hydroCHLOROthiazide (HYDRODIURIL) 12.5 MG Tab TAKE 1 TABLET BY MOUTH EVERY DAY 90 tablet 3    isosorbide mononitrate (IMDUR) 60 MG 24 hr tablet TAKE 1 TABLET BY MOUTH EVERY DAY 90 tablet 3    metFORMIN (GLUCOPHAGE) 500 MG tablet TAKE 2 TABLETS BY MOUTH TWICE A DAY WITH MEALS 360 tablet 3    methocarbamoL (ROBAXIN) 500 MG Tab TAKE 1 TABLET BY MOUTH THREE TIMES A DAY AS NEEDED MUSCLE SPASM 60 tablet 5    metoprolol succinate (TOPROL-XL) 50 MG 24 hr tablet  TAKE 1 TABLET BY MOUTH TWICE A  tablet 3    naproxen (NAPROSYN) 500 MG tablet Take 1 tablet (500 mg total) by mouth 2 (two) times daily with meals. 60 tablet 4    omeprazole (PRILOSEC) 40 MG capsule Take 1 capsule (40 mg total) by mouth once daily. 90 capsule 3    ondansetron (ZOFRAN-ODT) 4 MG TbDL Take 1 tablet (4 mg total) by mouth every 6 (six) hours as needed (if vomiting use odt). 30 tablet 0    rosuvastatin (CRESTOR) 40 MG Tab Take 1 tablet (40 mg total) by mouth every evening. 90 tablet 3    spironolactone (ALDACTONE) 25 MG tablet Take 1 tablet (25 mg total) by mouth once daily. 90 tablet 3    metoclopramide HCl (REGLAN) 10 MG tablet Take 1 tablet (10 mg total) by mouth every 6 (six) hours as needed (nausea/vomiting). (Patient not taking: Reported on 4/16/2025) 30 tablet 0    nitroGLYCERIN (NITROSTAT) 0.4 MG SL tablet TAKE ONE FOR ONSET OF CHEST PAIN / THEN EVERY 5 MINUTES IF CP CONTINUES UP TO 3 DOSES..CALL 911 (Patient not taking: Reported on 4/16/2025) 25 tablet 11     No current facility-administered medications on file prior to visit.

## 2025-04-28 DIAGNOSIS — Z00.00 ENCOUNTER FOR MEDICARE ANNUAL WELLNESS EXAM: ICD-10-CM

## 2025-05-06 ENCOUNTER — PATIENT OUTREACH (OUTPATIENT)
Dept: ADMINISTRATIVE | Facility: HOSPITAL | Age: 65
End: 2025-05-06
Payer: MEDICARE

## 2025-05-12 ENCOUNTER — LAB VISIT (OUTPATIENT)
Dept: LAB | Facility: HOSPITAL | Age: 65
End: 2025-05-12
Attending: INTERNAL MEDICINE
Payer: MEDICARE

## 2025-05-12 DIAGNOSIS — E11.8 TYPE II DIABETES MELLITUS WITH COMPLICATION: ICD-10-CM

## 2025-05-12 LAB
ALBUMIN/CREAT UR: 54.5 UG/MG
CREAT UR-MCNC: 297 MG/DL (ref 15–325)
MICROALBUMIN UR-MCNC: 162 UG/ML (ref ?–5000)

## 2025-05-12 PROCEDURE — 82570 ASSAY OF URINE CREATININE: CPT

## 2025-05-14 DIAGNOSIS — E78.5 HYPERLIPIDEMIA WITH TARGET LDL LESS THAN 70: ICD-10-CM

## 2025-05-14 DIAGNOSIS — G72.0 STATIN MYOPATHY: ICD-10-CM

## 2025-05-14 DIAGNOSIS — T46.6X5A STATIN MYOPATHY: ICD-10-CM

## 2025-05-14 RX ORDER — EVOLOCUMAB 140 MG/ML
140 INJECTION, SOLUTION SUBCUTANEOUS
Qty: 6 EACH | Refills: 3 | Status: ACTIVE | OUTPATIENT
Start: 2025-05-14

## 2025-05-21 DIAGNOSIS — Z78.0 MENOPAUSE: ICD-10-CM

## 2025-05-22 ENCOUNTER — OFFICE VISIT (OUTPATIENT)
Dept: INTERNAL MEDICINE | Facility: CLINIC | Age: 65
End: 2025-05-22
Payer: MEDICARE

## 2025-05-22 VITALS
OXYGEN SATURATION: 97 % | HEART RATE: 62 BPM | SYSTOLIC BLOOD PRESSURE: 138 MMHG | WEIGHT: 194.88 LBS | BODY MASS INDEX: 33.27 KG/M2 | HEIGHT: 64 IN | DIASTOLIC BLOOD PRESSURE: 88 MMHG | TEMPERATURE: 97 F

## 2025-05-22 DIAGNOSIS — E11.69 HYPERLIPIDEMIA ASSOCIATED WITH TYPE 2 DIABETES MELLITUS: ICD-10-CM

## 2025-05-22 DIAGNOSIS — I15.2 HYPERTENSION ASSOCIATED WITH DIABETES: ICD-10-CM

## 2025-05-22 DIAGNOSIS — E78.5 HYPERLIPIDEMIA ASSOCIATED WITH TYPE 2 DIABETES MELLITUS: ICD-10-CM

## 2025-05-22 DIAGNOSIS — Z79.899 OTHER LONG TERM (CURRENT) DRUG THERAPY: ICD-10-CM

## 2025-05-22 DIAGNOSIS — L98.9 CHEST SKIN LESION: ICD-10-CM

## 2025-05-22 DIAGNOSIS — E11.59 HYPERTENSION ASSOCIATED WITH DIABETES: ICD-10-CM

## 2025-05-22 DIAGNOSIS — E11.8 TYPE II DIABETES MELLITUS WITH COMPLICATION: Primary | ICD-10-CM

## 2025-05-22 PROCEDURE — 1126F AMNT PAIN NOTED NONE PRSNT: CPT | Mod: CPTII,S$GLB,,

## 2025-05-22 PROCEDURE — 1101F PT FALLS ASSESS-DOCD LE1/YR: CPT | Mod: CPTII,S$GLB,,

## 2025-05-22 PROCEDURE — 3008F BODY MASS INDEX DOCD: CPT | Mod: CPTII,S$GLB,,

## 2025-05-22 PROCEDURE — 3051F HG A1C>EQUAL 7.0%<8.0%: CPT | Mod: CPTII,S$GLB,,

## 2025-05-22 PROCEDURE — 3060F POS MICROALBUMINURIA REV: CPT | Mod: CPTII,S$GLB,,

## 2025-05-22 PROCEDURE — 4010F ACE/ARB THERAPY RXD/TAKEN: CPT | Mod: CPTII,S$GLB,,

## 2025-05-22 PROCEDURE — 3079F DIAST BP 80-89 MM HG: CPT | Mod: CPTII,S$GLB,,

## 2025-05-22 PROCEDURE — 99999 PR PBB SHADOW E&M-EST. PATIENT-LVL III: CPT | Mod: PBBFAC,,,

## 2025-05-22 PROCEDURE — 3288F FALL RISK ASSESSMENT DOCD: CPT | Mod: CPTII,S$GLB,,

## 2025-05-22 PROCEDURE — 3066F NEPHROPATHY DOC TX: CPT | Mod: CPTII,S$GLB,,

## 2025-05-22 PROCEDURE — 99214 OFFICE O/P EST MOD 30 MIN: CPT | Mod: S$GLB,,,

## 2025-05-22 PROCEDURE — 3075F SYST BP GE 130 - 139MM HG: CPT | Mod: CPTII,S$GLB,,

## 2025-05-22 PROCEDURE — G2211 COMPLEX E/M VISIT ADD ON: HCPCS | Mod: S$GLB,,,

## 2025-05-22 RX ORDER — GLIMEPIRIDE 4 MG/1
4 TABLET ORAL
Qty: 90 TABLET | Refills: 3 | Status: SHIPPED | OUTPATIENT
Start: 2025-05-22 | End: 2026-05-22

## 2025-05-22 NOTE — PROGRESS NOTES
Patient ID: Dulce Boogie is a 64 y.o. female.    Chief Complaint: Follow-up (6 m)    History of Present Illness    CHIEF COMPLAINT:  - Ms. Boogie presents with a recurring breast lump and for follow-up on diabetes management.    HPI:  Ms. Boogie reports a recurring lump in her breast, present for approximately 3-4 weeks. It recurs in the same location, despite previous treatment. A mammogram did not show any abnormalities. Dr. Hanson, her previous physician, prescribed a cream which temporarily improved the condition, but the lump has returned and is now slightly larger. Ms. Boogie reports intermittent pain associated with the lump.    Regarding diabetes, her A1c level has improved to 7.3, which is better than her previous result but still above the target of less than 7. She is currently taking Metformin 1000 mg twice daily, glyburide 2 mg, and a third diabetes medication. Dr. Hanson had discussed other medications like Ozempic or Mounjaro, but she declined to start these due to potential side effects.    Ms. Boogie is following up with a cardiologist, Dr. Marmolejo, for a cardiac condition.    She denies numbness or tingling in her extremities.      ROS:  General: -fever, -chills, -fatigue, -weight gain, -weight loss  Eyes: -vision changes, -redness, -discharge  ENT: -ear pain, -nasal congestion, -sore throat  Cardiovascular: -chest pain, -palpitations, -lower extremity edema  Respiratory: -cough, -shortness of breath  Gastrointestinal: -abdominal pain, -nausea, -vomiting, -diarrhea, -constipation, -blood in stool  Genitourinary: -dysuria, -hematuria, -frequency  Musculoskeletal: -joint pain, -muscle pain  Skin: -rash, -lesion  Neurological: -headache, -dizziness, -numbness, -tingling  Psychiatric: -anxiety, -depression, -sleep difficulty  Breasts: +breast lumps         Pmh, Psh, Family Hx, Social Hx updated in Epic Tabs today.         4/16/2025     8:56 AM 2/6/2024     8:10 AM 10/11/2023    10:07 AM 1/24/2022    10:02  AM 11/23/2020     7:58 AM 10/29/2018     9:33 AM   Depression Patient Health Questionnaire   Over the last two weeks how often have you been bothered by little interest or pleasure in doing things Not at all Not at all Several days Not at all  Several days  Several days    Over the last two weeks how often have you been bothered by feeling down, depressed or hopeless Not at all Not at all Several days Several days  Several days  Not at all    PHQ-2 Total Score 0 0 2 1 2 1       Data saved with a previous flowsheet row definition       Active Problem List with Overview Notes    Diagnosis Date Noted    Abdominal pain 03/06/2025    Nausea and vomiting 03/06/2025    Severe obesity (BMI 35.0-35.9 with comorbidity) 02/22/2024    Chronic left shoulder pain 08/16/2023    Left elbow pain 08/16/2023    Decreased ROM of left shoulder 08/16/2023    Abnormal EKG 12/08/2022    Class 1 obesity due to excess calories with serious comorbidity and body mass index (BMI) of 33.0 to 33.9 in adult 11/23/2020    Microalbuminuria due to type 2 diabetes mellitus     Major depression in remission 10/29/2018    Aortic ectasia 10/29/2018     CXR 7/10/2011- mild      Type II diabetes mellitus with complication     Hypertension associated with diabetes     Hyperlipidemia associated with type 2 diabetes mellitus     Old MI (myocardial infarction) 05/21/2018    History of colon polyps     CAD with far remote PCI of unknown vessel        Past Medical History:   Diagnosis Date    Abnormal EKG 12/08/2022    Anticoagulant long-term use     CAD (coronary artery disease)     Depression     History of colon polyps     Hyperlipidemia associated with type 2 diabetes mellitus     Hypertension associated with diabetes     Microalbuminuria due to type 2 diabetes mellitus     NSTEMI (non-ST elevated myocardial infarction) 05/21/2018    Obesity     Type II diabetes mellitus with complication        Past Surgical History:   Procedure Laterality Date    CAROTID  STENT       SECTION, LOW TRANSVERSE      x 2    COLONOSCOPY N/A 06/10/2019    Procedure: COLONOSCOPY;  Surgeon: Maricarmen Boo MD;  Location: [facility] ENDO;  Service: Endoscopy;  Laterality: N/A;    COLONOSCOPY N/A 2022    Procedure: COLONOSCOPY;  Surgeon: Ryan Lau MD;  Location: [facility] ENDO;  Service: Endoscopy;  Laterality: N/A;    CORONARY STENT PLACEMENT N/A 2021    Procedure: INSERTION, STENT, CORONARY ARTERY;  Surgeon: Aimee Cooper MD;  Location: [facility] CATH LAB;  Service: Cardiology;  Laterality: N/A;    LEFT HEART CATHETERIZATION Left 2018    Procedure: HEART CATH-LEFT;  Surgeon: Aimee Cooper MD;  Location: [facility] CATH LAB;  Service: Cardiology;  Laterality: Left;    LEFT HEART CATHETERIZATION Left 2021    Procedure: CATHETERIZATION, HEART, LEFT;  Surgeon: Aimee Cooper MD;  Location: [facility] CATH LAB;  Service: Cardiology;  Laterality: Left;    PERCUTANEOUS TRANSLUMINAL BALLOON ANGIOPLASTY OF CORONARY ARTERY  2021    Procedure: Angioplasty-coronary;  Surgeon: Aimee Cooper MD;  Location: [facility] CATH LAB;  Service: Cardiology;;       Family History   Problem Relation Name Age of Onset    Hypertension Mother      Hyperlipidemia Mother      Hypertension Father      Diabetes Father      Hyperlipidemia Father      Colon cancer Father      Hypertension Brother x3     Breast cancer Neg Hx      Ovarian cancer Neg Hx      Uterine cancer Neg Hx         Social History     Socioeconomic History    Marital status:    Tobacco Use    Smoking status: Never    Smokeless tobacco: Never   Substance and Sexual Activity    Alcohol use: No    Drug use: No    Sexual activity: Not Currently     Partners: Male     Social Drivers of Health     Financial Resource Strain: Medium Risk (2025)    Overall Financial Resource Strain (CARDIA)     Difficulty of Paying Living Expenses: Somewhat hard   Food Insecurity: Food Insecurity Present (2025)    Hunger Vital Sign      Worried About Running Out of Food in the Last Year: Sometimes true     Ran Out of Food in the Last Year: Sometimes true   Transportation Needs: No Transportation Needs (5/19/2025)    PRAPARE - Transportation     Lack of Transportation (Medical): No     Lack of Transportation (Non-Medical): No   Physical Activity: Unknown (5/19/2025)    Exercise Vital Sign     Days of Exercise per Week: 2 days   Stress: Stress Concern Present (5/19/2025)    [employer] - Occupational Stress Questionnaire     Feeling of Stress : To some extent   Housing Stability: High Risk (5/19/2025)    Housing Stability Vital Sign     Unable to Pay for Housing in the Last Year: Yes     Homeless in the Last Year: No       Medications Ordered Prior to Encounter[1]    Review of patient's allergies indicates:   Allergen Reactions    No known drug allergies        General - Well developed, alert and oriented in NAD  HEENT - normocephalic, no evidence of trauma, sclera white, EOMI  Neck - full range of motion  COR - regular rate and rhythm without murmurs or gallops  Lungs - Clear  Abdomen - soft, non-tender  Ext - no cyanosis or edema    Foot Exam:    Protective Sensation (w/ 10 gram monofilament):  Right: Intact  Left: Intact    Visual Inspection:  Normal -  Bilateral    Pedal Pulses:   Right: Present  Left: Present    Posterior Tibialis Pulses:   Right:Present  Left: Present     Assessment:     1. Type II diabetes mellitus with complication    2. Hypertension associated with diabetes    3. Hyperlipidemia associated with type 2 diabetes mellitus    4. Other long term (current) drug therapy    5. Chest skin lesion        Pertinent Labs:    Chemistry        Component Value Date/Time     05/12/2025 0748     03/07/2025 0755    K 3.2 (L) 05/12/2025 0748    K 2.9 (L) 03/07/2025 0755     05/12/2025 0748     03/07/2025 0755    CO2 27 05/12/2025 0748    CO2 26 03/07/2025 0755    BUN 24 (H) 05/12/2025 0748     "CREATININE 0.9 05/12/2025 0748     (H) 05/12/2025 0748     (H) 03/07/2025 0755        Component Value Date/Time    CALCIUM 8.8 05/12/2025 0748    CALCIUM 8.7 03/07/2025 0755    ALKPHOS 58 05/12/2025 0748    ALKPHOS 58 03/07/2025 0755    AST 26 05/12/2025 0748    AST 53 (H) 03/07/2025 0755    ALT 28 05/12/2025 0748    ALT 29 03/07/2025 0755    BILITOT 0.3 05/12/2025 0748    BILITOT 0.4 03/07/2025 0755    ESTGFRAFRICA >60.0 03/30/2022 0900    EGFRNONAA >60.0 03/30/2022 0900          Lab Results   Component Value Date    WBC 11.88 03/06/2025    HGB 13.4 03/06/2025    HCT 40.9 03/06/2025    MCV 82 03/06/2025    MCH 26.9 (L) 03/06/2025    MCHC 32.8 03/06/2025    RDW 15.2 (H) 03/06/2025     03/06/2025    MPV 13.0 (H) 03/06/2025    PLTEST Appears normal 01/24/2021       Lab Results   Component Value Date    HGBA1C 7.3 (H) 05/12/2025    HGBA1C 8.0 (H) 03/07/2025    HGBA1C 7.6 (H) 11/13/2024     (H) 05/12/2025     Lab Results   Component Value Date    LDLCALC 140.6 05/12/2025     Lab Results   Component Value Date    TSH 0.965 05/12/2025     Lab Results   Component Value Date    CHOL 212 (H) 05/12/2025    CHOL 143 03/07/2025    CHOL 204 (H) 08/16/2024     Lab Results   Component Value Date    TRIG 127 05/12/2025    TRIG 167 (H) 03/07/2025    TRIG 231 (H) 08/16/2024     Lab Results   Component Value Date    HDL 46 05/12/2025    HDL 42 03/07/2025    HDL 45 08/16/2024     Lab Results   Component Value Date    LDLCALC 140.6 05/12/2025    LDLCALC 67.6 03/07/2025    LDLCALC 112.8 08/16/2024     No results found for: "NONHDLC"  Lab Results   Component Value Date    CHOLHDL 21.7 05/12/2025    CHOLHDL 29.4 03/07/2025    CHOLHDL 22.1 08/16/2024       The ASCVD Risk score (Jose G BURNETT, et al., 2019) failed to calculate for the following reasons:    Risk score cannot be calculated because patient has a medical history suggesting prior/existing ASCVD    Plan:     Assessment & Plan    A1C improved to 7.3 but " still above target of <7.  Breast lump appears to be a benign skin condition similar to a mole, rather than an infection.  Considered US for further evaluation of breast lump if needed.    TYPE 2 DIABETES MELLITUS:  - Evaluated current A1c at 7.3%, which shows improvement but remains above the target of less than 7%.  - Increased Glyburide from 2 mg to 4 mg daily to better control diabetes and continued Metformin 1,000 mg twice daily.  - Instructed patient to take two 2 mg tablets in the morning until new prescription for 4 mg tablets is filled, then take one 4 mg tablet daily in the morning.  - Emphasized the importance of diet control in managing diabetes to help achieve target A1c levels.    BREAST LUMP:  - Advised against squeezing breast lump.  - Referred to dermatology for evaluation and possible shave biopsy.  - Ms. Boogie should expect a call from pathology regarding further management.    FOLLOW-UP:  - Scheduled follow up in 6 months for diabetes control and A1c levels.         1. Type II diabetes mellitus with complication  -  DIABETES FOOT EXAM  - glimepiride (AMARYL) 4 MG tablet; Take 1 tablet (4 mg total) by mouth before breakfast.  Dispense: 90 tablet; Refill: 3    2. Hypertension associated with diabetes    3. Hyperlipidemia associated with type 2 diabetes mellitus    4. Other long term (current) drug therapy  - CBC Auto Differential; Future  - Comprehensive Metabolic Panel; Future  - Hemoglobin A1C; Future  - Lipid Panel; Future  - TSH; Future    5. Chest skin lesion  - Ambulatory referral/consult to Dermatology; Future    Hypertension: Amlodipine-benazepril 10-40 mg daily, hydrochlorothiazide 12.5 mg daily, metoprolol succinate 50 mg daily, spironolactone 25 mg daily.  Chronic, stable, controlled medications.  Continue medications  DM2:  Metformin 1000 mg twice daily, glimepiride 4 mg daily, Jardiance 25 mg daily.  A1c not at goal, increased dose of glimepiride to 4 mg.  CAD s/p PCI 2010:   Following cardiology.  On rosuvastatin 40 mg daily, ezetimibe 10 mg daily, Repatha, metoprolol succinate, spironolactone, benazepril, isosorbide mononitrate 60 mg daily.  Anxiety: Alprazolam 0.5 mg as needed.    Immunization History   Administered Date(s) Administered    Influenza (FLUAD) - Quadrivalent - Adjuvanted - PF *Preferred* (65+) 11/23/2020, 10/06/2021    Influenza - Intradermal - Trivalent - PF 12/20/2012    Influenza - Quadrivalent - PF *Preferred* (6 months and older) 10/16/2023    Influenza - Trivalent - Afluria, Fluzone MDV 10/28/2010, 12/07/2011    Influenza - Trivalent - Fluzone High Dose - PF (65 years and older) 01/06/2016, 01/17/2017, 10/29/2018, 10/25/2019    Pneumococcal Conjugate - 13 Valent 01/06/2016    Pneumococcal Polysaccharide - 23 Valent 01/17/2017    Tdap 12/07/2011       Orders Placed This Encounter   Procedures    CBC Auto Differential    Comprehensive Metabolic Panel    Hemoglobin A1C    Lipid Panel    TSH    Ambulatory referral/consult to Dermatology    HM DIABETES FOOT EXAM       Portions of this note were generated by [employer].    Each patient to whom medical services by telemedicine are provided:  (1) informed of the relationship between the physician and patient and the respective role of any other health care provider with respect to management of the patient; and (2) notified that he or she may decline to receive medical services by telemedicine and may withdraw from such care at any time.    I spent a total of 35 minutes face to face and non-face to face on the date of this visit.This includes time preparing to see the patient (eg, review of tests, notes), obtaining and/or reviewing additional history from an independent historian and/or outside medical records, documenting clinical information in the electronic health record, independently interpreting results and/or communicating results to the patient/family/caregiver, or care coordinator.  Visit today included increased  complexity associated with the care of the episodic problem addressed and managing the longitudinal care of the patient due to the serious and/or complex managed problem(s).      This note was generated with the assistance of ambient listening technology. Verbal consent was obtained by the patient and accompanying visitor(s) for the recording of patient appointment to facilitate this note. I attest to having reviewed and edited the generated note for accuracy, though some syntax or spelling errors may persist. Please contact the author of this note for any clarification.      Devonte Shaffer MD       [1]   Current Outpatient Medications on File Prior to Visit   Medication Sig Dispense Refill    ALPRAZolam (XANAX) 0.5 MG tablet Take 1 tablet (0.5 mg total) by mouth daily as needed. 90 tablet 5    amLODIPine-benazepriL (LOTREL) 10-40 mg per capsule Take 1 capsule by mouth once daily. 90 capsule 3    aspirin (ECOTRIN) 81 MG EC tablet Take 1 tablet (81 mg total) by mouth once daily.  0    cyclobenzaprine (FLEXERIL) 10 MG tablet Take 1 tablet (10 mg total) by mouth 3 (three) times daily as needed for Muscle spasms. 90 tablet 5    doxazosin (CARDURA) 4 MG tablet TAKE 1 TABLET BY MOUTH ONCE DAILY. 90 tablet 3    empagliflozin (JARDIANCE) 25 mg tablet Take 1 tablet (25 mg total) by mouth once daily. 90 tablet 3    evolocumab (REPATHA SURECLICK) 140 mg/mL PnIj Inject 1 mL (140 mg total) into the skin every 14 (fourteen) days. 6 each 3    ezetimibe (ZETIA) 10 mg tablet Take 1 tablet (10 mg total) by mouth once daily. 90 tablet 3    hydroCHLOROthiazide (HYDRODIURIL) 12.5 MG Tab TAKE 1 TABLET BY MOUTH EVERY DAY 90 tablet 3    isosorbide mononitrate (IMDUR) 60 MG 24 hr tablet TAKE 1 TABLET BY MOUTH EVERY DAY 90 tablet 3    metFORMIN (GLUCOPHAGE) 500 MG tablet TAKE 2 TABLETS BY MOUTH TWICE A DAY WITH MEALS 360 tablet 3    methocarbamoL (ROBAXIN) 500 MG Tab TAKE 1 TABLET BY MOUTH THREE TIMES A DAY AS NEEDED MUSCLE SPASM 60 tablet  5    metoprolol succinate (TOPROL-XL) 50 MG 24 hr tablet TAKE 1 TABLET BY MOUTH TWICE A  tablet 3    naproxen (NAPROSYN) 500 MG tablet Take 1 tablet (500 mg total) by mouth 2 (two) times daily with meals. 60 tablet 4    omeprazole (PRILOSEC) 40 MG capsule Take 1 capsule (40 mg total) by mouth once daily. 90 capsule 3    ondansetron (ZOFRAN-ODT) 4 MG TbDL Take 1 tablet (4 mg total) by mouth every 6 (six) hours as needed (if vomiting use odt). 30 tablet 0    rosuvastatin (CRESTOR) 40 MG Tab Take 1 tablet (40 mg total) by mouth every evening. 90 tablet 3    spironolactone (ALDACTONE) 25 MG tablet Take 1 tablet (25 mg total) by mouth once daily. 90 tablet 3    [DISCONTINUED] glimepiride (AMARYL) 2 MG tablet Take 1 tablet (2 mg total) by mouth before breakfast. 90 tablet 3    nitroGLYCERIN (NITROSTAT) 0.4 MG SL tablet TAKE ONE FOR ONSET OF CHEST PAIN / THEN EVERY 5 MINUTES IF CP CONTINUES UP TO 3 DOSES..CALL 911 (Patient not taking: Reported on 5/22/2025) 25 tablet 11    [DISCONTINUED] metoclopramide HCl (REGLAN) 10 MG tablet Take 1 tablet (10 mg total) by mouth every 6 (six) hours as needed (nausea/vomiting). (Patient not taking: Reported on 4/16/2025) 30 tablet 0     No current facility-administered medications on file prior to visit.

## 2025-05-29 DIAGNOSIS — I25.10 CORONARY ARTERY DISEASE INVOLVING NATIVE CORONARY ARTERY OF NATIVE HEART WITHOUT ANGINA PECTORIS: Chronic | ICD-10-CM

## 2025-05-29 DIAGNOSIS — E11.59 HYPERTENSION ASSOCIATED WITH DIABETES: ICD-10-CM

## 2025-05-29 DIAGNOSIS — I15.2 HYPERTENSION ASSOCIATED WITH DIABETES: ICD-10-CM

## 2025-05-29 NOTE — TELEPHONE ENCOUNTER
Requested Prescriptions     Pending Prescriptions Disp Refills    metoprolol succinate (TOPROL-XL) 50 MG 24 hr tablet 180 tablet 3     Sig: Take 1 tablet (50 mg total) by mouth 2 (two) times daily.    hydroCHLOROthiazide 12.5 MG Tab 90 tablet 3     Sig: Take 1 tablet (12.5 mg total) by mouth once daily.     LV 05/22/2025 HS  NV 11/25/2025 HS   LF 06/13/2024

## 2025-05-30 RX ORDER — HYDROCHLOROTHIAZIDE 12.5 MG/1
12.5 TABLET ORAL DAILY
Qty: 90 TABLET | Refills: 3 | Status: SHIPPED | OUTPATIENT
Start: 2025-05-30

## 2025-05-30 RX ORDER — METOPROLOL SUCCINATE 50 MG/1
50 TABLET, EXTENDED RELEASE ORAL 2 TIMES DAILY
Qty: 180 TABLET | Refills: 3 | Status: SHIPPED | OUTPATIENT
Start: 2025-05-30

## 2025-06-10 RX ORDER — DOXAZOSIN 4 MG/1
4 TABLET ORAL DAILY
Qty: 90 TABLET | Refills: 3 | Status: SHIPPED | OUTPATIENT
Start: 2025-06-10

## 2025-07-17 ENCOUNTER — APPOINTMENT (OUTPATIENT)
Dept: RADIOLOGY | Facility: HOSPITAL | Age: 65
End: 2025-07-17
Payer: MEDICARE

## 2025-07-17 DIAGNOSIS — Z78.0 MENOPAUSE: ICD-10-CM

## 2025-07-17 PROCEDURE — 77080 DXA BONE DENSITY AXIAL: CPT | Mod: TC

## 2025-07-17 PROCEDURE — 77080 DXA BONE DENSITY AXIAL: CPT | Mod: 26,,, | Performed by: RADIOLOGY

## 2025-08-19 ENCOUNTER — PATIENT MESSAGE (OUTPATIENT)
Dept: ADMINISTRATIVE | Facility: HOSPITAL | Age: 65
End: 2025-08-19
Payer: MEDICARE

## 2025-09-05 DIAGNOSIS — E11.8 TYPE II DIABETES MELLITUS WITH COMPLICATION: ICD-10-CM

## 2025-09-05 RX ORDER — GLIMEPIRIDE 4 MG/1
4 TABLET ORAL
Qty: 90 TABLET | Refills: 3 | Status: SHIPPED | OUTPATIENT
Start: 2025-09-05 | End: 2026-09-05

## (undated) DEVICE — KIT SYR REUSABLE

## (undated) DEVICE — CATH JR4 5FR

## (undated) DEVICE — PACK CATH LAB CUSTOM BR

## (undated) DEVICE — BAND TR WITH INFLATOR

## (undated) DEVICE — INFLATOR ENCORE 26 BLLN INFL

## (undated) DEVICE — CATH JL4 5FR

## (undated) DEVICE — KIT WATCHDOG HEMSTAS VALVE 8FR

## (undated) DEVICE — KIT GLIDESHEATH SLEND 6FR 10CM

## (undated) DEVICE — GUIDE LAUNCHER 6FR EBU 3.5

## (undated) DEVICE — Device

## (undated) DEVICE — WIRE X-SUP CHOICE PT .014X182

## (undated) DEVICE — OMNIPAQUE 300MG 150ML VIAL

## (undated) DEVICE — GUIDEWIRE EMERALD .035IN 260CM

## (undated) DEVICE — KIT MANIFOLD LOW PRESS TUBING

## (undated) DEVICE — GUIDEWIRE WHOLEY HI TORQ 175CM

## (undated) DEVICE — CATH PIG145 INFINITI 5X110CM

## (undated) DEVICE — ANGIOTOUCH KIT

## (undated) DEVICE — PACK ANGIOPLASTY ACCESS PLUS